# Patient Record
Sex: FEMALE | Race: BLACK OR AFRICAN AMERICAN
[De-identification: names, ages, dates, MRNs, and addresses within clinical notes are randomized per-mention and may not be internally consistent; named-entity substitution may affect disease eponyms.]

---

## 2018-02-15 ENCOUNTER — HOSPITAL ENCOUNTER (OUTPATIENT)
Dept: HOSPITAL 61 - LAB | Age: 83
Discharge: HOME | End: 2018-02-15
Attending: PSYCHIATRY & NEUROLOGY
Payer: MEDICARE

## 2018-02-15 DIAGNOSIS — R41.3: Primary | ICD-10-CM

## 2018-02-15 DIAGNOSIS — E55.9: ICD-10-CM

## 2018-02-15 LAB — VITAMIN-B12: 188 PG/ML (ref 247–911)

## 2018-02-15 PROCEDURE — 36415 COLL VENOUS BLD VENIPUNCTURE: CPT

## 2018-02-15 PROCEDURE — 82306 VITAMIN D 25 HYDROXY: CPT

## 2018-02-15 PROCEDURE — 82607 VITAMIN B-12: CPT

## 2018-03-01 ENCOUNTER — HOSPITAL ENCOUNTER (OUTPATIENT)
Dept: HOSPITAL 61 - RT | Age: 83
Discharge: HOME | End: 2018-03-01
Attending: PSYCHIATRY & NEUROLOGY
Payer: MEDICARE

## 2018-03-01 DIAGNOSIS — R41.0: Primary | ICD-10-CM

## 2018-03-01 PROCEDURE — 95816 EEG AWAKE AND DROWSY: CPT

## 2018-06-13 ENCOUNTER — HOSPITAL ENCOUNTER (OUTPATIENT)
Dept: HOSPITAL 61 - LAB | Age: 83
Discharge: HOME | End: 2018-06-13
Attending: PSYCHIATRY & NEUROLOGY
Payer: MEDICARE

## 2018-06-13 DIAGNOSIS — E55.9: ICD-10-CM

## 2018-06-13 DIAGNOSIS — E53.8: Primary | ICD-10-CM

## 2018-06-13 LAB
FOLATE: 6.03 NG/ML (ref 3.2–20)
VITAMIN-B12: 263 PG/ML (ref 247–911)

## 2018-06-13 PROCEDURE — 82746 ASSAY OF FOLIC ACID SERUM: CPT

## 2018-06-13 PROCEDURE — 82607 VITAMIN B-12: CPT

## 2018-06-13 PROCEDURE — 82306 VITAMIN D 25 HYDROXY: CPT

## 2018-06-13 PROCEDURE — 36415 COLL VENOUS BLD VENIPUNCTURE: CPT

## 2018-09-30 ENCOUNTER — HOSPITAL ENCOUNTER (EMERGENCY)
Dept: HOSPITAL 61 - ER | Age: 83
Discharge: HOME | End: 2018-09-30
Payer: MEDICARE

## 2018-09-30 VITALS — BODY MASS INDEX: 24.16 KG/M2 | HEIGHT: 65 IN | WEIGHT: 145 LBS

## 2018-09-30 VITALS — DIASTOLIC BLOOD PRESSURE: 64 MMHG | SYSTOLIC BLOOD PRESSURE: 155 MMHG

## 2018-09-30 DIAGNOSIS — K44.9: ICD-10-CM

## 2018-09-30 DIAGNOSIS — R55: Primary | ICD-10-CM

## 2018-09-30 DIAGNOSIS — F03.90: ICD-10-CM

## 2018-09-30 DIAGNOSIS — Z90.89: ICD-10-CM

## 2018-09-30 DIAGNOSIS — Z95.5: ICD-10-CM

## 2018-09-30 LAB
ALBUMIN SERPL-MCNC: 3.5 G/DL (ref 3.4–5)
ALBUMIN/GLOB SERPL: 1 {RATIO} (ref 1–1.7)
ALP SERPL-CCNC: 59 U/L (ref 46–116)
ALT SERPL-CCNC: 12 U/L (ref 14–59)
ANION GAP SERPL CALC-SCNC: 12 MMOL/L (ref 6–14)
APTT PPP: (no result) S
AST SERPL-CCNC: 13 U/L (ref 15–37)
BACTERIA #/AREA URNS HPF: 0 /HPF
BASOPHILS # BLD AUTO: 0 X10^3/UL (ref 0–0.2)
BASOPHILS NFR BLD: 0 % (ref 0–3)
BILIRUB SERPL-MCNC: 0.4 MG/DL (ref 0.2–1)
BILIRUB UR QL STRIP: (no result)
BUN SERPL-MCNC: 13 MG/DL (ref 7–20)
BUN/CREAT SERPL: 12 (ref 6–20)
CALCIUM SERPL-MCNC: 9.7 MG/DL (ref 8.5–10.1)
CHLORIDE SERPL-SCNC: 105 MMOL/L (ref 98–107)
CO2 SERPL-SCNC: 25 MMOL/L (ref 21–32)
CREAT SERPL-MCNC: 1.1 MG/DL (ref 0.6–1)
EOSINOPHIL NFR BLD: 0.1 X10^3/UL (ref 0–0.7)
EOSINOPHIL NFR BLD: 1 % (ref 0–3)
ERYTHROCYTE [DISTWIDTH] IN BLOOD BY AUTOMATED COUNT: 18.6 % (ref 11.5–14.5)
FIBRINOGEN PPP-MCNC: CLEAR MG/DL
GFR SERPLBLD BASED ON 1.73 SQ M-ARVRAT: 57.4 ML/MIN
GLOBULIN SER-MCNC: 3.6 G/DL (ref 2.2–3.8)
GLUCOSE SERPL-MCNC: 160 MG/DL (ref 70–99)
HCT VFR BLD CALC: 36.3 % (ref 36–47)
HGB BLD-MCNC: 12 G/DL (ref 12–15.5)
HYALINE CASTS #/AREA URNS LPF: (no result) /HPF
LYMPHOCYTES # BLD: 0.8 X10^3/UL (ref 1–4.8)
LYMPHOCYTES NFR BLD AUTO: 16 % (ref 24–48)
MCH RBC QN AUTO: 28 PG (ref 25–35)
MCHC RBC AUTO-ENTMCNC: 33 G/DL (ref 31–37)
MCV RBC AUTO: 83 FL (ref 79–100)
MONO #: 0.4 X10^3/UL (ref 0–1.1)
MONOCYTES NFR BLD: 7 % (ref 0–9)
NEUT #: 3.7 X10^3UL (ref 1.8–7.7)
NEUTROPHILS NFR BLD AUTO: 75 % (ref 31–73)
NITRITE UR QL STRIP: NEGATIVE
PH UR STRIP: 5 [PH]
PLATELET # BLD AUTO: 202 X10^3/UL (ref 140–400)
POTASSIUM SERPL-SCNC: 3.7 MMOL/L (ref 3.5–5.1)
PROT SERPL-MCNC: 7.1 G/DL (ref 6.4–8.2)
PROT UR STRIP-MCNC: 30 MG/DL
RBC # BLD AUTO: 4.36 X10^6/UL (ref 3.5–5.4)
RBC #/AREA URNS HPF: 0 /HPF (ref 0–2)
SODIUM SERPL-SCNC: 142 MMOL/L (ref 136–145)
SQUAMOUS #/AREA URNS LPF: (no result) /LPF
UROBILINOGEN UR-MCNC: 1 MG/DL
WBC # BLD AUTO: 4.9 X10^3/UL (ref 4–11)
WBC #/AREA URNS HPF: (no result) /HPF (ref 0–4)

## 2018-09-30 PROCEDURE — 84484 ASSAY OF TROPONIN QUANT: CPT

## 2018-09-30 PROCEDURE — 93005 ELECTROCARDIOGRAM TRACING: CPT

## 2018-09-30 PROCEDURE — 82962 GLUCOSE BLOOD TEST: CPT

## 2018-09-30 PROCEDURE — 70450 CT HEAD/BRAIN W/O DYE: CPT

## 2018-09-30 PROCEDURE — 85025 COMPLETE CBC W/AUTO DIFF WBC: CPT

## 2018-09-30 PROCEDURE — 81001 URINALYSIS AUTO W/SCOPE: CPT

## 2018-09-30 PROCEDURE — 51701 INSERT BLADDER CATHETER: CPT

## 2018-09-30 PROCEDURE — 71045 X-RAY EXAM CHEST 1 VIEW: CPT

## 2018-09-30 PROCEDURE — 84443 ASSAY THYROID STIM HORMONE: CPT

## 2018-09-30 PROCEDURE — 80053 COMPREHEN METABOLIC PANEL: CPT

## 2018-09-30 PROCEDURE — 72125 CT NECK SPINE W/O DYE: CPT

## 2018-09-30 PROCEDURE — 36415 COLL VENOUS BLD VENIPUNCTURE: CPT

## 2018-09-30 PROCEDURE — 99285 EMERGENCY DEPT VISIT HI MDM: CPT

## 2018-09-30 PROCEDURE — 96360 HYDRATION IV INFUSION INIT: CPT

## 2018-09-30 NOTE — PHYS DOC
Past Medical History


Past Medical History:  Dementia


Past Surgical History:  Angioplasty, Appendectomy, Other


Additional Past Surgical Histo:  cardiac stent x 3


Alcohol Use:  None


Drug Use:  None





Adult General


Chief Complaint


Chief Complaint:  SYNCOPE





[facility]


HPI





Patient is a 83  year old female who presents with complaint of syncopal 

episode on the toilet at home today. Family was in the bathroom with her and 

stated she just finished defecating on the commode when apparently she became 

difficult to rouse. Patient never fell off the commode and after several 

minutes she came to again. Patient does have a history of dementia and does not 

have any recollection of the event. Patient has never had any type of vasovagal 

syncope in the past when using the bathroom. Patient has no localizing 

neurological deficits at this time and has no acute complaints.





Review of Systems


Review of Systems





Constitutional: Denies fever or chills []


Eyes: Denies change in visual acuity, redness, or eye pain []


HENT: Denies nasal congestion or sore throat []


Respiratory: Denies cough or shortness of breath []


Cardiovascular: No additional information not addressed in HPI []


GI: Denies abdominal pain, nausea, vomiting, bloody stools or diarrhea []


: Denies dysuria or hematuria []


Musculoskeletal: Denies back pain or joint pain []


Integument: Denies rash or skin lesions []


Neurologic: Denies headache, focal weakness or sensory changes []


Endocrine: Denies polyuria or polydipsia []





All other systems were reviewed and found to be within normal limits, except as 

documented in this note.





Allergies


Allergies





Allergies








Coded Allergies Type Severity Reaction Last Updated Verified


 


  No Known Drug Allergies    10/29/16 No











Physical Exam


Physical Exam





Constitutional: Well developed, well nourished, no acute distress, non-toxic 

appearance. []


HENT: Normocephalic, atraumatic, bilateral external ears normal, oropharynx 

moist, no oral exudates, nose normal. []


Eyes: PERRLA, EOMI, conjunctiva normal, no discharge. [] 


Neck: Normal range of motion, no tenderness, supple, no stridor. [] 


Cardiovascular:Heart rate regular rhythm, no murmur []


Lungs & Thorax:  Bilateral breath sounds clear to auscultation []


Abdomen: Bowel sounds normal, soft, no tenderness, no masses, no pulsatile 

masses. [] 


Skin: Warm, dry, no erythema, no rash. [] 


Back: No tenderness, no CVA tenderness. [] 


Extremities: No tenderness, no cyanosis, no clubbing, ROM intact, no edema. [] 


Neurologic: Alert and oriented X 3, normal motor function, normal sensory 

function, no focal deficits noted. []


Psychologic: Affect normal, judgement normal, mood normal. []





Current Patient Data


Vital Signs





 Vital Signs








  Date Time  Temp Pulse Resp B/P (MAP) Pulse Ox O2 Delivery O2 Flow Rate FiO2


 


18 12:18 98.2 70 18 138/85 (102) 97 Room Air  





 98.2       








Lab Values





 Laboratory Tests








Test


 18


12:32 18


12:40 18


13:15


 


Glucose (Fingerstick)


 179 mg/dL


(70-99)  H 


 





 


White Blood Count


 


 4.9 x10^3/uL


(4.0-11.0) 





 


Red Blood Count


 


 4.36 x10^6/uL


(3.50-5.40) 





 


Hemoglobin


 


 12.0 g/dL


(12.0-15.5) 





 


Hematocrit


 


 36.3 %


(36.0-47.0) 





 


Mean Corpuscular Volume


 


 83 fL ()


 





 


Mean Corpuscular Hemoglobin  28 pg (25-35)   


 


Mean Corpuscular Hemoglobin


Concent 


 33 g/dL


(31-37) 





 


Red Cell Distribution Width


 


 18.6 %


(11.5-14.5)  H 





 


Platelet Count


 


 202 x10^3/uL


(140-400) 





 


Neutrophils (%) (Auto)  75 % (31-73)  H 


 


Lymphocytes (%) (Auto)  16 % (24-48)  L 


 


Monocytes (%) (Auto)  7 % (0-9)   


 


Eosinophils (%) (Auto)  1 % (0-3)   


 


Basophils (%) (Auto)  0 % (0-3)   


 


Neutrophils # (Auto)


 


 3.7 x10^3uL


(1.8-7.7) 





 


Lymphocytes # (Auto)


 


 0.8 x10^3/uL


(1.0-4.8)  L 





 


Monocytes # (Auto)


 


 0.4 x10^3/uL


(0.0-1.1) 





 


Eosinophils # (Auto)


 


 0.1 x10^3/uL


(0.0-0.7) 





 


Basophils # (Auto)


 


 0.0 x10^3/uL


(0.0-0.2) 





 


Sodium Level


 


 142 mmol/L


(136-145) 





 


Potassium Level


 


 3.7 mmol/L


(3.5-5.1) 





 


Chloride Level


 


 105 mmol/L


() 





 


Carbon Dioxide Level


 


 25 mmol/L


(21-32) 





 


Anion Gap  12 (6-14)   


 


Blood Urea Nitrogen


 


 13 mg/dL


(7-20) 





 


Creatinine


 


 1.1 mg/dL


(0.6-1.0)  H 





 


Estimated GFR


(Cockcroft-Gault) 


 57.4  


 





 


BUN/Creatinine Ratio  12 (6-20)   


 


Glucose Level


 


 160 mg/dL


(70-99)  H 





 


Calcium Level


 


 9.7 mg/dL


(8.5-10.1) 





 


Total Bilirubin


 


 0.4 mg/dL


(0.2-1.0) 





 


Aspartate Amino Transferase


(AST) 


 13 U/L (15-37)


L 





 


Alanine Aminotransferase (ALT)


 


 12 U/L (14-59)


L 





 


Alkaline Phosphatase


 


 59 U/L


() 





 


Troponin I Quantitative


 


 < 0.017 ng/mL


(0.000-0.055) 





 


Total Protein


 


 7.1 g/dL


(6.4-8.2) 





 


Albumin


 


 3.5 g/dL


(3.4-5.0) 





 


Albumin/Globulin Ratio  1.0 (1.0-1.7)   


 


Thyroid Stimulating Hormone


(TSH) 


 5.120 uIU/mL


(0.358-3.74)  H 





 


Urine Color   [place]  


 


Urine Clarity   Clear  


 


Urine pH   5.0  


 


Urine Specific Gravity   1.025  


 


Urine Protein


 


 


 30 mg/dL


(NEG-TRACE)


 


Urine Glucose (UA)


 


 


 Negative mg/dL


(NEG)


 


Urine Ketones (Stick)


 


 


 Trace mg/dL


(NEG)


 


Urine Blood


 


 


 Negative (NEG)





 


Urine Nitrite


 


 


 Negative (NEG)





 


Urine Bilirubin


 


 


 Moderate (NEG)





 


Urine Urobilinogen Dipstick


 


 


 1.0 mg/dL (0.2


mg/dL)


 


Urine Leukocyte Esterase


 


 


 Negative (NEG)





 


Urine RBC   0 /HPF (0-2)  


 


Urine WBC


 


 


 Occ /HPF (0-4)





 


Urine Squamous Epithelial


Cells 


 


 Occ /LPF  





 


Urine Bacteria


 


 


 0 /HPF (0-FEW)





 


Urine Hyaline Casts


 


 


 Occasional


/HPF


 


Urine Mucus   Mod /LPF  





 Laboratory Tests


18 12:40








 Laboratory Tests


18 12:40














EKG


EKG


Normal sinus rhythm at a rate of 75 with a rare PVC[]





Radiology/Procedures


Radiology/Procedures


[][facility]


 [address] [place] [address]


 [phone]


 


 IMAGING REPORT





 Signed





PATIENT: MARY OBREGON ACCOUNT: [id number] MRN#: [id number]


: 1935 LOCATION: ER AGE: 83


SEX: F EXAM DT: 18 ACCESSION#: [id number]


STATUS: REG ER ORD. PHYSICIAN: SONG ROJAS MD 


REASON: syncopy


PROCEDURE: PORTABLE CHEST 1V





 


EXAM: CHEST 1 VIEW 


 


History: Syncope 


 


COMPARISON: 2018


 


TECHNIQUE: Single portable radiograph of the chest


 


FINDINGS:  The cardiac silhouette is unremarkable. Moderate-sized hiatal 


hernia. Minimal left lung base atelectasis.


 


IMPRESSION:


 


1. No acute cardiopulmonary findings.


 


2. Moderate size hiatal hernia.


 


 


Electronically signed by: Matthew Hogue MD (2018 1:45 PM) [facility]














DICTATED and SIGNED BY:     MATTHEW HOGUE MD


DATE:     18 1344








 [id number] [place] [address]


 [phone]


 


 IMAGING REPORT





 Signed





PATIENT: MARY OBREGON ACCOUNT: [id number] MRN#: [id number]


: 1935 LOCATION: ER AGE: 83


SEX: F EXAM DT: 18 ACCESSION#: [id number]


STATUS: REG ER ORD. PHYSICIAN: SONG ROJAS MD 


REASON: syncopy


PROCEDURE: CT HEAD AND CERVICAL SPINE WO





Examination: CT head and cervical spine without contrast


 


 


 


CT HEAD


 


INDICATION: SYNCOPE


 


COMPARISON: 2018


 


Exposure: One or more of the following individualized dose reduction 


techniques were utilized for this examination:  1. Automated exposure 


control  2. Adjustment of the mA and/or kV according to patient size  3. 


Use of iterative reconstruction technique


 


TECHNIQUE: 5 mm contiguous axial images were obtained from the skull base 


to the vertex in both bone and soft tissue algorithm.  


 


FINDINGS: 


 


Mild bilateral periventricular white matter hypodensities likely chronic 


small vessel ischemic disease.  


 


No evidence of acute intracranial hemorrhage.   No extra-axial fluid 


collections.


 


No mass effect or midline shift. Ventricular size is appropriate.  Basal 


cisterns are patent.


 


No fractures identified.Gray-white differentiation is preserved.Globes and


orbits are within normal limits.   Paranasal sinuses and mastoid air cells


are clear.   


 


IMPRESSION:


 


No acute intracranial findings. 


 


 


 


CT CERVICAL SPINE


 


INDICATION: SYNCOPE. 


 


COMPARISON: None Available.


 


Technique: axial images were obtained from the skull base through the 


cervicothoracic junction Additional sagittal and coronal reconstructions 


were also performed. 


 


FINDINGS: Vertebral body height and alignment are maintained.  Cervical 


lordosis is preserved.  The lateral masses of C1 are aligned upon C2.  


 


No fractures identified.  The bony canal is patent throughout.


 


Moderate intervertebral disc height loss identified throughout the 


cervical spine particularly at C3-C4, C5-C6, C6-7 vertebral levels. The 


bilateral facets are well aligned.  


 


The paraspinous soft tissues are unremarkable.  Visualized intracranial 


contents are unremarkable.  Lung apices are clear. 


 


IMPRESSION:


 


 


1. No acute fracture pf the cervical spine particularly clinically.


 


 


2. Moderate degenerative changes cervical spine.  


 


Electronically signed by: Matthew Hogue MD (2018 1:11 PM) [facility]














DICTATED and SIGNED BY:     MATTHEW HOGUE MD


DATE:     18 1303





Course & Med Decision Making


Course & Med Decision Making


Pertinent Labs and Imaging studies reviewed. (See chart for details)





[]





Dragon Disclaimer


Dragon Disclaimer


This electronic medical record was generated, in whole or in part, using a 

voice recognition dictation system.





Departure


Departure


Impression:  


 Primary Impression:  


 Vasovagal syncope


Disposition:  01 HOME, SELF-CARE


Condition:  IMPROVED


Referrals:  


VIMAL BERNAL MD (PCP)


Patient Instructions:  Syncope, Easy-to-Read





Additional Instructions:  


Please follow up with her primary care in 1-2 days for further evaluation and 

management of your condition











SONG ROJAS MD Sep 30, 2018 13:01

## 2018-09-30 NOTE — EKG
[facility]

              [address] [place] [address]

Test Date:    2018               Test Time:    12:26:39

Pat Name:     MARY OBREGON            Department:   

Patient ID:   [id number]           Room:          

Gender:       F                        Technician:   

:          1935               Requested By: SONG ROJAS

Order Number: [id number]           Reading MD:   Ayush Rizvi MD

                                 Measurements

Intervals                              Axis          

Rate:         75                       P:            169

IN:           196                      QRS:          -168

QRSD:         96                       T:            161

QT:           390                                    

QTc:          438                                    

                           Interpretive Statements

SINUS RHYTHM

LIMB LEAD MISPLACEMENT

PVC

Electronically Signed On 10-1-2018 14:02:01 CDT by Ayush Rizvi MD

## 2018-09-30 NOTE — RAD
EXAM: CHEST 1 VIEW 

 

History: Syncope 

 

COMPARISON: 8/30/2018

 

TECHNIQUE: Single portable radiograph of the chest

 

FINDINGS:  The cardiac silhouette is unremarkable. Moderate-sized hiatal 

hernia. Minimal left lung base atelectasis.

 

IMPRESSION:

 

1. No acute cardiopulmonary findings.

 

2. Moderate size hiatal hernia.

 

 

Electronically signed by: Matthwe Hogue MD (9/30/2018 1:45 PM) [facility]

## 2018-09-30 NOTE — RAD
Examination: CT head and cervical spine without contrast

 

 

 

CT HEAD

 

INDICATION: SYNCOPE

 

COMPARISON: 8/30/2018

 

Exposure: One or more of the following individualized dose reduction 

techniques were utilized for this examination:  1. Automated exposure 

control  2. Adjustment of the mA and/or kV according to patient size  3. 

Use of iterative reconstruction technique

 

TECHNIQUE: 5 mm contiguous axial images were obtained from the skull base 

to the vertex in both bone and soft tissue algorithm.  

 

FINDINGS: 

 

Mild bilateral periventricular white matter hypodensities likely chronic 

small vessel ischemic disease.  

 

No evidence of acute intracranial hemorrhage.   No extra-axial fluid 

collections.

 

No mass effect or midline shift. Ventricular size is appropriate.  Basal 

cisterns are patent.

 

No fractures identified.Gray-white differentiation is preserved.Globes and

orbits are within normal limits.   Paranasal sinuses and mastoid air cells

are clear.   

 

IMPRESSION:

 

No acute intracranial findings. 

 

 

 

CT CERVICAL SPINE

 

INDICATION: SYNCOPE. 

 

COMPARISON: None Available.

 

Technique: axial images were obtained from the skull base through the 

cervicothoracic junction Additional sagittal and coronal reconstructions 

were also performed. 

 

FINDINGS: Vertebral body height and alignment are maintained.  Cervical 

lordosis is preserved.  The lateral masses of C1 are aligned upon C2.  

 

No fractures identified.  The bony canal is patent throughout.

 

Moderate intervertebral disc height loss identified throughout the 

cervical spine particularly at C3-C4, C5-C6, C6-7 vertebral levels. The 

bilateral facets are well aligned.  

 

The paraspinous soft tissues are unremarkable.  Visualized intracranial 

contents are unremarkable.  Lung apices are clear. 

 

IMPRESSION:

 

 

1. No acute fracture pf the cervical spine particularly clinically.

 

 

2. Moderate degenerative changes cervical spine.  

 

Electronically signed by: Matthew Hogue MD (9/30/2018 1:11 PM) [facility]

## 2018-10-14 ENCOUNTER — HOSPITAL ENCOUNTER (INPATIENT)
Dept: HOSPITAL 61 - ER | Age: 83
LOS: 10 days | Discharge: NURSING, REHAB OR LONG-TERM CARE | DRG: 97 | End: 2018-10-24
Attending: INTERNAL MEDICINE | Admitting: INTERNAL MEDICINE
Payer: MEDICARE

## 2018-10-14 VITALS — BODY MASS INDEX: 22.56 KG/M2 | WEIGHT: 127.31 LBS | HEIGHT: 63 IN

## 2018-10-14 DIAGNOSIS — F02.80: ICD-10-CM

## 2018-10-14 DIAGNOSIS — Z90.710: ICD-10-CM

## 2018-10-14 DIAGNOSIS — I25.10: ICD-10-CM

## 2018-10-14 DIAGNOSIS — M19.90: ICD-10-CM

## 2018-10-14 DIAGNOSIS — E78.5: ICD-10-CM

## 2018-10-14 DIAGNOSIS — E86.0: ICD-10-CM

## 2018-10-14 DIAGNOSIS — I49.3: ICD-10-CM

## 2018-10-14 DIAGNOSIS — Z82.3: ICD-10-CM

## 2018-10-14 DIAGNOSIS — Z82.49: ICD-10-CM

## 2018-10-14 DIAGNOSIS — R13.10: ICD-10-CM

## 2018-10-14 DIAGNOSIS — I25.2: ICD-10-CM

## 2018-10-14 DIAGNOSIS — Z80.41: ICD-10-CM

## 2018-10-14 DIAGNOSIS — E11.9: ICD-10-CM

## 2018-10-14 DIAGNOSIS — I10: ICD-10-CM

## 2018-10-14 DIAGNOSIS — G04.90: Primary | ICD-10-CM

## 2018-10-14 DIAGNOSIS — I67.83: ICD-10-CM

## 2018-10-14 DIAGNOSIS — G93.6: ICD-10-CM

## 2018-10-14 DIAGNOSIS — K59.00: ICD-10-CM

## 2018-10-14 DIAGNOSIS — Z95.5: ICD-10-CM

## 2018-10-14 DIAGNOSIS — E44.0: ICD-10-CM

## 2018-10-14 DIAGNOSIS — D72.819: ICD-10-CM

## 2018-10-14 DIAGNOSIS — D72.829: ICD-10-CM

## 2018-10-14 DIAGNOSIS — Z83.3: ICD-10-CM

## 2018-10-14 LAB
ALBUMIN SERPL-MCNC: 3 G/DL (ref 3.4–5)
ALBUMIN/GLOB SERPL: 0.9 {RATIO} (ref 1–1.7)
ALP SERPL-CCNC: 59 U/L (ref 46–116)
ALT SERPL-CCNC: 10 U/L (ref 14–59)
ANION GAP SERPL CALC-SCNC: 9 MMOL/L (ref 6–14)
APTT PPP: YELLOW S
AST SERPL-CCNC: 13 U/L (ref 15–37)
BACTERIA #/AREA URNS HPF: (no result) /HPF
BASOPHILS # BLD AUTO: 0 X10^3/UL (ref 0–0.2)
BASOPHILS NFR BLD: 1 % (ref 0–3)
BILIRUB SERPL-MCNC: 0.4 MG/DL (ref 0.2–1)
BILIRUB UR QL STRIP: (no result)
BUN SERPL-MCNC: 11 MG/DL (ref 7–20)
BUN/CREAT SERPL: 11 (ref 6–20)
CALCIUM SERPL-MCNC: 8.7 MG/DL (ref 8.5–10.1)
CHLORIDE SERPL-SCNC: 108 MMOL/L (ref 98–107)
CO2 SERPL-SCNC: 26 MMOL/L (ref 21–32)
CREAT SERPL-MCNC: 1 MG/DL (ref 0.6–1)
EOSINOPHIL NFR BLD: 0.1 X10^3/UL (ref 0–0.7)
EOSINOPHIL NFR BLD: 2 % (ref 0–3)
ERYTHROCYTE [DISTWIDTH] IN BLOOD BY AUTOMATED COUNT: 19 % (ref 11.5–14.5)
FIBRINOGEN PPP-MCNC: CLEAR MG/DL
GFR SERPLBLD BASED ON 1.73 SQ M-ARVRAT: 64.1 ML/MIN
GLOBULIN SER-MCNC: 3.3 G/DL (ref 2.2–3.8)
GLUCOSE SERPL-MCNC: 84 MG/DL (ref 70–99)
HCT VFR BLD CALC: 38.7 % (ref 36–47)
HGB BLD-MCNC: 12.8 G/DL (ref 12–15.5)
LYMPHOCYTES # BLD: 0.9 X10^3/UL (ref 1–4.8)
LYMPHOCYTES NFR BLD AUTO: 21 % (ref 24–48)
MCH RBC QN AUTO: 28 PG (ref 25–35)
MCHC RBC AUTO-ENTMCNC: 33 G/DL (ref 31–37)
MCV RBC AUTO: 84 FL (ref 79–100)
MONO #: 0.4 X10^3/UL (ref 0–1.1)
MONOCYTES NFR BLD: 10 % (ref 0–9)
NEUT #: 2.7 X10^3UL (ref 1.8–7.7)
NEUTROPHILS NFR BLD AUTO: 67 % (ref 31–73)
NITRITE UR QL STRIP: NEGATIVE
PH UR STRIP: 5.5 [PH]
PLATELET # BLD AUTO: 233 X10^3/UL (ref 140–400)
POTASSIUM SERPL-SCNC: 3.6 MMOL/L (ref 3.5–5.1)
PROT SERPL-MCNC: 6.3 G/DL (ref 6.4–8.2)
PROT UR STRIP-MCNC: NEGATIVE MG/DL
RBC # BLD AUTO: 4.59 X10^6/UL (ref 3.5–5.4)
RBC #/AREA URNS HPF: (no result) /HPF (ref 0–2)
SODIUM SERPL-SCNC: 143 MMOL/L (ref 136–145)
SQUAMOUS #/AREA URNS LPF: (no result) /LPF
UROBILINOGEN UR-MCNC: 1 MG/DL
WBC # BLD AUTO: 4.1 X10^3/UL (ref 4–11)
WBC #/AREA URNS HPF: (no result) /HPF (ref 0–4)

## 2018-10-14 PROCEDURE — 70450 CT HEAD/BRAIN W/O DYE: CPT

## 2018-10-14 PROCEDURE — 85025 COMPLETE CBC W/AUTO DIFF WBC: CPT

## 2018-10-14 PROCEDURE — 86592 SYPHILIS TEST NON-TREP QUAL: CPT

## 2018-10-14 PROCEDURE — 84443 ASSAY THYROID STIM HORMONE: CPT

## 2018-10-14 PROCEDURE — 86789 WEST NILE VIRUS ANTIBODY: CPT

## 2018-10-14 PROCEDURE — 93306 TTE W/DOPPLER COMPLETE: CPT

## 2018-10-14 PROCEDURE — 71045 X-RAY EXAM CHEST 1 VIEW: CPT

## 2018-10-14 PROCEDURE — 82945 GLUCOSE OTHER FLUID: CPT

## 2018-10-14 PROCEDURE — 87071 CULTURE AEROBIC QUANT OTHER: CPT

## 2018-10-14 PROCEDURE — 89051 BODY FLUID CELL COUNT: CPT

## 2018-10-14 PROCEDURE — 81001 URINALYSIS AUTO W/SCOPE: CPT

## 2018-10-14 PROCEDURE — 87529 HSV DNA AMP PROBE: CPT

## 2018-10-14 PROCEDURE — 86788 WEST NILE VIRUS AB IGM: CPT

## 2018-10-14 PROCEDURE — 93005 ELECTROCARDIOGRAM TRACING: CPT

## 2018-10-14 PROCEDURE — 80048 BASIC METABOLIC PNL TOTAL CA: CPT

## 2018-10-14 PROCEDURE — 74018 RADEX ABDOMEN 1 VIEW: CPT

## 2018-10-14 PROCEDURE — 80202 ASSAY OF VANCOMYCIN: CPT

## 2018-10-14 PROCEDURE — 80053 COMPREHEN METABOLIC PANEL: CPT

## 2018-10-14 PROCEDURE — 62270 DX LMBR SPI PNXR: CPT

## 2018-10-14 PROCEDURE — 36415 COLL VENOUS BLD VENIPUNCTURE: CPT

## 2018-10-14 PROCEDURE — 87075 CULTR BACTERIA EXCEPT BLOOD: CPT

## 2018-10-14 PROCEDURE — 83735 ASSAY OF MAGNESIUM: CPT

## 2018-10-14 PROCEDURE — 87102 FUNGUS ISOLATION CULTURE: CPT

## 2018-10-14 PROCEDURE — 70551 MRI BRAIN STEM W/O DYE: CPT

## 2018-10-14 PROCEDURE — 85730 THROMBOPLASTIN TIME PARTIAL: CPT

## 2018-10-14 PROCEDURE — 82962 GLUCOSE BLOOD TEST: CPT

## 2018-10-14 PROCEDURE — 96360 HYDRATION IV INFUSION INIT: CPT

## 2018-10-14 PROCEDURE — 86038 ANTINUCLEAR ANTIBODIES: CPT

## 2018-10-14 PROCEDURE — 85651 RBC SED RATE NONAUTOMATED: CPT

## 2018-10-14 PROCEDURE — 87252 VIRUS INOCULATION TISSUE: CPT

## 2018-10-14 PROCEDURE — 85610 PROTHROMBIN TIME: CPT

## 2018-10-14 PROCEDURE — 84484 ASSAY OF TROPONIN QUANT: CPT

## 2018-10-14 PROCEDURE — 84157 ASSAY OF PROTEIN OTHER: CPT

## 2018-10-14 PROCEDURE — 85007 BL SMEAR W/DIFF WBC COUNT: CPT

## 2018-10-14 PROCEDURE — 83605 ASSAY OF LACTIC ACID: CPT

## 2018-10-14 NOTE — RAD
PORTABLE CHEST 1V

 

History: Altered mental status

 

Comparison: September 30, 2018

 

Findings:

Single view of the chest is submitted. 

There is moderate to large hiatal hernia. Heart size is similar. There is 

atherosclerotic calcification thoracic aorta. There is no lobar 

infiltrate, pleural fluid, pneumothorax. There is degenerative change of 

the left shoulder.

 

Impression: 

 

1.  There is no evidence of acute cardiopulmonary disease.

2. There is hiatal hernia.

 

Electronically signed by: Russ Willoughby MD (10/14/2018 9:04 PM) [facility]

## 2018-10-14 NOTE — RAD
CT HEAD

 

INDICATION: Altered mental status

 

COMPARISON: 9/30/2018

 

Exposure: One or more of the following individualized dose reduction 

techniques were utilized for this examination:  1. Automated exposure 

control  2. Adjustment of the mA and/or kV according to patient size  3. 

Use of iterative reconstruction technique

 

TECHNIQUE: 5 mm contiguous axial images were obtained from the skull base 

to the vertex in both bone and soft tissue algorithm.  

 

FINDINGS: 

 

Moderate bilateral periventricular white matter hypodensities likely 

chronic small vessel ischemic disease.  Examination limited due to motion 

artifact.

 

No evidence of acute intracranial hemorrhage.   No extra-axial fluid 

collections.

 

No mass effect or midline shift. Ventricular size is appropriate.  Basal 

cisterns are patent.

 

No fractures identified.Gray-white differentiation is preserved.Globes and

orbits are within normal limits.   Paranasal sinuses and mastoid air cells

are clear.   

 

IMPRESSION:

 

 

No acute intracranial findings. 

 

Electronically signed by: Matthew Hogue MD (10/14/2018 10:31 PM) 

[facility]

## 2018-10-14 NOTE — PHYS DOC
Past Medical History


Past Medical History:  Dementia


Past Surgical History:  Angioplasty, Appendectomy, Other


Additional Past Surgical Histo:  cardiac stent x 3


Alcohol Use:  None


Drug Use:  None





Adult General


Chief Complaint


Chief Complaint:  ALTERED MENTAL STATUS





[facility]


HPI





Patient is a 83  year old female who presents with altered mental status. 

Patient lives at home with family members and family member states that she 

became quiet as of last night. Normally she is verbal and was able to ask for 

things and move around. They noted that she was in bed all day today wasn't 

eating. Patient also did not take her medications today. During the day when 

she was assisted to the bathroom they noted that her urine was a dark brown and 

very concentrated. They also noted that she complained of abdominal pain 

earlier today. This been no complaint of chest pain and no vomiting or 

diarrhea. There are no other acute complaints at this time.





Review of Systems


Review of Systems





Constitutional: Denies fever or chills []


Eyes: Denies change in visual acuity, redness, or eye pain []


HENT: Denies nasal congestion or sore throat []


Respiratory: Denies cough or shortness of breath []


Cardiovascular: No additional information not addressed in HPI []


GI: Positive for abdominal pain, denies nausea, vomiting, bloody stools or 

diarrhea []


: Denies dysuria or hematuria []


Musculoskeletal: Denies back pain or joint pain []


Integument: Denies rash or skin lesions []


Neurologic: Denies headache, focal weakness or sensory changes []


Endocrine: Denies polyuria or polydipsia []





All other systems were reviewed and found to be within normal limits, except as 

documented in this note.





Current Medications


Current Medications





Current Medications








 Medications


  (Trade)  Dose


 Ordered  Sig/Td  Start Time


 Stop Time Status Last Admin


Dose Admin


 


 Sodium Chloride  1,000 ml @ 


 100 mls/hr  1X  ONCE  10/14/18 20:30


 10/15/18 06:29  10/14/18 21:30


100 MLS/HR











Allergies


Allergies





Allergies








Coded Allergies Type Severity Reaction Last Updated Verified


 


  No Known Drug Allergies    10/29/16 No











Physical Exam


Physical Exam





Constitutional: Well developed, well nourished, no acute distress, non-toxic 

appearance. []


HENT: Normocephalic, atraumatic, bilateral external ears normal, oropharynx 

moist, no oral exudates, nose normal. []


Eyes: PERRLA, EOMI, conjunctiva normal, no discharge. [] 


Neck: Normal range of motion, no tenderness, supple, no stridor. [] 


Cardiovascular:Heart rate regular rhythm, no murmur []


Lungs & Thorax:  Bilateral breath sounds clear to auscultation []


Abdomen: Bowel sounds normal, soft, mild lower tenderness, no masses, no 

pulsatile masses. [] 


Skin: Warm, dry, no erythema, no rash. [] 


Back: No tenderness, no CVA tenderness. [] 


Extremities: No tenderness, no cyanosis, no clubbing, ROM intact, no edema. [] 


Neurologic: Alert and oriented X 3, normal motor function, normal sensory 

function, no focal deficits noted. []


Psychologic: Affect normal, judgement normal, mood normal. []





Current Patient Data


Vital Signs





 Vital Signs








  Date Time  Temp Pulse Resp B/P (MAP) Pulse Ox O2 Delivery O2 Flow Rate FiO2


 


10/14/18 20:30 97.3 70 24 161/78 (105) 96 Room Air  





 97.3       








Lab Values





 Laboratory Tests








Test


 10/14/18


20:24 10/14/18


20:30 10/14/18


21:12 10/14/18


21:45


 


Glucose (Fingerstick)


 87 mg/dL


(70-99) 


 


 





 


White Blood Count


 


 4.1 x10^3/uL


(4.0-11.0) 


 





 


Red Blood Count


 


 4.59 x10^6/uL


(3.50-5.40) 


 





 


Hemoglobin


 


 12.8 g/dL


(12.0-15.5) 


 





 


Hematocrit


 


 38.7 %


(36.0-47.0) 


 





 


Mean Corpuscular Volume


 


 84 fL ()


 


 





 


Mean Corpuscular Hemoglobin  28 pg (25-35)    


 


Mean Corpuscular Hemoglobin


Concent 


 33 g/dL


(31-37) 


 





 


Red Cell Distribution Width


 


 19.0 %


(11.5-14.5)  H 


 





 


Platelet Count


 


 233 x10^3/uL


(140-400) 


 





 


Neutrophils (%) (Auto)  67 % (31-73)    


 


Lymphocytes (%) (Auto)  21 % (24-48)  L  


 


Monocytes (%) (Auto)  10 % (0-9)  H  


 


Eosinophils (%) (Auto)  2 % (0-3)    


 


Basophils (%) (Auto)  1 % (0-3)    


 


Neutrophils # (Auto)


 


 2.7 x10^3uL


(1.8-7.7) 


 





 


Lymphocytes # (Auto)


 


 0.9 x10^3/uL


(1.0-4.8)  L 


 





 


Monocytes # (Auto)


 


 0.4 x10^3/uL


(0.0-1.1) 


 





 


Eosinophils # (Auto)


 


 0.1 x10^3/uL


(0.0-0.7) 


 





 


Basophils # (Auto)


 


 0.0 x10^3/uL


(0.0-0.2) 


 





 


Lactic Acid Level


 


 1.3 mmol/L


(0.4-2.0) 


 





 


Urine Collection Type   Unknown   


 


Urine Color   Yellow   


 


Urine Clarity   Clear   


 


Urine pH   5.5   


 


Urine Specific Gravity   1.020   


 


Urine Protein


 


 


 Negative mg/dL


(NEG-TRACE) 





 


Urine Glucose (UA)


 


 


 Negative mg/dL


(NEG) 





 


Urine Ketones (Stick)


 


 


 15 mg/dL (NEG)


 





 


Urine Blood


 


 


 Negative (NEG)


 





 


Urine Nitrite


 


 


 Negative (NEG)


 





 


Urine Bilirubin   Small (NEG)   


 


Urine Urobilinogen Dipstick


 


 


 1.0 mg/dL (0.2


mg/dL) 





 


Urine Leukocyte Esterase


 


 


 Negative (NEG)


 





 


Urine RBC


 


 


 Occ /HPF (0-2)


 





 


Urine WBC


 


 


 Occ /HPF (0-4)


 





 


Urine Squamous Epithelial


Cells 


 


 Occ /LPF  


 





 


Urine Bacteria


 


 


 Few /HPF


(0-FEW) 





 


Urine Mucus   Mod /LPF   


 


Sodium Level


 


 


 


 143 mmol/L


(136-145)


 


Potassium Level


 


 


 


 3.6 mmol/L


(3.5-5.1)


 


Chloride Level


 


 


 


 108 mmol/L


()  H


 


Carbon Dioxide Level


 


 


 


 26 mmol/L


(21-32)


 


Anion Gap    9 (6-14)  


 


Blood Urea Nitrogen


 


 


 


 11 mg/dL


(7-20)


 


Creatinine


 


 


 


 1.0 mg/dL


(0.6-1.0)


 


Estimated GFR


(Cockcroft-Gault) 


 


 


 64.1  





 


BUN/Creatinine Ratio    11 (6-20)  


 


Glucose Level


 


 


 


 84 mg/dL


(70-99)


 


Calcium Level


 


 


 


 8.7 mg/dL


(8.5-10.1)


 


Total Bilirubin


 


 


 


 0.4 mg/dL


(0.2-1.0)


 


Aspartate Amino Transferase


(AST) 


 


 


 13 U/L (15-37)


L


 


Alanine Aminotransferase (ALT)


 


 


 


 10 U/L (14-59)


L


 


Alkaline Phosphatase


 


 


 


 59 U/L


()


 


Troponin I Quantitative


 


 


 


 < 0.017 ng/mL


(0.000-0.055)


 


Total Protein


 


 


 


 6.3 g/dL


(6.4-8.2)  L


 


Albumin


 


 


 


 3.0 g/dL


(3.4-5.0)  L


 


Albumin/Globulin Ratio


 


 


 


 0.9 (1.0-1.7)


L


 


Thyroid Stimulating Hormone


(TSH) 


 


 


 2.289 uIU/mL


(0.358-3.74)





 Laboratory Tests


10/14/18 20:30








 Laboratory Tests


10/14/18 21:45











EKG


EKG


Bigeminy at a rate of 76[]





Radiology/Procedures


Radiology/Procedures


[id number] [place] [address]


 [phone]


 


 IMAGING REPORT





 Signed





PATIENT: MARY OBREGON ACCOUNT: [id number] MRN#: [id number]


: 1935 LOCATION: ER AGE: 83


SEX: F EXAM DT: 10/14/18 ACCESSION#: [id number]


STATUS: REG ER ORD. PHYSICIAN: SONG ROJAS MD 


REASON: AMS


PROCEDURE: CT HEAD WO CONTRAST





 


CT HEAD


 


INDICATION: Altered mental status


 


COMPARISON: 2018


 


Exposure: One or more of the following individualized dose reduction 


techniques were utilized for this examination:  1. Automated exposure 


control  2. Adjustment of the mA and/or kV according to patient size  3. 


Use of iterative reconstruction technique


 


TECHNIQUE: 5 mm contiguous axial images were obtained from the skull base 


to the vertex in both bone and soft tissue algorithm.  


 


FINDINGS: 


 


Moderate bilateral periventricular white matter hypodensities likely 


chronic small vessel ischemic disease.  Examination limited due to motion 


artifact.


 


No evidence of acute intracranial hemorrhage.   No extra-axial fluid 


collections.


 


No mass effect or midline shift. Ventricular size is appropriate.  Basal 


cisterns are patent.


 


No fractures identified.Gray-white differentiation is preserved.Globes and


orbits are within normal limits.   Paranasal sinuses and mastoid air cells


are clear.   


 


IMPRESSION:


 


 


No acute intracranial findings. 


 


Electronically signed by: Matthew Hogue MD (10/14/2018 10:31 PM) 


[facility]














DICTATED and SIGNED BY:     MATTHEW HOGUE MD


DATE:     10/14/18 2227


[]





 [facility]


 [address] [place] [address]


 [phone]


 


 IMAGING REPORT





 Signed





PATIENT: MARY OBREGON ACCOUNT: [id number] MRN#: [id number]


: 1935 LOCATION: ER AGE: 83


SEX: F EXAM DT: 10/14/18 ACCESSION#: [id number]


STATUS: REG ER ORD. PHYSICIAN: SONG ROJAS MD 


REASON: AMS


PROCEDURE: PORTABLE CHEST 1V





PORTABLE CHEST 1V


 


History: Altered mental status


 


Comparison: 2018


 


Findings:


Single view of the chest is submitted. 


There is moderate to large hiatal hernia. Heart size is similar. There is 


atherosclerotic calcification thoracic aorta. There is no lobar 


infiltrate, pleural fluid, pneumothorax. There is degenerative change of 


the left shoulder.


 


Impression: 


 


1.  There is no evidence of acute cardiopulmonary disease.


2. There is hiatal hernia.


 


Electronically signed by: Deisy Lopez MD (10/14/2018 9:04 PM) [facility]














DICTATED and SIGNED BY:     DEISY LOPEZ MD


DATE:     10/14/18 2103





Course & Med Decision Making


Course & Med Decision Making


Pertinent Labs and Imaging studies reviewed. (See chart for details)


Discussion with family they did mention that there might be some dementia but 

that even given the patient's declining mental state over the last 1-2 days 

with a thank is different is her minimally verbal state. While in the emergency 

department when I came in as part of my reevaluation process and asked her how 

she feeling the patient did say okay and was able to look at me and track 

across the room. The daughter/caregiver stated that was new that she was 

speaking now.





The daughter also said there at least 5 different family members the rotate 

within a 24-hour period taking care of her and that they use to keep a log book 

but they don't anymore in terms of being able to note and have first-person 

accurate record of what happened on each caregiver shift. The current caregiver 

is unable to give me much information and in trying to contact other family 

members is going to voicemail. Due to the possibility of this being dementia 

versus a TIA was decided to go ahead and admit for further evaluation and 

management.


[]





Dragon Disclaimer


Dragon Disclaimer


This electronic medical record was generated, in whole or in part, using a 

voice recognition dictation system.





Departure


Departure


Impression:  


 Primary Impression:  


 Altered mental status


Disposition:   ADMITTED AS INPATIENT


Admitting Physician:  Purvi Dao


Condition:  STABLE


Referrals:  


VIMAL BERNAL MD (PCP)











SOGN ROJAS MD Oct 14, 2018 20:24

## 2018-10-15 VITALS — SYSTOLIC BLOOD PRESSURE: 142 MMHG | DIASTOLIC BLOOD PRESSURE: 79 MMHG

## 2018-10-15 VITALS — SYSTOLIC BLOOD PRESSURE: 149 MMHG | DIASTOLIC BLOOD PRESSURE: 82 MMHG

## 2018-10-15 VITALS — SYSTOLIC BLOOD PRESSURE: 155 MMHG | DIASTOLIC BLOOD PRESSURE: 59 MMHG

## 2018-10-15 VITALS — DIASTOLIC BLOOD PRESSURE: 85 MMHG | SYSTOLIC BLOOD PRESSURE: 137 MMHG

## 2018-10-15 VITALS — SYSTOLIC BLOOD PRESSURE: 150 MMHG | DIASTOLIC BLOOD PRESSURE: 74 MMHG

## 2018-10-15 VITALS — SYSTOLIC BLOOD PRESSURE: 151 MMHG | DIASTOLIC BLOOD PRESSURE: 81 MMHG

## 2018-10-15 LAB
ANION GAP SERPL CALC-SCNC: 11 MMOL/L (ref 6–14)
BASOPHILS # BLD AUTO: 0 X10^3/UL (ref 0–0.2)
BASOPHILS NFR BLD: 0 % (ref 0–3)
BUN SERPL-MCNC: 9 MG/DL (ref 7–20)
CALCIUM SERPL-MCNC: 8.7 MG/DL (ref 8.5–10.1)
CHLORIDE SERPL-SCNC: 107 MMOL/L (ref 98–107)
CO2 SERPL-SCNC: 23 MMOL/L (ref 21–32)
CREAT SERPL-MCNC: 0.9 MG/DL (ref 0.6–1)
EOSINOPHIL NFR BLD: 0.1 X10^3/UL (ref 0–0.7)
EOSINOPHIL NFR BLD: 1 % (ref 0–3)
ERYTHROCYTE [DISTWIDTH] IN BLOOD BY AUTOMATED COUNT: 18.8 % (ref 11.5–14.5)
GFR SERPLBLD BASED ON 1.73 SQ M-ARVRAT: 72.4 ML/MIN
GLUCOSE SERPL-MCNC: 74 MG/DL (ref 70–99)
HCT VFR BLD CALC: 35.9 % (ref 36–47)
HGB BLD-MCNC: 11.8 G/DL (ref 12–15.5)
LYMPHOCYTES # BLD: 1 X10^3/UL (ref 1–4.8)
LYMPHOCYTES NFR BLD AUTO: 26 % (ref 24–48)
MCH RBC QN AUTO: 28 PG (ref 25–35)
MCHC RBC AUTO-ENTMCNC: 33 G/DL (ref 31–37)
MCV RBC AUTO: 84 FL (ref 79–100)
MONO #: 0.4 X10^3/UL (ref 0–1.1)
MONOCYTES NFR BLD: 10 % (ref 0–9)
NEUT #: 2.5 X10^3UL (ref 1.8–7.7)
NEUTROPHILS NFR BLD AUTO: 63 % (ref 31–73)
PLATELET # BLD AUTO: 206 X10^3/UL (ref 140–400)
POTASSIUM SERPL-SCNC: 3.3 MMOL/L (ref 3.5–5.1)
RBC # BLD AUTO: 4.28 X10^6/UL (ref 3.5–5.4)
SODIUM SERPL-SCNC: 141 MMOL/L (ref 136–145)
WBC # BLD AUTO: 3.9 X10^3/UL (ref 4–11)

## 2018-10-15 RX ADMIN — DEXTROSE, SODIUM CHLORIDE, AND POTASSIUM CHLORIDE SCH MLS/HR: 5; .45; .15 INJECTION INTRAVENOUS at 10:54

## 2018-10-15 RX ADMIN — VANCOMYCIN HYDROCHLORIDE PRN EACH: 1 INJECTION, POWDER, LYOPHILIZED, FOR SOLUTION INTRAVENOUS at 18:45

## 2018-10-15 RX ADMIN — DEXAMETHASONE SODIUM PHOSPHATE SCH MG: 4 INJECTION, SOLUTION INTRAMUSCULAR; INTRAVENOUS at 16:50

## 2018-10-15 RX ADMIN — DEXAMETHASONE SODIUM PHOSPHATE SCH MG: 4 INJECTION, SOLUTION INTRAMUSCULAR; INTRAVENOUS at 23:37

## 2018-10-15 NOTE — PDOC1
History and Physical


Date of Admission


Date of Admission


DATE: 10/15/18 


TIME: 09:24





Identification/Chief Complaint


Chief Complaint


obtunded





Source


Source:  Chart review, Patient





History of Present Illness


History of Present Illness


 Ms. Deal is a 83  year old female who presents with altered mental status.  

Similar admit 5 weeks ago


 Patient lives at home with family members and family members, and has required 

around the clock care.


Family reported change last night with her suddenly not talking.  She has not 

eaten much in over 2 days, and had mostly stayed in bed and had not moved or 

taken her meds





  Normally she is verbal and was able to ask for things and move around.  


  not much liquid intake, and little urine output.  She has appeared in no 

distress. s There are no other acute complaints at this time.





Past Medical History


Cardiovascular:  HTN


Pulmonary:  No pertinent hx


Musculoskeletal:  Osteoarthritis





Past Surgical History


Past Surgical History:  Hysterectomy





Family History


Family History


father had CVA, mother had ovarian cancer, sister Dm2


Family History:  Cancer, Diabetes, Hypertension, Stroke


Family History:  Parent





Social History


Smoke:  No


ALCOHOL:  none


Drugs:  None





Current Problem List


Problem List


Problems


Medical Problems:


(1) Altered mental status


Status: Acute  











Current Medications


Current Medications





Current Medications


Sodium Chloride 500 ml @  500 mls/hr 1X  ONCE IV  Last administered on 10/14/

18at 20:30;  Start 10/14/18 at 20:30;  Stop 10/14/18 at 21:29;  Status DC


Sodium Chloride 1,000 ml @  100 mls/hr 1X  ONCE IV  Last administered on 10/14/

18at 21:30;  Start 10/14/18 at 20:30;  Stop 10/15/18 at 06:29;  Status DC


Ondansetron HCl (Zofran) 4 mg PRN Q8HRS  PRN IV NAUSEA/VOMITING 1ST CHOICE;  

Start 10/14/18 at 23:45;  Stop 10/15/18 at 23:44


Sodium Chloride 1,000 ml @  80 mls/hr [id number] IV  Last administered on 10/15/

18at 08:29;  Start 10/15/18 at 08:00;  Stop 10/15/18 at 09:22;  Status DC


Potassium Chloride/Dextrose/ Sod Cl 1,000 ml @  80 mls/hr [id number] IV ;  Start 10

/15/18 at 09:30;  Status UNV





Active Scripts


Active


Meclizine Hcl 25 Mg Tablet 1 Tab PO PRN TID PRN


Reported


Aspirin 81 Mg Tab.chew 1 Tab PO DAILY


Simvastatin 40 Mg Tablet 1 Tab PO QHS


Clopidogrel (Clopidogrel Bisulfate) 75 Mg Tablet 1 Tab PO DAILY


Metformin Hcl 500 Mg Tablet 1 Tab PO BID


Atenolol 25 Mg Tablet 1 Tab PO DAILY





Allergies


Allergies:  


Coded Allergies:  


     No Known Drug Allergies (Unverified , 10/29/16)





ROS


Review of System


pt unable to articulate,  may have said abd discomfort and is rubbing her 

abdomen





Physical Exam


General:  No acute distress, Other (minimal cooperation,  does not follow 

commands, opens eyes,  shakes head,  does not talk, )


HEENT:  Atraumatic, PERRLA, EOMI, Mucous membr. moist/pink


Lungs:  Clear to auscultation


Heart:  no gallops, no murmurs


Abdomen:  Normal bowel sounds, Soft


Rectal Exam:  not examined


Extremities:  No clubbing, No cyanosis, No edema


Skin:  No breakdown, No significant lesion


Neuro:  Normal tone, Other


Psych/Mental Status:  Other





Vitals


Vitals





Vital Signs








  Date Time  Temp Pulse Resp B/P (MAP) Pulse Ox O2 Delivery O2 Flow Rate FiO2


 


10/15/18 03:00 97.9 75 18 149/82 (104) 98 Room Air  





 97.9       











Labs


Labs





Laboratory Tests








Test


 10/14/18


20:24 10/14/18


20:30 10/14/18


21:12 10/14/18


21:45


 


Glucose (Fingerstick)


 87 mg/dL


(70-99) 


 


 





 


White Blood Count


 


 4.1 x10^3/uL


(4.0-11.0) 


 





 


Red Blood Count


 


 4.59 x10^6/uL


(3.50-5.40) 


 





 


Hemoglobin


 


 12.8 g/dL


(12.0-15.5) 


 





 


Hematocrit


 


 38.7 %


(36.0-47.0) 


 





 


Mean Corpuscular Volume  84 fL ()   


 


Mean Corpuscular Hemoglobin  28 pg (25-35)   


 


Mean Corpuscular Hemoglobin


Concent 


 33 g/dL


(31-37) 


 





 


Red Cell Distribution Width


 


 19.0 %


(11.5-14.5) 


 





 


Platelet Count


 


 233 x10^3/uL


(140-400) 


 





 


Neutrophils (%) (Auto)  67 % (31-73)   


 


Lymphocytes (%) (Auto)  21 % (24-48)   


 


Monocytes (%) (Auto)  10 % (0-9)   


 


Eosinophils (%) (Auto)  2 % (0-3)   


 


Basophils (%) (Auto)  1 % (0-3)   


 


Neutrophils # (Auto)


 


 2.7 x10^3uL


(1.8-7.7) 


 





 


Lymphocytes # (Auto)


 


 0.9 x10^3/uL


(1.0-4.8) 


 





 


Monocytes # (Auto)


 


 0.4 x10^3/uL


(0.0-1.1) 


 





 


Eosinophils # (Auto)


 


 0.1 x10^3/uL


(0.0-0.7) 


 





 


Basophils # (Auto)


 


 0.0 x10^3/uL


(0.0-0.2) 


 





 


Lactic Acid Level


 


 1.3 mmol/L


(0.4-2.0) 


 





 


Urine Collection Type   Unknown  


 


Urine Color   Yellow  


 


Urine Clarity   Clear  


 


Urine pH   5.5  


 


Urine Specific Gravity   1.020  


 


Urine Protein


 


 


 Negative mg/dL


(NEG-TRACE) 





 


Urine Glucose (UA)


 


 


 Negative mg/dL


(NEG) 





 


Urine Ketones (Stick)   15 mg/dL (NEG)  


 


Urine Blood   Negative (NEG)  


 


Urine Nitrite   Negative (NEG)  


 


Urine Bilirubin   Small (NEG)  


 


Urine Urobilinogen Dipstick


 


 


 1.0 mg/dL (0.2


mg/dL) 





 


Urine Leukocyte Esterase   Negative (NEG)  


 


Urine RBC   Occ /HPF (0-2)  


 


Urine WBC   Occ /HPF (0-4)  


 


Urine Squamous Epithelial


Cells 


 


 Occ /LPF 


 





 


Urine Bacteria


 


 


 Few /HPF


(0-FEW) 





 


Urine Mucus   Mod /LPF  


 


Sodium Level


 


 


 


 143 mmol/L


(136-145)


 


Potassium Level


 


 


 


 3.6 mmol/L


(3.5-5.1)


 


Chloride Level


 


 


 


 108 mmol/L


()


 


Carbon Dioxide Level


 


 


 


 26 mmol/L


(21-32)


 


Anion Gap    9 (6-14) 


 


Blood Urea Nitrogen


 


 


 


 11 mg/dL


(7-20)


 


Creatinine


 


 


 


 1.0 mg/dL


(0.6-1.0)


 


Estimated GFR


(Cockcroft-Gault) 


 


 


 64.1 





 


BUN/Creatinine Ratio    11 (6-20) 


 


Glucose Level


 


 


 


 84 mg/dL


(70-99)


 


Calcium Level


 


 


 


 8.7 mg/dL


(8.5-10.1)


 


Total Bilirubin


 


 


 


 0.4 mg/dL


(0.2-1.0)


 


Aspartate Amino Transf


(AST/SGOT) 


 


 


 13 U/L (15-37) 





 


Alanine Aminotransferase


(ALT/SGPT) 


 


 


 10 U/L (14-59) 





 


Alkaline Phosphatase


 


 


 


 59 U/L


()


 


Troponin I Quantitative


 


 


 


 < 0.017 ng/mL


(0.000-0.055)


 


Total Protein


 


 


 


 6.3 g/dL


(6.4-8.2)


 


Albumin


 


 


 


 3.0 g/dL


(3.4-5.0)


 


Albumin/Globulin Ratio    0.9 (1.0-1.7) 


 


Thyroid Stimulating Hormone


(TSH) 


 


 


 2.289 uIU/mL


(0.358-3.74)


 


Test


 10/15/18


03:40 


 


 





 


White Blood Count


 3.9 x10^3/uL


(4.0-11.0) 


 


 





 


Red Blood Count


 4.28 x10^6/uL


(3.50-5.40) 


 


 





 


Hemoglobin


 11.8 g/dL


(12.0-15.5) 


 


 





 


Hematocrit


 35.9 %


(36.0-47.0) 


 


 





 


Mean Corpuscular Volume 84 fL ()    


 


Mean Corpuscular Hemoglobin 28 pg (25-35)    


 


Mean Corpuscular Hemoglobin


Concent 33 g/dL


(31-37) 


 


 





 


Red Cell Distribution Width


 18.8 %


(11.5-14.5) 


 


 





 


Platelet Count


 206 x10^3/uL


(140-400) 


 


 





 


Neutrophils (%) (Auto) 63 % (31-73)    


 


Lymphocytes (%) (Auto) 26 % (24-48)    


 


Monocytes (%) (Auto) 10 % (0-9)    


 


Eosinophils (%) (Auto) 1 % (0-3)    


 


Basophils (%) (Auto) 0 % (0-3)    


 


Neutrophils # (Auto)


 2.5 x10^3uL


(1.8-7.7) 


 


 





 


Lymphocytes # (Auto)


 1.0 x10^3/uL


(1.0-4.8) 


 


 





 


Monocytes # (Auto)


 0.4 x10^3/uL


(0.0-1.1) 


 


 





 


Eosinophils # (Auto)


 0.1 x10^3/uL


(0.0-0.7) 


 


 





 


Basophils # (Auto)


 0.0 x10^3/uL


(0.0-0.2) 


 


 





 


Sodium Level


 141 mmol/L


(136-145) 


 


 





 


Potassium Level


 3.3 mmol/L


(3.5-5.1) 


 


 





 


Chloride Level


 107 mmol/L


() 


 


 





 


Carbon Dioxide Level


 23 mmol/L


(21-32) 


 


 





 


Anion Gap 11 (6-14)    


 


Blood Urea Nitrogen 9 mg/dL (7-20)    


 


Creatinine


 0.9 mg/dL


(0.6-1.0) 


 


 





 


Estimated GFR


(Cockcroft-Gault) 72.4 


 


 


 





 


Glucose Level


 74 mg/dL


(70-99) 


 


 





 


Calcium Level


 8.7 mg/dL


(8.5-10.1) 


 


 











Laboratory Tests








Test


 10/14/18


20:24 10/14/18


20:30 10/14/18


21:12 10/14/18


21:45


 


Glucose (Fingerstick)


 87 mg/dL


(70-99) 


 


 





 


White Blood Count


 


 4.1 x10^3/uL


(4.0-11.0) 


 





 


Red Blood Count


 


 4.59 x10^6/uL


(3.50-5.40) 


 





 


Hemoglobin


 


 12.8 g/dL


(12.0-15.5) 


 





 


Hematocrit


 


 38.7 %


(36.0-47.0) 


 





 


Mean Corpuscular Volume  84 fL ()   


 


Mean Corpuscular Hemoglobin  28 pg (25-35)   


 


Mean Corpuscular Hemoglobin


Concent 


 33 g/dL


(31-37) 


 





 


Red Cell Distribution Width


 


 19.0 %


(11.5-14.5) 


 





 


Platelet Count


 


 233 x10^3/uL


(140-400) 


 





 


Neutrophils (%) (Auto)  67 % (31-73)   


 


Lymphocytes (%) (Auto)  21 % (24-48)   


 


Monocytes (%) (Auto)  10 % (0-9)   


 


Eosinophils (%) (Auto)  2 % (0-3)   


 


Basophils (%) (Auto)  1 % (0-3)   


 


Neutrophils # (Auto)


 


 2.7 x10^3uL


(1.8-7.7) 


 





 


Lymphocytes # (Auto)


 


 0.9 x10^3/uL


(1.0-4.8) 


 





 


Monocytes # (Auto)


 


 0.4 x10^3/uL


(0.0-1.1) 


 





 


Eosinophils # (Auto)


 


 0.1 x10^3/uL


(0.0-0.7) 


 





 


Basophils # (Auto)


 


 0.0 x10^3/uL


(0.0-0.2) 


 





 


Lactic Acid Level


 


 1.3 mmol/L


(0.4-2.0) 


 





 


Urine Collection Type   Unknown  


 


Urine Color   Yellow  


 


Urine Clarity   Clear  


 


Urine pH   5.5  


 


Urine Specific Gravity   1.020  


 


Urine Protein


 


 


 Negative mg/dL


(NEG-TRACE) 





 


Urine Glucose (UA)


 


 


 Negative mg/dL


(NEG) 





 


Urine Ketones (Stick)   15 mg/dL (NEG)  


 


Urine Blood   Negative (NEG)  


 


Urine Nitrite   Negative (NEG)  


 


Urine Bilirubin   Small (NEG)  


 


Urine Urobilinogen Dipstick


 


 


 1.0 mg/dL (0.2


mg/dL) 





 


Urine Leukocyte Esterase   Negative (NEG)  


 


Urine RBC   Occ /HPF (0-2)  


 


Urine WBC   Occ /HPF (0-4)  


 


Urine Squamous Epithelial


Cells 


 


 Occ /LPF 


 





 


Urine Bacteria


 


 


 Few /HPF


(0-FEW) 





 


Urine Mucus   Mod /LPF  


 


Sodium Level


 


 


 


 143 mmol/L


(136-145)


 


Potassium Level


 


 


 


 3.6 mmol/L


(3.5-5.1)


 


Chloride Level


 


 


 


 108 mmol/L


()


 


Carbon Dioxide Level


 


 


 


 26 mmol/L


(21-32)


 


Anion Gap    9 (6-14) 


 


Blood Urea Nitrogen


 


 


 


 11 mg/dL


(7-20)


 


Creatinine


 


 


 


 1.0 mg/dL


(0.6-1.0)


 


Estimated GFR


(Cockcroft-Gault) 


 


 


 64.1 





 


BUN/Creatinine Ratio    11 (6-20) 


 


Glucose Level


 


 


 


 84 mg/dL


(70-99)


 


Calcium Level


 


 


 


 8.7 mg/dL


(8.5-10.1)


 


Total Bilirubin


 


 


 


 0.4 mg/dL


(0.2-1.0)


 


Aspartate Amino Transf


(AST/SGOT) 


 


 


 13 U/L (15-37) 





 


Alanine Aminotransferase


(ALT/SGPT) 


 


 


 10 U/L (14-59) 





 


Alkaline Phosphatase


 


 


 


 59 U/L


()


 


Troponin I Quantitative


 


 


 


 < 0.017 ng/mL


(0.000-0.055)


 


Total Protein


 


 


 


 6.3 g/dL


(6.4-8.2)


 


Albumin


 


 


 


 3.0 g/dL


(3.4-5.0)


 


Albumin/Globulin Ratio    0.9 (1.0-1.7) 


 


Thyroid Stimulating Hormone


(TSH) 


 


 


 2.289 uIU/mL


(0.358-3.74)


 


Test


 10/15/18


03:40 


 


 





 


White Blood Count


 3.9 x10^3/uL


(4.0-11.0) 


 


 





 


Red Blood Count


 4.28 x10^6/uL


(3.50-5.40) 


 


 





 


Hemoglobin


 11.8 g/dL


(12.0-15.5) 


 


 





 


Hematocrit


 35.9 %


(36.0-47.0) 


 


 





 


Mean Corpuscular Volume 84 fL ()    


 


Mean Corpuscular Hemoglobin 28 pg (25-35)    


 


Mean Corpuscular Hemoglobin


Concent 33 g/dL


(31-37) 


 


 





 


Red Cell Distribution Width


 18.8 %


(11.5-14.5) 


 


 





 


Platelet Count


 206 x10^3/uL


(140-400) 


 


 





 


Neutrophils (%) (Auto) 63 % (31-73)    


 


Lymphocytes (%) (Auto) 26 % (24-48)    


 


Monocytes (%) (Auto) 10 % (0-9)    


 


Eosinophils (%) (Auto) 1 % (0-3)    


 


Basophils (%) (Auto) 0 % (0-3)    


 


Neutrophils # (Auto)


 2.5 x10^3uL


(1.8-7.7) 


 


 





 


Lymphocytes # (Auto)


 1.0 x10^3/uL


(1.0-4.8) 


 


 





 


Monocytes # (Auto)


 0.4 x10^3/uL


(0.0-1.1) 


 


 





 


Eosinophils # (Auto)


 0.1 x10^3/uL


(0.0-0.7) 


 


 





 


Basophils # (Auto)


 0.0 x10^3/uL


(0.0-0.2) 


 


 





 


Sodium Level


 141 mmol/L


(136-145) 


 


 





 


Potassium Level


 3.3 mmol/L


(3.5-5.1) 


 


 





 


Chloride Level


 107 mmol/L


() 


 


 





 


Carbon Dioxide Level


 23 mmol/L


(21-32) 


 


 





 


Anion Gap 11 (6-14)    


 


Blood Urea Nitrogen 9 mg/dL (7-20)    


 


Creatinine


 0.9 mg/dL


(0.6-1.0) 


 


 





 


Estimated GFR


(Cockcroft-Gault) 72.4 


 


 


 





 


Glucose Level


 74 mg/dL


(70-99) 


 


 





 


Calcium Level


 8.7 mg/dL


(8.5-10.1) 


 


 














VTE Prophylaxis Ordered


VTE Prophylaxis Devices:  No


VTE Pharmacological Prophylaxi:  Yes





Assessment/Plan


Assessment/Plan


Acute encephalopathy on chronic dementia


fluctuating dementia with prior delirium 





malnutrition,  moderate





dehydration





weakness and debility,  pT and OT


dysphagia,  poor po itnake, consult nutrition and speech





consult Neuro,   has seen before,  w/u had been negative, MRI, EEG neg,    ? 

Alzheimers or fronto-temporal dementia.





admit


full code,    consult palliative care to have a family meeting,  I discussed 

with one family member that this may be progression of dementia,   patient has 

6 kids, 


I recommended no code status,   consider hospice,   AUNG Domingo MD Oct 15, 2018 09:34

## 2018-10-15 NOTE — RAD
MRI Brain without contrast

 

History: Altered mental status, abnormal CT

 

Technique: Multiplanar, multisequential noncontrast MR imaging was 

performed of the brain.

 

Contrast:  None

 

Comparison: CT exam of October 14, 2018, MRI brain exam August 31, 2018

 

Findings: Not associated with significant restricted diffusion, there are 

new prominent areas of abnormal T2 and FLAIR hyperintense signal 

abnormality bilaterally, variable involvement of all lobes greater on the 

left. There is also involvement of the left aspect of the corpus callosum.

More confluent areas of edema on the left involving the left temporal lobe

but also left frontal parietal lobes, right greatest of the right temporal

lobe and parietal lobe. There is more mass effect on the left, left to 

right shift by about 0.4 to 0.5 cm. There is variable cortical involvement

again left greater than right. Ventricles are not significantly dilated. 

There is no significant extra-axial fluid collection. There are multiple 

scattered small foci of decreased signal on the gradient echo sequence 

greatest of the temporal lobes compatible with sites of microhemorrhage, 

new foci in the interval. Mastoid air cells are aerated. There has been 

lens surgery bilaterally. There is minimal patchy ethmoid air cell mucosal

thickening. There is preservation of the major arterial intracranial flow 

voids at the skull base.

 

Impression:

1. New since the previous MRI exam August 31, 2018, there are prominent 

areas of edema bilaterally, left greater than right, associated mild 

left-to-right midline shift. There is variable cortical involvement. 

Primary concern would be for encephalitis/cerebritis. There are scattered 

small foci of associated microhemorrhage.  If the patient is hypertensive,

sequela of posterior reversible encephalopathy syndrome would be included 

although distribution somewhat atypical.  No contrast was given for this 

exam. Underlying masses would be unlikely given absence of findings on 

recent exam.

 

 

Critical result:

 

Findings discussed with patient's nurse Macarena at 10/15/2018 4:10 PM.

 

 

Electronically signed by: Russ Willoughby MD (10/15/2018 4:11 PM) 

[id number]

## 2018-10-15 NOTE — EKG
[facility]

              [address] [place] [address]

Test Date:    2018-10-14               Test Time:    20:24:57

Pat Name:     MARY OBREGON            Department:   

Patient ID:   [id number]           Room:         506 1

Gender:       F                        Technician:   

:          [id number]               Requested By: SONG ROJAS

Order Number: [id number]           Reading MD:   Ayush Rizvi MD

                                 Measurements

Intervals                              Axis          

Rate:         75                       P:            55

MO:           180                      QRS:          -8

QRSD:         88                       T:            52

QT:           472                                    

QTc:          536                                    

                           Interpretive Statements

SINUS RHYTHM

VENTRICULAR PREMATURE COMPLEX(ES), BIGEMINY



Electronically Signed On 10- 9:39:21 CDT by Ayush Rizvi MD

## 2018-10-16 VITALS — SYSTOLIC BLOOD PRESSURE: 131 MMHG | DIASTOLIC BLOOD PRESSURE: 70 MMHG

## 2018-10-16 VITALS — SYSTOLIC BLOOD PRESSURE: 147 MMHG | DIASTOLIC BLOOD PRESSURE: 64 MMHG

## 2018-10-16 VITALS — SYSTOLIC BLOOD PRESSURE: 144 MMHG | DIASTOLIC BLOOD PRESSURE: 64 MMHG

## 2018-10-16 VITALS — SYSTOLIC BLOOD PRESSURE: 135 MMHG | DIASTOLIC BLOOD PRESSURE: 77 MMHG

## 2018-10-16 VITALS — DIASTOLIC BLOOD PRESSURE: 85 MMHG | SYSTOLIC BLOOD PRESSURE: 137 MMHG

## 2018-10-16 VITALS — SYSTOLIC BLOOD PRESSURE: 135 MMHG | DIASTOLIC BLOOD PRESSURE: 72 MMHG

## 2018-10-16 LAB
% BANDS: 7 % (ref 0–9)
% LYMPHS: 4 % (ref 24–48)
% SEGS: 89 % (ref 35–66)
ALBUMIN SERPL-MCNC: 2.8 G/DL (ref 3.4–5)
ALBUMIN/GLOB SERPL: 0.7 {RATIO} (ref 1–1.7)
ALP SERPL-CCNC: 65 U/L (ref 46–116)
ALT SERPL-CCNC: 10 U/L (ref 14–59)
ANION GAP SERPL CALC-SCNC: 14 MMOL/L (ref 6–14)
ANISOCYTOSIS BLD QL SMEAR: SLIGHT
AST SERPL-CCNC: 12 U/L (ref 15–37)
BASOPHILS # BLD AUTO: 0 X10^3/UL (ref 0–0.2)
BASOPHILS NFR BLD: 0 % (ref 0–3)
BILIRUB SERPL-MCNC: 0.3 MG/DL (ref 0.2–1)
BUN SERPL-MCNC: 10 MG/DL (ref 7–20)
BUN/CREAT SERPL: 13 (ref 6–20)
CALCIUM SERPL-MCNC: 8.6 MG/DL (ref 8.5–10.1)
CHLORIDE SERPL-SCNC: 107 MMOL/L (ref 98–107)
CO2 SERPL-SCNC: 20 MMOL/L (ref 21–32)
CREAT SERPL-MCNC: 0.8 MG/DL (ref 0.6–1)
EOSINOPHIL NFR BLD: 0 % (ref 0–3)
EOSINOPHIL NFR BLD: 0 X10^3/UL (ref 0–0.7)
ERYTHROCYTE [DISTWIDTH] IN BLOOD BY AUTOMATED COUNT: 18.3 % (ref 11.5–14.5)
GFR SERPLBLD BASED ON 1.73 SQ M-ARVRAT: 82.9 ML/MIN
GLOBULIN SER-MCNC: 3.8 G/DL (ref 2.2–3.8)
GLUCOSE SERPL-MCNC: 177 MG/DL (ref 70–99)
HCT VFR BLD CALC: 36.8 % (ref 36–47)
HGB BLD-MCNC: 12 G/DL (ref 12–15.5)
LYMPHOCYTES # BLD: 0.3 X10^3/UL (ref 1–4.8)
LYMPHOCYTES NFR BLD AUTO: 4 % (ref 24–48)
MCH RBC QN AUTO: 27 PG (ref 25–35)
MCHC RBC AUTO-ENTMCNC: 33 G/DL (ref 31–37)
MCV RBC AUTO: 83 FL (ref 79–100)
MONO #: 0.1 X10^3/UL (ref 0–1.1)
MONOCYTES NFR BLD: 2 % (ref 0–9)
NEUT #: 5.8 X10^3UL (ref 1.8–7.7)
NEUTROPHILS NFR BLD AUTO: 94 % (ref 31–73)
PLATELET # BLD AUTO: 221 X10^3/UL (ref 140–400)
PLATELET # BLD EST: ADEQUATE 10*3/UL
POIKILOCYTOSIS BLD QL SMEAR: SLIGHT
POTASSIUM SERPL-SCNC: 3.7 MMOL/L (ref 3.5–5.1)
PROT SERPL-MCNC: 6.6 G/DL (ref 6.4–8.2)
RBC # BLD AUTO: 4.41 X10^6/UL (ref 3.5–5.4)
SODIUM SERPL-SCNC: 141 MMOL/L (ref 136–145)
WBC # BLD AUTO: 6.2 X10^3/UL (ref 4–11)

## 2018-10-16 RX ADMIN — DEXAMETHASONE SODIUM PHOSPHATE SCH MG: 4 INJECTION, SOLUTION INTRAMUSCULAR; INTRAVENOUS at 12:05

## 2018-10-16 RX ADMIN — Medication SCH CAP: at 21:30

## 2018-10-16 RX ADMIN — GLYCERIN, ISOLEUCINE, LEUCINE, LYSINE, METHIONINE, PHENYLALANINE, THREONINE, TRYPTOPHAN, VALINE, ALANINE, GLYCINE, ARGININE, HISTIDINE, PROLINE, SERINE, CYSTEINE, SODIUM ACETATE, MAGNESIUM ACETATE, CALCIUM ACETATE, SODIUM CHLORIDE, POTASSIUM CHLORIDE, PHOSPHORIC ACID, AND POTASSIUM METABISULFITE SCH MLS/HR
3; .21; .27; .22; .16; .17; .12; .046; .2; .21; .42; .29; .085; .34; .18; .014; .2; .054; .026; .12; .15; .041 INJECTION INTRAVENOUS at 14:17

## 2018-10-16 RX ADMIN — DEXAMETHASONE SODIUM PHOSPHATE SCH MG: 4 INJECTION, SOLUTION INTRAMUSCULAR; INTRAVENOUS at 19:40

## 2018-10-16 RX ADMIN — DEXTROSE, SODIUM CHLORIDE, AND POTASSIUM CHLORIDE SCH MLS/HR: 5; .45; .15 INJECTION INTRAVENOUS at 01:33

## 2018-10-16 RX ADMIN — DEXAMETHASONE SODIUM PHOSPHATE SCH MG: 4 INJECTION, SOLUTION INTRAMUSCULAR; INTRAVENOUS at 05:15

## 2018-10-16 RX ADMIN — VANCOMYCIN HYDROCHLORIDE SCH MLS/HR: 1 INJECTION, POWDER, FOR SOLUTION INTRAVENOUS at 19:47

## 2018-10-16 RX ADMIN — ACYCLOVIR SODIUM SCH MLS/HR: 500 INJECTION, SOLUTION INTRAVENOUS at 21:08

## 2018-10-16 RX ADMIN — DEXTROSE, SODIUM CHLORIDE, AND POTASSIUM CHLORIDE SCH MLS/HR: 5; .45; .15 INJECTION INTRAVENOUS at 10:30

## 2018-10-16 NOTE — PDOC
PROGRESS NOTES


Chief Complaint


Chief Complaint


cerebral edema,   encephalitis or poss press


Acute encephalopathy on chronic dementia


fluctuating dementia with prior delirium 


malnutrition,  moderate


dehydration


weakness and debility, 


dysphagia,  poor po itnake, 





admit


full code,    consult palliative care to have a family meeting,  I discussed 

with one family member that this may be progression of dementia,   patient has 

6 kids, 


I recommended no code status,   consider hospice,   may eval





History of Present Illness


History of Present Illness


discussed with Dr. Trevino,  family had fired her from practice


I asked Dr. Martinez to eval, he is out of town for 5 days


LP ordered for tomorrow,  5 days off plavix


ID consult following


pt improved today, some PO intake





start PPN,  


c





Vitals


Vitals





Vital Signs








  Date Time  Temp Pulse Resp B/P (MAP) Pulse Ox O2 Delivery O2 Flow Rate FiO2


 


10/16/18 11:00 97.8 58 20 147/64 (91) 97 Room Air  





 97.8       











Physical Exam


General:  Alert, No acute distress, Other (minimal cooperation, opens eyes,  

shakes head,  had eaten)


Lungs:  Clear


Abdomen:  Normal bowel sounds, Soft


Extremities:  No clubbing, No cyanosis, No edema


Skin:  No breakdown, No significant lesion





Labs


LABS





Laboratory Tests








Test


 10/15/18


17:05 10/15/18


21:01 10/16/18


03:55 10/16/18


09:01


 


Glucose (Fingerstick)


 124 mg/dL


(70-99) 134 mg/dL


(70-99) 


 173 mg/dL


(70-99)


 


White Blood Count


 


 


 6.2 x10^3/uL


(4.0-11.0) 





 


Red Blood Count


 


 


 4.41 x10^6/uL


(3.50-5.40) 





 


Hemoglobin


 


 


 12.0 g/dL


(12.0-15.5) 





 


Hematocrit


 


 


 36.8 %


(36.0-47.0) 





 


Mean Corpuscular Volume   83 fL ()  


 


Mean Corpuscular Hemoglobin   27 pg (25-35)  


 


Mean Corpuscular Hemoglobin


Concent 


 


 33 g/dL


(31-37) 





 


Red Cell Distribution Width


 


 


 18.3 %


(11.5-14.5) 





 


Platelet Count


 


 


 221 x10^3/uL


(140-400) 





 


Neutrophils (%) (Auto)   94 % (31-73)  


 


Lymphocytes (%) (Auto)   4 % (24-48)  


 


Monocytes (%) (Auto)   2 % (0-9)  


 


Eosinophils (%) (Auto)   0 % (0-3)  


 


Basophils (%) (Auto)   0 % (0-3)  


 


Neutrophils # (Auto)


 


 


 5.8 x10^3uL


(1.8-7.7) 





 


Lymphocytes # (Auto)


 


 


 0.3 x10^3/uL


(1.0-4.8) 





 


Monocytes # (Auto)


 


 


 0.1 x10^3/uL


(0.0-1.1) 





 


Eosinophils # (Auto)


 


 


 0.0 x10^3/uL


(0.0-0.7) 





 


Basophils # (Auto)


 


 


 0.0 x10^3/uL


(0.0-0.2) 





 


Segmented Neutrophils %   89 % (35-66)  


 


Band Neutrophils %   7 % (0-9)  


 


Lymphocytes %   4 % (24-48)  


 


Platelet Estimate


 


 


 Adequate


(ADEQUATE) 





 


Poikilocytosis   Slight  


 


Anisocytosis   Slight  


 


Sodium Level


 


 


 141 mmol/L


(136-145) 





 


Potassium Level


 


 


 3.7 mmol/L


(3.5-5.1) 





 


Chloride Level


 


 


 107 mmol/L


() 





 


Carbon Dioxide Level


 


 


 20 mmol/L


(21-32) 





 


Anion Gap   14 (6-14)  


 


Blood Urea Nitrogen


 


 


 10 mg/dL


(7-20) 





 


Creatinine


 


 


 0.8 mg/dL


(0.6-1.0) 





 


Estimated GFR


(Cockcroft-Gault) 


 


 82.9 


 





 


BUN/Creatinine Ratio   13 (6-20)  


 


Glucose Level


 


 


 177 mg/dL


(70-99) 





 


Calcium Level


 


 


 8.6 mg/dL


(8.5-10.1) 





 


Total Bilirubin


 


 


 0.3 mg/dL


(0.2-1.0) 





 


Aspartate Amino Transf


(AST/SGOT) 


 


 12 U/L (15-37) 


 





 


Alanine Aminotransferase


(ALT/SGPT) 


 


 10 U/L (14-59) 


 





 


Alkaline Phosphatase


 


 


 65 U/L


() 





 


Total Protein


 


 


 6.6 g/dL


(6.4-8.2) 





 


Albumin


 


 


 2.8 g/dL


(3.4-5.0) 





 


Albumin/Globulin Ratio   0.7 (1.0-1.7)  


 


Treponema pallidum Antibody


 


 


 Nonreactive


(Nonreactive) 





 


Test


 10/16/18


11:45 


 


 





 


Erythrocyte Sedimentation Rate 14 (0-25)    











Assessment and Plan


Assessmemt and Plan


Problems


Medical Problems:


(1) Altered mental status


Status: Acute  











Comment


Review of Relevant


I have reviewed the following items marky (where applicable) has been applied.


Labs





Laboratory Tests








Test


 10/14/18


20:24 10/14/18


20:30 10/14/18


21:12 10/14/18


21:45


 


Glucose (Fingerstick)


 87 mg/dL


(70-99) 


 


 





 


White Blood Count


 


 4.1 x10^3/uL


(4.0-11.0) 


 





 


Red Blood Count


 


 4.59 x10^6/uL


(3.50-5.40) 


 





 


Hemoglobin


 


 12.8 g/dL


(12.0-15.5) 


 





 


Hematocrit


 


 38.7 %


(36.0-47.0) 


 





 


Mean Corpuscular Volume  84 fL ()   


 


Mean Corpuscular Hemoglobin  28 pg (25-35)   


 


Mean Corpuscular Hemoglobin


Concent 


 33 g/dL


(31-37) 


 





 


Red Cell Distribution Width


 


 19.0 %


(11.5-14.5) 


 





 


Platelet Count


 


 233 x10^3/uL


(140-400) 


 





 


Neutrophils (%) (Auto)  67 % (31-73)   


 


Lymphocytes (%) (Auto)  21 % (24-48)   


 


Monocytes (%) (Auto)  10 % (0-9)   


 


Eosinophils (%) (Auto)  2 % (0-3)   


 


Basophils (%) (Auto)  1 % (0-3)   


 


Neutrophils # (Auto)


 


 2.7 x10^3uL


(1.8-7.7) 


 





 


Lymphocytes # (Auto)


 


 0.9 x10^3/uL


(1.0-4.8) 


 





 


Monocytes # (Auto)


 


 0.4 x10^3/uL


(0.0-1.1) 


 





 


Eosinophils # (Auto)


 


 0.1 x10^3/uL


(0.0-0.7) 


 





 


Basophils # (Auto)


 


 0.0 x10^3/uL


(0.0-0.2) 


 





 


Lactic Acid Level


 


 1.3 mmol/L


(0.4-2.0) 


 





 


Urine Collection Type   Unknown  


 


Urine Color   Yellow  


 


Urine Clarity   Clear  


 


Urine pH   5.5  


 


Urine Specific Gravity   1.020  


 


Urine Protein


 


 


 Negative mg/dL


(NEG-TRACE) 





 


Urine Glucose (UA)


 


 


 Negative mg/dL


(NEG) 





 


Urine Ketones (Stick)   15 mg/dL (NEG)  


 


Urine Blood   Negative (NEG)  


 


Urine Nitrite   Negative (NEG)  


 


Urine Bilirubin   Small (NEG)  


 


Urine Urobilinogen Dipstick


 


 


 1.0 mg/dL (0.2


mg/dL) 





 


Urine Leukocyte Esterase   Negative (NEG)  


 


Urine RBC   Occ /HPF (0-2)  


 


Urine WBC   Occ /HPF (0-4)  


 


Urine Squamous Epithelial


Cells 


 


 Occ /LPF 


 





 


Urine Bacteria


 


 


 Few /HPF


(0-FEW) 





 


Urine Mucus   Mod /LPF  


 


Sodium Level


 


 


 


 143 mmol/L


(136-145)


 


Potassium Level


 


 


 


 3.6 mmol/L


(3.5-5.1)


 


Chloride Level


 


 


 


 108 mmol/L


()


 


Carbon Dioxide Level


 


 


 


 26 mmol/L


(21-32)


 


Anion Gap    9 (6-14) 


 


Blood Urea Nitrogen


 


 


 


 11 mg/dL


(7-20)


 


Creatinine


 


 


 


 1.0 mg/dL


(0.6-1.0)


 


Estimated GFR


(Cockcroft-Gault) 


 


 


 64.1 





 


BUN/Creatinine Ratio    11 (6-20) 


 


Glucose Level


 


 


 


 84 mg/dL


(70-99)


 


Calcium Level


 


 


 


 8.7 mg/dL


(8.5-10.1)


 


Total Bilirubin


 


 


 


 0.4 mg/dL


(0.2-1.0)


 


Aspartate Amino Transf


(AST/SGOT) 


 


 


 13 U/L (15-37) 





 


Alanine Aminotransferase


(ALT/SGPT) 


 


 


 10 U/L (14-59) 





 


Alkaline Phosphatase


 


 


 


 59 U/L


()


 


Troponin I Quantitative


 


 


 


 < 0.017 ng/mL


(0.000-0.055)


 


Total Protein


 


 


 


 6.3 g/dL


(6.4-8.2)


 


Albumin


 


 


 


 3.0 g/dL


(3.4-5.0)


 


Albumin/Globulin Ratio    0.9 (1.0-1.7) 


 


Thyroid Stimulating Hormone


(TSH) 


 


 


 2.289 uIU/mL


(0.358-3.74)


 


Test


 10/15/18


03:40 10/15/18


11:35 10/15/18


17:05 10/15/18


21:01


 


White Blood Count


 3.9 x10^3/uL


(4.0-11.0) 


 


 





 


Red Blood Count


 4.28 x10^6/uL


(3.50-5.40) 


 


 





 


Hemoglobin


 11.8 g/dL


(12.0-15.5) 


 


 





 


Hematocrit


 35.9 %


(36.0-47.0) 


 


 





 


Mean Corpuscular Volume 84 fL ()    


 


Mean Corpuscular Hemoglobin 28 pg (25-35)    


 


Mean Corpuscular Hemoglobin


Concent 33 g/dL


(31-37) 


 


 





 


Red Cell Distribution Width


 18.8 %


(11.5-14.5) 


 


 





 


Platelet Count


 206 x10^3/uL


(140-400) 


 


 





 


Neutrophils (%) (Auto) 63 % (31-73)    


 


Lymphocytes (%) (Auto) 26 % (24-48)    


 


Monocytes (%) (Auto) 10 % (0-9)    


 


Eosinophils (%) (Auto) 1 % (0-3)    


 


Basophils (%) (Auto) 0 % (0-3)    


 


Neutrophils # (Auto)


 2.5 x10^3uL


(1.8-7.7) 


 


 





 


Lymphocytes # (Auto)


 1.0 x10^3/uL


(1.0-4.8) 


 


 





 


Monocytes # (Auto)


 0.4 x10^3/uL


(0.0-1.1) 


 


 





 


Eosinophils # (Auto)


 0.1 x10^3/uL


(0.0-0.7) 


 


 





 


Basophils # (Auto)


 0.0 x10^3/uL


(0.0-0.2) 


 


 





 


Sodium Level


 141 mmol/L


(136-145) 


 


 





 


Potassium Level


 3.3 mmol/L


(3.5-5.1) 


 


 





 


Chloride Level


 107 mmol/L


() 


 


 





 


Carbon Dioxide Level


 23 mmol/L


(21-32) 


 


 





 


Anion Gap 11 (6-14)    


 


Blood Urea Nitrogen 9 mg/dL (7-20)    


 


Creatinine


 0.9 mg/dL


(0.6-1.0) 


 


 





 


Estimated GFR


(Cockcroft-Gault) 72.4 


 


 


 





 


Glucose Level


 74 mg/dL


(70-99) 


 


 





 


Calcium Level


 8.7 mg/dL


(8.5-10.1) 


 


 





 


Glucose (Fingerstick)


 


 84 mg/dL


(70-99) 124 mg/dL


(70-99) 134 mg/dL


(70-99)


 


Test


 10/16/18


03:55 10/16/18


09:01 10/16/18


11:45 





 


White Blood Count


 6.2 x10^3/uL


(4.0-11.0) 


 


 





 


Red Blood Count


 4.41 x10^6/uL


(3.50-5.40) 


 


 





 


Hemoglobin


 12.0 g/dL


(12.0-15.5) 


 


 





 


Hematocrit


 36.8 %


(36.0-47.0) 


 


 





 


Mean Corpuscular Volume 83 fL ()    


 


Mean Corpuscular Hemoglobin 27 pg (25-35)    


 


Mean Corpuscular Hemoglobin


Concent 33 g/dL


(31-37) 


 


 





 


Red Cell Distribution Width


 18.3 %


(11.5-14.5) 


 


 





 


Platelet Count


 221 x10^3/uL


(140-400) 


 


 





 


Neutrophils (%) (Auto) 94 % (31-73)    


 


Lymphocytes (%) (Auto) 4 % (24-48)    


 


Monocytes (%) (Auto) 2 % (0-9)    


 


Eosinophils (%) (Auto) 0 % (0-3)    


 


Basophils (%) (Auto) 0 % (0-3)    


 


Neutrophils # (Auto)


 5.8 x10^3uL


(1.8-7.7) 


 


 





 


Lymphocytes # (Auto)


 0.3 x10^3/uL


(1.0-4.8) 


 


 





 


Monocytes # (Auto)


 0.1 x10^3/uL


(0.0-1.1) 


 


 





 


Eosinophils # (Auto)


 0.0 x10^3/uL


(0.0-0.7) 


 


 





 


Basophils # (Auto)


 0.0 x10^3/uL


(0.0-0.2) 


 


 





 


Segmented Neutrophils % 89 % (35-66)    


 


Band Neutrophils % 7 % (0-9)    


 


Lymphocytes % 4 % (24-48)    


 


Platelet Estimate


 Adequate


(ADEQUATE) 


 


 





 


Poikilocytosis Slight    


 


Anisocytosis Slight    


 


Sodium Level


 141 mmol/L


(136-145) 


 


 





 


Potassium Level


 3.7 mmol/L


(3.5-5.1) 


 


 





 


Chloride Level


 107 mmol/L


() 


 


 





 


Carbon Dioxide Level


 20 mmol/L


(21-32) 


 


 





 


Anion Gap 14 (6-14)    


 


Blood Urea Nitrogen


 10 mg/dL


(7-20) 


 


 





 


Creatinine


 0.8 mg/dL


(0.6-1.0) 


 


 





 


Estimated GFR


(Cockcroft-Gault) 82.9 


 


 


 





 


BUN/Creatinine Ratio 13 (6-20)    


 


Glucose Level


 177 mg/dL


(70-99) 


 


 





 


Calcium Level


 8.6 mg/dL


(8.5-10.1) 


 


 





 


Total Bilirubin


 0.3 mg/dL


(0.2-1.0) 


 


 





 


Aspartate Amino Transf


(AST/SGOT) 12 U/L (15-37) 


 


 


 





 


Alanine Aminotransferase


(ALT/SGPT) 10 U/L (14-59) 


 


 


 





 


Alkaline Phosphatase


 65 U/L


() 


 


 





 


Total Protein


 6.6 g/dL


(6.4-8.2) 


 


 





 


Albumin


 2.8 g/dL


(3.4-5.0) 


 


 





 


Albumin/Globulin Ratio 0.7 (1.0-1.7)    


 


Treponema pallidum Antibody


 Nonreactive


(Nonreactive) 


 


 





 


Glucose (Fingerstick)


 


 173 mg/dL


(70-99) 


 





 


Erythrocyte Sedimentation Rate   14 (0-25)  








Laboratory Tests








Test


 10/15/18


17:05 10/15/18


21:01 10/16/18


03:55 10/16/18


09:01


 


Glucose (Fingerstick)


 124 mg/dL


(70-99) 134 mg/dL


(70-99) 


 173 mg/dL


(70-99)


 


White Blood Count


 


 


 6.2 x10^3/uL


(4.0-11.0) 





 


Red Blood Count


 


 


 4.41 x10^6/uL


(3.50-5.40) 





 


Hemoglobin


 


 


 12.0 g/dL


(12.0-15.5) 





 


Hematocrit


 


 


 36.8 %


(36.0-47.0) 





 


Mean Corpuscular Volume   83 fL ()  


 


Mean Corpuscular Hemoglobin   27 pg (25-35)  


 


Mean Corpuscular Hemoglobin


Concent 


 


 33 g/dL


(31-37) 





 


Red Cell Distribution Width


 


 


 18.3 %


(11.5-14.5) 





 


Platelet Count


 


 


 221 x10^3/uL


(140-400) 





 


Neutrophils (%) (Auto)   94 % (31-73)  


 


Lymphocytes (%) (Auto)   4 % (24-48)  


 


Monocytes (%) (Auto)   2 % (0-9)  


 


Eosinophils (%) (Auto)   0 % (0-3)  


 


Basophils (%) (Auto)   0 % (0-3)  


 


Neutrophils # (Auto)


 


 


 5.8 x10^3uL


(1.8-7.7) 





 


Lymphocytes # (Auto)


 


 


 0.3 x10^3/uL


(1.0-4.8) 





 


Monocytes # (Auto)


 


 


 0.1 x10^3/uL


(0.0-1.1) 





 


Eosinophils # (Auto)


 


 


 0.0 x10^3/uL


(0.0-0.7) 





 


Basophils # (Auto)


 


 


 0.0 x10^3/uL


(0.0-0.2) 





 


Segmented Neutrophils %   89 % (35-66)  


 


Band Neutrophils %   7 % (0-9)  


 


Lymphocytes %   4 % (24-48)  


 


Platelet Estimate


 


 


 Adequate


(ADEQUATE) 





 


Poikilocytosis   Slight  


 


Anisocytosis   Slight  


 


Sodium Level


 


 


 141 mmol/L


(136-145) 





 


Potassium Level


 


 


 3.7 mmol/L


(3.5-5.1) 





 


Chloride Level


 


 


 107 mmol/L


() 





 


Carbon Dioxide Level


 


 


 20 mmol/L


(21-32) 





 


Anion Gap   14 (6-14)  


 


Blood Urea Nitrogen


 


 


 10 mg/dL


(7-20) 





 


Creatinine


 


 


 0.8 mg/dL


(0.6-1.0) 





 


Estimated GFR


(Cockcroft-Gault) 


 


 82.9 


 





 


BUN/Creatinine Ratio   13 (6-20)  


 


Glucose Level


 


 


 177 mg/dL


(70-99) 





 


Calcium Level


 


 


 8.6 mg/dL


(8.5-10.1) 





 


Total Bilirubin


 


 


 0.3 mg/dL


(0.2-1.0) 





 


Aspartate Amino Transf


(AST/SGOT) 


 


 12 U/L (15-37) 


 





 


Alanine Aminotransferase


(ALT/SGPT) 


 


 10 U/L (14-59) 


 





 


Alkaline Phosphatase


 


 


 65 U/L


() 





 


Total Protein


 


 


 6.6 g/dL


(6.4-8.2) 





 


Albumin


 


 


 2.8 g/dL


(3.4-5.0) 





 


Albumin/Globulin Ratio   0.7 (1.0-1.7)  


 


Treponema pallidum Antibody


 


 


 Nonreactive


(Nonreactive) 





 


Test


 10/16/18


11:45 


 


 





 


Erythrocyte Sedimentation Rate 14 (0-25)    








Medications





Current Medications


Sodium Chloride 500 ml @  500 mls/hr 1X  ONCE IV  Last administered on 10/14/

18at 20:30;  Start 10/14/18 at 20:30;  Stop 10/14/18 at 21:29;  Status DC


Sodium Chloride 1,000 ml @  100 mls/hr 1X  ONCE IV  Last administered on 10/14/

18at 21:30;  Start 10/14/18 at 20:30;  Stop 10/15/18 at 06:29;  Status DC


Ondansetron HCl (Zofran) 4 mg PRN Q8HRS  PRN IV NAUSEA/VOMITING 1ST CHOICE;  

Start 10/14/18 at 23:45;  Stop 10/15/18 at 23:44;  Status DC


Sodium Chloride 1,000 ml @  80 mls/hr [id number] IV  Last administered on 10/15/

18at 08:29;  Start 10/15/18 at 08:00;  Stop 10/15/18 at 09:22;  Status DC


Potassium Chloride/Dextrose/ Sod Cl 1,000 ml @  80 mls/hr [id number] IV  Last 

administered on 10/16/18at 01:33;  Start 10/15/18 at 09:30;  Stop 10/16/18 at 13

:51;  Status DC


Dexamethasone Sodium Phosphate (Decadron) 4 mg Q6HRS IV  Last administered on 10

/16/18at 12:05;  Start 10/15/18 at 18:00


Vancomycin HCl (Vanco Per Pharmacy) 1 each PRN DAILY  PRN MC SEE COMMENTS Last 

administered on 10/15/18at 18:45;  Start 10/15/18 at 16:15


Ceftriaxone Sodium 2 gm/ Dextrose 100 ml @  200 mls/hr Q24H IV  Last 

administered on 10/15/18at 16:51;  Start 10/15/18 at 16:30;  Stop 10/16/18 at 10

:43;  Status DC


Vancomycin HCl 1.25 gm/Sodium Chloride 250 ml @  166.667 mls/hr 1X  ONCE IV  

Last administered on 10/15/18at 18:01;  Start 10/15/18 at 17:00;  Stop 10/15/18 

at 18:29;  Status DC


Vancomycin HCl 1 gm/Sodium Chloride 250 ml @  250 mls/hr Q24H IV ;  Start 10/16/

18 at 18:00


Vancomycin HCl (Vancomycin Trough Level) 1 each 1X  ONCE MC ;  Start 10/17/18 

at 17:30;  Stop 10/17/18 at 17:31


Ceftriaxone Sodium 2 gm/ Sodium Chloride 100 ml @  200 mls/hr Q12H IV ;  Start 

10/16/18 at 11:00;  Stop 10/16/18 at 11:00;  Status DC


Acyclovir Sodium 520 mg/Dextrose 110.4 ml @  110.4 mls/ hr Q12HR IV ;  Start 10/

16/18 at 11:30;  Stop 10/16/18 at 11:30;  Status DC


Ceftriaxone Sodium 2 gm/ Sodium Chloride 100 ml @  200 mls/hr Q12HR IV  Last 

administered on 10/16/18at 12:05;  Start 10/16/18 at 12:00


Acyclovir Sodium 500 mg/Dextrose 110 ml @  110 mls/hr Q12HR IV ;  Start 10/16/

18 at 11:30


Lactobacillus Rhamnosus (Culturelle) 1 cap BID PO ;  Start 10/16/18 at 21:00


Amino Acids/ Glycerin/ Electrolytes 1,000 ml @  80 mls/hr [id number] IV  Last 

administered on 10/16/18at 14:17;  Start 10/16/18 at 14:00





Active Scripts


Active


Meclizine Hcl 25 Mg Tablet 1 Tab PO PRN TID PRN


Reported


Aspirin 81 Mg Tab.chew 1 Tab PO DAILY


Simvastatin 40 Mg Tablet 1 Tab PO QHS


Clopidogrel (Clopidogrel Bisulfate) 75 Mg Tablet 1 Tab PO DAILY


Metformin Hcl 500 Mg Tablet 1 Tab PO BID


Atenolol 25 Mg Tablet 1 Tab PO DAILY


Vitals/I & O





Vital Sign - Last 24 Hours








 10/15/18 10/15/18 10/15/18 10/16/18





 19:00 20:00 23:00 03:00


 


Temp 98.0  97.4 96.9





 98.0  97.4 96.9


 


Pulse 75  77 56


 


Resp 16  16 16


 


B/P (MAP) 137/85 (102)  150/74 (99) 135/72 (93)


 


Pulse Ox 95  93 95


 


O2 Delivery Room Air Room Air Room Air Room Air


 


    





    





 10/16/18 10/16/18 10/16/18 





 07:00 08:30 11:00 


 


Temp 97.2  97.8 





 97.2  97.8 


 


Pulse 70  58 


 


Resp 22  20 


 


B/P (MAP) 135/77 (96)  147/64 (91) 


 


Pulse Ox 97  97 


 


O2 Delivery Room Air Room Air Room Air 














Intake and Output   


 


 10/15/18 10/15/18 10/16/18





 15:00 23:00 07:00


 


Intake Total 0 ml 350 ml 1010 ml


 


Output Total   740 ml


 


Balance 0 ml 350 ml 270 ml











Nutrition Consultation


Dietary Evaluation:


Recommendations by RD:  Increase Calorie Intake, Protein supplementation


Comments:  


sending ensure tid





diet per SLP


Expected Outcomes/Goals:  


to meet > 75% est nutr needs





Malnutrition Findings:


Food and Nutrition Intake (Mod:  <75% est energy req 7days


Weight Status:  Appropriate











AUNG GRIMES MD Oct 16, 2018 15:20

## 2018-10-16 NOTE — PDOC
Infectious Disease Note


Vital Sign


Vital Signs





Vital Signs








  Date Time  Temp Pulse Resp B/P (MAP) Pulse Ox O2 Delivery O2 Flow Rate FiO2


 


10/16/18 08:30      Room Air  


 


10/16/18 07:00 97.2 70 22 135/77 (96) 97   





 97.2       











Labs


Lab





Laboratory Tests








Test


 10/15/18


11:35 10/15/18


17:05 10/15/18


21:01 10/16/18


03:55


 


Glucose (Fingerstick)


 84 mg/dL


(70-99) 124 mg/dL


(70-99) 134 mg/dL


(70-99) 





 


White Blood Count


 


 


 


 6.2 x10^3/uL


(4.0-11.0)


 


Red Blood Count


 


 


 


 4.41 x10^6/uL


(3.50-5.40)


 


Hemoglobin


 


 


 


 12.0 g/dL


(12.0-15.5)


 


Hematocrit


 


 


 


 36.8 %


(36.0-47.0)


 


Mean Corpuscular Volume    83 fL () 


 


Mean Corpuscular Hemoglobin    27 pg (25-35) 


 


Mean Corpuscular Hemoglobin


Concent 


 


 


 33 g/dL


(31-37)


 


Red Cell Distribution Width


 


 


 


 18.3 %


(11.5-14.5)


 


Platelet Count


 


 


 


 221 x10^3/uL


(140-400)


 


Neutrophils (%) (Auto)    94 % (31-73) 


 


Lymphocytes (%) (Auto)    4 % (24-48) 


 


Monocytes (%) (Auto)    2 % (0-9) 


 


Eosinophils (%) (Auto)    0 % (0-3) 


 


Basophils (%) (Auto)    0 % (0-3) 


 


Neutrophils # (Auto)


 


 


 


 5.8 x10^3uL


(1.8-7.7)


 


Lymphocytes # (Auto)


 


 


 


 0.3 x10^3/uL


(1.0-4.8)


 


Monocytes # (Auto)


 


 


 


 0.1 x10^3/uL


(0.0-1.1)


 


Eosinophils # (Auto)


 


 


 


 0.0 x10^3/uL


(0.0-0.7)


 


Basophils # (Auto)


 


 


 


 0.0 x10^3/uL


(0.0-0.2)


 


Segmented Neutrophils %    89 % (35-66) 


 


Band Neutrophils %    7 % (0-9) 


 


Lymphocytes %    4 % (24-48) 


 


Platelet Estimate


 


 


 


 Adequate


(ADEQUATE)


 


Poikilocytosis    Slight 


 


Anisocytosis    Slight 


 


Sodium Level


 


 


 


 141 mmol/L


(136-145)


 


Potassium Level


 


 


 


 3.7 mmol/L


(3.5-5.1)


 


Chloride Level


 


 


 


 107 mmol/L


()


 


Carbon Dioxide Level


 


 


 


 20 mmol/L


(21-32)


 


Anion Gap    14 (6-14) 


 


Blood Urea Nitrogen


 


 


 


 10 mg/dL


(7-20)


 


Creatinine


 


 


 


 0.8 mg/dL


(0.6-1.0)


 


Estimated GFR


(Cockcroft-Gault) 


 


 


 82.9 





 


BUN/Creatinine Ratio    13 (6-20) 


 


Glucose Level


 


 


 


 177 mg/dL


(70-99)


 


Calcium Level


 


 


 


 8.6 mg/dL


(8.5-10.1)


 


Total Bilirubin


 


 


 


 0.3 mg/dL


(0.2-1.0)


 


Aspartate Amino Transf


(AST/SGOT) 


 


 


 12 U/L (15-37) 





 


Alanine Aminotransferase


(ALT/SGPT) 


 


 


 10 U/L (14-59) 





 


Alkaline Phosphatase


 


 


 


 65 U/L


()


 


Total Protein


 


 


 


 6.6 g/dL


(6.4-8.2)


 


Albumin


 


 


 


 2.8 g/dL


(3.4-5.0)


 


Albumin/Globulin Ratio    0.7 (1.0-1.7) 


 


Test


 10/16/18


09:01 


 


 





 


Glucose (Fingerstick)


 173 mg/dL


(70-99) 


 


 














Objective


Assessment


Encephalopathy - now on dexamethasone/Rocephin/Vanc already - daughter states 

she is improving


Abnormal MRI - ? PRESS


Leukopenia - corrected but on Steroids


HTN





Plan


Plan of Care


Check HSV/[place]/Syphilis/Sed rate/YENI


Rocephin to 2 gm IV q 12


Cont vanc


Add Acyclovir


F/u labs ancd response


Needs Neurological eval





D/w daughter





Thank you





# [id number]











SHIVAM VARELA MD Oct 16, 2018 11:05

## 2018-10-16 NOTE — CONS
DATE OF CONSULTATION:  10/16/2018



ROOM:  506.



REQUESTING PHYSICIAN:  Dr. Rodriguez.



REASON FOR CONSULTATION:  Encephalitis.



HISTORY OF PRESENT ILLNESS:  The patient is an 83-year-old female who lives with

her family and has a history of early dementia, previous episodes of

encephalopathy for which she has been admitted.  Daughter states that she has

been living at home and she had been doing fairly well, walking with a walker. 

She had been eating, but over the weekend this past Saturday, she suddenly kind

of became quiet.  Along with being quite, she became less active, stopped eating

and was not getting out of bed.  Daughter asked if she felt ill and she states

yes and told her that she had approved her taking her to the Emergency Room,

which she did on the evening of 10/14/2018.  On arrival, she had a white count

of 4.1 with 21% lymphocytes.  Her LFTs were normal.  Thyroid was normal. 

Urinalysis was collected.  Nitrite was negative; leukocyte esterase negative;

squamous cells, occasional; a few bacteria and occasional white blood cells. 

She underwent a chest x-ray, which did not show any acute pulmonary process. 

She had a hiatal hernia.  CT scan of the head was performed, showed no acute

intracranial findings.  A brain MRI was performed and there were prominent areas

of edema bilaterally, left greater than right, associated with mild left to

right midline shift.  There is variable cortical involvement.  Primary concern

would be for encephalitis or cerebritis, also had some small scattered foci of

associated microhemorrhage and possible press is concerned.  Her blood pressure

was 161/70 on arrival.  Because of the change, an MRI was consulted.  She has

already been started on dexamethasone, Rocephin q.24h. as well as IV vancomycin.



Currently, the patient is lying in bed.  Her daughter states she has been

responsive to her and is improved today; however, she is nonverbal to me.



PAST MEDICAL HISTORY:  Positive for hypertension, osteoarthritis, dementia, type

2 diabetes, hyperlipidemia, heart disease, history of myocardial infarction.



PAST SURGICAL HISTORY:  Cardiac stents as well as hysterectomy.



REVIEW OF SYSTEMS:  Unobtainable.



ALLERGIES:  No known drug allergies.



SOCIAL HISTORY:  Again, she lives at home with family.  No alcohol, no tobacco.



FAMILY HISTORY:  Positive for CVA, ovarian cancer, diabetes and hypertension.



PHYSICAL EXAMINATION:

VITAL SIGNS:  She has been afebrile since her presentation.  Current temperature

is 97.2, pulse 70, respirations 22, blood pressure 135/77, satting 97% on room

air.

CONSTITUTIONAL:  She is alert.  She does focus, but she is nonverbal.

HEENT:  Pupils are with possible early cataracts.  She had normal conjunctivae. 

Oral cavity, pharynx, she would not open her mouth, it did look moist in the

small area that I could see.

NECK:  Supple.  She denies any photophobia.  No JVD.

LUNGS:  Clear to auscultation.

HEART:  S1, S2.

ABDOMEN:  Soft, nontender, nondistended with positive bowel sounds.

EXTREMITIES:  Without clubbing or cyanosis.  No gross edema.

SKIN:  Warm to touch without signs of any rashes.

NEUROLOGIC:  She is alert and nonverbal.  Affect is flat.



LABORATORY VALUES:  White count 6.2, hemoglobin 12, platelets of 221 with 89

segs, 7 bands.  Creatinine 0.9.  She had low liver function tests on arrival. 

AST today is 12, ALT 10, glucose 177, creatinine 0.8.  TSH was normal. 

Urinalysis reviewed in history of present illness.  Radiology reviewed in

history of present illness.



IMPRESSION:

1.  Encephalopathy, now on dexamethasone, Rocephin, vancomycin already. 

Daughter states that she is improving.

2.  Abnormal MRI, questionable press.

3.  Leukopenia, corrected but now on steroids.

4.  Hypertension.



PLAN:  We will check herpes antibodies in the periphery as well as West Nile and

periphery of sicca syphilis as well as sed rate and YENI.  She has been on blood

thinners.  She cannot undergo an LP.  We will change Rocephin to 2 grams IV

q.12h., continue the vancomycin.  We will add acyclovir.  We will hold off on

ampicillin at this time as this appears more encephalitic and less likely

meningitis and no one overload her with antibiotics, increasing the risk of

complications and side effects.  Follow up labs and response.  Needs

neurological evaluation.  Discussed with the daughter.



Thank you for asking us to participate in this patient's care.  Should you have

further questions, please do not hesitate to contact me.

 



______________________________

SHIVAM VARELA MD



DR:  OTTONIEL/prashant  JOB#:  [id number] / [id number]

DD:  10/16/2018 11:04  DT:  10/16/2018 14:15

## 2018-10-17 VITALS — SYSTOLIC BLOOD PRESSURE: 179 MMHG | DIASTOLIC BLOOD PRESSURE: 82 MMHG

## 2018-10-17 VITALS — DIASTOLIC BLOOD PRESSURE: 68 MMHG | SYSTOLIC BLOOD PRESSURE: 143 MMHG

## 2018-10-17 VITALS — SYSTOLIC BLOOD PRESSURE: 140 MMHG | DIASTOLIC BLOOD PRESSURE: 73 MMHG

## 2018-10-17 VITALS — DIASTOLIC BLOOD PRESSURE: 70 MMHG | SYSTOLIC BLOOD PRESSURE: 125 MMHG

## 2018-10-17 VITALS — SYSTOLIC BLOOD PRESSURE: 148 MMHG | DIASTOLIC BLOOD PRESSURE: 76 MMHG

## 2018-10-17 LAB
ANION GAP SERPL CALC-SCNC: 12 MMOL/L (ref 6–14)
APTT BLD: 28 SEC (ref 24–38)
BUN SERPL-MCNC: 18 MG/DL (ref 7–20)
CALCIUM SERPL-MCNC: 9.3 MG/DL (ref 8.5–10.1)
CHLORIDE SERPL-SCNC: 107 MMOL/L (ref 98–107)
CLARITY CSF: CLEAR
CO2 SERPL-SCNC: 22 MMOL/L (ref 21–32)
COLOR CSF: COLORLESS
CREAT SERPL-MCNC: 1 MG/DL (ref 0.6–1)
CSF MON %: 100 %
CSF RBC COUNT: 223
CSF WBC COUNT: 24
GFR SERPLBLD BASED ON 1.73 SQ M-ARVRAT: 64.1 ML/MIN
GLUCOSE CSF-MCNC: 82 MG/DL (ref 37–70)
GLUCOSE SERPL-MCNC: 126 MG/DL (ref 70–99)
GRANULOCYTES NFR CSF MANUAL: 0 %
POTASSIUM SERPL-SCNC: 3.9 MMOL/L (ref 3.5–5.1)
PROT CSF-MCNC: 270.8 MG/DL (ref 15–45)
PROTHROMBIN TIME: 13.8 SEC (ref 11.7–14)
SODIUM SERPL-SCNC: 141 MMOL/L (ref 136–145)
VANC TR: 12.1 MCG/ML (ref 10–20)

## 2018-10-17 PROCEDURE — B01B1ZZ FLUOROSCOPY OF SPINAL CORD USING LOW OSMOLAR CONTRAST: ICD-10-PCS | Performed by: RADIOLOGY

## 2018-10-17 PROCEDURE — 009U3ZX DRAINAGE OF SPINAL CANAL, PERCUTANEOUS APPROACH, DIAGNOSTIC: ICD-10-PCS | Performed by: RADIOLOGY

## 2018-10-17 RX ADMIN — GLYCERIN, ISOLEUCINE, LEUCINE, LYSINE, METHIONINE, PHENYLALANINE, THREONINE, TRYPTOPHAN, VALINE, ALANINE, GLYCINE, ARGININE, HISTIDINE, PROLINE, SERINE, CYSTEINE, SODIUM ACETATE, MAGNESIUM ACETATE, CALCIUM ACETATE, SODIUM CHLORIDE, POTASSIUM CHLORIDE, PHOSPHORIC ACID, AND POTASSIUM METABISULFITE SCH MLS/HR
3; .21; .27; .22; .16; .17; .12; .046; .2; .21; .42; .29; .085; .34; .18; .014; .2; .054; .026; .12; .15; .041 INJECTION INTRAVENOUS at 05:19

## 2018-10-17 RX ADMIN — GLYCERIN, ISOLEUCINE, LEUCINE, LYSINE, METHIONINE, PHENYLALANINE, THREONINE, TRYPTOPHAN, VALINE, ALANINE, GLYCINE, ARGININE, HISTIDINE, PROLINE, SERINE, CYSTEINE, SODIUM ACETATE, MAGNESIUM ACETATE, CALCIUM ACETATE, SODIUM CHLORIDE, POTASSIUM CHLORIDE, PHOSPHORIC ACID, AND POTASSIUM METABISULFITE SCH MLS/HR
3; .21; .27; .22; .16; .17; .12; .046; .2; .21; .42; .29; .085; .34; .18; .014; .2; .054; .026; .12; .15; .041 INJECTION INTRAVENOUS at 15:00

## 2018-10-17 RX ADMIN — Medication SCH CAP: at 08:01

## 2018-10-17 RX ADMIN — DEXAMETHASONE SODIUM PHOSPHATE SCH MG: 4 INJECTION, SOLUTION INTRAMUSCULAR; INTRAVENOUS at 00:24

## 2018-10-17 RX ADMIN — DEXAMETHASONE SODIUM PHOSPHATE SCH MG: 4 INJECTION, SOLUTION INTRAMUSCULAR; INTRAVENOUS at 05:20

## 2018-10-17 RX ADMIN — ACYCLOVIR SODIUM SCH MLS/HR: 500 INJECTION, SOLUTION INTRAVENOUS at 09:22

## 2018-10-17 RX ADMIN — ACYCLOVIR SODIUM SCH MLS/HR: 500 INJECTION, SOLUTION INTRAVENOUS at 21:57

## 2018-10-17 RX ADMIN — DEXAMETHASONE SODIUM PHOSPHATE SCH MG: 4 INJECTION, SOLUTION INTRAMUSCULAR; INTRAVENOUS at 11:36

## 2018-10-17 RX ADMIN — Medication SCH CAP: at 21:18

## 2018-10-17 RX ADMIN — VANCOMYCIN HYDROCHLORIDE PRN EACH: 1 INJECTION, POWDER, LYOPHILIZED, FOR SOLUTION INTRAVENOUS at 19:07

## 2018-10-17 RX ADMIN — VANCOMYCIN HYDROCHLORIDE SCH MLS/HR: 1 INJECTION, POWDER, FOR SOLUTION INTRAVENOUS at 21:57

## 2018-10-17 RX ADMIN — DEXAMETHASONE SODIUM PHOSPHATE SCH MG: 4 INJECTION, SOLUTION INTRAMUSCULAR; INTRAVENOUS at 18:00

## 2018-10-17 NOTE — RAD
Fluoroscopic guided diagnostic lumbar puncture

 

History: Cerebral edema. Possible encephalitis. Altered mental status.

 

Procedure: The patient was unable to provide written consent. Written 

consent was obtained from daughter and nurse on floor. A timeout was 

performed which confirmed the name of the patient and date of birth based 

on the forearm bracelet and the type of procedure based on checking of 

orders in the EMR. A skin marky was placed overlying the lower lumbar spine

at the L5-S1 level. The lower back was prepped widely with Betadine and 

draped in the usual sterile fashion. A total of 2.5 cc of 1% lidocaine was

utilized for local anesthesia. Using sterile technique and fluoroscopic 

guidance, a 20-gauge spinal needle was directed into the spinal canal at 

the at the L5-S1 level. Due to bone and no opening into the spinal canal, 

the spinal canal could not be accessed. Therefore, the next attempt was 

performed at the L3-4 level. Using sterile technique and fluoroscopic 

guidance, a 20-gauge spinal needle was directed into the spinal canal at 

the L3-4 level. A total of 2.5 cc of 1 percent lidocaine was utilized for 

local anesthesia. The patient was moving during the procedure due to the 

altered mental status and had to be held down with 3 people. Therefore, no

opening or closing pressure was obtained. A total of 6 cc of clear CSF was

aspirated and placed into 3 separate specimen bottles. These were sent to 

the laboratory and laboratory orders as per the patient's physician. The 

stylette was replaced and the needle was removed and hemostasis was deemed

adequate. 5 minutes of manual compression was applied. Sterile bandage was

applied to the puncture site. The patient tolerated the procedure well 

without complication and was sent back to the the floor for recovery. 

Follow-up will be with the patient's physician.

 

Total fluoroscopic time: 1.8 minutes Total fluoroscopic spot images: 1 and

one overhead image.

 

Impression: Fluoroscopic guided diagnostic lumbar puncture was performed 

without complication.

 

Electronically signed by: Garrison Reeder MD (10/17/2018 4:18 PM) [facility]

## 2018-10-17 NOTE — PDOC
PROGRESS NOTES


Chief Complaint


Chief Complaint


cerebral edema,   encephalitis or poss press


Acute encephalopathy on chronic dementia


fluctuating dementia with prior delirium 


malnutrition,  moderate


dehydration


weakness and debility, 


dysphagia,  poor po [place]nake,





History of Present Illness


History of Present Illness


Pt seen and examined


DW RN


Pt without any speach but makes eye contact





Vitals


Vitals





Vital Signs








  Date Time  Temp Pulse Resp B/P (MAP) Pulse Ox O2 Delivery O2 Flow Rate FiO2


 


10/17/18 11:00 97.6 88 16 179/82 (114) 99 Room Air  





 97.6       











Physical Exam


Physical Exam


CONSTITUTIONAL:  SConfused, no talk


HEENT:  Pupils are with possible early cataracts.  She had normal conjunctivae. 


Oral cavity, pharynx, she would not open her mouth, it did look moist in the


small area that I could see.


NECK:  Supple.  She denies any photophobia.  No JVD.


LUNGS:  Clear to auscultation.


HEART:  S1, S2.


ABDOMEN:  Soft, nontender, nondistended with positive bowel sounds.


EXTREMITIES:  Without clubbing or cyanosis.  No gross edema.


SKIN:  Warm to touch without signs of any rashes.


NEUROLOGIC:  She is alert and nonverbal.  Affect is less flat. smiled


General:  Other (wont talk)


Heart:  Normal S1, Normal S2


Lungs:  Clear


Abdomen:  Normal bowel sounds, Soft


Extremities:  No clubbing, No cyanosis, No edema


Skin:  No breakdown, No significant lesion





Labs


LABS





Laboratory Tests








Test


 10/17/18


04:25


 


Prothrombin Time


 13.8 SEC


(11.7-14.0)


 


Prothromb Time International


Ratio 1.1 (0.8-1.1) 





 


Activated Partial


Thromboplast Time 28 SEC (24-38) 





 


Sodium Level


 141 mmol/L


(136-145)


 


Potassium Level


 3.9 mmol/L


(3.5-5.1)


 


Chloride Level


 107 mmol/L


()


 


Carbon Dioxide Level


 22 mmol/L


(21-32)


 


Anion Gap 12 (6-14) 


 


Blood Urea Nitrogen


 18 mg/dL


(7-20)


 


Creatinine


 1.0 mg/dL


(0.6-1.0)


 


Estimated GFR


(Cockcroft-Gault) 64.1 





 


Glucose Level


 126 mg/dL


(70-99)


 


Calcium Level


 9.3 mg/dL


(8.5-10.1)











Review of Systems


Review of Systems


Unable to obtain





Assessment and Plan


Assessmemt and Plan


Problems


Medical Problems:


(1) Altered mental status


Status: Acute  





Encephalopathy


Abnormal MRI


Leukopenia


HTN





Plan


ID following


IV Procalamine


Labs


Home meds


Await LP


F/u HSV/West nile/YENI


Rocephin to 2 gm IV q 12


Cont vanc


Emperic Acyclovir 


F/u labs and response


Needs Neurological eval( Dr Martinez out of town for now)





Comment


Review of Relevant


I have reviewed the following items marky (where applicable) has been applied.


Labs





Laboratory Tests








Test


 10/15/18


17:05 10/15/18


21:01 10/16/18


03:55 10/16/18


09:01


 


Glucose (Fingerstick)


 124 mg/dL


(70-99) 134 mg/dL


(70-99) 


 173 mg/dL


(70-99)


 


White Blood Count


 


 


 6.2 x10^3/uL


(4.0-11.0) 





 


Red Blood Count


 


 


 4.41 x10^6/uL


(3.50-5.40) 





 


Hemoglobin


 


 


 12.0 g/dL


(12.0-15.5) 





 


Hematocrit


 


 


 36.8 %


(36.0-47.0) 





 


Mean Corpuscular Volume   83 fL ()  


 


Mean Corpuscular Hemoglobin   27 pg (25-35)  


 


Mean Corpuscular Hemoglobin


Concent 


 


 33 g/dL


(31-37) 





 


Red Cell Distribution Width


 


 


 18.3 %


(11.5-14.5) 





 


Platelet Count


 


 


 221 x10^3/uL


(140-400) 





 


Neutrophils (%) (Auto)   94 % (31-73)  


 


Lymphocytes (%) (Auto)   4 % (24-48)  


 


Monocytes (%) (Auto)   2 % (0-9)  


 


Eosinophils (%) (Auto)   0 % (0-3)  


 


Basophils (%) (Auto)   0 % (0-3)  


 


Neutrophils # (Auto)


 


 


 5.8 x10^3uL


(1.8-7.7) 





 


Lymphocytes # (Auto)


 


 


 0.3 x10^3/uL


(1.0-4.8) 





 


Monocytes # (Auto)


 


 


 0.1 x10^3/uL


(0.0-1.1) 





 


Eosinophils # (Auto)


 


 


 0.0 x10^3/uL


(0.0-0.7) 





 


Basophils # (Auto)


 


 


 0.0 x10^3/uL


(0.0-0.2) 





 


Segmented Neutrophils %   89 % (35-66)  


 


Band Neutrophils %   7 % (0-9)  


 


Lymphocytes %   4 % (24-48)  


 


Platelet Estimate


 


 


 Adequate


(ADEQUATE) 





 


Poikilocytosis   Slight  


 


Anisocytosis   Slight  


 


Sodium Level


 


 


 141 mmol/L


(136-145) 





 


Potassium Level


 


 


 3.7 mmol/L


(3.5-5.1) 





 


Chloride Level


 


 


 107 mmol/L


() 





 


Carbon Dioxide Level


 


 


 20 mmol/L


(21-32) 





 


Anion Gap   14 (6-14)  


 


Blood Urea Nitrogen


 


 


 10 mg/dL


(7-20) 





 


Creatinine


 


 


 0.8 mg/dL


(0.6-1.0) 





 


Estimated GFR


(Cockcroft-Gault) 


 


 82.9 


 





 


BUN/Creatinine Ratio   13 (6-20)  


 


Glucose Level


 


 


 177 mg/dL


(70-99) 





 


Calcium Level


 


 


 8.6 mg/dL


(8.5-10.1) 





 


Total Bilirubin


 


 


 0.3 mg/dL


(0.2-1.0) 





 


Aspartate Amino Transf


(AST/SGOT) 


 


 12 U/L (15-37) 


 





 


Alanine Aminotransferase


(ALT/SGPT) 


 


 10 U/L (14-59) 


 





 


Alkaline Phosphatase


 


 


 65 U/L


() 





 


Total Protein


 


 


 6.6 g/dL


(6.4-8.2) 





 


Albumin


 


 


 2.8 g/dL


(3.4-5.0) 





 


Albumin/Globulin Ratio   0.7 (1.0-1.7)  


 


Treponema pallidum Antibody


 


 


 Nonreactive


(Nonreactive) 





 


Test


 10/16/18


11:45 10/17/18


04:25 


 





 


Erythrocyte Sedimentation Rate 14 (0-25)    


 


Prothrombin Time


 


 13.8 SEC


(11.7-14.0) 


 





 


Prothromb Time International


Ratio 


 1.1 (0.8-1.1) 


 


 





 


Activated Partial


Thromboplast Time 


 28 SEC (24-38) 


 


 





 


Sodium Level


 


 141 mmol/L


(136-145) 


 





 


Potassium Level


 


 3.9 mmol/L


(3.5-5.1) 


 





 


Chloride Level


 


 107 mmol/L


() 


 





 


Carbon Dioxide Level


 


 22 mmol/L


(21-32) 


 





 


Anion Gap  12 (6-14)   


 


Blood Urea Nitrogen


 


 18 mg/dL


(7-20) 


 





 


Creatinine


 


 1.0 mg/dL


(0.6-1.0) 


 





 


Estimated GFR


(Cockcroft-Gault) 


 64.1 


 


 





 


Glucose Level


 


 126 mg/dL


(70-99) 


 





 


Calcium Level


 


 9.3 mg/dL


(8.5-10.1) 


 











Laboratory Tests








Test


 10/17/18


04:25


 


Prothrombin Time


 13.8 SEC


(11.7-14.0)


 


Prothromb Time International


Ratio 1.1 (0.8-1.1) 





 


Activated Partial


Thromboplast Time 28 SEC (24-38) 





 


Sodium Level


 141 mmol/L


(136-145)


 


Potassium Level


 3.9 mmol/L


(3.5-5.1)


 


Chloride Level


 107 mmol/L


()


 


Carbon Dioxide Level


 22 mmol/L


(21-32)


 


Anion Gap 12 (6-14) 


 


Blood Urea Nitrogen


 18 mg/dL


(7-20)


 


Creatinine


 1.0 mg/dL


(0.6-1.0)


 


Estimated GFR


(Cockcroft-Gault) 64.1 





 


Glucose Level


 126 mg/dL


(70-99)


 


Calcium Level


 9.3 mg/dL


(8.5-10.1)








Medications





Current Medications


Sodium Chloride 500 ml @  500 mls/hr 1X  ONCE IV  Last administered on 10/14/

18at 20:30;  Start 10/14/18 at 20:30;  Stop 10/14/18 at 21:29;  Status DC


Sodium Chloride 1,000 ml @  100 mls/hr 1X  ONCE IV  Last administered on 10/14/

18at 21:30;  Start 10/14/18 at 20:30;  Stop 10/15/18 at 06:29;  Status DC


Ondansetron HCl (Zofran) 4 mg PRN Q8HRS  PRN IV NAUSEA/VOMITING 1ST CHOICE;  

Start 10/14/18 at 23:45;  Stop 10/15/18 at 23:44;  Status DC


Sodium Chloride 1,000 ml @  80 mls/hr [id number] IV  Last administered on 10/15/

18at 08:29;  Start 10/15/18 at 08:00;  Stop 10/15/18 at 09:22;  Status DC


Potassium Chloride/Dextrose/ Sod Cl 1,000 ml @  80 mls/hr [id number] IV  Last 

administered on 10/16/18at 01:33;  Start 10/15/18 at 09:30;  Stop 10/16/18 at 13

:51;  Status DC


Dexamethasone Sodium Phosphate (Decadron) 4 mg Q6HRS IV  Last administered on 10

/17/18at 11:36;  Start 10/15/18 at 18:00


Vancomycin HCl (Vanco Per Pharmacy) 1 each PRN DAILY  PRN MC SEE COMMENTS Last 

administered on 10/15/18at 18:45;  Start 10/15/18 at 16:15


Ceftriaxone Sodium 2 gm/ Dextrose 100 ml @  200 mls/hr Q24H IV  Last 

administered on 10/15/18at 16:51;  Start 10/15/18 at 16:30;  Stop 10/16/18 at 10

:43;  Status DC


Vancomycin HCl 1.25 gm/Sodium Chloride 250 ml @  166.667 mls/hr 1X  ONCE IV  

Last administered on 10/15/18at 18:01;  Start 10/15/18 at 17:00;  Stop 10/15/18 

at 18:29;  Status DC


Vancomycin HCl 1 gm/Sodium Chloride 250 ml @  250 mls/hr Q24H IV  Last 

administered on 10/16/18at 19:47;  Start 10/16/18 at 18:00


Vancomycin HCl (Vancomycin Trough Level) 1 each 1X  ONCE MC ;  Start 10/17/18 

at 17:30;  Stop 10/17/18 at 17:31


Ceftriaxone Sodium 2 gm/ Sodium Chloride 100 ml @  200 mls/hr Q12H IV ;  Start 

10/16/18 at 11:00;  Stop 10/16/18 at 11:00;  Status DC


Acyclovir Sodium 520 mg/Dextrose 110.4 ml @  110.4 mls/ hr Q12HR IV ;  Start 10/

16/18 at 11:30;  Stop 10/16/18 at 11:30;  Status DC


Ceftriaxone Sodium 2 gm/ Sodium Chloride 100 ml @  200 mls/hr Q12HR IV  Last 

administered on 10/17/18at 11:35;  Start 10/16/18 at 12:00


Acyclovir Sodium 500 mg/Dextrose 110 ml @  110 mls/hr Q12HR IV  Last 

administered on 10/17/18at 09:22;  Start 10/16/18 at 11:30


Lactobacillus Rhamnosus (Culturelle) 1 cap BID PO  Last administered on 10/16/

18at 21:30;  Start 10/16/18 at 21:00


Amino Acids/ Glycerin/ Electrolytes 1,000 ml @  80 mls/hr [id number] IV  Last 

administered on 10/17/18at 05:19;  Start 10/16/18 at 14:00


Lidocaine/Sodium Bicarbonate (Buffered Lidocaine 1%) 6 ml 1X  ONCE INJ ;  Start 

10/17/18 at 11:45;  Stop 10/17/18 at 11:47;  Status DC





Active Scripts


Active


Meclizine Hcl 25 Mg Tablet 1 Tab PO PRN TID PRN


Reported


Aspirin 81 Mg Tab.chew 1 Tab PO DAILY


Simvastatin 40 Mg Tablet 1 Tab PO QHS


Clopidogrel (Clopidogrel Bisulfate) 75 Mg Tablet 1 Tab PO DAILY


Metformin Hcl 500 Mg Tablet 1 Tab PO BID


Atenolol 25 Mg Tablet 1 Tab PO DAILY


Vitals/I & O





Vital Sign - Last 24 Hours








 10/16/18 10/16/18 10/16/18 10/16/18





 15:00 19:00 20:00 23:00


 


Temp 98.0 97.4  97.5





 98.0 97.4  97.5


 


Pulse 72 71  70


 


Resp 16 17 16


 


B/P (MAP) 137/85 (102) 131/70 (90)  144/64 (90)


 


Pulse Ox 95 98  100


 


O2 Delivery Room Air Room Air Room Air Room Air


 


    





    





 10/17/18 10/17/18 10/17/18 10/17/18





 03:00 07:00 07:30 11:00


 


Temp 96.9 97.8  97.6





 96.9 97.8  97.6


 


Pulse 69 57  88


 


Resp 16 16 16


 


B/P (MAP) 125/70 (88) 143/68 (93)  179/82 (114)


 


Pulse Ox 97 99  99


 


O2 Delivery Room Air Room Air Room Air Room Air














Intake and Output   


 


 10/16/18 10/16/18 10/17/18





 15:01 23:01 07:01


 


Intake Total 1050 ml  200 ml


 


Output Total  625 ml 300 ml


 


Balance 1050 ml -625 ml -100 ml











Nutrition Consultation


Dietary Evaluation:


Recommendations by RD:  Increase Calorie Intake, Protein supplementation


Comments:  


sending ensure tid





diet per SLP


Expected Outcomes/Goals:  


to meet > 75% est nutr needs





Malnutrition Findings:


Food and Nutrition Intake (Mod:  <75% est energy req 7days


Weight Status:  Appropriate











JOSE M PATINO III DO Oct 17, 2018 11:59

## 2018-10-17 NOTE — PDOC2
PALLIATIVE CARE


Palliative Care Note


Palliative Care


Consult requested by Dr. Rodriguez to address goals of carel


Medical Assessment per medical record; (patient admitted with Headache and 

decreased functional status)


Encephalopathy; MRI abnormal; Leukocytosis--on steroids; dementia; dehydration, 

dysphagia, 


Patient more awake today. 


Met with family yesterday; Yonny Palma Cheryl, Lee, Kevin, (Joel oldest son 

not in attendance--but aware of her illness)


Reviewed above medical condition. 





Discussed options for care:  Family see some improvement and would like to 

continue current medical treatment.


Family states 6months ago she was dressing herself, walking with a cane, 

playing dominoes, talking on the phone. 


She would get confused some at night.  Calling family on the phone for various 

concerns. 





Worked as a 


[employer]; very important to patient. 





Discussed Code Status;  Family requests full code. 





Plan:  Full Code


          Full aggressive care











GIOVANNA MIRANDA Oct 17, 2018 12:48

## 2018-10-18 VITALS — DIASTOLIC BLOOD PRESSURE: 89 MMHG | SYSTOLIC BLOOD PRESSURE: 139 MMHG

## 2018-10-18 VITALS — DIASTOLIC BLOOD PRESSURE: 93 MMHG | SYSTOLIC BLOOD PRESSURE: 123 MMHG

## 2018-10-18 VITALS — SYSTOLIC BLOOD PRESSURE: 138 MMHG | DIASTOLIC BLOOD PRESSURE: 81 MMHG

## 2018-10-18 VITALS — DIASTOLIC BLOOD PRESSURE: 82 MMHG | SYSTOLIC BLOOD PRESSURE: 142 MMHG

## 2018-10-18 VITALS — SYSTOLIC BLOOD PRESSURE: 112 MMHG | DIASTOLIC BLOOD PRESSURE: 60 MMHG

## 2018-10-18 VITALS — DIASTOLIC BLOOD PRESSURE: 87 MMHG | SYSTOLIC BLOOD PRESSURE: 141 MMHG

## 2018-10-18 LAB
ALBUMIN SERPL-MCNC: 2.7 G/DL (ref 3.4–5)
ALBUMIN/GLOB SERPL: 0.7 {RATIO} (ref 1–1.7)
ALP SERPL-CCNC: 62 U/L (ref 46–116)
ALT SERPL-CCNC: 11 U/L (ref 14–59)
ANION GAP SERPL CALC-SCNC: 14 MMOL/L (ref 6–14)
AST SERPL-CCNC: 14 U/L (ref 15–37)
BASOPHILS # BLD AUTO: 0 X10^3/UL (ref 0–0.2)
BASOPHILS NFR BLD: 0 % (ref 0–3)
BILIRUB SERPL-MCNC: 0.2 MG/DL (ref 0.2–1)
BUN SERPL-MCNC: 19 MG/DL (ref 7–20)
BUN/CREAT SERPL: 19 (ref 6–20)
CALCIUM SERPL-MCNC: 9 MG/DL (ref 8.5–10.1)
CHLORIDE SERPL-SCNC: 107 MMOL/L (ref 98–107)
CO2 SERPL-SCNC: 21 MMOL/L (ref 21–32)
CREAT SERPL-MCNC: 1 MG/DL (ref 0.6–1)
EOSINOPHIL NFR BLD: 0 % (ref 0–3)
EOSINOPHIL NFR BLD: 0 X10^3/UL (ref 0–0.7)
ERYTHROCYTE [DISTWIDTH] IN BLOOD BY AUTOMATED COUNT: 18.7 % (ref 11.5–14.5)
GFR SERPLBLD BASED ON 1.73 SQ M-ARVRAT: 64.1 ML/MIN
GLOBULIN SER-MCNC: 3.8 G/DL (ref 2.2–3.8)
GLUCOSE SERPL-MCNC: 169 MG/DL (ref 70–99)
HCT VFR BLD CALC: 38.1 % (ref 36–47)
HGB BLD-MCNC: 12.4 G/DL (ref 12–15.5)
LYMPHOCYTES # BLD: 0.5 X10^3/UL (ref 1–4.8)
LYMPHOCYTES NFR BLD AUTO: 4 % (ref 24–48)
MCH RBC QN AUTO: 27 PG (ref 25–35)
MCHC RBC AUTO-ENTMCNC: 33 G/DL (ref 31–37)
MCV RBC AUTO: 83 FL (ref 79–100)
MONO #: 0.4 X10^3/UL (ref 0–1.1)
MONOCYTES NFR BLD: 4 % (ref 0–9)
NEUT #: 11.2 X10^3UL (ref 1.8–7.7)
NEUTROPHILS NFR BLD AUTO: 92 % (ref 31–73)
PLATELET # BLD AUTO: 257 X10^3/UL (ref 140–400)
POTASSIUM SERPL-SCNC: 4.1 MMOL/L (ref 3.5–5.1)
PROT SERPL-MCNC: 6.5 G/DL (ref 6.4–8.2)
RBC # BLD AUTO: 4.6 X10^6/UL (ref 3.5–5.4)
SODIUM SERPL-SCNC: 142 MMOL/L (ref 136–145)
WBC # BLD AUTO: 12.2 X10^3/UL (ref 4–11)

## 2018-10-18 RX ADMIN — DEXAMETHASONE SODIUM PHOSPHATE SCH MG: 4 INJECTION, SOLUTION INTRAMUSCULAR; INTRAVENOUS at 05:42

## 2018-10-18 RX ADMIN — GLYCERIN, ISOLEUCINE, LEUCINE, LYSINE, METHIONINE, PHENYLALANINE, THREONINE, TRYPTOPHAN, VALINE, ALANINE, GLYCINE, ARGININE, HISTIDINE, PROLINE, SERINE, CYSTEINE, SODIUM ACETATE, MAGNESIUM ACETATE, CALCIUM ACETATE, SODIUM CHLORIDE, POTASSIUM CHLORIDE, PHOSPHORIC ACID, AND POTASSIUM METABISULFITE SCH MLS/HR
3; .21; .27; .22; .16; .17; .12; .046; .2; .21; .42; .29; .085; .34; .18; .014; .2; .054; .026; .12; .15; .041 INJECTION INTRAVENOUS at 05:43

## 2018-10-18 RX ADMIN — ENALAPRILAT SCH MG: 1.25 INJECTION, SOLUTION INTRAVENOUS at 11:28

## 2018-10-18 RX ADMIN — ENALAPRILAT SCH MG: 1.25 INJECTION, SOLUTION INTRAVENOUS at 17:48

## 2018-10-18 RX ADMIN — DICLOFENAC SODIUM SCH APP: 10 GEL TOPICAL at 00:05

## 2018-10-18 RX ADMIN — DEXAMETHASONE SODIUM PHOSPHATE SCH MG: 4 INJECTION, SOLUTION INTRAMUSCULAR; INTRAVENOUS at 11:27

## 2018-10-18 RX ADMIN — DICLOFENAC SODIUM SCH APP: 10 GEL TOPICAL at 20:53

## 2018-10-18 RX ADMIN — Medication SCH CAP: at 09:00

## 2018-10-18 RX ADMIN — ACYCLOVIR SODIUM SCH MLS/HR: 500 INJECTION, SOLUTION INTRAVENOUS at 20:53

## 2018-10-18 RX ADMIN — Medication SCH CAP: at 20:53

## 2018-10-18 RX ADMIN — VANCOMYCIN HYDROCHLORIDE PRN EACH: 1 INJECTION, POWDER, LYOPHILIZED, FOR SOLUTION INTRAVENOUS at 14:40

## 2018-10-18 RX ADMIN — DICLOFENAC SODIUM SCH APP: 10 GEL TOPICAL at 08:49

## 2018-10-18 RX ADMIN — ACYCLOVIR SODIUM SCH MLS/HR: 500 INJECTION, SOLUTION INTRAVENOUS at 08:57

## 2018-10-18 RX ADMIN — DEXAMETHASONE SODIUM PHOSPHATE SCH MG: 4 INJECTION, SOLUTION INTRAMUSCULAR; INTRAVENOUS at 00:05

## 2018-10-18 RX ADMIN — DEXAMETHASONE SODIUM PHOSPHATE SCH MG: 4 INJECTION, SOLUTION INTRAMUSCULAR; INTRAVENOUS at 17:48

## 2018-10-18 RX ADMIN — DEXAMETHASONE SODIUM PHOSPHATE SCH MG: 4 INJECTION, SOLUTION INTRAMUSCULAR; INTRAVENOUS at 23:29

## 2018-10-18 RX ADMIN — ENALAPRILAT SCH MG: 1.25 INJECTION, SOLUTION INTRAVENOUS at 23:29

## 2018-10-18 RX ADMIN — VANCOMYCIN HYDROCHLORIDE SCH MLS/HR: 1 INJECTION, POWDER, FOR SOLUTION INTRAVENOUS at 17:51

## 2018-10-18 RX ADMIN — GLYCERIN, ISOLEUCINE, LEUCINE, LYSINE, METHIONINE, PHENYLALANINE, THREONINE, TRYPTOPHAN, VALINE, ALANINE, GLYCINE, ARGININE, HISTIDINE, PROLINE, SERINE, CYSTEINE, SODIUM ACETATE, MAGNESIUM ACETATE, CALCIUM ACETATE, SODIUM CHLORIDE, POTASSIUM CHLORIDE, PHOSPHORIC ACID, AND POTASSIUM METABISULFITE SCH MLS/HR
3; .21; .27; .22; .16; .17; .12; .046; .2; .21; .42; .29; .085; .34; .18; .014; .2; .054; .026; .12; .15; .041 INJECTION INTRAVENOUS at 17:47

## 2018-10-18 NOTE — PDOC
PROGRESS NOTES


Chief Complaint


Chief Complaint


cerebral edema,   encephalitis or poss press


Acute encephalopathy on chronic dementia


fluctuating dementia with prior delirium 


malnutrition,  moderate


dehydration


weakness and debility, 


dysphagia,  poor po intake,





History of Present Illness


History of Present Illness


more alert today, not talking, but follows commands,  family has gotten her to 

eat a little





DW GINA KENNEY and family both discussed at [facility]





Vitals


Vitals





Vital Signs








  Date Time  Temp Pulse Resp B/P (MAP) Pulse Ox O2 Delivery O2 Flow Rate FiO2


 


10/18/18 11:28  112  139/89    


 


10/18/18 08:30      Room Air  


 


10/18/18 07:00 94.6  16  94   





 94.6       











Physical Exam


Physical Exam


CONSTITUTIONAL:  Confused, min verbalization


HEENT:  Pupils are with possible early cataracts.  She had normal conjunctivae. 


Oral cavity, pharynx, she would not open her mouth


NECK:  Supple.  She denies any photophobia.  No JVD.


LUNGS:  Clear to auscultation.


HEART:  S1, S2.


ABDOMEN:  Soft, nontender, nondistended with positive bowel sounds.


EXTREMITIES:  Without clubbing or cyanosis.  No gross edema.


SKIN:  Warm to touch without signs of any rashes.


NEUROLOGIC:  She is alert and nonverbal.  Affect is less flat. Left resisted + 

extension.


General:  Other (wont talk)


Heart:  Normal S1, Normal S2


Lungs:  Clear


Abdomen:  Normal bowel sounds, Soft


Extremities:  No clubbing, No cyanosis, No edema


Skin:  No breakdown, No significant lesion





Labs


LABS





Laboratory Tests








Test


 10/17/18


15:20 10/17/18


18:00 10/18/18


04:40 10/18/18


07:31


 


CSF Color Colorless    


 


CSF Clarity Clear    


 


CSF WBC 24    


 


CSF     


 


CSF Mononuclear WBCs % 100 %    


 


CSF Polynuclear WBCs (%) 0 %    


 


CSF Glucose


 82 mg/dL


(37-70) 


 


 





 


CSF Total Protein


 270.8 mg/dL


(15.0-45.0) 


 


 





 


Vancomycin Level Trough


 


 12.1 mcg/mL


(10.0-20.0) 


 





 


Vancomycin Last Dose Date  10/16/18   


 


Vancomycin Last Dose Time  1800   


 


Glucose (Fingerstick)


 


 


 162 mg/dL


(70-99) 164 mg/dL


(70-99)


 


Test


 10/18/18


07:45 


 


 





 


White Blood Count


 12.2 x10^3/uL


(4.0-11.0) 


 


 





 


Red Blood Count


 4.60 x10^6/uL


(3.50-5.40) 


 


 





 


Hemoglobin


 12.4 g/dL


(12.0-15.5) 


 


 





 


Hematocrit


 38.1 %


(36.0-47.0) 


 


 





 


Mean Corpuscular Volume 83 fL ()    


 


Mean Corpuscular Hemoglobin 27 pg (25-35)    


 


Mean Corpuscular Hemoglobin


Concent 33 g/dL


(31-37) 


 


 





 


Red Cell Distribution Width


 18.7 %


(11.5-14.5) 


 


 





 


Platelet Count


 257 x10^3/uL


(140-400) 


 


 





 


Neutrophils (%) (Auto) 92 % (31-73)    


 


Lymphocytes (%) (Auto) 4 % (24-48)    


 


Monocytes (%) (Auto) 4 % (0-9)    


 


Eosinophils (%) (Auto) 0 % (0-3)    


 


Basophils (%) (Auto) 0 % (0-3)    


 


Neutrophils # (Auto)


 11.2 x10^3uL


(1.8-7.7) 


 


 





 


Lymphocytes # (Auto)


 0.5 x10^3/uL


(1.0-4.8) 


 


 





 


Monocytes # (Auto)


 0.4 x10^3/uL


(0.0-1.1) 


 


 





 


Eosinophils # (Auto)


 0.0 x10^3/uL


(0.0-0.7) 


 


 





 


Basophils # (Auto)


 0.0 x10^3/uL


(0.0-0.2) 


 


 





 


Sodium Level


 142 mmol/L


(136-145) 


 


 





 


Potassium Level


 4.1 mmol/L


(3.5-5.1) 


 


 





 


Chloride Level


 107 mmol/L


() 


 


 





 


Carbon Dioxide Level


 21 mmol/L


(21-32) 


 


 





 


Anion Gap 14 (6-14)    


 


Blood Urea Nitrogen


 19 mg/dL


(7-20) 


 


 





 


Creatinine


 1.0 mg/dL


(0.6-1.0) 


 


 





 


Estimated GFR


(Cockcroft-Gault) 64.1 


 


 


 





 


BUN/Creatinine Ratio 19 (6-20)    


 


Glucose Level


 169 mg/dL


(70-99) 


 


 





 


Calcium Level


 9.0 mg/dL


(8.5-10.1) 


 


 





 


Total Bilirubin


 0.2 mg/dL


(0.2-1.0) 


 


 





 


Aspartate Amino Transf


(AST/SGOT) 14 U/L (15-37) 


 


 


 





 


Alanine Aminotransferase


(ALT/SGPT) 11 U/L (14-59) 


 


 


 





 


Alkaline Phosphatase


 62 U/L


() 


 


 





 


Total Protein


 6.5 g/dL


(6.4-8.2) 


 


 





 


Albumin


 2.7 g/dL


(3.4-5.0) 


 


 





 


Albumin/Globulin Ratio 0.7 (1.0-1.7)    











Assessment and Plan


Assessmemt and Plan


Problems


Medical Problems:


(1) Altered mental status


Status: Acute  











Comment


Review of Relevant


I have reviewed the following items marky (where applicable) has been applied.


Labs





Laboratory Tests








Test


 10/16/18


11:45 10/17/18


04:25 10/17/18


15:20 10/17/18


18:00


 


Erythrocyte Sedimentation Rate 14 (0-25)    


 


Prothrombin Time


 


 13.8 SEC


(11.7-14.0) 


 





 


Prothromb Time International


Ratio 


 1.1 (0.8-1.1) 


 


 





 


Activated Partial


Thromboplast Time 


 28 SEC (24-38) 


 


 





 


Sodium Level


 


 141 mmol/L


(136-145) 


 





 


Potassium Level


 


 3.9 mmol/L


(3.5-5.1) 


 





 


Chloride Level


 


 107 mmol/L


() 


 





 


Carbon Dioxide Level


 


 22 mmol/L


(21-32) 


 





 


Anion Gap  12 (6-14)   


 


Blood Urea Nitrogen


 


 18 mg/dL


(7-20) 


 





 


Creatinine


 


 1.0 mg/dL


(0.6-1.0) 


 





 


Estimated GFR


(Cockcroft-Gault) 


 64.1 


 


 





 


Glucose Level


 


 126 mg/dL


(70-99) 


 





 


Calcium Level


 


 9.3 mg/dL


(8.5-10.1) 


 





 


CSF Color   Colorless  


 


CSF Clarity   Clear  


 


CSF WBC   24  


 


CSF RBC   223  


 


CSF Mononuclear WBCs %   100 %  


 


CSF Polynuclear WBCs (%)   0 %  


 


CSF Glucose


 


 


 82 mg/dL


(37-70) 





 


CSF Total Protein


 


 


 270.8 mg/dL


(15.0-45.0) 





 


Vancomycin Level Trough


 


 


 


 12.1 mcg/mL


(10.0-20.0)


 


Vancomycin Last Dose Date    10/16/18 


 


Vancomycin Last Dose Time    1800 


 


Test


 10/18/18


04:40 10/18/18


07:31 10/18/18


07:45 





 


Glucose (Fingerstick)


 162 mg/dL


(70-99) 164 mg/dL


(70-99) 


 





 


White Blood Count


 


 


 12.2 x10^3/uL


(4.0-11.0) 





 


Red Blood Count


 


 


 4.60 x10^6/uL


(3.50-5.40) 





 


Hemoglobin


 


 


 12.4 g/dL


(12.0-15.5) 





 


Hematocrit


 


 


 38.1 %


(36.0-47.0) 





 


Mean Corpuscular Volume   83 fL ()  


 


Mean Corpuscular Hemoglobin   27 pg (25-35)  


 


Mean Corpuscular Hemoglobin


Concent 


 


 33 g/dL


(31-37) 





 


Red Cell Distribution Width


 


 


 18.7 %


(11.5-14.5) 





 


Platelet Count


 


 


 257 x10^3/uL


(140-400) 





 


Neutrophils (%) (Auto)   92 % (31-73)  


 


Lymphocytes (%) (Auto)   4 % (24-48)  


 


Monocytes (%) (Auto)   4 % (0-9)  


 


Eosinophils (%) (Auto)   0 % (0-3)  


 


Basophils (%) (Auto)   0 % (0-3)  


 


Neutrophils # (Auto)


 


 


 11.2 x10^3uL


(1.8-7.7) 





 


Lymphocytes # (Auto)


 


 


 0.5 x10^3/uL


(1.0-4.8) 





 


Monocytes # (Auto)


 


 


 0.4 x10^3/uL


(0.0-1.1) 





 


Eosinophils # (Auto)


 


 


 0.0 x10^3/uL


(0.0-0.7) 





 


Basophils # (Auto)


 


 


 0.0 x10^3/uL


(0.0-0.2) 





 


Sodium Level


 


 


 142 mmol/L


(136-145) 





 


Potassium Level


 


 


 4.1 mmol/L


(3.5-5.1) 





 


Chloride Level


 


 


 107 mmol/L


() 





 


Carbon Dioxide Level


 


 


 21 mmol/L


(21-32) 





 


Anion Gap   14 (6-14)  


 


Blood Urea Nitrogen


 


 


 19 mg/dL


(7-20) 





 


Creatinine


 


 


 1.0 mg/dL


(0.6-1.0) 





 


Estimated GFR


(Cockcroft-Gault) 


 


 64.1 


 





 


BUN/Creatinine Ratio   19 (6-20)  


 


Glucose Level


 


 


 169 mg/dL


(70-99) 





 


Calcium Level


 


 


 9.0 mg/dL


(8.5-10.1) 





 


Total Bilirubin


 


 


 0.2 mg/dL


(0.2-1.0) 





 


Aspartate Amino Transf


(AST/SGOT) 


 


 14 U/L (15-37) 


 





 


Alanine Aminotransferase


(ALT/SGPT) 


 


 11 U/L (14-59) 


 





 


Alkaline Phosphatase


 


 


 62 U/L


() 





 


Total Protein


 


 


 6.5 g/dL


(6.4-8.2) 





 


Albumin


 


 


 2.7 g/dL


(3.4-5.0) 





 


Albumin/Globulin Ratio   0.7 (1.0-1.7)  








Laboratory Tests








Test


 10/17/18


15:20 10/17/18


18:00 10/18/18


04:40 10/18/18


07:31


 


CSF Color Colorless    


 


CSF Clarity Clear    


 


CSF WBC 24    


 


CSF     


 


CSF Mononuclear WBCs % 100 %    


 


CSF Polynuclear WBCs (%) 0 %    


 


CSF Glucose


 82 mg/dL


(37-70) 


 


 





 


CSF Total Protein


 270.8 mg/dL


(15.0-45.0) 


 


 





 


Vancomycin Level Trough


 


 12.1 mcg/mL


(10.0-20.0) 


 





 


Vancomycin Last Dose Date  10/16/18   


 


Vancomycin Last Dose Time  1800   


 


Glucose (Fingerstick)


 


 


 162 mg/dL


(70-99) 164 mg/dL


(70-99)


 


Test


 10/18/18


07:45 


 


 





 


White Blood Count


 12.2 x10^3/uL


(4.0-11.0) 


 


 





 


Red Blood Count


 4.60 x10^6/uL


(3.50-5.40) 


 


 





 


Hemoglobin


 12.4 g/dL


(12.0-15.5) 


 


 





 


Hematocrit


 38.1 %


(36.0-47.0) 


 


 





 


Mean Corpuscular Volume 83 fL ()    


 


Mean Corpuscular Hemoglobin 27 pg (25-35)    


 


Mean Corpuscular Hemoglobin


Concent 33 g/dL


(31-37) 


 


 





 


Red Cell Distribution Width


 18.7 %


(11.5-14.5) 


 


 





 


Platelet Count


 257 x10^3/uL


(140-400) 


 


 





 


Neutrophils (%) (Auto) 92 % (31-73)    


 


Lymphocytes (%) (Auto) 4 % (24-48)    


 


Monocytes (%) (Auto) 4 % (0-9)    


 


Eosinophils (%) (Auto) 0 % (0-3)    


 


Basophils (%) (Auto) 0 % (0-3)    


 


Neutrophils # (Auto)


 11.2 x10^3uL


(1.8-7.7) 


 


 





 


Lymphocytes # (Auto)


 0.5 x10^3/uL


(1.0-4.8) 


 


 





 


Monocytes # (Auto)


 0.4 x10^3/uL


(0.0-1.1) 


 


 





 


Eosinophils # (Auto)


 0.0 x10^3/uL


(0.0-0.7) 


 


 





 


Basophils # (Auto)


 0.0 x10^3/uL


(0.0-0.2) 


 


 





 


Sodium Level


 142 mmol/L


(136-145) 


 


 





 


Potassium Level


 4.1 mmol/L


(3.5-5.1) 


 


 





 


Chloride Level


 107 mmol/L


() 


 


 





 


Carbon Dioxide Level


 21 mmol/L


(21-32) 


 


 





 


Anion Gap 14 (6-14)    


 


Blood Urea Nitrogen


 19 mg/dL


(7-20) 


 


 





 


Creatinine


 1.0 mg/dL


(0.6-1.0) 


 


 





 


Estimated GFR


(Cockcroft-Gault) 64.1 


 


 


 





 


BUN/Creatinine Ratio 19 (6-20)    


 


Glucose Level


 169 mg/dL


(70-99) 


 


 





 


Calcium Level


 9.0 mg/dL


(8.5-10.1) 


 


 





 


Total Bilirubin


 0.2 mg/dL


(0.2-1.0) 


 


 





 


Aspartate Amino Transf


(AST/SGOT) 14 U/L (15-37) 


 


 


 





 


Alanine Aminotransferase


(ALT/SGPT) 11 U/L (14-59) 


 


 


 





 


Alkaline Phosphatase


 62 U/L


() 


 


 





 


Total Protein


 6.5 g/dL


(6.4-8.2) 


 


 





 


Albumin


 2.7 g/dL


(3.4-5.0) 


 


 





 


Albumin/Globulin Ratio 0.7 (1.0-1.7)    








Microbiology


10/17/18 CSF Gram Stain - Final, Complete


Medications





Current Medications


Sodium Chloride 500 ml @  500 mls/hr 1X  ONCE IV  Last administered on 10/14/

18at 20:30;  Start 10/14/18 at 20:30;  Stop 10/14/18 at 21:29;  Status DC


Sodium Chloride 1,000 ml @  100 mls/hr 1X  ONCE IV  Last administered on 10/14/

18at 21:30;  Start 10/14/18 at 20:30;  Stop 10/15/18 at 06:29;  Status DC


Ondansetron HCl (Zofran) 4 mg PRN Q8HRS  PRN IV NAUSEA/VOMITING 1ST CHOICE;  

Start 10/14/18 at 23:45;  Stop 10/15/18 at 23:44;  Status DC


Sodium Chloride 1,000 ml @  80 mls/hr [id number] IV  Last administered on 10/15/

18at 08:29;  Start 10/15/18 at 08:00;  Stop 10/15/18 at 09:22;  Status DC


Potassium Chloride/Dextrose/ Sod Cl 1,000 ml @  80 mls/hr [id number] IV  Last 

administered on 10/16/18at 01:33;  Start 10/15/18 at 09:30;  Stop 10/16/18 at 13

:51;  Status DC


Dexamethasone Sodium Phosphate (Decadron) 4 mg Q6HRS IV  Last administered on 10

/18/18at 11:27;  Start 10/15/18 at 18:00


Vancomycin HCl (Vanco Per Pharmacy) 1 each PRN DAILY  PRN MC SEE COMMENTS Last 

administered on 10/17/18at 19:07;  Start 10/15/18 at 16:15


Ceftriaxone Sodium 2 gm/ Dextrose 100 ml @  200 mls/hr Q24H IV  Last 

administered on 10/15/18at 16:51;  Start 10/15/18 at 16:30;  Stop 10/16/18 at 10

:43;  Status DC


Vancomycin HCl 1.25 gm/Sodium Chloride 250 ml @  166.667 mls/hr 1X  ONCE IV  

Last administered on 10/15/18at 18:01;  Start 10/15/18 at 17:00;  Stop 10/15/18 

at 18:29;  Status DC


Vancomycin HCl 1 gm/Sodium Chloride 250 ml @  250 mls/hr Q24H IV  Last 

administered on 10/17/18at 21:57;  Start 10/16/18 at 18:00


Vancomycin HCl (Vancomycin Trough Level) 1 each 1X  ONCE MC  Last administered 

on 10/17/18at 17:30;  Start 10/17/18 at 17:30;  Stop 10/17/18 at 17:31;  Status 

DC


Ceftriaxone Sodium 2 gm/ Sodium Chloride 100 ml @  200 mls/hr Q12H IV ;  Start 

10/16/18 at 11:00;  Stop 10/16/18 at 11:00;  Status DC


Acyclovir Sodium 520 mg/Dextrose 110.4 ml @  110.4 mls/ hr Q12HR IV ;  Start 10/

16/18 at 11:30;  Stop 10/16/18 at 11:30;  Status DC


Ceftriaxone Sodium 2 gm/ Sodium Chloride 100 ml @  200 mls/hr Q12HR IV  Last 

administered on 10/18/18at 11:25;  Start 10/16/18 at 12:00


Acyclovir Sodium 500 mg/Dextrose 110 ml @  110 mls/hr Q12HR IV  Last 

administered on 10/18/18at 08:57;  Start 10/16/18 at 11:30


Lactobacillus Rhamnosus (Culturelle) 1 cap BID PO  Last administered on 10/17/

18at 21:18;  Start 10/16/18 at 21:00


Amino Acids/ Glycerin/ Electrolytes 1,000 ml @  80 mls/hr [id number] IV  Last 

administered on 10/18/18at 05:43;  Start 10/16/18 at 14:00


Lidocaine/Sodium Bicarbonate (Buffered Lidocaine 1%) 6 ml 1X  ONCE INJ  Last 

administered on 10/17/18at 15:08;  Start 10/17/18 at 11:45;  Stop 10/17/18 at 11

:47;  Status DC


Diclofenac Sodium (Voltaren) 1 danyell BID TP  Last administered on 10/18/18at 08:49

;  Start 10/17/18 at 23:45


Enalaprilat (Vasotec Inj) 1.25 mg Q6HRS IVP  Last administered on 10/18/18at 11:

28;  Start 10/18/18 at 11:00





Active Scripts


Active


Meclizine Hcl 25 Mg Tablet 1 Tab PO PRN TID PRN


Reported


Aspirin 81 Mg Tab.chew 1 Tab PO DAILY


Simvastatin 40 Mg Tablet 1 Tab PO QHS


Clopidogrel (Clopidogrel Bisulfate) 75 Mg Tablet 1 Tab PO DAILY


Metformin Hcl 500 Mg Tablet 1 Tab PO BID


Atenolol 25 Mg Tablet 1 Tab PO DAILY


Vitals/I & O





Vital Sign - Last 24 Hours








 10/17/18 10/17/18 10/17/18 10/18/18





 19:00 20:00 23:00 03:00


 


Temp 98.9  98.1 98.1





 98.9  98.1 98.1


 


Pulse 88  81 94


 


Resp 16  16 16


 


B/P (MAP) 148/76 (100)  140/73 (95) 142/82 (102)


 


Pulse Ox 99  99 99


 


O2 Delivery Room Air Room Air Room Air Room Air


 


    





    





 10/18/18 10/18/18 10/18/18 





 07:00 08:30 11:28 


 


Temp 94.6   





 94.6   


 


Pulse 112  112 


 


Resp 16   


 


B/P (MAP) 139/89 (106)  139/89 


 


Pulse Ox 94   


 


O2 Delivery Room Air Room Air  














Intake and Output   


 


 10/17/18 10/17/18 10/18/18





 15:00 23:00 07:00


 


Intake Total 210 ml 50 ml 1460 ml


 


Output Total 800 ml 450 ml 1050 ml


 


Balance -590 ml -400 ml 410 ml











Nutrition Consultation


Dietary Evaluation:


Recommendations by RD:  Increase Calorie Intake, Protein supplementation


Comments:  


sending ensure tid





diet per SLP





PPN < 10 days


Expected Outcomes/Goals:  


to meet > 75% est nutr needs





Malnutrition Findings:


Food and Nutrition Intake (Mod:  <75% est energy req 7days


Weight Status:  Appropriate











AUNG GRIMES MD Oct 18, 2018 11:35

## 2018-10-18 NOTE — PDOC
Infectious Disease Note


Subjective


Subjective


States ok but no further verbalization


Looks better





ROS


ROS


unable to obtain





Vital Sign


Vital Signs





Vital Signs








  Date Time  Temp Pulse Resp B/P (MAP) Pulse Ox O2 Delivery O2 Flow Rate FiO2


 


10/18/18 03:00 98.1 94 16 142/82 (102) 99 Room Air  





 98.1       











Physical Exam


PHYSICAL EXAM


CONSTITUTIONAL:  Confused, min verbalization


HEENT:  Pupils are with possible early cataracts.  She had normal conjunctivae. 


Oral cavity, pharynx, she would not open her mouth


NECK:  Supple.  She denies any photophobia.  No JVD.


LUNGS:  Clear to auscultation.


HEART:  S1, S2.


ABDOMEN:  Soft, nontender, nondistended with positive bowel sounds.


EXTREMITIES:  Without clubbing or cyanosis.  No gross edema.


SKIN:  Warm to touch without signs of any rashes.


NEUROLOGIC:  She is alert and nonverbal.  Affect is less flat. Left resisted + 

extension.  R foot with some ? clonus





Labs


Lab





Laboratory Tests








Test


 10/17/18


15:20 10/17/18


18:00 10/18/18


04:40


 


CSF Color Colorless   


 


CSF Clarity Clear   


 


CSF WBC 24   


 


CSF    


 


CSF Mononuclear WBCs % 100 %   


 


CSF Polynuclear WBCs (%) 0 %   


 


CSF Glucose


 82 mg/dL


(37-70) 


 





 


CSF Total Protein


 270.8 mg/dL


(15.0-45.0) 


 





 


Vancomycin Level Trough


 


 12.1 mcg/mL


(10.0-20.0) 





 


Vancomycin Last Dose Date  10/16/18  


 


Vancomycin Last Dose Time  1800  


 


Glucose (Fingerstick)


 


 


 162 mg/dL


(70-99)








Micro





Microbiology





Objective


Assessment


Encephalopathy - now on dexamethasone/Rocephin/Vanc already - family states she 

is improving this am.  CSF abnormal WBC but on steroids however, TP elevated


Syphilis - neg. Sed rate 14 - nml


Abnormal MRI - ? PRESS


Leukopenia - corrected but on Steroids


HTN





Plan


Plan of Care


MRI thoracic/Lumbar given elevated TP in CSF


F/u HSV/[place] in peripheral and CSF


F/u YENI


Rocephin to 2 gm IV q 12


Cont vanc


Added Acyclovir 10/16


F/u labs and response


Needs Neurological eval





D/w family











SHIVAM VARELA MD Oct 18, 2018 07:48

## 2018-10-19 VITALS — DIASTOLIC BLOOD PRESSURE: 85 MMHG | SYSTOLIC BLOOD PRESSURE: 135 MMHG

## 2018-10-19 VITALS — DIASTOLIC BLOOD PRESSURE: 76 MMHG | SYSTOLIC BLOOD PRESSURE: 143 MMHG

## 2018-10-19 VITALS — DIASTOLIC BLOOD PRESSURE: 83 MMHG | SYSTOLIC BLOOD PRESSURE: 130 MMHG

## 2018-10-19 VITALS — SYSTOLIC BLOOD PRESSURE: 114 MMHG | DIASTOLIC BLOOD PRESSURE: 82 MMHG

## 2018-10-19 VITALS — SYSTOLIC BLOOD PRESSURE: 123 MMHG | DIASTOLIC BLOOD PRESSURE: 79 MMHG

## 2018-10-19 VITALS — DIASTOLIC BLOOD PRESSURE: 87 MMHG | SYSTOLIC BLOOD PRESSURE: 128 MMHG

## 2018-10-19 RX ADMIN — DICLOFENAC SODIUM SCH APP: 10 GEL TOPICAL at 20:31

## 2018-10-19 RX ADMIN — DICLOFENAC SODIUM SCH APP: 10 GEL TOPICAL at 09:00

## 2018-10-19 RX ADMIN — DEXAMETHASONE SODIUM PHOSPHATE SCH MG: 4 INJECTION, SOLUTION INTRAMUSCULAR; INTRAVENOUS at 12:00

## 2018-10-19 RX ADMIN — GLYCERIN, ISOLEUCINE, LEUCINE, LYSINE, METHIONINE, PHENYLALANINE, THREONINE, TRYPTOPHAN, VALINE, ALANINE, GLYCINE, ARGININE, HISTIDINE, PROLINE, SERINE, CYSTEINE, SODIUM ACETATE, MAGNESIUM ACETATE, CALCIUM ACETATE, SODIUM CHLORIDE, POTASSIUM CHLORIDE, PHOSPHORIC ACID, AND POTASSIUM METABISULFITE SCH MLS/HR
3; .21; .27; .22; .16; .17; .12; .046; .2; .21; .42; .29; .085; .34; .18; .014; .2; .054; .026; .12; .15; .041 INJECTION INTRAVENOUS at 03:37

## 2018-10-19 RX ADMIN — ACYCLOVIR SODIUM SCH MLS/HR: 500 INJECTION, SOLUTION INTRAVENOUS at 09:00

## 2018-10-19 RX ADMIN — ENALAPRILAT SCH MG: 1.25 INJECTION, SOLUTION INTRAVENOUS at 17:33

## 2018-10-19 RX ADMIN — GLYCERIN, ISOLEUCINE, LEUCINE, LYSINE, METHIONINE, PHENYLALANINE, THREONINE, TRYPTOPHAN, VALINE, ALANINE, GLYCINE, ARGININE, HISTIDINE, PROLINE, SERINE, CYSTEINE, SODIUM ACETATE, MAGNESIUM ACETATE, CALCIUM ACETATE, SODIUM CHLORIDE, POTASSIUM CHLORIDE, PHOSPHORIC ACID, AND POTASSIUM METABISULFITE SCH MLS/HR
3; .21; .27; .22; .16; .17; .12; .046; .2; .21; .42; .29; .085; .34; .18; .014; .2; .054; .026; .12; .15; .041 INJECTION INTRAVENOUS at 17:00

## 2018-10-19 RX ADMIN — ENALAPRILAT SCH MG: 1.25 INJECTION, SOLUTION INTRAVENOUS at 12:00

## 2018-10-19 RX ADMIN — ENALAPRILAT SCH MG: 1.25 INJECTION, SOLUTION INTRAVENOUS at 05:39

## 2018-10-19 RX ADMIN — DEXAMETHASONE SODIUM PHOSPHATE SCH MG: 4 INJECTION, SOLUTION INTRAMUSCULAR; INTRAVENOUS at 05:39

## 2018-10-19 RX ADMIN — Medication SCH CAP: at 09:00

## 2018-10-19 RX ADMIN — ACYCLOVIR SODIUM SCH MLS/HR: 500 INJECTION, SOLUTION INTRAVENOUS at 20:31

## 2018-10-19 RX ADMIN — DEXAMETHASONE SODIUM PHOSPHATE SCH MG: 4 INJECTION, SOLUTION INTRAMUSCULAR; INTRAVENOUS at 17:32

## 2018-10-19 NOTE — RAD
History:  Dobbhoff placement.

 

Comparison:  4 hours earlier.

 

Findings:

 

AP view the abdomen.  Dobbhoff tube is without significant change, still 

appearing to be coiled in proximal stomach.  Bowel gas pattern is 

nonspecific.

 

Impression:

Dobbhoff tube continues to be coiled in the proximal stomach.

 

Electronically signed by: Aguila Smith MD (10/19/2018 11:22 PM) [facility]

## 2018-10-19 NOTE — PDOC
Infectious Disease Note


Subjective


Subjective


Noncommunicative


No fevers





ROS


ROS


Unobtainable as patient in noncommunicative





Vital Sign


Vital Signs





Vital Signs








  Date Time  Temp Pulse Resp B/P (MAP) Pulse Ox O2 Delivery O2 Flow Rate FiO2


 


10/19/18 07:00 97.6 57 20 128/87 (101) 96 Room Air  





 97.6       











Physical Exam


PHYSICAL EXAM


GENERAL: weak appearing


HEENT:  Pupils are with possible early cataracts.  Resists oral exam. 


NECK:  Supple.  


LUNGS:  Clear to auscultation.


HEART:  S1, S2.


ABDOMEN:  Soft, nontender, nondistended with positive bowel sounds.


: [place]


EXTREMITIES:  Without clubbing or cyanosis.  No gross edema.


SKIN:  Warm to touch without signs of any rashes.


NEUROLOGIC:  Sleepy, nonverbal, no follow commands


PIV





Labs


Micro


CSF


 ANAEROBIC-AEROBIC CULTURE  


                                PENDING





  ANAEROBIC RES 1  


                                PENDING





  AEROBIC CULT  


                                PENDING





  AEROBIC RES 1  


                                PENDING





  GRAM STAIN  Final  


        Final report





  GRAM STAIN RES 1  Final  


        Comment





        No white blood cells seen.





  GRAM STAIN RES 2  Final  


        No organisms seen





Objective


Assessment


Encephalopathy - now on dexamethasone/Rocephin/Vanc/Acyclovir  


   -LP: CSF WBC 24, glucose 82, .8 (on steroids) 


Syphilis - neg. Sed rate 14 - nml


Abnormal brain MRI - ? PRESSURE


HTN





Plan


Plan of Care


MRI thoracic/Lumbar given elevated TP in CSF


F/u HSV/[place] in peripheral and CSF


YENI neg


Rocephin to 2 gm IV q 12


Vancomycin 


  - Trough 12.1


Acyclovir 10/16


F/u labs and response


Awaiting neurological eval


Attending Co-Sign


The patient was seen and interviewed as well as examined at the bedside. The 

chart was reviewed. The case was discussed. Agree with the plan of care.


Encephalitis, likely viral, csf wbc 24 only, 


cont acyclovir, rocephine , d/c vanc


d/w family











MAGUIRONNY ROSIO MOORE Oct 19, 2018 11:59


CATRINA GALLAGHER MD Oct 19, 2018 12:19

## 2018-10-19 NOTE — RAD
History:  Dobbhoff tube placement.

 

Comparison:  Earlier October 19, 2018.

 

Findings:

 

AP view of abdomen.  Bowel gas pattern is nonspecific.  Dobbhoff tube 

continues to be coiled in the proximal stomach.

 

Impression:

Dobbhoff tube tip is coiled in the proximal stomach.

 

Electronically signed by: Aguila Smith MD (10/19/2018 7:11 PM) [facility]

## 2018-10-19 NOTE — RAD
History:  Dobbhoff placement.

 

Comparison:  None.

 

Findings:

 

AP supine abdomen radiograph.  Dobbhoff tube is present with the tip 

projecting at the fundus of the stomach.  Advancement is advised.  Bowel 

gas pattern is nonspecific.  

 

Impression:

Dobbhoff tube tip projects at the fundus of the stomach.  Advancement is 

advised.

 

Electronically signed by: Aguila Smith MD (10/19/2018 6:20 PM) [facility]

## 2018-10-20 VITALS — SYSTOLIC BLOOD PRESSURE: 157 MMHG | DIASTOLIC BLOOD PRESSURE: 102 MMHG

## 2018-10-20 VITALS — DIASTOLIC BLOOD PRESSURE: 82 MMHG | SYSTOLIC BLOOD PRESSURE: 129 MMHG

## 2018-10-20 VITALS — SYSTOLIC BLOOD PRESSURE: 94 MMHG | DIASTOLIC BLOOD PRESSURE: 55 MMHG

## 2018-10-20 VITALS — SYSTOLIC BLOOD PRESSURE: 162 MMHG | DIASTOLIC BLOOD PRESSURE: 79 MMHG

## 2018-10-20 VITALS — DIASTOLIC BLOOD PRESSURE: 68 MMHG | SYSTOLIC BLOOD PRESSURE: 125 MMHG

## 2018-10-20 VITALS — SYSTOLIC BLOOD PRESSURE: 105 MMHG | DIASTOLIC BLOOD PRESSURE: 65 MMHG

## 2018-10-20 RX ADMIN — GLYCERIN, ISOLEUCINE, LEUCINE, LYSINE, METHIONINE, PHENYLALANINE, THREONINE, TRYPTOPHAN, VALINE, ALANINE, GLYCINE, ARGININE, HISTIDINE, PROLINE, SERINE, CYSTEINE, SODIUM ACETATE, MAGNESIUM ACETATE, CALCIUM ACETATE, SODIUM CHLORIDE, POTASSIUM CHLORIDE, PHOSPHORIC ACID, AND POTASSIUM METABISULFITE SCH MLS/HR
3; .21; .27; .22; .16; .17; .12; .046; .2; .21; .42; .29; .085; .34; .18; .014; .2; .054; .026; .12; .15; .041 INJECTION INTRAVENOUS at 03:18

## 2018-10-20 RX ADMIN — DEXAMETHASONE SODIUM PHOSPHATE SCH MG: 4 INJECTION, SOLUTION INTRAMUSCULAR; INTRAVENOUS at 01:11

## 2018-10-20 RX ADMIN — ENALAPRILAT SCH MG: 1.25 INJECTION, SOLUTION INTRAVENOUS at 01:11

## 2018-10-20 RX ADMIN — GLYCERIN, ISOLEUCINE, LEUCINE, LYSINE, METHIONINE, PHENYLALANINE, THREONINE, TRYPTOPHAN, VALINE, ALANINE, GLYCINE, ARGININE, HISTIDINE, PROLINE, SERINE, CYSTEINE, SODIUM ACETATE, MAGNESIUM ACETATE, CALCIUM ACETATE, SODIUM CHLORIDE, POTASSIUM CHLORIDE, PHOSPHORIC ACID, AND POTASSIUM METABISULFITE SCH MLS/HR
3; .21; .27; .22; .16; .17; .12; .046; .2; .21; .42; .29; .085; .34; .18; .014; .2; .054; .026; .12; .15; .041 INJECTION INTRAVENOUS at 19:30

## 2018-10-20 RX ADMIN — METOPROLOL TARTRATE SCH MG: 5 INJECTION INTRAVENOUS at 12:56

## 2018-10-20 RX ADMIN — DEXAMETHASONE SODIUM PHOSPHATE SCH MG: 4 INJECTION, SOLUTION INTRAMUSCULAR; INTRAVENOUS at 06:38

## 2018-10-20 RX ADMIN — ACYCLOVIR SODIUM SCH MLS/HR: 500 INJECTION, SOLUTION INTRAVENOUS at 20:31

## 2018-10-20 RX ADMIN — DICLOFENAC SODIUM SCH APP: 10 GEL TOPICAL at 09:00

## 2018-10-20 RX ADMIN — DEXAMETHASONE SODIUM PHOSPHATE SCH MG: 4 INJECTION, SOLUTION INTRAMUSCULAR; INTRAVENOUS at 12:55

## 2018-10-20 RX ADMIN — ENALAPRILAT SCH MG: 1.25 INJECTION, SOLUTION INTRAVENOUS at 12:55

## 2018-10-20 RX ADMIN — ENALAPRILAT SCH MG: 1.25 INJECTION, SOLUTION INTRAVENOUS at 19:31

## 2018-10-20 RX ADMIN — DEXAMETHASONE SODIUM PHOSPHATE SCH MG: 4 INJECTION, SOLUTION INTRAMUSCULAR; INTRAVENOUS at 18:00

## 2018-10-20 RX ADMIN — ENALAPRILAT SCH MG: 1.25 INJECTION, SOLUTION INTRAVENOUS at 06:39

## 2018-10-20 RX ADMIN — ACYCLOVIR SODIUM SCH MLS/HR: 500 INJECTION, SOLUTION INTRAVENOUS at 09:00

## 2018-10-20 RX ADMIN — METOPROLOL TARTRATE SCH MG: 5 INJECTION INTRAVENOUS at 19:30

## 2018-10-20 RX ADMIN — DICLOFENAC SODIUM SCH APP: 10 GEL TOPICAL at 20:31

## 2018-10-20 NOTE — RAD
EXAM: Supine AP view of the abdomen 

 

DATE: 10/20/2018 2:59 AM

 

INDICATION: NG PLACEMENT

 

COMPARISON: 10/19/2018

 

FINDINGS:

Dobbhoff tube is seen within the lower chest, possibly coiled within a 

large hiatal hernia.

Small and large bowel loops are grossly stable. Moderate colonic stool 

content.  No abnormal soft tissue mass effect.  No suspicious 

calcifications are seen.  Evaluation for free intraperitoneal gas is 

limited on this supine exam. Lung bases are not well assessed per 

technique.

 

IMPRESSION:

Dobbhoff tube is seen within the lower chest, likely coiled within a 

hiatal hernia.

 

Electronically signed by: Gonzalo Prescott MD (10/20/2018 3:12 AM) [facility]

## 2018-10-20 NOTE — PDOC2
GI CONSULT


Reason For Consult:


Difficult NG Tube Placement





HPI:


HPI:


Melinda Deal is an 83 years old female patient with history of multiple 

comorbidities including hypertension, hyperlipidemia, diabetes mellitus,

coronary artery disease, osteoarthritis and dementia. patient currently 

admitted with declined functional capacity and poor oral intake presumed to be 

related to  viral encephalitis. GI consulted for difficult feeding NG tube 

placement with hiatus hernia. Patient was examined at bed side this morning. 

She was non verbal. Family members were not also available at time of exam. 

Information was gathered from chart review and discussion with primary team and 

nursing staffs





PMH:


PMH:


Hypertension


Hyperlipidemia


Coronary artery disease


Osteoarthritis


Dementia.





Social History:


Smoke:  No


ALCOHOL:  none


Drugs:  None





ROS:





RO was not obtained because of patient's condition.





Vitals:


Vitals:





 Vital Signs








  Date Time  Temp Pulse Resp B/P (MAP) Pulse Ox O2 Delivery O2 Flow Rate FiO2


 


10/20/18 12:56  55  105/65    


 


10/20/18 10:48 97.5  17  97 Room Air  





 97.5       











Allergies:


Coded Allergies:  


     No Known Drug Allergies (Unverified , 10/29/16)





Medications:





Current Medications








 Medications


  (Trade)  Dose


 Ordered  Sig/Td


 Route


 PRN Reason  Start Time


 Stop Time Status Last Admin


Dose Admin


 


 Metoprolol


 Tartrate


  (Lopressor Vial)  2.5 mg  Q6HRS


 IVP


   10/20/18 12:45


    10/20/18 12:56














PE:





GEN: Non verbal elderly female patient. No acute cardiopulmonary distress.


HEENT: Atraumatic, PERRLA


LUNGS: CTAB


HEART: Has irregularly irregular heart beats to auscultation. 


ABD: NABS, S/ND/NT, no masses


EXTREMITY: No edema


SKIN: No rashes, no jaundice


NEURO/PSYCH: A & O 3





A/P:


A/P:


An 83 years old female patient with history of multiple comorbidities including 

hypertension, hyperlipidemia, diabetes mellitus,coronary artery disease,

osteoarthritis and dementia. Patient currently managed for possible viral 

encephalitis after she was brought for declined functional capacity and poor 

oral intake.GI consulted for difficult feeding NG tube placement with hiatus 

hernia. 





Recommendations:


- Please consult IR for fluoroscopic guided NG tube placement. This was 

discussed with primary team.


- Please call with any Q's.











DINA FINCH MD Oct 20, 2018 14:09

## 2018-10-20 NOTE — PDOC
Infectious Disease Note


Subjective


Subjective


Daughter says her mother has been more responsive today.


She recognized and spoke to her as well as ate few bites of her meal.


No fever/vomiting or diarrhea reported. .





Vital Sign


Vital Signs





Vital Signs








  Date Time  Temp Pulse Resp B/P (MAP) Pulse Ox O2 Delivery O2 Flow Rate FiO2


 


10/20/18 12:56  55  105/65    


 


10/20/18 10:48 97.5  17  97 Room Air  





 97.5       











Physical Exam


PHYSICAL EXAM


GENERAL: Resting quietly 


LUNGS:  Clear to auscultation.


HEART:  S1, S2.


ABDOMEN:  Soft, nondistended with positive bowel sounds.


: [place]


EXTREMITIES:  Without clubbing or cyanosis.  No gross edema.


SKIN:  Warm to touch without signs of any rashes.


PIV





Labs


Micro


CSF


 ANAEROBIC-AEROBIC CULTURE  


                                PENDING





  ANAEROBIC RES 1  


                                PENDING





  AEROBIC CULT  


                                PENDING





  AEROBIC RES 1  


                                PENDING





  GRAM STAIN  Final  


        Final report





  GRAM STAIN RES 1  Final  


        Comment





        No white blood cells seen.





  GRAM STAIN RES 2  Final  


        No organisms seen





Objective


Assessment


Encephalopathy - now on dexamethasone/Rocephin/Vanc/Acyclovir  


   -LP: CSF WBC 24, glucose 82, .8 (on steroids) 


Syphilis - neg. Sed rate 14 - nml


Abnormal brain MRI - ? PRESSURE


HTN





Plan


Plan of Care


MRI thoracic/Lumbar given elevated TP in CSF


HSV/West Nile peripheral neg 


YENI neg


Rocephin to 2 gm IV q 12


Acyclovir 10/16


F/u labs and response


Supportive care 





D/w son and daughter 





Patient seen, examined, I agree with above.  Assessment and plan was formulated 

with ARNP.











RONNY KILGORE Oct 20, 2018 13:33


AARON GALLAGHER MD Oct 20, 2018 15:39

## 2018-10-20 NOTE — PDOC
PROGRESS NOTES


Chief Complaint


Chief Complaint


cerebral edema,   encephalitis or poss press syndrome


Acute encephalopathy on chronic dementia


fluctuating dementia with prior delirium 


malnutrition,  moderate


dehydration


weakness and debility, 


dysphagia,  poor po intake,





History of Present Illness


History of Present Illness


About the same alert today, not talking, but follows (some) commands, 


no PO intake today, seen by GI, not a good candidate for sedation at this time


Accelerated HR noted on examination, placed on telemetry with multiple PACs, 

consulted cardiology





AIME RN - no BM, very hard stool in rectum, she attempted digital disimpaction





A/P:


cerebral edema,   encephalitis or poss press syndrome - may try MRI with 

sedation


Constipation - will try soap suds enema


Atrial arrhythmia - PACs, will use IV metoprolol


Acute encephalopathy on chronic dementia


fluctuating dementia - with prior delirium. A little awake today


malnutrition,  moderate - needs more than PPN. recommended doboff, family will 

consider IR to place under fluoro


dehydration - improving with IVF, PPN


weakness and debility - severe, will d/w neuro


dysphagia - poor po intake as above, will consider NGT with IR


HTN - BP control has been better





FEN - ppn for now, needs NGT


PPX - heparin


FULL CODE


Cont inpatient for encephalopathy, needs nutrition





Vitals


Vitals





Vital Signs








  Date Time  Temp Pulse Resp B/P (MAP) Pulse Ox O2 Delivery O2 Flow Rate FiO2


 


10/20/18 06:39    157/102    


 


10/20/18 03:00 96.9 60 18  96 Room Air  





 96.9       











Physical Exam


Physical Exam


GENERAL: weak appearing


HEENT:  Pupils are with possible early cataracts.  Resists oral exam. 


NECK:  Supple.  


LUNGS:  Clear to auscultation.


HEART:  S1, S2.


ABDOMEN:  Soft, nontender, nondistended with positive bowel sounds.


: [place]


EXTREMITIES:  Without clubbing or cyanosis.  No gross edema.


SKIN:  Warm to touch without signs of any rashes.


NEUROLOGIC:  Sleepy, nonverbal, no follow commands


PIV


General:  Cooperative, No acute distress, Other (wont talk)


Heart:  Normal S1, Normal S2


Lungs:  Clear


Abdomen:  Normal bowel sounds, Soft


Extremities:  No clubbing, No cyanosis, No edema


Skin:  No breakdown, No significant lesion





Assessment and Plan


Assessmemt and Plan


Problems


Medical Problems:


(1) Altered mental status


Status: Acute  











Comment


Review of Relevant


I have reviewed the following items marky (where applicable) has been applied.


Labs





Microbiology


10/17/18 CSF Gram Stain - Final, Complete


Medications





Current Medications


Sodium Chloride 500 ml @  500 mls/hr 1X  ONCE IV  Last administered on 10/14/

18at 20:30;  Start 10/14/18 at 20:30;  Stop 10/14/18 at 21:29;  Status DC


Sodium Chloride 1,000 ml @  100 mls/hr 1X  ONCE IV  Last administered on 10/14/

18at 21:30;  Start 10/14/18 at 20:30;  Stop 10/15/18 at 06:29;  Status DC


Ondansetron HCl (Zofran) 4 mg PRN Q8HRS  PRN IV NAUSEA/VOMITING 1ST CHOICE;  

Start 10/14/18 at 23:45;  Stop 10/15/18 at 23:44;  Status DC


Sodium Chloride 1,000 ml @  80 mls/hr [id number] IV  Last administered on 10/15/

18at 08:29;  Start 10/15/18 at 08:00;  Stop 10/15/18 at 09:22;  Status DC


Potassium Chloride/Dextrose/ Sod Cl 1,000 ml @  80 mls/hr [id number] IV  Last 

administered on 10/16/18at 01:33;  Start 10/15/18 at 09:30;  Stop 10/16/18 at 13

:51;  Status DC


Dexamethasone Sodium Phosphate (Decadron) 4 mg Q6HRS IV  Last administered on 10

/20/18at 06:38;  Start 10/15/18 at 18:00


Vancomycin HCl (Vanco Per Pharmacy) 1 each PRN DAILY  PRN MC SEE COMMENTS Last 

administered on 10/18/18at 14:40;  Start 10/15/18 at 16:15;  Stop 10/19/18 at 12

:17;  Status DC


Ceftriaxone Sodium 2 gm/ Dextrose 100 ml @  200 mls/hr Q24H IV  Last 

administered on 10/15/18at 16:51;  Start 10/15/18 at 16:30;  Stop 10/16/18 at 10

:43;  Status DC


Vancomycin HCl 1.25 gm/Sodium Chloride 250 ml @  166.667 mls/hr 1X  ONCE IV  

Last administered on 10/15/18at 18:01;  Start 10/15/18 at 17:00;  Stop 10/15/18 

at 18:29;  Status DC


Vancomycin HCl 1 gm/Sodium Chloride 250 ml @  250 mls/hr Q24H IV  Last 

administered on 10/18/18at 17:51;  Start 10/16/18 at 18:00;  Stop 10/19/18 at 12

:17;  Status DC


Vancomycin HCl (Vancomycin Trough Level) 1 each 1X  ONCE MC  Last administered 

on 10/17/18at 17:30;  Start 10/17/18 at 17:30;  Stop 10/17/18 at 17:31;  Status 

DC


Ceftriaxone Sodium 2 gm/ Sodium Chloride 100 ml @  200 mls/hr Q12H IV ;  Start 

10/16/18 at 11:00;  Stop 10/16/18 at 11:00;  Status DC


Acyclovir Sodium 520 mg/Dextrose 110.4 ml @  110.4 mls/ hr Q12HR IV ;  Start 10/

16/18 at 11:30;  Stop 10/16/18 at 11:30;  Status DC


Ceftriaxone Sodium 2 gm/ Sodium Chloride 100 ml @  200 mls/hr Q12HR IV  Last 

administered on 10/19/18at 20:30;  Start 10/16/18 at 12:00


Acyclovir Sodium 500 mg/Dextrose 110 ml @  110 mls/hr Q12HR IV  Last 

administered on 10/19/18at 20:31;  Start 10/16/18 at 11:30


Lactobacillus Rhamnosus (Culturelle) 1 cap BID PO  Last administered on 10/17/

18at 21:18;  Start 10/16/18 at 21:00;  Stop 10/19/18 at 16:08;  Status DC


Amino Acids/ Glycerin/ Electrolytes 1,000 ml @  80 mls/hr [id number] IV  Last 

administered on 10/20/18at 03:18;  Start 10/16/18 at 14:00


Lidocaine/Sodium Bicarbonate (Buffered Lidocaine 1%) 6 ml 1X  ONCE INJ  Last 

administered on 10/17/18at 15:08;  Start 10/17/18 at 11:45;  Stop 10/17/18 at 11

:47;  Status DC


Diclofenac Sodium (Voltaren) 1 danyell BID TP  Last administered on 10/19/18at 20:31

;  Start 10/17/18 at 23:45


Enalaprilat (Vasotec Inj) 1.25 mg Q6HRS IVP  Last administered on 10/20/18at 06:

39;  Start 10/18/18 at 11:00


Lorazepam (Ativan) 0.5 mg PRN DAILY  PRN IV ANXIETY / AGITATION Last 

administered on 10/19/18at 17:32;  Start 10/18/18 at 11:45





Active Scripts


Active


Meclizine Hcl 25 Mg Tablet 1 Tab PO PRN TID PRN


Reported


Aspirin 81 Mg Tab.chew 1 Tab PO DAILY


Simvastatin 40 Mg Tablet 1 Tab PO QHS


Clopidogrel (Clopidogrel Bisulfate) 75 Mg Tablet 1 Tab PO DAILY


Metformin Hcl 500 Mg Tablet 1 Tab PO BID


Atenolol 25 Mg Tablet 1 Tab PO DAILY


Vitals/I & O





Vital Sign - Last 24 Hours








 10/19/18 10/19/18 10/19/18 10/19/18





 08:00 11:00 12:00 15:00


 


Temp  97.7  97.9





  97.7  97.9


 


Pulse  60 60 60


 


Resp  20  20


 


B/P (MAP)  130/83 (99) 130/83 123/79 (94)


 


Pulse Ox  98  96


 


O2 Delivery Room Air Room Air  Room Air


 


    





    





 10/19/18 10/19/18 10/19/18 10/19/18





 17:33 19:00 20:00 23:00


 


Temp  96.5  96.6





  96.5  96.6


 


Pulse 60 72  82


 


Resp  18  18


 


B/P (MAP) 123/79 135/85 (102)  143/76 (98)


 


Pulse Ox  94  94


 


O2 Delivery  Room Air Room Air Room Air


 


    





    





 10/20/18 10/20/18 10/20/18 





 01:11 03:00 06:39 


 


Temp  96.9  





  96.9  


 


Pulse  60  


 


Resp  18  


 


B/P (MAP) 143/76 157/102 (120) 157/102 


 


Pulse Ox  96  


 


O2 Delivery  Room Air  














Intake and Output   


 


 10/19/18 10/19/18 10/20/18





 15:00 23:00 07:00


 


Output Total  600 ml 350 ml


 


Balance  -600 ml -350 ml











Nutrition Consultation


Dietary Evaluation:


Recommendations by RD:  Increase Calorie Intake, Protein supplementation


Comments:  


sending ensure tid





diet per SLP





REC TF via dobhoff per following: Jevity 1.5@goal rate 40 ml/hr w/125 ml  


water flushes q4 hrs or flushes per MD


Expected Outcomes/Goals:  


to meet > 75% est nutr needs - not met, new goal established 





New goal 10/19: Dobhoff placement and TF initiation





Malnutrition Findings:


Food and Nutrition Intake (Mod:  <75% est energy req 7days


Weight Status:  Appropriate











BENJAMÍN ADAM MD Oct 20, 2018 07:52

## 2018-10-20 NOTE — CONS
DATE OF CONSULTATION:  10/20/2018



REASON FOR CONSULTATION:  Irregular heart rhythm.



HISTORY OF PRESENT ILLNESS:  The patient is a pleasant 83-year-old woman who

presented initially with altered mental status.  She is being evaluated for

possible PRES syndrome and is also being given multiple antibiotics for possible

encephalitis.  In this setting, she was noted to have an irregular heartbeat on

routine examination, which then prompted the Cardiology consultation.  Telemetry

revealed sinus rhythm with frequent PVCs.  Her initial admission EKG also

reveals sinus rhythm with ventricular bigeminy.



PAST MEDICAL HISTORY:

1.  Coronary artery disease.

2.  Hypertension.

3.  Dyslipidemia.

4.  Diabetes.



SOCIAL HISTORY:  The patient lives alone at home, but is cared for by multiple

family members.  No alcohol, tobacco or illicit drug use.



ALLERGIES:  No known drug allergies.



CURRENT CARDIAC MEDICATIONS:  Enalapril 1.25 mg IV q.6 hours.



REVIEW OF SYSTEMS:  Not able to be obtained, but in speaking with the patient,

she reports no chest pain.  Family reports that she is doing much better today.



PHYSICAL EXAMINATION:

VITAL SIGNS:  Afebrile, 55, 17, 105/65, 97% on room air.

GENERAL:  She is somnolent, but arousable.  She is able to recognize herself and

her family.  Does not know the date or time.

HEAD AND NECK:  Unremarkable.

CARDIAC:  Irregularly irregular rhythm with frequent PVCs.

LUNGS:  Fairly clear to auscultation bilaterally anteriorly.

ABDOMEN:  Soft, nontender, nondistended.

EXTREMITIES:  No clubbing, cyanosis or edema.  1+ pedal and radial pulses.

NEUROLOGIC:  No focal deficits, but overall is weak.

MUSCULOSKELETAL:  No trauma.



DIAGNOSTIC STUDIES:  Hemoglobin 12.2, platelets are 257.

Creatinine, potassium, ALT, AST and TSH are within normal limits.



IMPRESSION:

1.  Sinus rhythm with frequent premature ventricular contractions in the setting

of multiple underlying comorbidities.

2.  Known chronic coronary artery disease without any acute chest pain.

3.  Hypertension, well controlled.

4.  Dyslipidemia.

5.  Diabetes.



RECOMMENDATIONS:  From a cardiac perspective, at this present time, we would

continue conservative management with metoprolol 2.5 mg IV q.6 hours and if her

blood pressure is too low could then consider holding that.  If she has

significant bradycardia, could at that time consider decreasing the metoprolol

to 1.25 mg IV q.6 hours.  If necessary, we can ultimately had amiodarone as

needed, but would defer this at this time.



Obtain an echocardiogram routinely.



Thank you for this consultation.

 



______________________________

PATRICIA DAWSON MD



DR:  LEONEL/prashant  JOB#:  [id number] / [id number]

DD:  10/20/2018 12:42  DT:  10/20/2018 12:55

## 2018-10-21 VITALS — SYSTOLIC BLOOD PRESSURE: 128 MMHG | DIASTOLIC BLOOD PRESSURE: 68 MMHG

## 2018-10-21 VITALS — SYSTOLIC BLOOD PRESSURE: 111 MMHG | DIASTOLIC BLOOD PRESSURE: 61 MMHG

## 2018-10-21 VITALS — SYSTOLIC BLOOD PRESSURE: 133 MMHG | DIASTOLIC BLOOD PRESSURE: 60 MMHG

## 2018-10-21 VITALS — DIASTOLIC BLOOD PRESSURE: 78 MMHG | SYSTOLIC BLOOD PRESSURE: 118 MMHG

## 2018-10-21 VITALS — SYSTOLIC BLOOD PRESSURE: 114 MMHG | DIASTOLIC BLOOD PRESSURE: 67 MMHG

## 2018-10-21 VITALS — SYSTOLIC BLOOD PRESSURE: 119 MMHG | DIASTOLIC BLOOD PRESSURE: 68 MMHG

## 2018-10-21 RX ADMIN — DEXAMETHASONE SODIUM PHOSPHATE SCH MG: 4 INJECTION, SOLUTION INTRAMUSCULAR; INTRAVENOUS at 00:34

## 2018-10-21 RX ADMIN — HEPARIN SODIUM SCH UNIT: 5000 INJECTION, SOLUTION INTRAVENOUS; SUBCUTANEOUS at 22:33

## 2018-10-21 RX ADMIN — DEXAMETHASONE SODIUM PHOSPHATE SCH MG: 4 INJECTION, SOLUTION INTRAMUSCULAR; INTRAVENOUS at 14:27

## 2018-10-21 RX ADMIN — METOPROLOL TARTRATE SCH MG: 5 INJECTION INTRAVENOUS at 00:35

## 2018-10-21 RX ADMIN — GLYCERIN, ISOLEUCINE, LEUCINE, LYSINE, METHIONINE, PHENYLALANINE, THREONINE, TRYPTOPHAN, VALINE, ALANINE, GLYCINE, ARGININE, HISTIDINE, PROLINE, SERINE, CYSTEINE, SODIUM ACETATE, MAGNESIUM ACETATE, CALCIUM ACETATE, SODIUM CHLORIDE, POTASSIUM CHLORIDE, PHOSPHORIC ACID, AND POTASSIUM METABISULFITE SCH MLS/HR
3; .21; .27; .22; .16; .17; .12; .046; .2; .21; .42; .29; .085; .34; .18; .014; .2; .054; .026; .12; .15; .041 INJECTION INTRAVENOUS at 06:30

## 2018-10-21 RX ADMIN — ENALAPRILAT SCH MG: 1.25 INJECTION, SOLUTION INTRAVENOUS at 14:27

## 2018-10-21 RX ADMIN — ACYCLOVIR SODIUM SCH MLS/HR: 500 INJECTION, SOLUTION INTRAVENOUS at 21:18

## 2018-10-21 RX ADMIN — METOPROLOL TARTRATE SCH MG: 5 INJECTION INTRAVENOUS at 18:00

## 2018-10-21 RX ADMIN — ENALAPRILAT SCH MG: 1.25 INJECTION, SOLUTION INTRAVENOUS at 00:34

## 2018-10-21 RX ADMIN — HEPARIN SODIUM SCH UNIT: 5000 INJECTION, SOLUTION INTRAVENOUS; SUBCUTANEOUS at 14:36

## 2018-10-21 RX ADMIN — DEXAMETHASONE SODIUM PHOSPHATE SCH MG: 4 INJECTION, SOLUTION INTRAMUSCULAR; INTRAVENOUS at 06:12

## 2018-10-21 RX ADMIN — METOPROLOL TARTRATE SCH MG: 5 INJECTION INTRAVENOUS at 06:00

## 2018-10-21 RX ADMIN — FAMOTIDINE SCH MG: 20 TABLET ORAL at 21:17

## 2018-10-21 RX ADMIN — DEXAMETHASONE SODIUM PHOSPHATE SCH MG: 4 INJECTION, SOLUTION INTRAMUSCULAR; INTRAVENOUS at 18:32

## 2018-10-21 RX ADMIN — GLYCERIN, ISOLEUCINE, LEUCINE, LYSINE, METHIONINE, PHENYLALANINE, THREONINE, TRYPTOPHAN, VALINE, ALANINE, GLYCINE, ARGININE, HISTIDINE, PROLINE, SERINE, CYSTEINE, SODIUM ACETATE, MAGNESIUM ACETATE, CALCIUM ACETATE, SODIUM CHLORIDE, POTASSIUM CHLORIDE, PHOSPHORIC ACID, AND POTASSIUM METABISULFITE SCH MLS/HR
3; .21; .27; .22; .16; .17; .12; .046; .2; .21; .42; .29; .085; .34; .18; .014; .2; .054; .026; .12; .15; .041 INJECTION INTRAVENOUS at 11:23

## 2018-10-21 RX ADMIN — ENALAPRILAT SCH MG: 1.25 INJECTION, SOLUTION INTRAVENOUS at 18:31

## 2018-10-21 RX ADMIN — METOPROLOL TARTRATE SCH MG: 5 INJECTION INTRAVENOUS at 14:26

## 2018-10-21 RX ADMIN — DICLOFENAC SODIUM SCH APP: 10 GEL TOPICAL at 09:26

## 2018-10-21 RX ADMIN — DICLOFENAC SODIUM SCH APP: 10 GEL TOPICAL at 21:18

## 2018-10-21 RX ADMIN — ENALAPRILAT SCH MG: 1.25 INJECTION, SOLUTION INTRAVENOUS at 06:13

## 2018-10-21 RX ADMIN — ACYCLOVIR SODIUM SCH MLS/HR: 500 INJECTION, SOLUTION INTRAVENOUS at 09:26

## 2018-10-21 NOTE — PDOC
PROGRESS NOTES


Chief Complaint


Chief Complaint


cerebral edema,   encephalitis or poss press syndrome


Acute encephalopathy on chronic mild dementia


fluctuating dementia with prior delirium 


malnutrition,  moderate


dehydration


weakness and debility, 


dysphagia,  poor po intake, 


arrythmia


leukocytosis











plan:


fu with ID, on steroid iv, ceftriaxone, acyclovir. fu LP cx.


pt eats ok with daughter at bedside, GI fu tmr


on PPN, if cont eats ok, will dc


add dvt , gi ppx


PTOT





History of Present Illness


History of Present Illness


 





ROS: no fever, chills, sob or chest pain





Altered mental status for admission, s/p LP with high Protein >200, otherwise 

not significant





slightly better today, not talking to me or follow commands, but can squeeze my 

fingers when asked by her daughter at bedside


has constipation, had BM WITH ENema last night


MRI brain showed brain edema indicating encephalitis





family refused neuro before, not sure why allow to see dr. Villa yesterday. not 

sure if neuro will cont see tmr





Vitals


Vitals





Vital Signs








  Date Time  Temp Pulse Resp B/P (MAP) Pulse Ox O2 Delivery O2 Flow Rate FiO2


 


10/21/18 08:00      Room Air  


 


10/21/18 07:00 98.0 74 18 118/78 (91) 100   





 98.0       











Physical Exam


Physical Exam


GENERAL: Resting quietly, NAD  . not answer questions, squeeze my hands when 

asked by daughter


LUNGS:  Clear to auscultation.


HEART:  S1, S2.


ABDOMEN:  Soft, nondistended with positive bowel sounds.


: [place]


EXTREMITIES:  Without clubbing or cyanosis.  No gross edema.


SKIN:  Warm to touch without signs of any rashes.


CNS: Arouses to name, mumbles, 


PIV


General:  Alert, No acute distress, Other (wont talk)


Heart:  Normal S1, Normal S2


Lungs:  Clear


Abdomen:  Normal bowel sounds, Soft


Extremities:  No clubbing, No cyanosis, No edema


Skin:  No breakdown, No significant lesion





Assessment and Plan


Assessmemt and Plan


Problems


Medical Problems:


(1) Altered mental status


Status: Acute  











Comment


Review of Relevant


I have reviewed the following items marky (where applicable) has been applied.


Labs





Microbiology


10/17/18 CSF Gram Stain - Final, Complete


Medications





Current Medications


Sodium Chloride 500 ml @  500 mls/hr 1X  ONCE IV  Last administered on 10/14/

18at 20:30;  Start 10/14/18 at 20:30;  Stop 10/14/18 at 21:29;  Status DC


Sodium Chloride 1,000 ml @  100 mls/hr 1X  ONCE IV  Last administered on 10/14/

18at 21:30;  Start 10/14/18 at 20:30;  Stop 10/15/18 at 06:29;  Status DC


Ondansetron HCl (Zofran) 4 mg PRN Q8HRS  PRN IV NAUSEA/VOMITING 1ST CHOICE;  

Start 10/14/18 at 23:45;  Stop 10/15/18 at 23:44;  Status DC


Sodium Chloride 1,000 ml @  80 mls/hr [id number] IV  Last administered on 10/15/

18at 08:29;  Start 10/15/18 at 08:00;  Stop 10/15/18 at 09:22;  Status DC


Potassium Chloride/Dextrose/ Sod Cl 1,000 ml @  80 mls/hr [id number] IV  Last 

administered on 10/16/18at 01:33;  Start 10/15/18 at 09:30;  Stop 10/16/18 at 13

:51;  Status DC


Dexamethasone Sodium Phosphate (Decadron) 4 mg Q6HRS IV  Last administered on 10

/21/18at 06:12;  Start 10/15/18 at 18:00


Vancomycin HCl (Vanco Per Pharmacy) 1 each PRN DAILY  PRN MC SEE COMMENTS Last 

administered on 10/18/18at 14:40;  Start 10/15/18 at 16:15;  Stop 10/19/18 at 12

:17;  Status DC


Ceftriaxone Sodium 2 gm/ Dextrose 100 ml @  200 mls/hr Q24H IV  Last 

administered on 10/15/18at 16:51;  Start 10/15/18 at 16:30;  Stop 10/16/18 at 10

:43;  Status DC


Vancomycin HCl 1.25 gm/Sodium Chloride 250 ml @  166.667 mls/hr 1X  ONCE IV  

Last administered on 10/15/18at 18:01;  Start 10/15/18 at 17:00;  Stop 10/15/18 

at 18:29;  Status DC


Vancomycin HCl 1 gm/Sodium Chloride 250 ml @  250 mls/hr Q24H IV  Last 

administered on 10/18/18at 17:51;  Start 10/16/18 at 18:00;  Stop 10/19/18 at 12

:17;  Status DC


Vancomycin HCl (Vancomycin Trough Level) 1 each 1X  ONCE MC  Last administered 

on 10/17/18at 17:30;  Start 10/17/18 at 17:30;  Stop 10/17/18 at 17:31;  Status 

DC


Ceftriaxone Sodium 2 gm/ Sodium Chloride 100 ml @  200 mls/hr Q12H IV ;  Start 

10/16/18 at 11:00;  Stop 10/16/18 at 11:00;  Status DC


Acyclovir Sodium 520 mg/Dextrose 110.4 ml @  110.4 mls/ hr Q12HR IV ;  Start 10/

16/18 at 11:30;  Stop 10/16/18 at 11:30;  Status DC


Ceftriaxone Sodium 2 gm/ Sodium Chloride 100 ml @  200 mls/hr Q12HR IV  Last 

administered on 10/21/18at 08:29;  Start 10/16/18 at 12:00


Acyclovir Sodium 500 mg/Dextrose 110 ml @  110 mls/hr Q12HR IV  Last 

administered on 10/21/18at 09:26;  Start 10/16/18 at 11:30


Lactobacillus Rhamnosus (Culturelle) 1 cap BID PO  Last administered on 10/17/

18at 21:18;  Start 10/16/18 at 21:00;  Stop 10/19/18 at 16:08;  Status DC


Amino Acids/ Glycerin/ Electrolytes 1,000 ml @  80 mls/hr [id number] IV  Last 

administered on 10/21/18at 11:23;  Start 10/16/18 at 14:00


Lidocaine/Sodium Bicarbonate (Buffered Lidocaine 1%) 6 ml 1X  ONCE INJ  Last 

administered on 10/17/18at 15:08;  Start 10/17/18 at 11:45;  Stop 10/17/18 at 11

:47;  Status DC


Diclofenac Sodium (Voltaren) 1 danyell BID TP  Last administered on 10/21/18at 09:26

;  Start 10/17/18 at 23:45


Enalaprilat (Vasotec Inj) 1.25 mg Q6HRS IVP  Last administered on 10/21/18at 06:

13;  Start 10/18/18 at 11:00


Lorazepam (Ativan) 0.5 mg PRN DAILY  PRN IV ANXIETY / AGITATION Last 

administered on 10/19/18at 17:32;  Start 10/18/18 at 11:45


Metoprolol Tartrate (Lopressor Vial) 2.5 mg Q6HRS IVP  Last administered on 10/

21/18at 00:35;  Start 10/20/18 at 12:45





Active Scripts


Active


Meclizine Hcl 25 Mg Tablet 1 Tab PO PRN TID PRN


Reported


Aspirin 81 Mg Tab.chew 1 Tab PO DAILY


Simvastatin 40 Mg Tablet 1 Tab PO QHS


Clopidogrel (Clopidogrel Bisulfate) 75 Mg Tablet 1 Tab PO DAILY


Metformin Hcl 500 Mg Tablet 1 Tab PO BID


Atenolol 25 Mg Tablet 1 Tab PO DAILY


Vitals/I & O





Vital Sign - Last 24 Hours








 10/20/18 10/20/18 10/20/18 10/20/18





 12:55 12:56 15:00 19:00


 


Temp   97.5 97.7





   97.5 97.7


 


Pulse 55 55 55 75


 


Resp   16 16


 


B/P (MAP) 105/65 105/65 125/68 (87) 129/82 (98)


 


Pulse Ox   97 97


 


O2 Delivery   Room Air Room Air


 


    





    





 10/20/18 10/20/18 10/20/18 10/20/18





 19:30 19:31 20:00 23:00


 


Temp    97.5





    97.5


 


Pulse 75 75  61


 


Resp    17


 


B/P (MAP) 129/82 129/82  94/55 (68)


 


Pulse Ox    100


 


O2 Delivery   Room Air Room Air


 


    





    





 10/21/18 10/21/18 10/21/18 10/21/18





 00:34 00:35 03:00 06:13


 


Temp   96.7 





   96.7 


 


Pulse 79 79 72 59


 


Resp   17 


 


B/P (MAP) 119/72 119/72 128/68 (88) 115/56


 


Pulse Ox   100 


 


O2 Delivery   Room Air 


 


    





    





 10/21/18 10/21/18  





 07:00 08:00  


 


Temp 98.0   





 98.0   


 


Pulse 74   


 


Resp 18   


 


B/P (MAP) 118/78 (91)   


 


Pulse Ox 100   


 


O2 Delivery Room Air Room Air  














Intake and Output   


 


 10/20/18 10/20/18 10/21/18





 15:00 23:00 07:00


 


Intake Total  30 ml 20 ml


 


Output Total  700 ml 800 ml


 


Balance  -670 ml -780 ml











Nutrition Consultation


Dietary Evaluation:


Recommendations by RD:  Increase Calorie Intake, Protein supplementation


Comments:  


sending ensure tid





diet per SLP





REC TF via dobhoff per following: Jevity 1.5@goal rate 40 ml/hr w/125 ml  


water flushes q4 hrs or flushes per MD


Expected Outcomes/Goals:  


to meet > 75% est nutr needs - not met, new goal established 





New goal 10/19: Dobhoff placement and TF initiation





Malnutrition Findings:


Food and Nutrition Intake (Mod:  <75% est energy req 7days


Weight Status:  Appropriate











MELVI METZGER MD Oct 21, 2018 11:44

## 2018-10-21 NOTE — PDOC2
CONSULT


Date of Consult


Date of Consult


DATE: 10/21/18 


TIME: 10:55





Reason for Consult


Reason for Consult:


AMS





History of Present Illness


Reason for Visit:


This patient is 83-year-old woman with past medical history of multiple medical 

problems history of coronary artery disease, memory problems, hypertension, 

hyperlipidemia, diabetes.  Patient presented with complaint of confusion.  

Patient was noted to have a regular heart rhythm.  Patient was also evaluated 

by cardiology. Patient had MRI of brain done with a question of encephalitis, 

hypertensive sequelae of posterior reversible encephalopathy breathy with the 

prominent areas of edema bilaterally more on the left than on the right side





Past Medical History


Cardiovascular:  HTN


Pulmonary:  No pertinent hx


Musculoskeletal:  Osteoarthritis





Past Surgical History


Past Surgical History:  Hysterectomy





Family History


Family History:  Cancer, Diabetes, Hypertension, Stroke





Social History


Social History:  Parent


No


ALCOHOL:  none


Drugs:  None





Current Problem List


Problem List


Problems


Medical Problems:


(1) Altered mental status


Status: Acute  











Current Medications


Current Medications





Current Medications


Sodium Chloride 500 ml @  500 mls/hr 1X  ONCE IV  Last administered on 10/14/

18at 20:30;  Start 10/14/18 at 20:30;  Stop 10/14/18 at 21:29;  Status DC


Sodium Chloride 1,000 ml @  100 mls/hr 1X  ONCE IV  Last administered on 10/14/

18at 21:30;  Start 10/14/18 at 20:30;  Stop 10/15/18 at 06:29;  Status DC


Ondansetron HCl (Zofran) 4 mg PRN Q8HRS  PRN IV NAUSEA/VOMITING 1ST CHOICE;  

Start 10/14/18 at 23:45;  Stop 10/15/18 at 23:44;  Status DC


Sodium Chloride 1,000 ml @  80 mls/hr [id number] IV  Last administered on 10/15/

18at 08:29;  Start 10/15/18 at 08:00;  Stop 10/15/18 at 09:22;  Status DC


Potassium Chloride/Dextrose/ Sod Cl 1,000 ml @  80 mls/hr [id number] IV  Last 

administered on 10/16/18at 01:33;  Start 10/15/18 at 09:30;  Stop 10/16/18 at 13

:51;  Status DC


Dexamethasone Sodium Phosphate (Decadron) 4 mg Q6HRS IV  Last administered on 10

/21/18at 06:12;  Start 10/15/18 at 18:00


Vancomycin HCl (Vanco Per Pharmacy) 1 each PRN DAILY  PRN MC SEE COMMENTS Last 

administered on 10/18/18at 14:40;  Start 10/15/18 at 16:15;  Stop 10/19/18 at 12

:17;  Status DC


Ceftriaxone Sodium 2 gm/ Dextrose 100 ml @  200 mls/hr Q24H IV  Last 

administered on 10/15/18at 16:51;  Start 10/15/18 at 16:30;  Stop 10/16/18 at 10

:43;  Status DC


Vancomycin HCl 1.25 gm/Sodium Chloride 250 ml @  166.667 mls/hr 1X  ONCE IV  

Last administered on 10/15/18at 18:01;  Start 10/15/18 at 17:00;  Stop 10/15/18 

at 18:29;  Status DC


Vancomycin HCl 1 gm/Sodium Chloride 250 ml @  250 mls/hr Q24H IV  Last 

administered on 10/18/18at 17:51;  Start 10/16/18 at 18:00;  Stop 10/19/18 at 12

:17;  Status DC


Vancomycin HCl (Vancomycin Trough Level) 1 each 1X  ONCE MC  Last administered 

on 10/17/18at 17:30;  Start 10/17/18 at 17:30;  Stop 10/17/18 at 17:31;  Status 

DC


Ceftriaxone Sodium 2 gm/ Sodium Chloride 100 ml @  200 mls/hr Q12H IV ;  Start 

10/16/18 at 11:00;  Stop 10/16/18 at 11:00;  Status DC


Acyclovir Sodium 520 mg/Dextrose 110.4 ml @  110.4 mls/ hr Q12HR IV ;  Start 10/

16/18 at 11:30;  Stop 10/16/18 at 11:30;  Status DC


Ceftriaxone Sodium 2 gm/ Sodium Chloride 100 ml @  200 mls/hr Q12HR IV  Last 

administered on 10/21/18at 08:29;  Start 10/16/18 at 12:00


Acyclovir Sodium 500 mg/Dextrose 110 ml @  110 mls/hr Q12HR IV  Last 

administered on 10/21/18at 09:26;  Start 10/16/18 at 11:30


Lactobacillus Rhamnosus (Culturelle) 1 cap BID PO  Last administered on 10/17/

18at 21:18;  Start 10/16/18 at 21:00;  Stop 10/19/18 at 16:08;  Status DC


Amino Acids/ Glycerin/ Electrolytes 1,000 ml @  80 mls/hr [id number] IV  Last 

administered on 10/20/18at 19:30;  Start 10/16/18 at 14:00


Lidocaine/Sodium Bicarbonate (Buffered Lidocaine 1%) 6 ml 1X  ONCE INJ  Last 

administered on 10/17/18at 15:08;  Start 10/17/18 at 11:45;  Stop 10/17/18 at 11

:47;  Status DC


Diclofenac Sodium (Voltaren) 1 danyell BID TP  Last administered on 10/21/18at 09:26

;  Start 10/17/18 at 23:45


Enalaprilat (Vasotec Inj) 1.25 mg Q6HRS IVP  Last administered on 10/21/18at 06:

13;  Start 10/18/18 at 11:00


Lorazepam (Ativan) 0.5 mg PRN DAILY  PRN IV ANXIETY / AGITATION Last 

administered on 10/19/18at 17:32;  Start 10/18/18 at 11:45


Metoprolol Tartrate (Lopressor Vial) 2.5 mg Q6HRS IVP  Last administered on 10/

21/18at 00:35;  Start 10/20/18 at 12:45





Active Scripts


Active


Meclizine Hcl 25 Mg Tablet 1 Tab PO PRN TID PRN


Reported


Aspirin 81 Mg Tab.chew 1 Tab PO DAILY


Simvastatin 40 Mg Tablet 1 Tab PO QHS


Clopidogrel (Clopidogrel Bisulfate) 75 Mg Tablet 1 Tab PO DAILY


Metformin Hcl 500 Mg Tablet 1 Tab PO BID


Atenolol 25 Mg Tablet 1 Tab PO DAILY





Allergies


Allergies:  


Coded Allergies:  


     No Known Drug Allergies (Unverified , 10/29/16)





Physical Exam


Physical Exam


General no acute distress.


Respiratory: clear


Heart: Regular rate and rhythm, S1S2 normal


HEENT: Normocephalic and atraumatic. 


NECK: Supple without bruit


Respiratory: Clear to auscultation bilaterally


Heart: Regular rate and rhythm, S1S2 normal


NEUROLOGIC: Mental status able alert does not appear to be in pain


Cranial nerve equally reactive pupils, There is no facial asymmetry. Palate 

elevates and tongue protrudes in midline.


 Reflexes are 1+ with flexor plantar responses. Strength able to move exts more 

upper than lower to stimuli


 Sensory exam is intact for pain stimuli. Gait in bed.





Vitals


VITALS





Vital Signs








  Date Time  Temp Pulse Resp B/P (MAP) Pulse Ox O2 Delivery O2 Flow Rate FiO2


 


10/21/18 08:00      Room Air  


 


10/21/18 07:00 98.0 74 18 118/78 (91) 100   





 98.0       











Assessment/Plan


Assessment/Plan


This patient is 83-year-old woman with past medical history of multiple medical 

problems history of coronary artery disease, memory problems, hypertension, 

hyperlipidemia, diabetes.  Patient presented with complaint of confusion.  

Patient was noted to have a regular heart rhythm.  Patient was also evaluated 

by cardiology. Patient had MRI of brain done with a question of encephalitis, 

hypertensive sequelae of posterior reversible encephalopathy breathy with the 

prominent areas of edema bilaterally more on the left than on the right side





83-year-old woman with with past medical history of multiple medical problems 

history of coronary artery disease, memory problems, hypertension, 

hyperlipidemia, diabetes.  Patient presented with complaint of confusion.the 

encephalopathy with the concern of encephalitis patient is on  antibiotics, 

infectious disease recommendations appreciated.  Patient had elevated WBCs on 

CSF with elevated protein.  HSV, West Nile negative follow pending labs, CSF 

results.  Encephalopathy is improving. Patient had MRI of brain done with a 

question of encephalitis, hypertensive sequelae of posterior reversible 

encephalopathy breathy with the prominent areas of edema bilaterally more on 

the left than on the right side Continue medical management. Plan discussed 

with patient's family in detail











WOJCIECH MARQUIS MD Oct 21, 2018 10:56

## 2018-10-21 NOTE — PDOC
Infectious Disease Note


Subjective


Subjective


Resting quietly


No fevers





Vital Sign


Vital Signs





Vital Signs








  Date Time  Temp Pulse Resp B/P (MAP) Pulse Ox O2 Delivery O2 Flow Rate FiO2


 


10/21/18 08:00      Room Air  


 


10/21/18 07:00 98.0 74 18 118/78 (91) 100   





 98.0       











Physical Exam


PHYSICAL EXAM


GENERAL: Resting quietly, NAD  


LUNGS:  Clear to auscultation.


HEART:  S1, S2.


ABDOMEN:  Soft, nondistended with positive bowel sounds.


: [place]


EXTREMITIES:  Without clubbing or cyanosis.  No gross edema.


SKIN:  Warm to touch without signs of any rashes.


CNS: Arouses to name, mumbles, 


PIV





Labs


Micro


CSF


  ANAEROBIC-AEROBIC CULTURE  


                                PENDING





  ANAEROBIC RES 1  


                                PENDING





  AEROBIC CULT  Preliminary  


        Preliminary report





  AEROBIC RES 1  Preliminary  


        Comment





        No growth in 36 - 48 hours.





  GRAM STAIN  Final  


        Final report





  GRAM STAIN RES 1  Final  


        Comment





        No white blood cells seen.





  GRAM STAIN RES 2  Final  


        No organisms seen





Objective


Assessment


Encephalopathy - now on dexamethasone/Rocephin/Vanc/Acyclovir  


   -LP: CSF WBC 24, glucose 82, .8 (on steroids) 


Syphilis - neg. Sed rate 14 - nml


Abnormal brain MRI - ? PRESSURE


HTN





Plan


Plan of Care


MRI thoracic/Lumbar given elevated TP in CSF


HSV/West Nile peripheral neg 


YENI neg


Rocephin to 2 gm IV q 12


Acyclovir 10/16


F/u labs and response


Supportive care 





Patient seen, examined,labs and data reviewed. I agree with above a and p.


d/w with ARNP.











RONNY KILGORE Oct 21, 2018 10:56


AARON GALLAGHER MD Oct 21, 2018 13:40

## 2018-10-22 VITALS — DIASTOLIC BLOOD PRESSURE: 64 MMHG | SYSTOLIC BLOOD PRESSURE: 111 MMHG

## 2018-10-22 VITALS — SYSTOLIC BLOOD PRESSURE: 120 MMHG | DIASTOLIC BLOOD PRESSURE: 70 MMHG

## 2018-10-22 VITALS — SYSTOLIC BLOOD PRESSURE: 131 MMHG | DIASTOLIC BLOOD PRESSURE: 74 MMHG

## 2018-10-22 VITALS — SYSTOLIC BLOOD PRESSURE: 114 MMHG | DIASTOLIC BLOOD PRESSURE: 64 MMHG

## 2018-10-22 VITALS — SYSTOLIC BLOOD PRESSURE: 134 MMHG | DIASTOLIC BLOOD PRESSURE: 73 MMHG

## 2018-10-22 VITALS — SYSTOLIC BLOOD PRESSURE: 123 MMHG | DIASTOLIC BLOOD PRESSURE: 67 MMHG

## 2018-10-22 LAB
ANION GAP SERPL CALC-SCNC: 9 MMOL/L (ref 6–14)
BASOPHILS # BLD AUTO: 0 X10^3/UL (ref 0–0.2)
BASOPHILS NFR BLD: 0 % (ref 0–3)
BUN SERPL-MCNC: 41 MG/DL (ref 7–20)
CALCIUM SERPL-MCNC: 8.5 MG/DL (ref 8.5–10.1)
CHLORIDE SERPL-SCNC: 105 MMOL/L (ref 98–107)
CO2 SERPL-SCNC: 19 MMOL/L (ref 21–32)
CREAT SERPL-MCNC: 0.9 MG/DL (ref 0.6–1)
EOSINOPHIL NFR BLD: 0 % (ref 0–3)
EOSINOPHIL NFR BLD: 0 X10^3/UL (ref 0–0.7)
ERYTHROCYTE [DISTWIDTH] IN BLOOD BY AUTOMATED COUNT: 18.4 % (ref 11.5–14.5)
GFR SERPLBLD BASED ON 1.73 SQ M-ARVRAT: 72.4 ML/MIN
GLUCOSE SERPL-MCNC: 145 MG/DL (ref 70–99)
HCT VFR BLD CALC: 39.9 % (ref 36–47)
HGB BLD-MCNC: 13.5 G/DL (ref 12–15.5)
LYMPHOCYTES # BLD: 0.9 X10^3/UL (ref 1–4.8)
LYMPHOCYTES NFR BLD AUTO: 8 % (ref 24–48)
MCH RBC QN AUTO: 28 PG (ref 25–35)
MCHC RBC AUTO-ENTMCNC: 34 G/DL (ref 31–37)
MCV RBC AUTO: 83 FL (ref 79–100)
MONO #: 0.4 X10^3/UL (ref 0–1.1)
MONOCYTES NFR BLD: 4 % (ref 0–9)
NEUT #: 9.2 X10^3UL (ref 1.8–7.7)
NEUTROPHILS NFR BLD AUTO: 88 % (ref 31–73)
PLATELET # BLD AUTO: 201 X10^3/UL (ref 140–400)
POTASSIUM SERPL-SCNC: 5 MMOL/L (ref 3.5–5.1)
RBC # BLD AUTO: 4.83 X10^6/UL (ref 3.5–5.4)
SODIUM SERPL-SCNC: 133 MMOL/L (ref 136–145)
WBC # BLD AUTO: 10.5 X10^3/UL (ref 4–11)

## 2018-10-22 RX ADMIN — DEXAMETHASONE SODIUM PHOSPHATE SCH MG: 4 INJECTION, SOLUTION INTRAMUSCULAR; INTRAVENOUS at 00:06

## 2018-10-22 RX ADMIN — DICLOFENAC SODIUM SCH APP: 10 GEL TOPICAL at 09:18

## 2018-10-22 RX ADMIN — ENALAPRILAT SCH MG: 1.25 INJECTION, SOLUTION INTRAVENOUS at 00:00

## 2018-10-22 RX ADMIN — DICLOFENAC SODIUM SCH APP: 10 GEL TOPICAL at 21:45

## 2018-10-22 RX ADMIN — METOPROLOL TARTRATE SCH MG: 25 TABLET ORAL at 21:45

## 2018-10-22 RX ADMIN — HEPARIN SODIUM SCH UNIT: 5000 INJECTION, SOLUTION INTRAVENOUS; SUBCUTANEOUS at 07:40

## 2018-10-22 RX ADMIN — DEXAMETHASONE SCH MG: 4 TABLET ORAL at 17:48

## 2018-10-22 RX ADMIN — FAMOTIDINE SCH MG: 20 TABLET ORAL at 21:45

## 2018-10-22 RX ADMIN — HEPARIN SODIUM SCH UNIT: 5000 INJECTION, SOLUTION INTRAVENOUS; SUBCUTANEOUS at 21:57

## 2018-10-22 RX ADMIN — ACYCLOVIR SODIUM SCH MLS/HR: 500 INJECTION, SOLUTION INTRAVENOUS at 09:17

## 2018-10-22 RX ADMIN — DEXAMETHASONE SODIUM PHOSPHATE SCH MG: 4 INJECTION, SOLUTION INTRAMUSCULAR; INTRAVENOUS at 07:29

## 2018-10-22 RX ADMIN — GLYCERIN, ISOLEUCINE, LEUCINE, LYSINE, METHIONINE, PHENYLALANINE, THREONINE, TRYPTOPHAN, VALINE, ALANINE, GLYCINE, ARGININE, HISTIDINE, PROLINE, SERINE, CYSTEINE, SODIUM ACETATE, MAGNESIUM ACETATE, CALCIUM ACETATE, SODIUM CHLORIDE, POTASSIUM CHLORIDE, PHOSPHORIC ACID, AND POTASSIUM METABISULFITE SCH MLS/HR
3; .21; .27; .22; .16; .17; .12; .046; .2; .21; .42; .29; .085; .34; .18; .014; .2; .054; .026; .12; .15; .041 INJECTION INTRAVENOUS at 09:16

## 2018-10-22 RX ADMIN — ENALAPRILAT SCH MG: 1.25 INJECTION, SOLUTION INTRAVENOUS at 06:00

## 2018-10-22 RX ADMIN — METOPROLOL TARTRATE SCH MG: 5 INJECTION INTRAVENOUS at 07:30

## 2018-10-22 RX ADMIN — HEPARIN SODIUM SCH UNIT: 5000 INJECTION, SOLUTION INTRAVENOUS; SUBCUTANEOUS at 16:18

## 2018-10-22 RX ADMIN — METOPROLOL TARTRATE SCH MG: 5 INJECTION INTRAVENOUS at 00:00

## 2018-10-22 RX ADMIN — GLYCERIN, ISOLEUCINE, LEUCINE, LYSINE, METHIONINE, PHENYLALANINE, THREONINE, TRYPTOPHAN, VALINE, ALANINE, GLYCINE, ARGININE, HISTIDINE, PROLINE, SERINE, CYSTEINE, SODIUM ACETATE, MAGNESIUM ACETATE, CALCIUM ACETATE, SODIUM CHLORIDE, POTASSIUM CHLORIDE, PHOSPHORIC ACID, AND POTASSIUM METABISULFITE SCH MLS/HR
3; .21; .27; .22; .16; .17; .12; .046; .2; .21; .42; .29; .085; .34; .18; .014; .2; .054; .026; .12; .15; .041 INJECTION INTRAVENOUS at 07:26

## 2018-10-22 RX ADMIN — LISINOPRIL SCH MG: 5 TABLET ORAL at 16:15

## 2018-10-22 RX ADMIN — METOPROLOL TARTRATE SCH MG: 25 TABLET ORAL at 16:14

## 2018-10-22 NOTE — PDOC
Subjective:


Subjective:


Pt is no longer requiring an NG tube. She has had her diet advanced to a 

mechanical soft diet, which she seems to be tolerating well. She is more alert 

and responsive today.





Objective:


Vital Signs:





 Vital Signs








  Date Time  Temp Pulse Resp B/P (MAP) Pulse Ox O2 Delivery O2 Flow Rate FiO2


 


10/22/18 11:00 97.4 67 18 111/64 (80) 100 Room Air  





 97.4       








Labs:





Laboratory Tests








Test


 10/22/18


03:40


 


White Blood Count 10.5 x10^3/uL 


 


Red Blood Count 4.83 x10^6/uL 


 


Hemoglobin 13.5 g/dL 


 


Hematocrit 39.9 % 


 


Mean Corpuscular Volume 83 fL 


 


Mean Corpuscular Hemoglobin 28 pg 


 


Mean Corpuscular Hemoglobin


Concent 34 g/dL 





 


Red Cell Distribution Width 18.4 % 


 


Platelet Count 201 x10^3/uL 


 


Neutrophils (%) (Auto) 88 % 


 


Lymphocytes (%) (Auto) 8 % 


 


Monocytes (%) (Auto) 4 % 


 


Eosinophils (%) (Auto) 0 % 


 


Basophils (%) (Auto) 0 % 


 


Neutrophils # (Auto) 9.2 x10^3uL 


 


Lymphocytes # (Auto) 0.9 x10^3/uL 


 


Monocytes # (Auto) 0.4 x10^3/uL 


 


Eosinophils # (Auto) 0.0 x10^3/uL 


 


Basophils # (Auto) 0.0 x10^3/uL 


 


Sodium Level 133 mmol/L 


 


Potassium Level 5.0 mmol/L 


 


Chloride Level 105 mmol/L 


 


Carbon Dioxide Level 19 mmol/L 


 


Anion Gap 9 


 


Blood Urea Nitrogen 41 mg/dL 


 


Creatinine 0.9 mg/dL 


 


Estimated GFR


(Cockcroft-Gault) 72.4 





 


Glucose Level 145 mg/dL 


 


Calcium Level 8.5 mg/dL 


 


Magnesium Level 2.8 mg/dL 








Imaging:


KUB 10/20/18





IMPRESSION:


Dobbhoff tube is seen within the lower chest, likely coiled within a 


hiatal hernia.





PE:





GEN: NAD


HEENT: Atraumatic, PERRLA


LUNGS: CTAB


HEART: RRR, no murmurs


ABD: NABS, S/ND/NT, no masses


EXTREMITY: No edema


SKIN: No rashes, no jaundice


NEURO/PSYCH: A & O 3





A/P:


An 83 years old female patient with history of multiple comorbidities including 

hypertension, hyperlipidemia, diabetes mellitus,coronary artery disease,

osteoarthritis and dementia. Patient currently managed for possible viral 

encephalitis after she was brought for declined functional capacity and poor 

oral intake.GI consulted for difficult feeding NG tube placement with hiatus 

hernia.





-Pt is no longer requiring NG tube, ok to continue mechanical soft diet for now











ELY ARGUELLO Oct 22, 2018 14:27

## 2018-10-22 NOTE — PDOC
Infectious Disease Note


Subjective


Subjective


awake, speaking, eating, much better per son and RN





ROS


ROS


no n/v/d/fever/sob





Vital Sign


Vital Signs





Vital Signs








  Date Time  Temp Pulse Resp B/P (MAP) Pulse Ox O2 Delivery O2 Flow Rate FiO2


 


10/22/18 08:00      Room Air  


 


10/22/18 07:30  66  149/84    


 


10/22/18 06:00 96.9  17  97   





 96.9       











Physical Exam


PHYSICAL EXAM


GENERAL: Resting quietly, NAD  .


LUNGS:  Clear to auscultation.


HEART:  S1, S2.


ABDOMEN:  Soft, nondistended with positive bowel sounds.


: [place]


EXTREMITIES:  Without clubbing or cyanosis.  No gross edema.


SKIN:  Warm to touch without signs of any rashes.


CNS: much more awake, and nods feeling better, eat today per RN


PIV





Labs


Lab





Laboratory Tests








Test


 10/22/18


03:40


 


White Blood Count


 10.5 x10^3/uL


(4.0-11.0)


 


Red Blood Count


 4.83 x10^6/uL


(3.50-5.40)


 


Hemoglobin


 13.5 g/dL


(12.0-15.5)


 


Hematocrit


 39.9 %


(36.0-47.0)


 


Mean Corpuscular Volume 83 fL () 


 


Mean Corpuscular Hemoglobin 28 pg (25-35) 


 


Mean Corpuscular Hemoglobin


Concent 34 g/dL


(31-37)


 


Red Cell Distribution Width


 18.4 %


(11.5-14.5)


 


Platelet Count


 201 x10^3/uL


(140-400)


 


Neutrophils (%) (Auto) 88 % (31-73) 


 


Lymphocytes (%) (Auto) 8 % (24-48) 


 


Monocytes (%) (Auto) 4 % (0-9) 


 


Eosinophils (%) (Auto) 0 % (0-3) 


 


Basophils (%) (Auto) 0 % (0-3) 


 


Neutrophils # (Auto)


 9.2 x10^3uL


(1.8-7.7)


 


Lymphocytes # (Auto)


 0.9 x10^3/uL


(1.0-4.8)


 


Monocytes # (Auto)


 0.4 x10^3/uL


(0.0-1.1)


 


Eosinophils # (Auto)


 0.0 x10^3/uL


(0.0-0.7)


 


Basophils # (Auto)


 0.0 x10^3/uL


(0.0-0.2)


 


Sodium Level


 133 mmol/L


(136-145)


 


Potassium Level


 5.0 mmol/L


(3.5-5.1)


 


Chloride Level


 105 mmol/L


()


 


Carbon Dioxide Level


 19 mmol/L


(21-32)


 


Anion Gap 9 (6-14) 


 


Blood Urea Nitrogen


 41 mg/dL


(7-20)


 


Creatinine


 0.9 mg/dL


(0.6-1.0)


 


Estimated GFR


(Cockcroft-Gault) 72.4 





 


Glucose Level


 145 mg/dL


(70-99)


 


Calcium Level


 8.5 mg/dL


(8.5-10.1)


 


Magnesium Level


 2.8 mg/dL


(1.8-2.4)








Micro





Microbiology


10/17/18 CSF Gram Stain - Final, Complete





Objective


Assessment


Encephalopathy - now on dexamethasone/Rocephin/Vanc/Acyclovir  


   -LP: CSF WBC 24, glucose 82, .8 (on steroids) 


Syphilis - neg. Sed rate 14 - nml,,, HSV neg, west nile neg


Abnormal brain MRI - ? PRESSURE


HTN





Plan


Plan of Care


d/c acyclovir, rocephine


pt/ot


d/w CATRINA Sousa MD Oct 22, 2018 12:01

## 2018-10-22 NOTE — PDOC
PROGRESS NOTES


Chief Complaint


Chief Complaint


Cerebral edema, encephalitis or poss press syndrome


Acute encephalopathy on chronic mild dementia


Fluctuating dementia w/prior delirium 


Moderate malnutrition 


Dehydration


Weakness and debility


Dysphagia


Poor PO intake


Arrhythmia


Leukocytosis





History of Present Illness


History of Present Illness


Pt seen and examined


Discussed plan with pt and daughter at bedside


Discussed with RN





Vitals


Vitals





Vital Signs








  Date Time  Temp Pulse Resp B/P (MAP) Pulse Ox O2 Delivery O2 Flow Rate FiO2


 


10/22/18 11:00 97.4 67 18 111/64 (80) 100 Room Air  





 97.4       











Physical Exam


Physical Exam


: Evans


CNS: Awake and nods she is feeling better, earing well today per RN


General:  Alert, No acute distress, Other (wont talk)


Heart:  Regular rate, Normal S1, Normal S2


Lungs:  Clear


Abdomen:  Normal bowel sounds, Soft


Extremities:  No clubbing, No cyanosis, No edema


Skin:  No breakdown, No significant lesion





Labs


LABS





Laboratory Tests








Test


 10/22/18


03:40


 


White Blood Count


 10.5 x10^3/uL


(4.0-11.0)


 


Red Blood Count


 4.83 x10^6/uL


(3.50-5.40)


 


Hemoglobin


 13.5 g/dL


(12.0-15.5)


 


Hematocrit


 39.9 %


(36.0-47.0)


 


Mean Corpuscular Volume 83 fL () 


 


Mean Corpuscular Hemoglobin 28 pg (25-35) 


 


Mean Corpuscular Hemoglobin


Concent 34 g/dL


(31-37)


 


Red Cell Distribution Width


 18.4 %


(11.5-14.5)


 


Platelet Count


 201 x10^3/uL


(140-400)


 


Neutrophils (%) (Auto) 88 % (31-73) 


 


Lymphocytes (%) (Auto) 8 % (24-48) 


 


Monocytes (%) (Auto) 4 % (0-9) 


 


Eosinophils (%) (Auto) 0 % (0-3) 


 


Basophils (%) (Auto) 0 % (0-3) 


 


Neutrophils # (Auto)


 9.2 x10^3uL


(1.8-7.7)


 


Lymphocytes # (Auto)


 0.9 x10^3/uL


(1.0-4.8)


 


Monocytes # (Auto)


 0.4 x10^3/uL


(0.0-1.1)


 


Eosinophils # (Auto)


 0.0 x10^3/uL


(0.0-0.7)


 


Basophils # (Auto)


 0.0 x10^3/uL


(0.0-0.2)


 


Sodium Level


 133 mmol/L


(136-145)


 


Potassium Level


 5.0 mmol/L


(3.5-5.1)


 


Chloride Level


 105 mmol/L


()


 


Carbon Dioxide Level


 19 mmol/L


(21-32)


 


Anion Gap 9 (6-14) 


 


Blood Urea Nitrogen


 41 mg/dL


(7-20)


 


Creatinine


 0.9 mg/dL


(0.6-1.0)


 


Estimated GFR


(Cockcroft-Gault) 72.4 





 


Glucose Level


 145 mg/dL


(70-99)


 


Calcium Level


 8.5 mg/dL


(8.5-10.1)


 


Magnesium Level


 2.8 mg/dL


(1.8-2.4)











Review of Systems


Review of Systems


No fever, chills or SOB


Daughter and RN report pt is eating well today


RN states she does not believe dobhoff will be necessary





Assessment and Plan


Assessmemt and Plan


Problems


Medical Problems:


(1) Altered mental status


Status: Acute  





Assessment:


Cerebral edema, encephalitis or poss press syndrome


Acute encephalopathy on chronic mild dementia


Fluctuating dementia w/prior delirium 


Moderate malnutrition 


Dehydration


Weakness and debility


Dysphagia


Poor PO intake 


Arrhythmia


Leukocytosis





Plan:


Cont abx


Cont PPN


Advance diet as tolerated 


PT/OT


Labs


Home meds 








Comment


Review of Relevant


I have reviewed the following items marky (where applicable) has been applied.


Labs





Laboratory Tests








Test


 10/22/18


03:40


 


White Blood Count


 10.5 x10^3/uL


(4.0-11.0)


 


Red Blood Count


 4.83 x10^6/uL


(3.50-5.40)


 


Hemoglobin


 13.5 g/dL


(12.0-15.5)


 


Hematocrit


 39.9 %


(36.0-47.0)


 


Mean Corpuscular Volume 83 fL () 


 


Mean Corpuscular Hemoglobin 28 pg (25-35) 


 


Mean Corpuscular Hemoglobin


Concent 34 g/dL


(31-37)


 


Red Cell Distribution Width


 18.4 %


(11.5-14.5)


 


Platelet Count


 201 x10^3/uL


(140-400)


 


Neutrophils (%) (Auto) 88 % (31-73) 


 


Lymphocytes (%) (Auto) 8 % (24-48) 


 


Monocytes (%) (Auto) 4 % (0-9) 


 


Eosinophils (%) (Auto) 0 % (0-3) 


 


Basophils (%) (Auto) 0 % (0-3) 


 


Neutrophils # (Auto)


 9.2 x10^3uL


(1.8-7.7)


 


Lymphocytes # (Auto)


 0.9 x10^3/uL


(1.0-4.8)


 


Monocytes # (Auto)


 0.4 x10^3/uL


(0.0-1.1)


 


Eosinophils # (Auto)


 0.0 x10^3/uL


(0.0-0.7)


 


Basophils # (Auto)


 0.0 x10^3/uL


(0.0-0.2)


 


Sodium Level


 133 mmol/L


(136-145)


 


Potassium Level


 5.0 mmol/L


(3.5-5.1)


 


Chloride Level


 105 mmol/L


()


 


Carbon Dioxide Level


 19 mmol/L


(21-32)


 


Anion Gap 9 (6-14) 


 


Blood Urea Nitrogen


 41 mg/dL


(7-20)


 


Creatinine


 0.9 mg/dL


(0.6-1.0)


 


Estimated GFR


(Cockcroft-Gault) 72.4 





 


Glucose Level


 145 mg/dL


(70-99)


 


Calcium Level


 8.5 mg/dL


(8.5-10.1)


 


Magnesium Level


 2.8 mg/dL


(1.8-2.4)








Laboratory Tests








Test


 10/22/18


03:40


 


White Blood Count


 10.5 x10^3/uL


(4.0-11.0)


 


Red Blood Count


 4.83 x10^6/uL


(3.50-5.40)


 


Hemoglobin


 13.5 g/dL


(12.0-15.5)


 


Hematocrit


 39.9 %


(36.0-47.0)


 


Mean Corpuscular Volume 83 fL () 


 


Mean Corpuscular Hemoglobin 28 pg (25-35) 


 


Mean Corpuscular Hemoglobin


Concent 34 g/dL


(31-37)


 


Red Cell Distribution Width


 18.4 %


(11.5-14.5)


 


Platelet Count


 201 x10^3/uL


(140-400)


 


Neutrophils (%) (Auto) 88 % (31-73) 


 


Lymphocytes (%) (Auto) 8 % (24-48) 


 


Monocytes (%) (Auto) 4 % (0-9) 


 


Eosinophils (%) (Auto) 0 % (0-3) 


 


Basophils (%) (Auto) 0 % (0-3) 


 


Neutrophils # (Auto)


 9.2 x10^3uL


(1.8-7.7)


 


Lymphocytes # (Auto)


 0.9 x10^3/uL


(1.0-4.8)


 


Monocytes # (Auto)


 0.4 x10^3/uL


(0.0-1.1)


 


Eosinophils # (Auto)


 0.0 x10^3/uL


(0.0-0.7)


 


Basophils # (Auto)


 0.0 x10^3/uL


(0.0-0.2)


 


Sodium Level


 133 mmol/L


(136-145)


 


Potassium Level


 5.0 mmol/L


(3.5-5.1)


 


Chloride Level


 105 mmol/L


()


 


Carbon Dioxide Level


 19 mmol/L


(21-32)


 


Anion Gap 9 (6-14) 


 


Blood Urea Nitrogen


 41 mg/dL


(7-20)


 


Creatinine


 0.9 mg/dL


(0.6-1.0)


 


Estimated GFR


(Cockcroft-Gault) 72.4 





 


Glucose Level


 145 mg/dL


(70-99)


 


Calcium Level


 8.5 mg/dL


(8.5-10.1)


 


Magnesium Level


 2.8 mg/dL


(1.8-2.4)








Microbiology


10/17/18 CSF Gram Stain - Final, Complete


Medications





Current Medications


Sodium Chloride 500 ml @  500 mls/hr 1X  ONCE IV  Last administered on 10/14/

18at 20:30;  Start 10/14/18 at 20:30;  Stop 10/14/18 at 21:29;  Status DC


Sodium Chloride 1,000 ml @  100 mls/hr 1X  ONCE IV  Last administered on 10/14/

18at 21:30;  Start 10/14/18 at 20:30;  Stop 10/15/18 at 06:29;  Status DC


Ondansetron HCl (Zofran) 4 mg PRN Q8HRS  PRN IV NAUSEA/VOMITING 1ST CHOICE;  

Start 10/14/18 at 23:45;  Stop 10/15/18 at 23:44;  Status DC


Sodium Chloride 1,000 ml @  80 mls/hr [id number] IV  Last administered on 10/15/

18at 08:29;  Start 10/15/18 at 08:00;  Stop 10/15/18 at 09:22;  Status DC


Potassium Chloride/Dextrose/ Sod Cl 1,000 ml @  80 mls/hr [id number] IV  Last 

administered on 10/16/18at 01:33;  Start 10/15/18 at 09:30;  Stop 10/16/18 at 13

:51;  Status DC


Dexamethasone Sodium Phosphate (Decadron) 4 mg Q6HRS IV  Last administered on 10

/22/18at 07:29;  Start 10/15/18 at 18:00;  Stop 10/22/18 at 13:02;  Status DC


Vancomycin HCl (Vanco Per Pharmacy) 1 each PRN DAILY  PRN MC SEE COMMENTS Last 

administered on 10/18/18at 14:40;  Start 10/15/18 at 16:15;  Stop 10/19/18 at 12

:17;  Status DC


Ceftriaxone Sodium 2 gm/ Dextrose 100 ml @  200 mls/hr Q24H IV  Last 

administered on 10/15/18at 16:51;  Start 10/15/18 at 16:30;  Stop 10/16/18 at 10

:43;  Status DC


Vancomycin HCl 1.25 gm/Sodium Chloride 250 ml @  166.667 mls/hr 1X  ONCE IV  

Last administered on 10/15/18at 18:01;  Start 10/15/18 at 17:00;  Stop 10/15/18 

at 18:29;  Status DC


Vancomycin HCl 1 gm/Sodium Chloride 250 ml @  250 mls/hr Q24H IV  Last 

administered on 10/18/18at 17:51;  Start 10/16/18 at 18:00;  Stop 10/19/18 at 12

:17;  Status DC


Vancomycin HCl (Vancomycin Trough Level) 1 each 1X  ONCE MC  Last administered 

on 10/17/18at 17:30;  Start 10/17/18 at 17:30;  Stop 10/17/18 at 17:31;  Status 

DC


Ceftriaxone Sodium 2 gm/ Sodium Chloride 100 ml @  200 mls/hr Q12H IV ;  Start 

10/16/18 at 11:00;  Stop 10/16/18 at 11:00;  Status DC


Acyclovir Sodium 520 mg/Dextrose 110.4 ml @  110.4 mls/ hr Q12HR IV ;  Start 10/

16/18 at 11:30;  Stop 10/16/18 at 11:30;  Status DC


Ceftriaxone Sodium 2 gm/ Sodium Chloride 100 ml @  200 mls/hr Q12HR IV  Last 

administered on 10/22/18at 09:10;  Start 10/16/18 at 12:00;  Stop 10/22/18 at 11

:58;  Status DC


Acyclovir Sodium 500 mg/Dextrose 110 ml @  110 mls/hr Q12HR IV  Last 

administered on 10/22/18at 09:17;  Start 10/16/18 at 11:30;  Stop 10/22/18 at 11

:58;  Status DC


Lactobacillus Rhamnosus (Culturelle) 1 cap BID PO  Last administered on 10/17/

18at 21:18;  Start 10/16/18 at 21:00;  Stop 10/19/18 at 16:08;  Status DC


Amino Acids/ Glycerin/ Electrolytes 1,000 ml @  80 mls/hr [id number] IV  Last 

administered on 10/22/18at 09:16;  Start 10/16/18 at 14:00


Lidocaine/Sodium Bicarbonate (Buffered Lidocaine 1%) 6 ml 1X  ONCE INJ  Last 

administered on 10/17/18at 15:08;  Start 10/17/18 at 11:45;  Stop 10/17/18 at 11

:47;  Status DC


Diclofenac Sodium (Voltaren) 1 danyell BID TP  Last administered on 10/22/18at 09:18

;  Start 10/17/18 at 23:45


Enalaprilat (Vasotec Inj) 1.25 mg Q6HRS IVP  Last administered on 10/21/18at 18:

31;  Start 10/18/18 at 11:00;  Stop 10/22/18 at 13:12;  Status DC


Lorazepam (Ativan) 0.5 mg PRN DAILY  PRN IV ANXIETY / AGITATION Last 

administered on 10/19/18at 17:32;  Start 10/18/18 at 11:45


Metoprolol Tartrate (Lopressor Vial) 2.5 mg Q6HRS IVP  Last administered on 10/

22/18at 07:30;  Start 10/20/18 at 12:45;  Stop 10/22/18 at 13:12;  Status DC


Acetaminophen (Tylenol) 650 mg PRN Q6HRS  PRN PO FEVER;  Start 10/21/18 at 11:45


Ondansetron HCl (Zofran) 4 mg PRN Q6HRS  PRN IV NAUSEA/VOMITING;  Start 10/21/

18 at 11:45


Morphine Sulfate (Morphine Sulfate) 2 mg PRN Q2HR  PRN IV MODERATE TO SEVERE 

PAIN;  Start 10/21/18 at 11:45


Tramadol HCl (Ultram) 50 mg PRN Q6HRS  PRN PO MILD TO MODERATE PAIN;  Start 10/

21/18 at 11:45


Docusate Sodium (Colace) 100 mg PRN DAILY  PRN PO CONSTIPATION;  Start 10/21/18 

at 11:45


Famotidine (Pepcid) 20 mg QHS PO  Last administered on 10/21/18at 21:17;  Start 

10/21/18 at 21:00


Heparin Sodium (Porcine) (Heparin Sodium) 5,000 unit Q8HRS SQ  Last 

administered on 10/22/18at 07:40;  Start 10/21/18 at 14:00


Dexamethasone (Decadron) 4 mg Q6HRS PO ;  Start 10/22/18 at 18:00


Metoprolol Tartrate (Lopressor) 12.5 mg BID PO ;  Start 10/22/18 at 14:00


Lisinopril (Prinivil) 5 mg DAILY PO ;  Start 10/22/18 at 14:00





Active Scripts


Active


Meclizine Hcl 25 Mg Tablet 1 Tab PO PRN TID PRN


Reported


Aspirin 81 Mg Tab.chew 1 Tab PO DAILY


Simvastatin 40 Mg Tablet 1 Tab PO QHS


Clopidogrel (Clopidogrel Bisulfate) 75 Mg Tablet 1 Tab PO DAILY


Metformin Hcl 500 Mg Tablet 1 Tab PO BID


Atenolol 25 Mg Tablet 1 Tab PO DAILY


Vitals/I & O





Vital Sign - Last 24 Hours








 10/21/18 10/21/18 10/21/18 10/21/18





 15:00 18:31 19:00 20:00


 


Temp   96.9 





   96.9 


 


Pulse 65 65 71 


 


Resp 18  17 


 


B/P (MAP) 114/67 (83) 114/67 133/60 (84) 


 


Pulse Ox 99  98 


 


O2 Delivery   Room Air Room Air


 


    





    





 10/21/18 10/22/18 10/22/18 10/22/18





 23:00 00:00 00:14 06:00


 


Temp 96.7   96.9





 96.7   96.9


 


Pulse 57 58 58 61


 


Resp 16   17


 


B/P (MAP) 119/68 (85)  114/64 (81) 120/70 (87)


 


Pulse Ox 96   97


 


O2 Delivery Room Air   Room Air


 


    





    





 10/22/18 10/22/18 10/22/18 





 07:30 08:00 11:00 


 


Temp   97.4 





   97.4 


 


Pulse 66  67 


 


Resp   18 


 


B/P (MAP) 149/84  111/64 (80) 


 


Pulse Ox   100 


 


O2 Delivery  Room Air Room Air 














Intake and Output   


 


 10/21/18 10/21/18 10/22/18





 15:00 23:00 07:00


 


Intake Total  120 ml 220 ml


 


Output Total  350 ml 1100 ml


 


Balance  -230 ml -880 ml











Nutrition Consultation


Dietary Evaluation:


Recommendations by RD:  Increase Calorie Intake, Protein supplementation, PPN/

TPN


Comments:  


continue ensure tid, diet per SLP, PPN until d/c


Expected Outcomes/Goals:  


to meet > 75% est nutr needs - goal ongoing





Malnutrition Findings:


Food and Nutrition Intake (Mod:  <75% est energy req 7days


Weight Status:  Appropriate











JOSE M PATINO III DO Oct 22, 2018 14:59

## 2018-10-23 VITALS — DIASTOLIC BLOOD PRESSURE: 61 MMHG | SYSTOLIC BLOOD PRESSURE: 157 MMHG

## 2018-10-23 VITALS — DIASTOLIC BLOOD PRESSURE: 61 MMHG | SYSTOLIC BLOOD PRESSURE: 122 MMHG

## 2018-10-23 VITALS — SYSTOLIC BLOOD PRESSURE: 143 MMHG | DIASTOLIC BLOOD PRESSURE: 65 MMHG

## 2018-10-23 VITALS — SYSTOLIC BLOOD PRESSURE: 118 MMHG | DIASTOLIC BLOOD PRESSURE: 89 MMHG

## 2018-10-23 VITALS — SYSTOLIC BLOOD PRESSURE: 138 MMHG | DIASTOLIC BLOOD PRESSURE: 84 MMHG

## 2018-10-23 VITALS — DIASTOLIC BLOOD PRESSURE: 56 MMHG | SYSTOLIC BLOOD PRESSURE: 128 MMHG

## 2018-10-23 RX ADMIN — METOPROLOL TARTRATE SCH MG: 25 TABLET ORAL at 21:34

## 2018-10-23 RX ADMIN — DEXAMETHASONE SCH MG: 4 TABLET ORAL at 05:33

## 2018-10-23 RX ADMIN — LISINOPRIL SCH MG: 5 TABLET ORAL at 09:04

## 2018-10-23 RX ADMIN — DEXAMETHASONE SCH MG: 4 TABLET ORAL at 00:57

## 2018-10-23 RX ADMIN — HEPARIN SODIUM SCH UNIT: 5000 INJECTION, SOLUTION INTRAVENOUS; SUBCUTANEOUS at 14:02

## 2018-10-23 RX ADMIN — METOPROLOL TARTRATE SCH MG: 25 TABLET ORAL at 09:00

## 2018-10-23 RX ADMIN — DEXAMETHASONE SCH MG: 4 TABLET ORAL at 12:19

## 2018-10-23 RX ADMIN — FAMOTIDINE SCH MG: 20 TABLET ORAL at 21:34

## 2018-10-23 RX ADMIN — HEPARIN SODIUM SCH UNIT: 5000 INJECTION, SOLUTION INTRAVENOUS; SUBCUTANEOUS at 05:40

## 2018-10-23 RX ADMIN — DICLOFENAC SODIUM SCH APP: 10 GEL TOPICAL at 09:08

## 2018-10-23 RX ADMIN — DICLOFENAC SODIUM SCH APP: 10 GEL TOPICAL at 21:34

## 2018-10-23 RX ADMIN — HEPARIN SODIUM SCH UNIT: 5000 INJECTION, SOLUTION INTRAVENOUS; SUBCUTANEOUS at 21:42

## 2018-10-23 RX ADMIN — DEXAMETHASONE SCH MG: 4 TABLET ORAL at 18:37

## 2018-10-23 NOTE — PDOC
PROGRESS NOTES


Chief Complaint


Chief Complaint


Cerebral edema, encephalitis or poss press syndrome


Acute encephalopathy on chronic mild dementia


Fluctuating dementia w/prior delirium 


Moderate malnutrition 


Dehydration


Weakness and debility


Dysphagia


Poor PO intake


Arrhythmia


Leukocytosis





History of Present Illness


History of Present Illness


Pt seen and examined


Discussed plan with pt and daughter at bedside


Discussed with RN


continue iv antibiotics





Vitals


Vitals





Vital Signs








  Date Time  Temp Pulse Resp B/P (MAP) Pulse Ox O2 Delivery O2 Flow Rate FiO2


 


10/23/18 11:08 97.7 51 18 128/56 (80) 100 Room Air  





 97.7       











Physical Exam


Physical Exam


GENERAL: Resting quietly, NAD  .


LUNGS:  Clear to auscultation.


HEART:  S1, S2.


ABDOMEN:  Soft, nondistended with positive bowel sounds.


: [place]


EXTREMITIES:  Without clubbing or cyanosis.  No gross edema.


SKIN:  Warm to touch without signs of any rashes.


CNS: much more awake, and nods feeling better, eat today per RN


PIV


General:  Alert, Cooperative, No acute distress, mild distress, Other (wont talk

)


Heart:  Regular rate, Normal S1, Normal S2


Lungs:  Clear


Abdomen:  Normal bowel sounds, Soft


Extremities:  No clubbing, No cyanosis, No edema


Skin:  No breakdown, No significant lesion





Assessment and Plan


Assessmemt and Plan


Problems


Medical Problems:


(1) Altered mental status


Status: Acute  











Comment


Review of Relevant


I have reviewed the following items marky (where applicable) has been applied.


Labs





Laboratory Tests








Test


 10/22/18


03:40


 


White Blood Count


 10.5 x10^3/uL


(4.0-11.0)


 


Red Blood Count


 4.83 x10^6/uL


(3.50-5.40)


 


Hemoglobin


 13.5 g/dL


(12.0-15.5)


 


Hematocrit


 39.9 %


(36.0-47.0)


 


Mean Corpuscular Volume 83 fL () 


 


Mean Corpuscular Hemoglobin 28 pg (25-35) 


 


Mean Corpuscular Hemoglobin


Concent 34 g/dL


(31-37)


 


Red Cell Distribution Width


 18.4 %


(11.5-14.5)


 


Platelet Count


 201 x10^3/uL


(140-400)


 


Neutrophils (%) (Auto) 88 % (31-73) 


 


Lymphocytes (%) (Auto) 8 % (24-48) 


 


Monocytes (%) (Auto) 4 % (0-9) 


 


Eosinophils (%) (Auto) 0 % (0-3) 


 


Basophils (%) (Auto) 0 % (0-3) 


 


Neutrophils # (Auto)


 9.2 x10^3uL


(1.8-7.7)


 


Lymphocytes # (Auto)


 0.9 x10^3/uL


(1.0-4.8)


 


Monocytes # (Auto)


 0.4 x10^3/uL


(0.0-1.1)


 


Eosinophils # (Auto)


 0.0 x10^3/uL


(0.0-0.7)


 


Basophils # (Auto)


 0.0 x10^3/uL


(0.0-0.2)


 


Sodium Level


 133 mmol/L


(136-145)


 


Potassium Level


 5.0 mmol/L


(3.5-5.1)


 


Chloride Level


 105 mmol/L


()


 


Carbon Dioxide Level


 19 mmol/L


(21-32)


 


Anion Gap 9 (6-14) 


 


Blood Urea Nitrogen


 41 mg/dL


(7-20)


 


Creatinine


 0.9 mg/dL


(0.6-1.0)


 


Estimated GFR


(Cockcroft-Gault) 72.4 





 


Glucose Level


 145 mg/dL


(70-99)


 


Calcium Level


 8.5 mg/dL


(8.5-10.1)


 


Magnesium Level


 2.8 mg/dL


(1.8-2.4)








Microbiology


10/17/18 CSF Gram Stain - Final, Complete


Medications





Current Medications


Sodium Chloride 500 ml @  500 mls/hr 1X  ONCE IV  Last administered on 10/14/

18at 20:30;  Start 10/14/18 at 20:30;  Stop 10/14/18 at 21:29;  Status DC


Sodium Chloride 1,000 ml @  100 mls/hr 1X  ONCE IV  Last administered on 10/14/

18at 21:30;  Start 10/14/18 at 20:30;  Stop 10/15/18 at 06:29;  Status DC


Ondansetron HCl (Zofran) 4 mg PRN Q8HRS  PRN IV NAUSEA/VOMITING 1ST CHOICE;  

Start 10/14/18 at 23:45;  Stop 10/15/18 at 23:44;  Status DC


Sodium Chloride 1,000 ml @  80 mls/hr [id number] IV  Last administered on 10/15/

18at 08:29;  Start 10/15/18 at 08:00;  Stop 10/15/18 at 09:22;  Status DC


Potassium Chloride/Dextrose/ Sod Cl 1,000 ml @  80 mls/hr [id number] IV  Last 

administered on 10/16/18at 01:33;  Start 10/15/18 at 09:30;  Stop 10/16/18 at 13

:51;  Status DC


Dexamethasone Sodium Phosphate (Decadron) 4 mg Q6HRS IV  Last administered on 10

/22/18at 07:29;  Start 10/15/18 at 18:00;  Stop 10/22/18 at 13:02;  Status DC


Vancomycin HCl (Vanco Per Pharmacy) 1 each PRN DAILY  PRN MC SEE COMMENTS Last 

administered on 10/18/18at 14:40;  Start 10/15/18 at 16:15;  Stop 10/19/18 at 12

:17;  Status DC


Ceftriaxone Sodium 2 gm/ Dextrose 100 ml @  200 mls/hr Q24H IV  Last 

administered on 10/15/18at 16:51;  Start 10/15/18 at 16:30;  Stop 10/16/18 at 10

:43;  Status DC


Vancomycin HCl 1.25 gm/Sodium Chloride 250 ml @  166.667 mls/hr 1X  ONCE IV  

Last administered on 10/15/18at 18:01;  Start 10/15/18 at 17:00;  Stop 10/15/18 

at 18:29;  Status DC


Vancomycin HCl 1 gm/Sodium Chloride 250 ml @  250 mls/hr Q24H IV  Last 

administered on 10/18/18at 17:51;  Start 10/16/18 at 18:00;  Stop 10/19/18 at 12

:17;  Status DC


Vancomycin HCl (Vancomycin Trough Level) 1 each 1X  ONCE MC  Last administered 

on 10/17/18at 17:30;  Start 10/17/18 at 17:30;  Stop 10/17/18 at 17:31;  Status 

DC


Ceftriaxone Sodium 2 gm/ Sodium Chloride 100 ml @  200 mls/hr Q12H IV ;  Start 

10/16/18 at 11:00;  Stop 10/16/18 at 11:00;  Status DC


Acyclovir Sodium 520 mg/Dextrose 110.4 ml @  110.4 mls/ hr Q12HR IV ;  Start 10/

16/18 at 11:30;  Stop 10/16/18 at 11:30;  Status DC


Ceftriaxone Sodium 2 gm/ Sodium Chloride 100 ml @  200 mls/hr Q12HR IV  Last 

administered on 10/22/18at 09:10;  Start 10/16/18 at 12:00;  Stop 10/22/18 at 11

:58;  Status DC


Acyclovir Sodium 500 mg/Dextrose 110 ml @  110 mls/hr Q12HR IV  Last 

administered on 10/22/18at 09:17;  Start 10/16/18 at 11:30;  Stop 10/22/18 at 11

:58;  Status DC


Lactobacillus Rhamnosus (Culturelle) 1 cap BID PO  Last administered on 10/17/

18at 21:18;  Start 10/16/18 at 21:00;  Stop 10/19/18 at 16:08;  Status DC


Amino Acids/ Glycerin/ Electrolytes 1,000 ml @  80 mls/hr [id number] IV  Last 

administered on 10/22/18at 09:16;  Start 10/16/18 at 14:00;  Stop 10/22/18 at 16

:36;  Status DC


Lidocaine/Sodium Bicarbonate (Buffered Lidocaine 1%) 6 ml 1X  ONCE INJ  Last 

administered on 10/17/18at 15:08;  Start 10/17/18 at 11:45;  Stop 10/17/18 at 11

:47;  Status DC


Diclofenac Sodium (Voltaren) 1 danyell BID TP  Last administered on 10/23/18at 09:08

;  Start 10/17/18 at 23:45


Enalaprilat (Vasotec Inj) 1.25 mg Q6HRS IVP  Last administered on 10/21/18at 18:

31;  Start 10/18/18 at 11:00;  Stop 10/22/18 at 13:12;  Status DC


Lorazepam (Ativan) 0.5 mg PRN DAILY  PRN IV ANXIETY / AGITATION Last 

administered on 10/19/18at 17:32;  Start 10/18/18 at 11:45


Metoprolol Tartrate (Lopressor Vial) 2.5 mg Q6HRS IVP  Last administered on 10/

22/18at 07:30;  Start 10/20/18 at 12:45;  Stop 10/22/18 at 13:12;  Status DC


Acetaminophen (Tylenol) 650 mg PRN Q6HRS  PRN PO FEVER;  Start 10/21/18 at 11:45


Ondansetron HCl (Zofran) 4 mg PRN Q6HRS  PRN IV NAUSEA/VOMITING;  Start 10/21/

18 at 11:45


Morphine Sulfate (Morphine Sulfate) 2 mg PRN Q2HR  PRN IV MODERATE TO SEVERE 

PAIN;  Start 10/21/18 at 11:45


Tramadol HCl (Ultram) 50 mg PRN Q6HRS  PRN PO MILD TO MODERATE PAIN;  Start 10/

21/18 at 11:45


Docusate Sodium (Colace) 100 mg PRN DAILY  PRN PO CONSTIPATION;  Start 10/21/18 

at 11:45


Famotidine (Pepcid) 20 mg QHS PO  Last administered on 10/22/18at 21:45;  Start 

10/21/18 at 21:00


Heparin Sodium (Porcine) (Heparin Sodium) 5,000 unit Q8HRS SQ  Last 

administered on 10/23/18at 05:40;  Start 10/21/18 at 14:00


Dexamethasone (Decadron) 4 mg Q6HRS PO  Last administered on 10/23/18at 12:19;  

Start 10/22/18 at 18:00


Metoprolol Tartrate (Lopressor) 12.5 mg BID PO  Last administered on 10/22/18at 

21:45;  Start 10/22/18 at 14:00


Lisinopril (Prinivil) 5 mg DAILY PO  Last administered on 10/23/18at 09:04;  

Start 10/22/18 at 14:00





Active Scripts


Active


Meclizine Hcl 25 Mg Tablet 1 Tab PO PRN TID PRN


Reported


Aspirin 81 Mg Tab.chew 1 Tab PO DAILY


Simvastatin 40 Mg Tablet 1 Tab PO QHS


Clopidogrel (Clopidogrel Bisulfate) 75 Mg Tablet 1 Tab PO DAILY


Metformin Hcl 500 Mg Tablet 1 Tab PO BID


Atenolol 25 Mg Tablet 1 Tab PO DAILY


Vitals/I & O





Vital Sign - Last 24 Hours








 10/22/18 10/22/18 10/22/18 10/22/18





 15:00 16:14 16:15 19:00


 


Temp 96.6   96.9





 96.6   96.9


 


Pulse 69 69 69 62


 


Resp 17   18


 


B/P (MAP) 123/67 (85) 123/67 123/67 134/73 (93)


 


Pulse Ox 99   98


 


O2 Delivery Room Air   Room Air


 


    





    





 10/22/18 10/22/18 10/22/18 10/23/18





 20:05 21:45 23:00 03:00


 


Temp   96.7 96.9





   96.7 96.9


 


Pulse  62 59 69


 


Resp   18 18


 


B/P (MAP)  134/73 131/74 (93) 138/84 (102)


 


Pulse Ox   98 92


 


O2 Delivery Room Air  Room Air Room Air


 


    





    





 10/23/18 10/23/18 10/23/18 10/23/18





 07:47 08:00 09:00 09:04


 


Temp 97.7   





 97.7   


 


Pulse 56  45 56


 


Resp 18   


 


B/P (MAP) 143/65 (91)  143/65 143/65


 


Pulse Ox 100   


 


O2 Delivery Room Air Room Air  


 


    





    





 10/23/18   





 11:08   


 


Temp 97.7   





 97.7   


 


Pulse 51   


 


Resp 18   


 


B/P (MAP) 128/56 (80)   


 


Pulse Ox 100   


 


O2 Delivery Room Air   














Intake and Output   


 


 10/22/18 10/22/18 10/23/18





 15:00 23:00 07:00


 


Intake Total  200 ml 


 


Output Total  800 ml 675 ml


 


Balance  -600 ml -675 ml











Nutrition Consultation


Dietary Evaluation:


Recommendations by RD:  Increase Calorie Intake, Protein supplementation, PPN/

TPN


Comments:  


continue ensure tid, diet per SLP, PPN until d/c


Expected Outcomes/Goals:  


to meet > 75% est nutr needs - goal ongoing





Malnutrition Findings:


Food and Nutrition Intake (Mod:  <75% est energy req 7days


Weight Status:  Appropriate











KATELYNN ABDULLAHI MD Oct 23, 2018 13:55

## 2018-10-23 NOTE — DISCH
DISCHARGE


DISCHARGE INFORMATION:


FINAL DIAGNOSIS


Problems


Medical Problems:


(1) Altered mental status


Status: Acute  








CONDITION ON DISCHARGE:  Stable





CODE STATUS:


Code Status:  Full





SKILLED NURSING:


SNF STAY <30 DAYS:  Yes





POST DISCHARGE ORDERS:


ACTIVITY ORDERS:  Activity as tolerated


WEIGHT BEARING STATUS:  As tolerated


DIET AFTER DISCHARGE:  Cardiac





CHECKS AFTER DISCHARGE:


CHECKS AFTER DISCHARGE:  Check blood press - daily, Check blood sugar, ac/hs, 

Check your Temp as needed





TREATMENT/EQUIPMENT ORDERS:


ADAPTIVE EQUIPMENT NEEDED:  None


Physical Therapy For:  Evalulation/Treatment


Occupational Therapy For:  Evaluation/Treatment





DISCHARGE MEDICATIONS:


Home Meds


Active Scripts


Meclizine Hcl (MECLIZINE HCL) 25 Mg Tablet, 1 TAB PO PRN TID PRN for DIZZINESS, 

#30 TAB


   Prov:KINGS ROSAS MD         10/29/16


Reported Medications


Aspirin (ASPIRIN) 81 Mg Tab.chew, 1 TAB PO DAILY for blood thinner, #30 TAB 3 

Refills


   10/29/16


Simvastatin (SIMVASTATIN) 40 Mg Tablet, 1 TAB PO QHS for elevated cholesterol, #

30 TAB 5 Refills


   10/29/16


Clopidogrel Bisulfate (CLOPIDOGREL) 75 Mg Tablet, 1 TAB PO DAILY for stents, #

90 TAB 1 Refill


   10/29/16


Metformin Hcl (METFORMIN HCL) 500 Mg Tablet, 1 TAB PO BID for dm, #60 TAB 3 

Refills


   10/29/16


Atenolol (ATENOLOL) 25 Mg Tablet, 1 TAB PO DAILY for htn, #30 TAB 5 Refills


   10/29/16











KATELYNN ABDULLAHI MD Oct 23, 2018 15:19

## 2018-10-23 NOTE — PDOC
Infectious Disease Note


Subjective


Subjective


awake, speaking, eating, much better per son and RN





ROS


ROS


no n/v/d/sob





Vital Sign


Vital Signs





Vital Signs








  Date Time  Temp Pulse Resp B/P (MAP) Pulse Ox O2 Delivery O2 Flow Rate FiO2


 


10/23/18 11:08 97.7 51 18 128/56 (80) 100 Room Air  





 97.7       











Physical Exam


PHYSICAL EXAM


GENERAL: Resting quietly, NAD  .


LUNGS:  Clear to auscultation.


HEART:  S1, S2.


ABDOMEN:  Soft, nondistended with positive bowel sounds.


: [place]


EXTREMITIES:  Without clubbing or cyanosis.  No gross edema.


SKIN:  Warm to touch without signs of any rashes.


CNS: much more awake, and nods feeling better, eat today per RN


PIV





Labs


Micro





Microbiology


10/17/18 CSF Gram Stain - Final, Complete





Objective


Assessment


Encephalopathy - now on dexamethasone/Rocephin/Vanc/Acyclovir  


   -LP: CSF WBC 24, glucose 82, .8 (on steroids) 


Syphilis - neg. Sed rate 14 - nml,,, HSV neg, [place] neg


Abnormal brain MRI - ? PRESSURE


HTN





Plan


Plan of Care


supportive care


pt/ot


d/w son


ok to d/c to SNF











CATRINA GALLAGHER MD Oct 23, 2018 12:00

## 2018-10-23 NOTE — PDOC3
Discharge Summary


Date of Admission:  Oct 14, 2018


Date of Discharge:  Oct 23, 2018


Follow-Up:  1-2 days


Admitting Diagnosis comment:


DISCHARGE DIAGNOSIS=======





Chief Complaint


Cerebral edema, encephalitis or poss press syndrome


Acute encephalopathy on chronic mild dementia


Fluctuating dementia w/prior delirium 


Moderate malnutrition 


Dehydration


Weakness and debility


Dysphagia


Poor PO intake


Arrhythmia


Leukocytosis





History of Present Illness


History of Present Illness


Pt seen and examined


Discussed plan with pt 


Discussed with RN


OK WITH DR GALLAGHER D/C TO SNF





Vitals


Vitals





Vital Signs








  Date Time  Temp Pulse Resp B/P (MAP) Pulse Ox O2 Delivery O2 Flow Rate FiO2


 


10/23/18 11:08 97.7 51 18 128/56 (80) 100 Room Air  





 97.7       











Physical Exam


Physical Exam


GENERAL: Resting quietly, NAD  .


LUNGS:  Clear to auscultation.


HEART:  S1, S2.


ABDOMEN:  Soft, nondistended with positive bowel sounds.


: [place]


EXTREMITIES:  Without clubbing or cyanosis.  No gross edema.


SKIN:  Warm to touch without signs of any rashes.


CNS: much more awake, and nods feeling better, eat today per RN


PIV


General:  Alert, Cooperative, No acute distress, mild distress, Other (wont talk

)


Heart:  Regular rate, Normal S1, Normal S2


Lungs:  Clear


Abdomen:  Normal bowel sounds, Soft


Extremities:  No clubbing, No cyanosis, No edema


Skin:  No breakdown, No significant lesion





Assessment and Plan


Assessmemt and Plan


Problems


FINAL DIAGNOSIS


Problems


Medical Problems:


(1) Altered mental status


Status: Acute  








Brief Hospital Course


Ms. Deal  is a 83 old [sex] who presented with [ ]


Discharge Medications





Current Medications


Sodium Chloride 500 ml @  500 mls/hr 1X  ONCE IV  Last administered on 10/14/

18at 20:30;  Start 10/14/18 at 20:30;  Stop 10/14/18 at 21:29;  Status DC


Sodium Chloride 1,000 ml @  100 mls/hr 1X  ONCE IV  Last administered on 10/14/

18at 21:30;  Start 10/14/18 at 20:30;  Stop 10/15/18 at 06:29;  Status DC


Ondansetron HCl (Zofran) 4 mg PRN Q8HRS  PRN IV NAUSEA/VOMITING 1ST CHOICE;  

Start 10/14/18 at 23:45;  Stop 10/15/18 at 23:44;  Status DC


Sodium Chloride 1,000 ml @  80 mls/hr [id number] IV  Last administered on 10/15/

18at 08:29;  Start 10/15/18 at 08:00;  Stop 10/15/18 at 09:22;  Status DC


Potassium Chloride/Dextrose/ Sod Cl 1,000 ml @  80 mls/hr [id number] IV  Last 

administered on 10/16/18at 01:33;  Start 10/15/18 at 09:30;  Stop 10/16/18 at 13

:51;  Status DC


Dexamethasone Sodium Phosphate (Decadron) 4 mg Q6HRS IV  Last administered on 10

/22/18at 07:29;  Start 10/15/18 at 18:00;  Stop 10/22/18 at 13:02;  Status DC


Vancomycin HCl (Vanco Per Pharmacy) 1 each PRN DAILY  PRN MC SEE COMMENTS Last 

administered on 10/18/18at 14:40;  Start 10/15/18 at 16:15;  Stop 10/19/18 at 12

:17;  Status DC


Ceftriaxone Sodium 2 gm/ Dextrose 100 ml @  200 mls/hr Q24H IV  Last 

administered on 10/15/18at 16:51;  Start 10/15/18 at 16:30;  Stop 10/16/18 at 10

:43;  Status DC


Vancomycin HCl 1.25 gm/Sodium Chloride 250 ml @  166.667 mls/hr 1X  ONCE IV  

Last administered on 10/15/18at 18:01;  Start 10/15/18 at 17:00;  Stop 10/15/18 

at 18:29;  Status DC


Vancomycin HCl 1 gm/Sodium Chloride 250 ml @  250 mls/hr Q24H IV  Last 

administered on 10/18/18at 17:51;  Start 10/16/18 at 18:00;  Stop 10/19/18 at 12

:17;  Status DC


Vancomycin HCl (Vancomycin Trough Level) 1 each 1X  ONCE MC  Last administered 

on 10/17/18at 17:30;  Start 10/17/18 at 17:30;  Stop 10/17/18 at 17:31;  Status 

DC


Ceftriaxone Sodium 2 gm/ Sodium Chloride 100 ml @  200 mls/hr Q12H IV ;  Start 

10/16/18 at 11:00;  Stop 10/16/18 at 11:00;  Status DC


Acyclovir Sodium 520 mg/Dextrose 110.4 ml @  110.4 mls/ hr Q12HR IV ;  Start 10/

16/18 at 11:30;  Stop 10/16/18 at 11:30;  Status DC


Ceftriaxone Sodium 2 gm/ Sodium Chloride 100 ml @  200 mls/hr Q12HR IV  Last 

administered on 10/22/18at 09:10;  Start 10/16/18 at 12:00;  Stop 10/22/18 at 11

:58;  Status DC


Acyclovir Sodium 500 mg/Dextrose 110 ml @  110 mls/hr Q12HR IV  Last 

administered on 10/22/18at 09:17;  Start 10/16/18 at 11:30;  Stop 10/22/18 at 11

:58;  Status DC


Lactobacillus Rhamnosus (Culturelle) 1 cap BID PO  Last administered on 10/17/

18at 21:18;  Start 10/16/18 at 21:00;  Stop 10/19/18 at 16:08;  Status DC


Amino Acids/ Glycerin/ Electrolytes 1,000 ml @  80 mls/hr [id number] IV  Last 

administered on 10/22/18at 09:16;  Start 10/16/18 at 14:00;  Stop 10/22/18 at 16

:36;  Status DC


Lidocaine/Sodium Bicarbonate (Buffered Lidocaine 1%) 6 ml 1X  ONCE INJ  Last 

administered on 10/17/18at 15:08;  Start 10/17/18 at 11:45;  Stop 10/17/18 at 11

:47;  Status DC


Diclofenac Sodium (Voltaren) 1 danyell BID TP  Last administered on 10/23/18at 09:08

;  Start 10/17/18 at 23:45


Enalaprilat (Vasotec Inj) 1.25 mg Q6HRS IVP  Last administered on 10/21/18at 18:

31;  Start 10/18/18 at 11:00;  Stop 10/22/18 at 13:12;  Status DC


Lorazepam (Ativan) 0.5 mg PRN DAILY  PRN IV ANXIETY / AGITATION Last 

administered on 10/19/18at 17:32;  Start 10/18/18 at 11:45


Metoprolol Tartrate (Lopressor Vial) 2.5 mg Q6HRS IVP  Last administered on 10/

22/18at 07:30;  Start 10/20/18 at 12:45;  Stop 10/22/18 at 13:12;  Status DC


Acetaminophen (Tylenol) 650 mg PRN Q6HRS  PRN PO FEVER;  Start 10/21/18 at 11:45


Ondansetron HCl (Zofran) 4 mg PRN Q6HRS  PRN IV NAUSEA/VOMITING;  Start 10/21/

18 at 11:45


Morphine Sulfate (Morphine Sulfate) 2 mg PRN Q2HR  PRN IV MODERATE TO SEVERE 

PAIN;  Start 10/21/18 at 11:45


Tramadol HCl (Ultram) 50 mg PRN Q6HRS  PRN PO MILD TO MODERATE PAIN;  Start 10/

21/18 at 11:45


Docusate Sodium (Colace) 100 mg PRN DAILY  PRN PO CONSTIPATION;  Start 10/21/18 

at 11:45


Famotidine (Pepcid) 20 mg QHS PO  Last administered on 10/22/18at 21:45;  Start 

10/21/18 at 21:00


Heparin Sodium (Porcine) (Heparin Sodium) 5,000 unit Q8HRS SQ  Last 

administered on 10/23/18at 14:02;  Start 10/21/18 at 14:00


Dexamethasone (Decadron) 4 mg Q6HRS PO  Last administered on 10/23/18at 12:19;  

Start 10/22/18 at 18:00


Metoprolol Tartrate (Lopressor) 12.5 mg BID PO  Last administered on 10/22/18at 

21:45;  Start 10/22/18 at 14:00


Lisinopril (Prinivil) 5 mg DAILY PO  Last administered on 10/23/18at 09:04;  

Start 10/22/18 at 14:00





Active Scripts


Active


Meclizine Hcl 25 Mg Tablet 1 Tab PO PRN TID PRN


Reported


Aspirin 81 Mg Tab.chew 1 Tab PO DAILY


Simvastatin 40 Mg Tablet 1 Tab PO QHS


Clopidogrel (Clopidogrel Bisulfate) 75 Mg Tablet 1 Tab PO DAILY


Metformin Hcl 500 Mg Tablet 1 Tab PO BID


Atenolol 25 Mg Tablet 1 Tab PO DAILY


Vital Signs





Vital Signs








  Date Time  Temp Pulse Resp B/P (MAP) Pulse Ox O2 Delivery O2 Flow Rate FiO2


 


10/23/18 11:08 97.7 51 18 128/56 (80) 100 Room Air  





 97.7       








Labs





Laboratory Tests








Test


 10/22/18


03:40


 


White Blood Count


 10.5 x10^3/uL


(4.0-11.0)


 


Red Blood Count


 4.83 x10^6/uL


(3.50-5.40)


 


Hemoglobin


 13.5 g/dL


(12.0-15.5)


 


Hematocrit


 39.9 %


(36.0-47.0)


 


Mean Corpuscular Volume 83 fL () 


 


Mean Corpuscular Hemoglobin 28 pg (25-35) 


 


Mean Corpuscular Hemoglobin


Concent 34 g/dL


(31-37)


 


Red Cell Distribution Width


 18.4 %


(11.5-14.5)


 


Platelet Count


 201 x10^3/uL


(140-400)


 


Neutrophils (%) (Auto) 88 % (31-73) 


 


Lymphocytes (%) (Auto) 8 % (24-48) 


 


Monocytes (%) (Auto) 4 % (0-9) 


 


Eosinophils (%) (Auto) 0 % (0-3) 


 


Basophils (%) (Auto) 0 % (0-3) 


 


Neutrophils # (Auto)


 9.2 x10^3uL


(1.8-7.7)


 


Lymphocytes # (Auto)


 0.9 x10^3/uL


(1.0-4.8)


 


Monocytes # (Auto)


 0.4 x10^3/uL


(0.0-1.1)


 


Eosinophils # (Auto)


 0.0 x10^3/uL


(0.0-0.7)


 


Basophils # (Auto)


 0.0 x10^3/uL


(0.0-0.2)


 


Sodium Level


 133 mmol/L


(136-145)


 


Potassium Level


 5.0 mmol/L


(3.5-5.1)


 


Chloride Level


 105 mmol/L


()


 


Carbon Dioxide Level


 19 mmol/L


(21-32)


 


Anion Gap 9 (6-14) 


 


Blood Urea Nitrogen


 41 mg/dL


(7-20)


 


Creatinine


 0.9 mg/dL


(0.6-1.0)


 


Estimated GFR


(Cockcroft-Gault) 72.4 





 


Glucose Level


 145 mg/dL


(70-99)


 


Calcium Level


 8.5 mg/dL


(8.5-10.1)


 


Magnesium Level


 2.8 mg/dL


(1.8-2.4)








Allergies





 Allergies








Coded Allergies Type Severity Reaction Last Updated Verified


 


  No Known Drug Allergies    10/29/16 No








Disposition/Orders:  Other (SNF BED)


Patient Instructions


D/C PLANNING 35 MIN











KATELYNN ABDULLAHI MD Oct 23, 2018 15:18

## 2018-10-24 VITALS — DIASTOLIC BLOOD PRESSURE: 56 MMHG | SYSTOLIC BLOOD PRESSURE: 122 MMHG

## 2018-10-24 VITALS — SYSTOLIC BLOOD PRESSURE: 129 MMHG | DIASTOLIC BLOOD PRESSURE: 60 MMHG

## 2018-10-24 RX ADMIN — LISINOPRIL SCH MG: 5 TABLET ORAL at 09:37

## 2018-10-24 RX ADMIN — HEPARIN SODIUM SCH UNIT: 5000 INJECTION, SOLUTION INTRAVENOUS; SUBCUTANEOUS at 06:48

## 2018-10-24 RX ADMIN — METOPROLOL TARTRATE SCH MG: 25 TABLET ORAL at 09:00

## 2018-10-24 RX ADMIN — DEXAMETHASONE SCH MG: 4 TABLET ORAL at 12:00

## 2018-10-24 RX ADMIN — DICLOFENAC SODIUM SCH APP: 10 GEL TOPICAL at 09:36

## 2018-10-24 RX ADMIN — DEXAMETHASONE SCH MG: 4 TABLET ORAL at 00:00

## 2018-10-24 RX ADMIN — DEXAMETHASONE SCH MG: 4 TABLET ORAL at 06:47

## 2018-10-24 NOTE — PDOC
PROGRESS NOTES


Chief Complaint


Chief Complaint


Cerebral edema, encephalitis or poss press syndrome


Acute encephalopathy on chronic mild dementia


Fluctuating dementia w/prior delirium 


Moderate malnutrition 


Dehydration


Weakness and debility


Dysphagia


Poor PO intake


Arrhythmia


Leukocytosis





History of Present Illness


History of Present Illness


Pt seen and examined


Discussed plan with pt at bedside


Discussed with RN


continue iv antibiotics





Vitals


Vitals





Vital Signs








  Date Time  Temp Pulse Resp B/P (MAP) Pulse Ox O2 Delivery O2 Flow Rate FiO2


 


10/24/18 09:37  52  129/60    


 


10/24/18 08:00      Room Air  


 


10/24/18 03:00 97.8  18  98   





 97.8       











Physical Exam


Physical Exam


GENERAL: Resting quietly, NAD  .


LUNGS:  Clear to auscultation.


HEART:  S1, S2.


ABDOMEN:  Soft, nondistended with positive bowel sounds.


: [place]


EXTREMITIES:  Without clubbing or cyanosis.  No gross edema.


SKIN:  Warm to touch without signs of any rashes.


CNS: much more awake, and nods feeling better, eat today per RN


PIV


General:  Alert, Cooperative, No acute distress, mild distress, Other (wont talk

)


Heart:  Regular rate, Normal S1, Normal S2


Lungs:  Clear


Abdomen:  Normal bowel sounds, Soft, No hepatosplenomegaly


Extremities:  No clubbing, No cyanosis, No edema, Normal pulses


Skin:  No breakdown, No significant lesion





Assessment and Plan


Assessmemt and Plan


Problems


Medical Problems:


(1) Altered mental status


Status: Acute  











Comment


Review of Relevant


I have reviewed the following items marky (where applicable) has been applied.


Labs





Microbiology


10/17/18 CSF Gram Stain - Final, Complete


Medications





Current Medications


Sodium Chloride 500 ml @  500 mls/hr 1X  ONCE IV  Last administered on 10/14/

18at 20:30;  Start 10/14/18 at 20:30;  Stop 10/14/18 at 21:29;  Status DC


Sodium Chloride 1,000 ml @  100 mls/hr 1X  ONCE IV  Last administered on 10/14/

18at 21:30;  Start 10/14/18 at 20:30;  Stop 10/15/18 at 06:29;  Status DC


Ondansetron HCl (Zofran) 4 mg PRN Q8HRS  PRN IV NAUSEA/VOMITING 1ST CHOICE;  

Start 10/14/18 at 23:45;  Stop 10/15/18 at 23:44;  Status DC


Sodium Chloride 1,000 ml @  80 mls/hr [id number] IV  Last administered on 10/15/

18at 08:29;  Start 10/15/18 at 08:00;  Stop 10/15/18 at 09:22;  Status DC


Potassium Chloride/Dextrose/ Sod Cl 1,000 ml @  80 mls/hr [id number] IV  Last 

administered on 10/16/18at 01:33;  Start 10/15/18 at 09:30;  Stop 10/16/18 at 13

:51;  Status DC


Dexamethasone Sodium Phosphate (Decadron) 4 mg Q6HRS IV  Last administered on 10

/22/18at 07:29;  Start 10/15/18 at 18:00;  Stop 10/22/18 at 13:02;  Status DC


Vancomycin HCl (Vanco Per Pharmacy) 1 each PRN DAILY  PRN MC SEE COMMENTS Last 

administered on 10/18/18at 14:40;  Start 10/15/18 at 16:15;  Stop 10/19/18 at 12

:17;  Status DC


Ceftriaxone Sodium 2 gm/ Dextrose 100 ml @  200 mls/hr Q24H IV  Last 

administered on 10/15/18at 16:51;  Start 10/15/18 at 16:30;  Stop 10/16/18 at 10

:43;  Status DC


Vancomycin HCl 1.25 gm/Sodium Chloride 250 ml @  166.667 mls/hr 1X  ONCE IV  

Last administered on 10/15/18at 18:01;  Start 10/15/18 at 17:00;  Stop 10/15/18 

at 18:29;  Status DC


Vancomycin HCl 1 gm/Sodium Chloride 250 ml @  250 mls/hr Q24H IV  Last 

administered on 10/18/18at 17:51;  Start 10/16/18 at 18:00;  Stop 10/19/18 at 12

:17;  Status DC


Vancomycin HCl (Vancomycin Trough Level) 1 each 1X  ONCE MC  Last administered 

on 10/17/18at 17:30;  Start 10/17/18 at 17:30;  Stop 10/17/18 at 17:31;  Status 

DC


Ceftriaxone Sodium 2 gm/ Sodium Chloride 100 ml @  200 mls/hr Q12H IV ;  Start 

10/16/18 at 11:00;  Stop 10/16/18 at 11:00;  Status DC


Acyclovir Sodium 520 mg/Dextrose 110.4 ml @  110.4 mls/ hr Q12HR IV ;  Start 10/

16/18 at 11:30;  Stop 10/16/18 at 11:30;  Status DC


Ceftriaxone Sodium 2 gm/ Sodium Chloride 100 ml @  200 mls/hr Q12HR IV  Last 

administered on 10/22/18at 09:10;  Start 10/16/18 at 12:00;  Stop 10/22/18 at 11

:58;  Status DC


Acyclovir Sodium 500 mg/Dextrose 110 ml @  110 mls/hr Q12HR IV  Last 

administered on 10/22/18at 09:17;  Start 10/16/18 at 11:30;  Stop 10/22/18 at 11

:58;  Status DC


Lactobacillus Rhamnosus (Culturelle) 1 cap BID PO  Last administered on 10/17/

18at 21:18;  Start 10/16/18 at 21:00;  Stop 10/19/18 at 16:08;  Status DC


Amino Acids/ Glycerin/ Electrolytes 1,000 ml @  80 mls/hr [id number] IV  Last 

administered on 10/22/18at 09:16;  Start 10/16/18 at 14:00;  Stop 10/22/18 at 16

:36;  Status DC


Lidocaine/Sodium Bicarbonate (Buffered Lidocaine 1%) 6 ml 1X  ONCE INJ  Last 

administered on 10/17/18at 15:08;  Start 10/17/18 at 11:45;  Stop 10/17/18 at 11

:47;  Status DC


Diclofenac Sodium (Voltaren) 1 dnayell BID TP  Last administered on 10/24/18at 09:36

;  Start 10/17/18 at 23:45


Enalaprilat (Vasotec Inj) 1.25 mg Q6HRS IVP  Last administered on 10/21/18at 18:

31;  Start 10/18/18 at 11:00;  Stop 10/22/18 at 13:12;  Status DC


Lorazepam (Ativan) 0.5 mg PRN DAILY  PRN IV ANXIETY / AGITATION Last 

administered on 10/19/18at 17:32;  Start 10/18/18 at 11:45


Metoprolol Tartrate (Lopressor Vial) 2.5 mg Q6HRS IVP  Last administered on 10/

22/18at 07:30;  Start 10/20/18 at 12:45;  Stop 10/22/18 at 13:12;  Status DC


Acetaminophen (Tylenol) 650 mg PRN Q6HRS  PRN PO FEVER;  Start 10/21/18 at 11:45


Ondansetron HCl (Zofran) 4 mg PRN Q6HRS  PRN IV NAUSEA/VOMITING;  Start 10/21/

18 at 11:45


Morphine Sulfate (Morphine Sulfate) 2 mg PRN Q2HR  PRN IV MODERATE TO SEVERE 

PAIN;  Start 10/21/18 at 11:45


Tramadol HCl (Ultram) 50 mg PRN Q6HRS  PRN PO MILD TO MODERATE PAIN;  Start 10/

21/18 at 11:45


Docusate Sodium (Colace) 100 mg PRN DAILY  PRN PO CONSTIPATION;  Start 10/21/18 

at 11:45


Famotidine (Pepcid) 20 mg QHS PO  Last administered on 10/23/18at 21:34;  Start 

10/21/18 at 21:00


Heparin Sodium (Porcine) (Heparin Sodium) 5,000 unit Q8HRS SQ  Last 

administered on 10/23/18at 21:42;  Start 10/21/18 at 14:00


Dexamethasone (Decadron) 4 mg Q6HRS PO  Last administered on 10/23/18at 18:37;  

Start 10/22/18 at 18:00


Metoprolol Tartrate (Lopressor) 12.5 mg BID PO  Last administered on 10/23/18at 

21:34;  Start 10/22/18 at 14:00


Lisinopril (Prinivil) 5 mg DAILY PO  Last administered on 10/24/18at 09:37;  

Start 10/22/18 at 14:00





Active Scripts


Active


Meclizine Hcl 25 Mg Tablet 1 Tab PO PRN TID PRN


Reported


Aspirin 81 Mg Tab.chew 1 Tab PO DAILY


Simvastatin 40 Mg Tablet 1 Tab PO QHS


Clopidogrel (Clopidogrel Bisulfate) 75 Mg Tablet 1 Tab PO DAILY


Metformin Hcl 500 Mg Tablet 1 Tab PO BID


Atenolol 25 Mg Tablet 1 Tab PO DAILY


Vitals/I & O





Vital Sign - Last 24 Hours








 10/23/18 10/23/18 10/23/18 10/23/18





 11:08 15:50 19:00 19:45


 


Temp 97.7 97.3 98.0 





 97.7 97.3 98.0 


 


Pulse 51 56 67 


 


Resp 18 18 18 


 


B/P (MAP) 128/56 (80) 122/61 (81) 157/61 (93) 


 


Pulse Ox 100 100 98 


 


O2 Delivery Room Air Room Air Room Air Room Air


 


    





    





 10/23/18 10/23/18 10/24/18 10/24/18





 21:34 23:21 03:00 08:00


 


Temp  96.1 97.8 





  96.1 97.8 


 


Pulse 67 55 52 


 


Resp  18 18 


 


B/P (MAP) 157/61 118/89 (99) 129/60 (83) 


 


Pulse Ox  98 98 


 


O2 Delivery  Room Air Room Air Room Air


 


    





    





 10/24/18 10/24/18  





 09:00 09:37  


 


Pulse 52 52  


 


B/P (MAP) 129/60 129/60  














Intake and Output   


 


 10/23/18 10/23/18 10/24/18





 15:00 23:00 07:00


 


Intake Total  240 ml 170 ml


 


Output Total  400 ml 325 ml


 


Balance  -160 ml -155 ml











Nutrition Consultation


Dietary Evaluation:


Recommendations by RD:  Increase Calorie Intake, Protein supplementation, PPN/

TPN


Comments:  


continue ensure tid, diet per SLP, PPN until d/c


Expected Outcomes/Goals:  


to meet > 75% est nutr needs - goal ongoing





Malnutrition Findings:


Food and Nutrition Intake (Mod:  <75% est energy req 7days


Weight Status:  Appropriate











KATELYNN ABDULLAHI MD Oct 24, 2018 10:25

## 2018-10-25 ENCOUNTER — HOSPITAL ENCOUNTER (INPATIENT)
Dept: HOSPITAL 61 - ER | Age: 83
LOS: 9 days | Discharge: NURSING, REHAB OR LONG-TERM CARE | DRG: 64 | End: 2018-11-03
Attending: FAMILY MEDICINE | Admitting: FAMILY MEDICINE
Payer: MEDICARE

## 2018-10-25 VITALS — HEIGHT: 62 IN | BODY MASS INDEX: 23.23 KG/M2 | WEIGHT: 126.25 LBS

## 2018-10-25 VITALS — DIASTOLIC BLOOD PRESSURE: 53 MMHG | SYSTOLIC BLOOD PRESSURE: 92 MMHG

## 2018-10-25 VITALS — DIASTOLIC BLOOD PRESSURE: 55 MMHG | SYSTOLIC BLOOD PRESSURE: 89 MMHG

## 2018-10-25 VITALS — SYSTOLIC BLOOD PRESSURE: 111 MMHG | DIASTOLIC BLOOD PRESSURE: 54 MMHG

## 2018-10-25 DIAGNOSIS — Z83.3: ICD-10-CM

## 2018-10-25 DIAGNOSIS — I25.10: ICD-10-CM

## 2018-10-25 DIAGNOSIS — Z79.82: ICD-10-CM

## 2018-10-25 DIAGNOSIS — E11.9: ICD-10-CM

## 2018-10-25 DIAGNOSIS — I50.9: ICD-10-CM

## 2018-10-25 DIAGNOSIS — Z82.3: ICD-10-CM

## 2018-10-25 DIAGNOSIS — E78.5: ICD-10-CM

## 2018-10-25 DIAGNOSIS — R13.10: ICD-10-CM

## 2018-10-25 DIAGNOSIS — G93.40: ICD-10-CM

## 2018-10-25 DIAGNOSIS — Z79.01: ICD-10-CM

## 2018-10-25 DIAGNOSIS — F03.90: ICD-10-CM

## 2018-10-25 DIAGNOSIS — I26.99: ICD-10-CM

## 2018-10-25 DIAGNOSIS — Z86.61: ICD-10-CM

## 2018-10-25 DIAGNOSIS — R62.7: ICD-10-CM

## 2018-10-25 DIAGNOSIS — I49.3: ICD-10-CM

## 2018-10-25 DIAGNOSIS — E78.00: ICD-10-CM

## 2018-10-25 DIAGNOSIS — Z82.49: ICD-10-CM

## 2018-10-25 DIAGNOSIS — Z95.5: ICD-10-CM

## 2018-10-25 DIAGNOSIS — E44.0: ICD-10-CM

## 2018-10-25 DIAGNOSIS — Z79.84: ICD-10-CM

## 2018-10-25 DIAGNOSIS — I47.2: ICD-10-CM

## 2018-10-25 DIAGNOSIS — I63.9: Primary | ICD-10-CM

## 2018-10-25 DIAGNOSIS — M86.9: ICD-10-CM

## 2018-10-25 DIAGNOSIS — Z79.899: ICD-10-CM

## 2018-10-25 DIAGNOSIS — N39.0: ICD-10-CM

## 2018-10-25 DIAGNOSIS — M19.90: ICD-10-CM

## 2018-10-25 DIAGNOSIS — I10: ICD-10-CM

## 2018-10-25 DIAGNOSIS — G93.6: ICD-10-CM

## 2018-10-25 DIAGNOSIS — E86.0: ICD-10-CM

## 2018-10-25 DIAGNOSIS — Z90.710: ICD-10-CM

## 2018-10-25 DIAGNOSIS — I08.0: ICD-10-CM

## 2018-10-25 DIAGNOSIS — Z86.73: ICD-10-CM

## 2018-10-25 LAB
% BANDS: 4 % (ref 0–9)
% LYMPHS: 19 % (ref 24–48)
% METAS: 3 % (ref 0–0)
% MONOS: 8 % (ref 0–10)
% SEGS: 66 % (ref 35–66)
ACANTHOCYTES BLD QL SMEAR: (no result)
ALBUMIN SERPL-MCNC: 2.5 G/DL (ref 3.4–5)
ALBUMIN/GLOB SERPL: 0.8 {RATIO} (ref 1–1.7)
ALP SERPL-CCNC: 63 U/L (ref 46–116)
ALT SERPL-CCNC: 59 U/L (ref 14–59)
ANION GAP SERPL CALC-SCNC: 7 MMOL/L (ref 6–14)
ANISOCYTOSIS BLD QL SMEAR: SLIGHT
APTT PPP: YELLOW S
AST SERPL-CCNC: 24 U/L (ref 15–37)
BACTERIA #/AREA URNS HPF: 0 /HPF
BASOPHILS # BLD AUTO: 0 X10^3/UL (ref 0–0.2)
BASOPHILS NFR BLD: 0 % (ref 0–3)
BILIRUB SERPL-MCNC: 0.4 MG/DL (ref 0.2–1)
BILIRUB UR QL STRIP: NEGATIVE
BIZZARE CELLS: (no result)
BUN SERPL-MCNC: 33 MG/DL (ref 7–20)
BUN/CREAT SERPL: 28 (ref 6–20)
BURR CELLS BLD QL SMEAR: (no result)
CALCIUM SERPL-MCNC: 8.7 MG/DL (ref 8.5–10.1)
CHLORIDE SERPL-SCNC: 102 MMOL/L (ref 98–107)
CO2 SERPL-SCNC: 26 MMOL/L (ref 21–32)
CREAT SERPL-MCNC: 1.2 MG/DL (ref 0.6–1)
EOSINOPHIL NFR BLD: 0 % (ref 0–3)
EOSINOPHIL NFR BLD: 0 X10^3/UL (ref 0–0.7)
ERYTHROCYTE [DISTWIDTH] IN BLOOD BY AUTOMATED COUNT: 19.1 % (ref 11.5–14.5)
FIBRINOGEN PPP-MCNC: CLEAR MG/DL
GFR SERPLBLD BASED ON 1.73 SQ M-ARVRAT: 51.9 ML/MIN
GLOBULIN SER-MCNC: 3.1 G/DL (ref 2.2–3.8)
GLUCOSE SERPL-MCNC: 132 MG/DL (ref 70–99)
HCT VFR BLD CALC: 39.5 % (ref 36–47)
HGB BLD-MCNC: 13.1 G/DL (ref 12–15.5)
HYALINE CASTS #/AREA URNS LPF: (no result) /HPF
LYMPHOCYTES # BLD: 1.2 X10^3/UL (ref 1–4.8)
LYMPHOCYTES NFR BLD AUTO: 14 % (ref 24–48)
MCH RBC QN AUTO: 28 PG (ref 25–35)
MCHC RBC AUTO-ENTMCNC: 33 G/DL (ref 31–37)
MCV RBC AUTO: 85 FL (ref 79–100)
MONO #: 0.5 X10^3/UL (ref 0–1.1)
MONOCYTES NFR BLD: 6 % (ref 0–9)
NEUT #: 6.9 X10^3UL (ref 1.8–7.7)
NEUTROPHILS NFR BLD AUTO: 79 % (ref 31–73)
NITRITE UR QL STRIP: NEGATIVE
PH UR STRIP: 5.5 [PH]
PLATELET # BLD AUTO: 171 X10^3/UL (ref 140–400)
PLATELET # BLD EST: ADEQUATE 10*3/UL
POIKILOCYTOSIS BLD QL SMEAR: (no result)
POTASSIUM SERPL-SCNC: 4.2 MMOL/L (ref 3.5–5.1)
PROT SERPL-MCNC: 5.6 G/DL (ref 6.4–8.2)
PROT UR STRIP-MCNC: NEGATIVE MG/DL
RBC # BLD AUTO: 4.67 X10^6/UL (ref 3.5–5.4)
RBC #/AREA URNS HPF: (no result) /HPF (ref 0–2)
SCHISTOCYTES BLD QL SMEAR: (no result)
SODIUM SERPL-SCNC: 135 MMOL/L (ref 136–145)
UROBILINOGEN UR-MCNC: 0.2 MG/DL
WBC # BLD AUTO: 8.7 X10^3/UL (ref 4–11)
YEAST #/AREA URNS HPF: PRESENT /HPF

## 2018-10-25 PROCEDURE — 36415 COLL VENOUS BLD VENIPUNCTURE: CPT

## 2018-10-25 PROCEDURE — 84443 ASSAY THYROID STIM HORMONE: CPT

## 2018-10-25 PROCEDURE — 87086 URINE CULTURE/COLONY COUNT: CPT

## 2018-10-25 PROCEDURE — A9585 GADOBUTROL INJECTION: HCPCS

## 2018-10-25 PROCEDURE — 78582 LUNG VENTILAT&PERFUS IMAGING: CPT

## 2018-10-25 PROCEDURE — 83880 ASSAY OF NATRIURETIC PEPTIDE: CPT

## 2018-10-25 PROCEDURE — 85025 COMPLETE CBC W/AUTO DIFF WBC: CPT

## 2018-10-25 PROCEDURE — 96374 THER/PROPH/DIAG INJ IV PUSH: CPT

## 2018-10-25 PROCEDURE — 70496 CT ANGIOGRAPHY HEAD: CPT

## 2018-10-25 PROCEDURE — 71045 X-RAY EXAM CHEST 1 VIEW: CPT

## 2018-10-25 PROCEDURE — 85007 BL SMEAR W/DIFF WBC COUNT: CPT

## 2018-10-25 PROCEDURE — 93970 EXTREMITY STUDY: CPT

## 2018-10-25 PROCEDURE — 84146 ASSAY OF PROLACTIN: CPT

## 2018-10-25 PROCEDURE — 81001 URINALYSIS AUTO W/SCOPE: CPT

## 2018-10-25 PROCEDURE — 93005 ELECTROCARDIOGRAM TRACING: CPT

## 2018-10-25 PROCEDURE — 93325 DOPPLER ECHO COLOR FLOW MAPG: CPT

## 2018-10-25 PROCEDURE — 84484 ASSAY OF TROPONIN QUANT: CPT

## 2018-10-25 PROCEDURE — A9540 TC99M MAA: HCPCS

## 2018-10-25 PROCEDURE — 85027 COMPLETE CBC AUTOMATED: CPT

## 2018-10-25 PROCEDURE — 95816 EEG AWAKE AND DROWSY: CPT

## 2018-10-25 PROCEDURE — 80048 BASIC METABOLIC PNL TOTAL CA: CPT

## 2018-10-25 PROCEDURE — A9558 XE133 XENON 10MCI: HCPCS

## 2018-10-25 PROCEDURE — 70450 CT HEAD/BRAIN W/O DYE: CPT

## 2018-10-25 PROCEDURE — 82962 GLUCOSE BLOOD TEST: CPT

## 2018-10-25 PROCEDURE — 70498 CT ANGIOGRAPHY NECK: CPT

## 2018-10-25 PROCEDURE — 70553 MRI BRAIN STEM W/O & W/DYE: CPT

## 2018-10-25 PROCEDURE — 80053 COMPREHEN METABOLIC PANEL: CPT

## 2018-10-25 PROCEDURE — 83735 ASSAY OF MAGNESIUM: CPT

## 2018-10-25 PROCEDURE — 85520 HEPARIN ASSAY: CPT

## 2018-10-25 PROCEDURE — 93312 ECHO TRANSESOPHAGEAL: CPT

## 2018-10-25 RX ADMIN — ACETAMINOPHEN SCH MG: 500 TABLET ORAL at 21:39

## 2018-10-25 RX ADMIN — Medication SCH CAP: at 21:39

## 2018-10-25 RX ADMIN — SIMVASTATIN SCH MG: 40 TABLET, FILM COATED ORAL at 21:39

## 2018-10-25 RX ADMIN — BACITRACIN SCH MLS/HR: 5000 INJECTION, POWDER, FOR SOLUTION INTRAMUSCULAR at 16:39

## 2018-10-25 RX ADMIN — BACITRACIN SCH MLS/HR: 5000 INJECTION, POWDER, FOR SOLUTION INTRAMUSCULAR at 22:04

## 2018-10-25 NOTE — PDOC1
History and Physical


Date of Admission


Date of Admission


DATE: 10/25/18 


TIME: 16:57





Identification/Chief Complaint


Chief Complaint





 seen in ER Patient is a 83  year old  female presents from 

skilled nursing unit with witnessed syncopal episode.WAS  doing rehabilitation 

in her wheelchair when she became unresponsive with respiration with possible 

aspiration episode. Patient was unresponsive for approximately 4 minutes 

according to her daughter who is present. No seizure activity was witnessed. 

Patient then became gradually more alert and has since returned to baseline 

mental status. Patient's alert and oriented person only in the emergency 

department. 





ua concerning for uti





Past Medical History


Cardiovascular:  HTN


Pulmonary:  No pertinent hx


Musculoskeletal:  Osteoarthritis





Past Surgical History


Past Surgical History:  Hysterectomy





Family History


Family History:  Cancer, Diabetes, Hypertension, Stroke


Family History:  Parent





Social History


Smoke:  No


ALCOHOL:  none


Drugs:  None





Current Medications


Current Medications





Current Medications


Ondansetron HCl (Zofran) 4 mg 1X  ONCE IV  Last administered on 10/25/18at 13:04

;  Start 10/25/18 at 11:45;  Stop 10/25/18 at 11:46;  Status DC


Ondansetron HCl (Zofran) 4 mg PRN Q8HRS  PRN IV NAUSEA/VOMITING;  Start 10/25/

18 at 14:15;  Stop 10/26/18 at 14:14


Sodium Chloride 1,000 ml @  125 mls/hr Q8H IV  Last administered on 10/25/18at 

16:39;  Start 10/25/18 at 14:04;  Stop 10/26/18 at 14:03





Active Scripts


Active


Meclizine Hcl 25 Mg Tablet 1 Tab PO PRN TID PRN


Reported


Tylenol (Acetaminophen) 325 Mg Tablet 500 Mg PO BID


Aspirin 81 Mg Tab.chew 1 Tab PO DAILY


Simvastatin 40 Mg Tablet 1 Tab PO QHS


Clopidogrel (Clopidogrel Bisulfate) 75 Mg Tablet 1 Tab PO DAILY


Metformin Hcl 500 Mg Tablet 1 Tab PO BID


Atenolol 25 Mg Tablet 1 Tab PO DAILY





Allergies


Allergies:  


Coded Allergies:  


     No Known Drug Allergies (Unverified , 10/29/16)





ROS


Review of System


14 pt ros otherwise neg, poor historian sec dementia


General:  YES: Fatigue


PSYCHOLOGICAL ROS:  YES: Memory difficulties


Eyes:  Yes Blurry vision


Hematological and Lymphatic:  No: Bleeding Problems, Blood Clots, Blood 

Transfusions, Brusing, Night Sweats, Pallor, Swollen Lymph Nodes, Other


ENDOCRINE:  No: Breast Changes, Galactorrhea, Hair Pattern Changes, Hot Flashes

, Malaise/lethargy, Mood Swings, Palpitations, Polydipsia/polyuria, Skin Changes

, Temperature Intolerance, Unexpected Weight Changes, Other


Cardiovascular:  No Chest Pain, No Palpitations, No Orthopnea, No Paroxysmal 

Noc. Dyspnea, No Edema, No Lt Headedness, No Other


Genitourinary:  YES Incontinence


Musculoskeletal:  Yes Gait Disturbance


Neurological:  Yes Confusion





Physical Exam


Physical Exam


LUNGS:  Clear to auscultation.


HEART:  S1, S2.


ABDOMEN:  Soft, nondistended with positive bowel sounds.


: [place]


EXTREMITIES:  Without clubbing or cyanosis.  No gross edema.


SKIN:  Warm to touch without signs of any rashes.


CNS:  somnolent, responds to simple commands, confused to details


General:  somnolent , Cooperative, No acute distress, 


Heart:  Regular rate, Normal S1, Normal S2


Lungs:  Clear


Abdomen:  Normal bowel sounds, Soft, No hepatosplenomegaly


Extremities:  No clubbing, No cyanosis, No edema, Normal pulses


Skin:  No breakdown, No significant lesion


General:  Cooperative, mild distress


HEENT:  Atraumatic


Lungs:  Clear to auscultation


Cardiovascular:  S1, S2


Breasts:  Not examined


Abdomen:  Normal bowel sounds, Soft


Rectal Exam:  not examined


PELVIC:  Examination not indicated


Extremities:  No cyanosis


Neuro:  Cranial nerves 3-12 NL





Vitals


Vitals





Vital Signs








  Date Time  Temp Pulse Resp B/P (MAP) Pulse Ox O2 Delivery O2 Flow Rate FiO2


 


10/25/18 15:56      Room Air  


 


10/25/18 15:50 97.5 53 20 89/55 (66) 100   





 97.5       











Labs


Labs





Laboratory Tests








Test


 10/25/18


12:15 10/25/18


12:33


 


White Blood Count


 8.7 x10^3/uL


(4.0-11.0) 





 


Red Blood Count


 4.67 x10^6/uL


(3.50-5.40) 





 


Hemoglobin


 13.1 g/dL


(12.0-15.5) 





 


Hematocrit


 39.5 %


(36.0-47.0) 





 


Mean Corpuscular Volume 85 fL ()  


 


Mean Corpuscular Hemoglobin 28 pg (25-35)  


 


Mean Corpuscular Hemoglobin


Concent 33 g/dL


(31-37) 





 


Red Cell Distribution Width


 19.1 %


(11.5-14.5) 





 


Platelet Count


 171 x10^3/uL


(140-400) 





 


Neutrophils (%) (Auto) 79 % (31-73)  


 


Lymphocytes (%) (Auto) 14 % (24-48)  


 


Monocytes (%) (Auto) 6 % (0-9)  


 


Eosinophils (%) (Auto) 0 % (0-3)  


 


Basophils (%) (Auto) 0 % (0-3)  


 


Neutrophils # (Auto)


 6.9 x10^3uL


(1.8-7.7) 





 


Lymphocytes # (Auto)


 1.2 x10^3/uL


(1.0-4.8) 





 


Monocytes # (Auto)


 0.5 x10^3/uL


(0.0-1.1) 





 


Eosinophils # (Auto)


 0.0 x10^3/uL


(0.0-0.7) 





 


Basophils # (Auto)


 0.0 x10^3/uL


(0.0-0.2) 





 


Segmented Neutrophils % 66 % (35-66)  


 


Band Neutrophils % 4 % (0-9)  


 


Lymphocytes % 19 % (24-48)  


 


Monocytes % 8 % (0-10)  


 


Metamyelocytes % 3 % (0-0)  


 


Platelet Estimate


 Adequate


(ADEQUATE) 





 


Giant Platelets Occ  


 


Poikilocytosis Mod  


 


Anisocytosis Slight  


 


Tian Cells Mod  


 


Acanthocytes Few  


 


Schistocytes Few  


 


RBC Morphology Bizarre Forms Few  


 


Sodium Level


 135 mmol/L


(136-145) 





 


Potassium Level


 4.2 mmol/L


(3.5-5.1) 





 


Chloride Level


 102 mmol/L


() 





 


Carbon Dioxide Level


 26 mmol/L


(21-32) 





 


Anion Gap 7 (6-14)  


 


Blood Urea Nitrogen


 33 mg/dL


(7-20) 





 


Creatinine


 1.2 mg/dL


(0.6-1.0) 





 


Estimated GFR


(Cockcroft-Gault) 51.9 


 





 


BUN/Creatinine Ratio 28 (6-20)  


 


Glucose Level


 132 mg/dL


(70-99) 





 


Calcium Level


 8.7 mg/dL


(8.5-10.1) 





 


Total Bilirubin


 0.4 mg/dL


(0.2-1.0) 





 


Aspartate Amino Transf


(AST/SGOT) 24 U/L (15-37) 


 





 


Alanine Aminotransferase


(ALT/SGPT) 59 U/L (14-59) 


 





 


Alkaline Phosphatase


 63 U/L


() 





 


Troponin I Quantitative


 < 0.017 ng/mL


(0.000-0.055) 





 


Total Protein


 5.6 g/dL


(6.4-8.2) 





 


Albumin


 2.5 g/dL


(3.4-5.0) 





 


Albumin/Globulin Ratio 0.8 (1.0-1.7)  


 


Urine Collection Type  Unknown 


 


Urine Color  Yellow 


 


Urine Clarity  Clear 


 


Urine pH  5.5 


 


Urine Specific Gravity  1.020 


 


Urine Protein


 


 Negative mg/dL


(NEG-TRACE)


 


Urine Glucose (UA)


 


 Negative mg/dL


(NEG)


 


Urine Ketones (Stick)


 


 Negative mg/dL


(NEG)


 


Urine Blood  Trace (NEG) 


 


Urine Nitrite  Negative (NEG) 


 


Urine Bilirubin  Negative (NEG) 


 


Urine Urobilinogen Dipstick


 


 0.2 mg/dL (0.2


mg/dL)


 


Urine Leukocyte Esterase  Small (NEG) 


 


Urine RBC  3-5 /HPF (0-2) 


 


Urine WBC


 


 11-20 /HPF


(0-4)


 


Urine Bacteria  0 /HPF (0-FEW) 


 


Urine Hyaline Casts  Moderate /HPF 


 


Urine Mucus  Marked /LPF 


 


Urine Yeast  Present /HPF 








Laboratory Tests








Test


 10/25/18


12:15 10/25/18


12:33


 


White Blood Count


 8.7 x10^3/uL


(4.0-11.0) 





 


Red Blood Count


 4.67 x10^6/uL


(3.50-5.40) 





 


Hemoglobin


 13.1 g/dL


(12.0-15.5) 





 


Hematocrit


 39.5 %


(36.0-47.0) 





 


Mean Corpuscular Volume 85 fL ()  


 


Mean Corpuscular Hemoglobin 28 pg (25-35)  


 


Mean Corpuscular Hemoglobin


Concent 33 g/dL


(31-37) 





 


Red Cell Distribution Width


 19.1 %


(11.5-14.5) 





 


Platelet Count


 171 x10^3/uL


(140-400) 





 


Neutrophils (%) (Auto) 79 % (31-73)  


 


Lymphocytes (%) (Auto) 14 % (24-48)  


 


Monocytes (%) (Auto) 6 % (0-9)  


 


Eosinophils (%) (Auto) 0 % (0-3)  


 


Basophils (%) (Auto) 0 % (0-3)  


 


Neutrophils # (Auto)


 6.9 x10^3uL


(1.8-7.7) 





 


Lymphocytes # (Auto)


 1.2 x10^3/uL


(1.0-4.8) 





 


Monocytes # (Auto)


 0.5 x10^3/uL


(0.0-1.1) 





 


Eosinophils # (Auto)


 0.0 x10^3/uL


(0.0-0.7) 





 


Basophils # (Auto)


 0.0 x10^3/uL


(0.0-0.2) 





 


Segmented Neutrophils % 66 % (35-66)  


 


Band Neutrophils % 4 % (0-9)  


 


Lymphocytes % 19 % (24-48)  


 


Monocytes % 8 % (0-10)  


 


Metamyelocytes % 3 % (0-0)  


 


Platelet Estimate


 Adequate


(ADEQUATE) 





 


Giant Platelets Occ  


 


Poikilocytosis Mod  


 


Anisocytosis Slight  


 


[place] Cells Mod  


 


Acanthocytes Few  


 


Schistocytes Few  


 


RBC Morphology Bizarre Forms Few  


 


Sodium Level


 135 mmol/L


(136-145) 





 


Potassium Level


 4.2 mmol/L


(3.5-5.1) 





 


Chloride Level


 102 mmol/L


() 





 


Carbon Dioxide Level


 26 mmol/L


(21-32) 





 


Anion Gap 7 (6-14)  


 


Blood Urea Nitrogen


 33 mg/dL


(7-20) 





 


Creatinine


 1.2 mg/dL


(0.6-1.0) 





 


Estimated GFR


(Cockcroft-Gault) 51.9 


 





 


BUN/Creatinine Ratio 28 (6-20)  


 


Glucose Level


 132 mg/dL


(70-99) 





 


Calcium Level


 8.7 mg/dL


(8.5-10.1) 





 


Total Bilirubin


 0.4 mg/dL


(0.2-1.0) 





 


Aspartate Amino Transf


(AST/SGOT) 24 U/L (15-37) 


 





 


Alanine Aminotransferase


(ALT/SGPT) 59 U/L (14-59) 


 





 


Alkaline Phosphatase


 63 U/L


() 





 


Troponin I Quantitative


 < 0.017 ng/mL


(0.000-0.055) 





 


Total Protein


 5.6 g/dL


(6.4-8.2) 





 


Albumin


 2.5 g/dL


(3.4-5.0) 





 


Albumin/Globulin Ratio 0.8 (1.0-1.7)  


 


Urine Collection Type  Unknown 


 


Urine Color  Yellow 


 


Urine Clarity  Clear 


 


Urine pH  5.5 


 


Urine Specific Gravity  1.020 


 


Urine Protein


 


 Negative mg/dL


(NEG-TRACE)


 


Urine Glucose (UA)


 


 Negative mg/dL


(NEG)


 


Urine Ketones (Stick)


 


 Negative mg/dL


(NEG)


 


Urine Blood  Trace (NEG) 


 


Urine Nitrite  Negative (NEG) 


 


Urine Bilirubin  Negative (NEG) 


 


Urine Urobilinogen Dipstick


 


 0.2 mg/dL (0.2


mg/dL)


 


Urine Leukocyte Esterase  Small (NEG) 


 


Urine RBC  3-5 /HPF (0-2) 


 


Urine WBC


 


 11-20 /HPF


(0-4)


 


Urine Bacteria  0 /HPF (0-FEW) 


 


Urine Hyaline Casts  Moderate /HPF 


 


Urine Mucus  Marked /LPF 


 


Urine Yeast  Present /HPF 











Images


Images


 LEFT VENTRICLE 


The left ventricle is normal size. There is moderate concentric left 

ventricular hypertrophy. The left ventricular systolic function is low normal. 

Difficult to estimate due to PVC's. EF 45-50% Septal motion consistent with 

conduction abnormality. Transmitral Doppler flow pattern is Grade I-abnormal 

relaxation pattern.





 RIGHT VENTRICLE 


The right ventricle is normal size. There is normal right ventricular wall 

thickness. The right ventricular systolic function is normal.





 ATRIA 


The left atrium size is normal. The right atrium size is normal. The 

interatrial septum is intact with no evidence for an atrial septal defect or 

patent foramen ovale as noted on 2-D or Doppler imaging.





 AORTIC VALVE 


The aortic valve is normal in structure and function. Doppler and Color Flow 

revealed trace aortic regurgitation. Calculated aortic valve area is 2.1 cm2 

with maximum pressure gradient of 10 mmHg and mean pressure gradient of 6 mmHg.





 MITRAL VALVE 


The mitral valve is thickened but opens well. There is no mitral valve 

stenosis. Doppler and Color-flow revealed trace mitral regurgitation.





 TRICUSPID VALVE 


The tricuspid valve is normal in structure and function. Doppler and Color Flow 

revealed trace tricuspid regurgitation. There is no tricuspid valve stenosis. 





 PULMONIC VALVE 


The pulmonic valve is not well visualized. Doppler and Color Flow revealed 

trace pulmonic valvular regurgitation. There is no pulmonic valvular stenosis.





 GREAT VESSELS 


The aortic root is normal in size. The IVC is normal in size and collapses >50% 

with inspiration.





 PERICARDIAL EFFUSION 


There is no evidence of significant pericardial effusion.





Critical Notification


Critical Value: No





<Conclusion>


The left ventricular systolic function is low normal. Difficult to estimate due 

to PVC's. EF 45-50%


There is moderate concentric left ventricular hypertrophy.





VTE Prophylaxis Ordered


VTE Prophylaxis Devices:  Yes


VTE Pharmacological Prophylaxi:  Yes





Assessment/Plan


Assessment/Plan


IMPRESSION======= 


Altered mental status


syncope


Cerebral edema, encephalitis or poss press syndrome


Acute encephalopathy on chronic mild dementia


Fluctuating dementia w/prior delirium 


Moderate malnutrition 


Dehydration


Weakness and debility


Dysphagia


Poor PO intake


Arrhythmia


The left ventricular systolic function is low normal.  PVC's. EF 45-50%


There is moderate concentric left ventricular hypertrophy.


Leukocytosis


uti





 


Pt seen and examined


iv rocephin


Discussed plan with pt


consult cardiology


will consult neurology


tele


seizure precautions


neuro checks q 4 hrs


hold metrormin


sq lovenox dvt prophylaxis











KATELYNN ABDULLAHI MD Oct 25, 2018 16:57

## 2018-10-25 NOTE — RAD
CT HEAD WO CONTRAST

 

Indication: AMS, WEAKNESS, PRIOR SENT

 

Exposure: One or more of the following individualized dose reduction 

techniques were utilized for this examination:  1. Automated exposure 

control  2. Adjustment of the mA and/or kV according to patient size  3. 

Use of iterative reconstruction technique.

 

Comparison: October 14, 2018

Contrast: None

 

FINDINGS:

Cerebral hypodensity, left greater than right, is again identified and 

appears similar to the prior exam. This is again compatible with 

nonspecific cerebral edema. No new mass effect is identified. No evidence 

of acute intracranial or extra-axial hemorrhage.

 

Visualized orbits are unremarkable.

Visualized paranasal sinuses and mastoids are clear.

No acute calvarial abnormality.

 

Impression: Bilateral cerebral hypodensity or edema, left greater than 

right, appears similar to the previous exam. No new mass effect or acute 

hemorrhage.

 

Electronically signed by: Aguila Singh MD (10/25/2018 12:55 PM) 

[id number]

## 2018-10-25 NOTE — EKG
[facility]

              [address] [place] [address]

Test Date:    2018-10-25               Test Time:    12:25:16

Pat Name:     MARY OBREGON            Department:   

Patient ID:   [id number]           Room:          

Gender:       F                        Technician:   

:          [id number]               Requested By: NAHID CARPIO

Order Number: [id number]           Reading MD:   Ayush Rizvi MD

                                 Measurements

Intervals                              Axis          

Rate:         56                       P:            31

MN:           202                      QRS:          -2

QRSD:         92                       T:            -172

QT:           496                                    

QTc:          482                                    

                           Interpretive Statements

SINUS RHYTHM

LEFTWARD AXIS

T ABNORMALITY IN ANTERIOR LEADS

LATERAL LEADS

INFEROLATERAL LEADS

PROLONGED QT

ABNORMAL ECG



Electronically Signed On 10- 10:53:31 CDT by Ayush Rizvi MD

## 2018-10-25 NOTE — PHYS DOC
Past Medical History


Past Medical History:  Dementia


Past Surgical History:  Angioplasty, Appendectomy, Other


Additional Past Surgical Histo:  cardiac stent x 3


Alcohol Use:  None


Drug Use:  None





Adult General


Chief Complaint


Chief Complaint:  WEAKNESS/GENERALIZED





HPI


HPI





Patient is a 83  year old  female presents from skilled nursing 

unit with witnessed syncopal episode. Patient interested in doing 

rehabilitation in her wheelchair when she became unresponsive with respiration 

with possible aspiration episode. Patient was unresponsive for approximately 4 

minutes according to her daughter who is present. No seizure activity was 

witnessed. Patient then became gradually more alert and has since returned to 

baseline mental status. Patient's alert and oriented person only in the 

emergency department. Recent prolonged hospitalization with release this 

facility yesterday. Patient was being treated for an encephalitis/press 

syndrome with underlying mild chronic dementia and generalized weakness with 

malnutrition and chronic debilitation.  No other acute symptoms or complaints.[]





Review of Systems


Review of Systems


ROS as per HPI


All other systems were reviewed and found to be within normal limits, except as 

documented in this note.





Current Medications


Current Medications





Current Medications








 Medications


  (Trade)  Dose


 Ordered  Sig/Td  Start Time


 Stop Time Status Last Admin


Dose Admin


 


 Ondansetron HCl


  (Zofran)  4 mg  1X  ONCE  10/25/18 11:45


 10/25/18 11:46 DC 10/25/18 13:04


4 MG











Allergies


Allergies





Allergies








Coded Allergies Type Severity Reaction Last Updated Verified


 


  No Known Drug Allergies    10/29/16 No











Physical Exam


Physical Exam





Constitutional: Well developed, generally weak and fatigued appearing. []


HENT: Normocephalic, atraumatic, bilateral external ears normal, oropharynx 

moist, no oral exudates, nose normal. []


Eyes: PERRLA, EOMI, conjunctiva normal, no discharge. [] 


Neck: Normal range of motion, no tenderness. [] 


Cardiovascular:Heart rate regular rhythm, no murmur []


Lungs & Thorax:  Bilateral breath sounds clear to auscultation []


Abdomen: Bowel sounds normal, soft, no tenderness. [] 


Skin: Warm, dry, no erythema, no rash. [] 


Back: No tenderness. [] 


Extremities: No tenderness, no cyanosis, no clubbing, ROM intact, no edema. [] 


Neurologic: Alert and oriented X 1, normal motor function, normal sensory 

function, no focal deficits noted. []


Psychologic: Affect normal, judgement normal, mood normal. []





Current Patient Data


Vital Signs





 Vital Signs








  Date Time  Temp Pulse Resp B/P (MAP) Pulse Ox O2 Delivery O2 Flow Rate FiO2


 


10/25/18 13:15  52 19  99   


 


10/25/18 11:12 97.4   116/63 (80)  Room Air  





 97.4       








Lab Values





 Laboratory Tests








Test


 10/25/18


12:15 10/25/18


12:33


 


White Blood Count


 8.7 x10^3/uL


(4.0-11.0) 





 


Red Blood Count


 4.67 x10^6/uL


(3.50-5.40) 





 


Hemoglobin


 13.1 g/dL


(12.0-15.5) 





 


Hematocrit


 39.5 %


(36.0-47.0) 





 


Mean Corpuscular Volume


 85 fL ()


 





 


Mean Corpuscular Hemoglobin 28 pg (25-35)   


 


Mean Corpuscular Hemoglobin


Concent 33 g/dL


(31-37) 





 


Red Cell Distribution Width


 19.1 %


(11.5-14.5)  H 





 


Platelet Count


 171 x10^3/uL


(140-400) 





 


Neutrophils (%) (Auto) 79 % (31-73)  H 


 


Lymphocytes (%) (Auto) 14 % (24-48)  L 


 


Monocytes (%) (Auto) 6 % (0-9)   


 


Eosinophils (%) (Auto) 0 % (0-3)   


 


Basophils (%) (Auto) 0 % (0-3)   


 


Neutrophils # (Auto)


 6.9 x10^3uL


(1.8-7.7) 





 


Lymphocytes # (Auto)


 1.2 x10^3/uL


(1.0-4.8) 





 


Monocytes # (Auto)


 0.5 x10^3/uL


(0.0-1.1) 





 


Eosinophils # (Auto)


 0.0 x10^3/uL


(0.0-0.7) 





 


Basophils # (Auto)


 0.0 x10^3/uL


(0.0-0.2) 





 


Segmented Neutrophils % 66 % (35-66)   


 


Band Neutrophils % 4 % (0-9)   


 


Lymphocytes % 19 % (24-48)  L 


 


Monocytes % 8 % (0-10)   


 


Metamyelocytes % 3 % (0-0)  H 


 


Platelet Estimate


 Adequate


(ADEQUATE) 





 


Giant Platelets Occ   


 


Poikilocytosis Mod   


 


Anisocytosis Slight   


 


[place] Cells Mod   


 


Acanthocytes (Spur Cells) Few   


 


Schistocytes Few   


 


RBC Morphology Bizarre Forms Few   


 


Sodium Level


 135 mmol/L


(136-145)  L 





 


Potassium Level


 4.2 mmol/L


(3.5-5.1) 





 


Chloride Level


 102 mmol/L


() 





 


Carbon Dioxide Level


 26 mmol/L


(21-32) 





 


Anion Gap 7 (6-14)   


 


Blood Urea Nitrogen


 33 mg/dL


(7-20)  H 





 


Creatinine


 1.2 mg/dL


(0.6-1.0)  H 





 


Estimated GFR


(Cockcroft-Gault) 51.9  


 





 


BUN/Creatinine Ratio 28 (6-20)  H 


 


Glucose Level


 132 mg/dL


(70-99)  H 





 


Calcium Level


 8.7 mg/dL


(8.5-10.1) 





 


Total Bilirubin


 0.4 mg/dL


(0.2-1.0) 





 


Aspartate Amino Transferase


(AST) 24 U/L (15-37)


 





 


Alanine Aminotransferase (ALT)


 59 U/L (14-59)


 





 


Alkaline Phosphatase


 63 U/L


() 





 


Troponin I Quantitative


 < 0.017 ng/mL


(0.000-0.055) 





 


Total Protein


 5.6 g/dL


(6.4-8.2)  L 





 


Albumin


 2.5 g/dL


(3.4-5.0)  L 





 


Albumin/Globulin Ratio


 0.8 (1.0-1.7)


L 





 


Urine Collection Type  Unknown  


 


Urine Color  Yellow  


 


Urine Clarity  Clear  


 


Urine pH  5.5  


 


Urine Specific Gravity  1.020  


 


Urine Protein


 


 Negative mg/dL


(NEG-TRACE)


 


Urine Glucose (UA)


 


 Negative mg/dL


(NEG)


 


Urine Ketones (Stick)


 


 Negative mg/dL


(NEG)


 


Urine Blood  Trace (NEG)  


 


Urine Nitrite


 


 Negative (NEG)





 


Urine Bilirubin


 


 Negative (NEG)





 


Urine Urobilinogen Dipstick


 


 0.2 mg/dL (0.2


mg/dL)


 


Urine Leukocyte Esterase  Small (NEG)  


 


Urine RBC


 


 3-5 /HPF (0-2)





 


Urine WBC


 


 11-20 /HPF


(0-4)


 


Urine Bacteria


 


 0 /HPF (0-FEW)





 


Urine Hyaline Casts  Moderate /HPF  


 


Urine Mucus  Marked /LPF  


 


Urine Yeast  Present /HPF  





 Laboratory Tests


10/25/18 12:15








 Laboratory Tests


10/25/18 12:15











EKG


EKG


[EKG: Reviewed]





Radiology/Procedures


Radiology/Procedures


[CT head: Bilateral cerebral hypodensities or edema unchanged from previous exam

]





Course & Med Decision Making


Course & Med Decision Making


Pertinent Labs and Imaging studies reviewed. (See chart for details)





[Syncopal event with 4-5 minute LOC prior to ED arrival with return to baseline 

mental status, etiology unclear. Given patient's recent diagnosis of 

encephalopathyencephalitis with persistent cerebral edema concern for possible 

seizure episode. Dr. Borrego to admit with neurology to consult.]





Dragon Disclaimer


Dragon Disclaimer


This electronic medical record was generated, in whole or in part, using a 

voice recognition dictation system.





Departure


Departure


Impression:  


 Primary Impression:  


 Syncope


Disposition:  09 ADMITTED AS INPATIENT


Admitting Physician:  Florentin Low


Condition:  STABLE


Referrals:  


VIMAL BERNAL MD (PCP)











NAHID CARPIO DO Oct 25, 2018 11:59

## 2018-10-26 VITALS — DIASTOLIC BLOOD PRESSURE: 75 MMHG | SYSTOLIC BLOOD PRESSURE: 108 MMHG

## 2018-10-26 VITALS — DIASTOLIC BLOOD PRESSURE: 54 MMHG | SYSTOLIC BLOOD PRESSURE: 106 MMHG

## 2018-10-26 VITALS — DIASTOLIC BLOOD PRESSURE: 50 MMHG | SYSTOLIC BLOOD PRESSURE: 108 MMHG

## 2018-10-26 VITALS — DIASTOLIC BLOOD PRESSURE: 55 MMHG | SYSTOLIC BLOOD PRESSURE: 107 MMHG

## 2018-10-26 VITALS — DIASTOLIC BLOOD PRESSURE: 54 MMHG | SYSTOLIC BLOOD PRESSURE: 102 MMHG

## 2018-10-26 LAB
ALBUMIN SERPL-MCNC: 2.3 G/DL (ref 3.4–5)
ALBUMIN/GLOB SERPL: 0.7 {RATIO} (ref 1–1.7)
ALP SERPL-CCNC: 54 U/L (ref 46–116)
ALT SERPL-CCNC: 44 U/L (ref 14–59)
ANION GAP SERPL CALC-SCNC: 8 MMOL/L (ref 6–14)
AST SERPL-CCNC: 17 U/L (ref 15–37)
BASOPHILS # BLD AUTO: 0 X10^3/UL (ref 0–0.2)
BASOPHILS NFR BLD: 0 % (ref 0–3)
BILIRUB SERPL-MCNC: 0.3 MG/DL (ref 0.2–1)
BUN SERPL-MCNC: 21 MG/DL (ref 7–20)
BUN/CREAT SERPL: 21 (ref 6–20)
CALCIUM SERPL-MCNC: 8.4 MG/DL (ref 8.5–10.1)
CHLORIDE SERPL-SCNC: 103 MMOL/L (ref 98–107)
CO2 SERPL-SCNC: 24 MMOL/L (ref 21–32)
CREAT SERPL-MCNC: 1 MG/DL (ref 0.6–1)
EOSINOPHIL NFR BLD: 0 X10^3/UL (ref 0–0.7)
EOSINOPHIL NFR BLD: 1 % (ref 0–3)
ERYTHROCYTE [DISTWIDTH] IN BLOOD BY AUTOMATED COUNT: 19.6 % (ref 11.5–14.5)
GFR SERPLBLD BASED ON 1.73 SQ M-ARVRAT: 64.1 ML/MIN
GLOBULIN SER-MCNC: 3.2 G/DL (ref 2.2–3.8)
GLUCOSE SERPL-MCNC: 115 MG/DL (ref 70–99)
HCT VFR BLD CALC: 37.2 % (ref 36–47)
HGB BLD-MCNC: 12.3 G/DL (ref 12–15.5)
LYMPHOCYTES # BLD: 1.2 X10^3/UL (ref 1–4.8)
LYMPHOCYTES NFR BLD AUTO: 18 % (ref 24–48)
MCH RBC QN AUTO: 28 PG (ref 25–35)
MCHC RBC AUTO-ENTMCNC: 33 G/DL (ref 31–37)
MCV RBC AUTO: 86 FL (ref 79–100)
MONO #: 0.4 X10^3/UL (ref 0–1.1)
MONOCYTES NFR BLD: 6 % (ref 0–9)
NEUT #: 5 X10^3UL (ref 1.8–7.7)
NEUTROPHILS NFR BLD AUTO: 75 % (ref 31–73)
PLATELET # BLD AUTO: 131 X10^3/UL (ref 140–400)
POTASSIUM SERPL-SCNC: 4.7 MMOL/L (ref 3.5–5.1)
PROT SERPL-MCNC: 5.5 G/DL (ref 6.4–8.2)
RBC # BLD AUTO: 4.32 X10^6/UL (ref 3.5–5.4)
SODIUM SERPL-SCNC: 135 MMOL/L (ref 136–145)
WBC # BLD AUTO: 6.7 X10^3/UL (ref 4–11)

## 2018-10-26 RX ADMIN — Medication SCH CAP: at 10:13

## 2018-10-26 RX ADMIN — ACETAMINOPHEN SCH MG: 500 TABLET ORAL at 10:12

## 2018-10-26 RX ADMIN — CLOPIDOGREL BISULFATE SCH MG: 75 TABLET ORAL at 10:12

## 2018-10-26 RX ADMIN — ASPIRIN 81 MG SCH MG: 81 TABLET ORAL at 10:13

## 2018-10-26 RX ADMIN — Medication SCH CAP: at 20:47

## 2018-10-26 RX ADMIN — SIMVASTATIN SCH MG: 40 TABLET, FILM COATED ORAL at 20:47

## 2018-10-26 RX ADMIN — DICLOFENAC SODIUM SCH APP: 10 GEL TOPICAL at 09:00

## 2018-10-26 RX ADMIN — ACETAMINOPHEN SCH MG: 500 TABLET ORAL at 20:47

## 2018-10-26 RX ADMIN — ENOXAPARIN SODIUM SCH MG: 40 INJECTION SUBCUTANEOUS at 10:14

## 2018-10-26 RX ADMIN — BACITRACIN SCH MLS/HR: 5000 INJECTION, POWDER, FOR SOLUTION INTRAMUSCULAR at 06:04

## 2018-10-26 RX ADMIN — DICLOFENAC SODIUM SCH APP: 10 GEL TOPICAL at 20:47

## 2018-10-26 NOTE — PDOC
PROGRESS NOTES


Chief Complaint


Chief Complaint


Cerebral edema, encephalitis or poss press syndrome


Acute encephalopathy on chronic mild dementia


Fluctuating dementia w/prior delirium 


Moderate malnutrition 


Dehydration


Weakness and debility


Dysphagia


Poor PO intake


Arrhythmia


The left ventricular systolic function is low normal.  PVC's. EF 45-50%


There is moderate concentric left ventricular hypertrophy.


Leukocytosis, UTI?





History of Present Illness


History of Present Illness


dtr at bedside, feeding the patient


Patient has dementia able to recognize Dtr.just  But otherwise not oriented to 

place or time. Maybe just to self


May be some mild sacral redness but otherwise no obvious wounds 


Ambulates via wheelchair mostly


Some concerns of aspiration or choking at [facility]


Was just discharged 2 days ago and now back again


I did ask about CODE STATUS-daughter is thinking about DNR but she has to 

discuss with other family members-so far full code and aggressive care for now





Plan: Palliative care consult-daughter is agreeable-Pat is known to them from 

previous admission


SLP evaluation


PT OT


Might need palliative or hospice possibly


\HOme meds reconciled


HIgh fall and asp risk


FULL code for now


Supprotive meds - off abx - ID has recommended palliative consult


dw RN





Vitals


Vitals





Vital Signs








  Date Time  Temp Pulse Resp B/P (MAP) Pulse Ox O2 Delivery O2 Flow Rate FiO2


 


10/26/18 11:00 97.9 54 16 106/54 (71) 100 Room Air  





 97.9       











Physical Exam


Physical Exam


GENERAL: Resting quietly, NAD  .


LUNGS:  Clear to auscultation.


HEART:  S1, S2.


ABDOMEN:  Soft, nondistended with positive bowel sounds.


: [place]


EXTREMITIES:  Without clubbing or cyanosis.  No gross edema.


SKIN:  Warm to touch without signs of any rashes.


CNS: much more awake, and nods feeling better, eat today per RN


PIV


General:  Cooperative, mild distress


Lungs:  Clear


Abdomen:  Normal bowel sounds, Soft


Extremities:  No cyanosis





Labs


LABS





Laboratory Tests








Test


 10/26/18


07:20 10/26/18


09:45 10/26/18


11:33


 


Glucose (Fingerstick)


 87 mg/dL


(70-99) 


 99 mg/dL


(70-99)


 


White Blood Count


 


 6.7 x10^3/uL


(4.0-11.0) 





 


Red Blood Count


 


 4.32 x10^6/uL


(3.50-5.40) 





 


Hemoglobin


 


 12.3 g/dL


(12.0-15.5) 





 


Hematocrit


 


 37.2 %


(36.0-47.0) 





 


Mean Corpuscular Volume  86 fL ()  


 


Mean Corpuscular Hemoglobin  28 pg (25-35)  


 


Mean Corpuscular Hemoglobin


Concent 


 33 g/dL


(31-37) 





 


Red Cell Distribution Width


 


 19.6 %


(11.5-14.5) 





 


Platelet Count


 


 131 x10^3/uL


(140-400) 





 


Neutrophils (%) (Auto)  75 % (31-73)  


 


Lymphocytes (%) (Auto)  18 % (24-48)  


 


Monocytes (%) (Auto)  6 % (0-9)  


 


Eosinophils (%) (Auto)  1 % (0-3)  


 


Basophils (%) (Auto)  0 % (0-3)  


 


Neutrophils # (Auto)


 


 5.0 x10^3uL


(1.8-7.7) 





 


Lymphocytes # (Auto)


 


 1.2 x10^3/uL


(1.0-4.8) 





 


Monocytes # (Auto)


 


 0.4 x10^3/uL


(0.0-1.1) 





 


Eosinophils # (Auto)


 


 0.0 x10^3/uL


(0.0-0.7) 





 


Basophils # (Auto)


 


 0.0 x10^3/uL


(0.0-0.2) 





 


Sodium Level


 


 135 mmol/L


(136-145) 





 


Potassium Level


 


 4.7 mmol/L


(3.5-5.1) 





 


Chloride Level


 


 103 mmol/L


() 





 


Carbon Dioxide Level


 


 24 mmol/L


(21-32) 





 


Anion Gap  8 (6-14)  


 


Blood Urea Nitrogen


 


 21 mg/dL


(7-20) 





 


Creatinine


 


 1.0 mg/dL


(0.6-1.0) 





 


Estimated GFR


(Cockcroft-Gault) 


 64.1 


 





 


BUN/Creatinine Ratio  21 (6-20)  


 


Glucose Level


 


 115 mg/dL


(70-99) 





 


Calcium Level


 


 8.4 mg/dL


(8.5-10.1) 





 


Magnesium Level


 


 2.7 mg/dL


(1.8-2.4) 





 


Total Bilirubin


 


 0.3 mg/dL


(0.2-1.0) 





 


Aspartate Amino Transf


(AST/SGOT) 


 17 U/L (15-37) 


 





 


Alanine Aminotransferase


(ALT/SGPT) 


 44 U/L (14-59) 


 





 


Alkaline Phosphatase


 


 54 U/L


() 





 


Troponin I Quantitative


 


 < 0.017 ng/mL


(0.000-0.055) 





 


NT-Pro-B-Type Natriuretic


Peptide 


 407 pg/mL


(0-449) 





 


Total Protein


 


 5.5 g/dL


(6.4-8.2) 





 


Albumin


 


 2.3 g/dL


(3.4-5.0) 





 


Albumin/Globulin Ratio  0.7 (1.0-1.7)  


 


Thyroid Stimulating Hormone


(TSH) 


 3.458 uIU/mL


(0.358-3.74) 














Review of Systems


Review of Systems


dementia, limited ROS





Comment


Review of Relevant


I have reviewed the following items marky (where applicable) has been applied.


Labs





Laboratory Tests








Test


 10/25/18


12:15 10/25/18


12:33 10/26/18


07:20 10/26/18


09:45


 


White Blood Count


 8.7 x10^3/uL


(4.0-11.0) 


 


 6.7 x10^3/uL


(4.0-11.0)


 


Red Blood Count


 4.67 x10^6/uL


(3.50-5.40) 


 


 4.32 x10^6/uL


(3.50-5.40)


 


Hemoglobin


 13.1 g/dL


(12.0-15.5) 


 


 12.3 g/dL


(12.0-15.5)


 


Hematocrit


 39.5 %


(36.0-47.0) 


 


 37.2 %


(36.0-47.0)


 


Mean Corpuscular Volume 85 fL ()    86 fL () 


 


Mean Corpuscular Hemoglobin 28 pg (25-35)    28 pg (25-35) 


 


Mean Corpuscular Hemoglobin


Concent 33 g/dL


(31-37) 


 


 33 g/dL


(31-37)


 


Red Cell Distribution Width


 19.1 %


(11.5-14.5) 


 


 19.6 %


(11.5-14.5)


 


Platelet Count


 171 x10^3/uL


(140-400) 


 


 131 x10^3/uL


(140-400)


 


Neutrophils (%) (Auto) 79 % (31-73)    75 % (31-73) 


 


Lymphocytes (%) (Auto) 14 % (24-48)    18 % (24-48) 


 


Monocytes (%) (Auto) 6 % (0-9)    6 % (0-9) 


 


Eosinophils (%) (Auto) 0 % (0-3)    1 % (0-3) 


 


Basophils (%) (Auto) 0 % (0-3)    0 % (0-3) 


 


Neutrophils # (Auto)


 6.9 x10^3uL


(1.8-7.7) 


 


 5.0 x10^3uL


(1.8-7.7)


 


Lymphocytes # (Auto)


 1.2 x10^3/uL


(1.0-4.8) 


 


 1.2 x10^3/uL


(1.0-4.8)


 


Monocytes # (Auto)


 0.5 x10^3/uL


(0.0-1.1) 


 


 0.4 x10^3/uL


(0.0-1.1)


 


Eosinophils # (Auto)


 0.0 x10^3/uL


(0.0-0.7) 


 


 0.0 x10^3/uL


(0.0-0.7)


 


Basophils # (Auto)


 0.0 x10^3/uL


(0.0-0.2) 


 


 0.0 x10^3/uL


(0.0-0.2)


 


Segmented Neutrophils % 66 % (35-66)    


 


Band Neutrophils % 4 % (0-9)    


 


Lymphocytes % 19 % (24-48)    


 


Monocytes % 8 % (0-10)    


 


Metamyelocytes % 3 % (0-0)    


 


Platelet Estimate


 Adequate


(ADEQUATE) 


 


 





 


Giant Platelets Occ    


 


Poikilocytosis Mod    


 


Anisocytosis Slight    


 


[place] Cells Mod    


 


Acanthocytes Few    


 


Schistocytes Few    


 


RBC Morphology Bizarre Forms Few    


 


Sodium Level


 135 mmol/L


(136-145) 


 


 135 mmol/L


(136-145)


 


Potassium Level


 4.2 mmol/L


(3.5-5.1) 


 


 4.7 mmol/L


(3.5-5.1)


 


Chloride Level


 102 mmol/L


() 


 


 103 mmol/L


()


 


Carbon Dioxide Level


 26 mmol/L


(21-32) 


 


 24 mmol/L


(21-32)


 


Anion Gap 7 (6-14)    8 (6-14) 


 


Blood Urea Nitrogen


 33 mg/dL


(7-20) 


 


 21 mg/dL


(7-20)


 


Creatinine


 1.2 mg/dL


(0.6-1.0) 


 


 1.0 mg/dL


(0.6-1.0)


 


Estimated GFR


(Cockcroft-Gault) 51.9 


 


 


 64.1 





 


BUN/Creatinine Ratio 28 (6-20)    21 (6-20) 


 


Glucose Level


 132 mg/dL


(70-99) 


 


 115 mg/dL


(70-99)


 


Calcium Level


 8.7 mg/dL


(8.5-10.1) 


 


 8.4 mg/dL


(8.5-10.1)


 


Total Bilirubin


 0.4 mg/dL


(0.2-1.0) 


 


 0.3 mg/dL


(0.2-1.0)


 


Aspartate Amino Transf


(AST/SGOT) 24 U/L (15-37) 


 


 


 17 U/L (15-37) 





 


Alanine Aminotransferase


(ALT/SGPT) 59 U/L (14-59) 


 


 


 44 U/L (14-59) 





 


Alkaline Phosphatase


 63 U/L


() 


 


 54 U/L


()


 


Troponin I Quantitative


 < 0.017 ng/mL


(0.000-0.055) 


 


 < 0.017 ng/mL


(0.000-0.055)


 


Total Protein


 5.6 g/dL


(6.4-8.2) 


 


 5.5 g/dL


(6.4-8.2)


 


Albumin


 2.5 g/dL


(3.4-5.0) 


 


 2.3 g/dL


(3.4-5.0)


 


Albumin/Globulin Ratio 0.8 (1.0-1.7)    0.7 (1.0-1.7) 


 


Urine Collection Type  Unknown   


 


Urine Color  Yellow   


 


Urine Clarity  Clear   


 


Urine pH  5.5   


 


Urine Specific Gravity  1.020   


 


Urine Protein


 


 Negative mg/dL


(NEG-TRACE) 


 





 


Urine Glucose (UA)


 


 Negative mg/dL


(NEG) 


 





 


Urine Ketones (Stick)


 


 Negative mg/dL


(NEG) 


 





 


Urine Blood  Trace (NEG)   


 


Urine Nitrite  Negative (NEG)   


 


Urine Bilirubin  Negative (NEG)   


 


Urine Urobilinogen Dipstick


 


 0.2 mg/dL (0.2


mg/dL) 


 





 


Urine Leukocyte Esterase  Small (NEG)   


 


Urine RBC  3-5 /HPF (0-2)   


 


Urine WBC


 


 11-20 /HPF


(0-4) 


 





 


Urine Bacteria  0 /HPF (0-FEW)   


 


Urine Hyaline Casts  Moderate /HPF   


 


Urine Mucus  Marked /LPF   


 


Urine Yeast  Present /HPF   


 


Glucose (Fingerstick)


 


 


 87 mg/dL


(70-99) 





 


Magnesium Level


 


 


 


 2.7 mg/dL


(1.8-2.4)


 


NT-Pro-B-Type Natriuretic


Peptide 


 


 


 407 pg/mL


(0-449)


 


Thyroid Stimulating Hormone


(TSH) 


 


 


 3.458 uIU/mL


(0.358-3.74)


 


Test


 10/26/18


11:33 


 


 





 


Glucose (Fingerstick)


 99 mg/dL


(70-99) 


 


 











Laboratory Tests








Test


 10/26/18


07:20 10/26/18


09:45 10/26/18


11:33


 


Glucose (Fingerstick)


 87 mg/dL


(70-99) 


 99 mg/dL


(70-99)


 


White Blood Count


 


 6.7 x10^3/uL


(4.0-11.0) 





 


Red Blood Count


 


 4.32 x10^6/uL


(3.50-5.40) 





 


Hemoglobin


 


 12.3 g/dL


(12.0-15.5) 





 


Hematocrit


 


 37.2 %


(36.0-47.0) 





 


Mean Corpuscular Volume  86 fL ()  


 


Mean Corpuscular Hemoglobin  28 pg (25-35)  


 


Mean Corpuscular Hemoglobin


Concent 


 33 g/dL


(31-37) 





 


Red Cell Distribution Width


 


 19.6 %


(11.5-14.5) 





 


Platelet Count


 


 131 x10^3/uL


(140-400) 





 


Neutrophils (%) (Auto)  75 % (31-73)  


 


Lymphocytes (%) (Auto)  18 % (24-48)  


 


Monocytes (%) (Auto)  6 % (0-9)  


 


Eosinophils (%) (Auto)  1 % (0-3)  


 


Basophils (%) (Auto)  0 % (0-3)  


 


Neutrophils # (Auto)


 


 5.0 x10^3uL


(1.8-7.7) 





 


Lymphocytes # (Auto)


 


 1.2 x10^3/uL


(1.0-4.8) 





 


Monocytes # (Auto)


 


 0.4 x10^3/uL


(0.0-1.1) 





 


Eosinophils # (Auto)


 


 0.0 x10^3/uL


(0.0-0.7) 





 


Basophils # (Auto)


 


 0.0 x10^3/uL


(0.0-0.2) 





 


Sodium Level


 


 135 mmol/L


(136-145) 





 


Potassium Level


 


 4.7 mmol/L


(3.5-5.1) 





 


Chloride Level


 


 103 mmol/L


() 





 


Carbon Dioxide Level


 


 24 mmol/L


(21-32) 





 


Anion Gap  8 (6-14)  


 


Blood Urea Nitrogen


 


 21 mg/dL


(7-20) 





 


Creatinine


 


 1.0 mg/dL


(0.6-1.0) 





 


Estimated GFR


(Cockcroft-Gault) 


 64.1 


 





 


BUN/Creatinine Ratio  21 (6-20)  


 


Glucose Level


 


 115 mg/dL


(70-99) 





 


Calcium Level


 


 8.4 mg/dL


(8.5-10.1) 





 


Magnesium Level


 


 2.7 mg/dL


(1.8-2.4) 





 


Total Bilirubin


 


 0.3 mg/dL


(0.2-1.0) 





 


Aspartate Amino Transf


(AST/SGOT) 


 17 U/L (15-37) 


 





 


Alanine Aminotransferase


(ALT/SGPT) 


 44 U/L (14-59) 


 





 


Alkaline Phosphatase


 


 54 U/L


() 





 


Troponin I Quantitative


 


 < 0.017 ng/mL


(0.000-0.055) 





 


NT-Pro-B-Type Natriuretic


Peptide 


 407 pg/mL


(0-449) 





 


Total Protein


 


 5.5 g/dL


(6.4-8.2) 





 


Albumin


 


 2.3 g/dL


(3.4-5.0) 





 


Albumin/Globulin Ratio  0.7 (1.0-1.7)  


 


Thyroid Stimulating Hormone


(TSH) 


 3.458 uIU/mL


(0.358-3.74) 











Medications





Current Medications


Ondansetron HCl (Zofran) 4 mg 1X  ONCE IV  Last administered on 10/25/18at 13:04

;  Start 10/25/18 at 11:45;  Stop 10/25/18 at 11:46;  Status DC


Ondansetron HCl (Zofran) 4 mg PRN Q8HRS  PRN IV NAUSEA/VOMITING;  Start 10/25/

18 at 14:15;  Stop 10/26/18 at 14:14


Sodium Chloride 1,000 ml @  125 mls/hr Q8H IV  Last administered on 10/25/18at 

16:39;  Start 10/25/18 at 14:04;  Stop 10/26/18 at 14:03


Lorazepam (Ativan) 1 mg 1X  ONCE IM  Last administered on 10/25/18at 21:53;  

Start 10/25/18 at 18:15;  Stop 10/25/18 at 18:16;  Status DC


Acetaminophen (Tylenol) 500 mg BID PO  Last administered on 10/26/18at 10:12;  

Start 10/25/18 at 21:00


Aspirin (Children'S Aspirin) 81 mg DAILY08 PO  Last administered on 10/26/18at 

10:13;  Start 10/26/18 at 08:00


Clopidogrel Bisulfate (Plavix) 75 mg DAILY07 PO  Last administered on 10/26/

18at 10:12;  Start 10/26/18 at 07:00


Atenolol (Tenormin) 25 mg DAILY PO  Last administered on 10/26/18at 10:13;  

Start 10/26/18 at 09:00;  Stop 10/26/18 at 11:14;  Status DC


Meclizine HCl (Antivert) 25 mg PRN Q8HRS  PRN PO DIZZINESS;  Start 10/25/18 at 

18:45


Simvastatin (Zocor) 40 mg QHS PO  Last administered on 10/25/18at 21:39;  Start 

10/25/18 at 21:00


Ceftriaxone Sodium 1 gm/ Dextrose 50 ml @  100 mls/hr Q24H IV ;  Start 10/25/18 

at 18:45;  Status UNV


Lactobacillus Rhamnosus (Culturelle) 1 cap BID PO  Last administered on 10/26/

18at 10:13;  Start 10/25/18 at 21:00


Ceftriaxone Sodium (Rocephin) 1 gm Q24H IVP ;  Start 10/25/18 at 19:00;  Stop 10

/26/18 at 10:15;  Status DC


Enoxaparin Sodium (Lovenox 40mg Syringe) 40 mg DAILY SQ  Last administered on 10

/26/18at 10:14;  Start 10/26/18 at 09:00


Diclofenac Sodium (Voltaren) 1 danyell BID TP ;  Start 10/25/18 at 21:00;  Status 

Cancel


Diclofenac Sodium (Voltaren) 1 danyell BID TP ;  Start 10/26/18 at 09:00





Active Scripts


Active


Meclizine Hcl 25 Mg Tablet 1 Tab PO PRN TID PRN


Reported


Tylenol (Acetaminophen) 325 Mg Tablet 500 Mg PO BID


Aspirin 81 Mg Tab.chew 1 Tab PO DAILY


Simvastatin 40 Mg Tablet 1 Tab PO QHS


Clopidogrel (Clopidogrel Bisulfate) 75 Mg Tablet 1 Tab PO DAILY


Metformin Hcl 500 Mg Tablet 1 Tab PO BID


Atenolol 25 Mg Tablet 1 Tab PO DAILY


Vitals/I & O





Vital Sign - Last 24 Hours








 10/25/18 10/25/18 10/25/18 10/25/18





 14:15 14:45 15:50 15:56


 


Temp   97.5 





   97.5 


 


Pulse 56 58 53 


 


Resp 20 19 20 


 


B/P (MAP)   89/55 (66) 


 


Pulse Ox 97 96 100 


 


O2 Delivery   Room Air Room Air


 


    





    





 10/25/18 10/25/18 10/25/18 10/26/18





 19:53 20:00 23:41 03:00


 


Temp 97.6  98.1 





 97.6  98.1 


 


Pulse 58  57 


 


Resp 20  20 20


 


B/P (MAP) 92/53 (66)  111/54 (73) 


 


Pulse Ox 99  100 


 


O2 Delivery Room Air Room Air Room Air Room Air


 


    





    





 10/26/18 10/26/18 10/26/18 10/26/18





 07:00 08:00 10:13 11:00


 


Temp 97.9   97.9





 97.9   97.9


 


Pulse 51  51 54


 


Resp 16   16


 


B/P (MAP) 108/50 (69)  108/50 106/54 (71)


 


Pulse Ox 100   100


 


O2 Delivery Room Air Room Air  Room Air














Intake and Output   


 


 10/25/18 10/25/18 10/26/18





 15:00 23:00 07:00


 


Intake Total  50 ml 120 ml


 


Output Total   550 ml


 


Balance  50 ml -430 ml

















TORIE MAXWELL MD Oct 26, 2018 13:23

## 2018-10-26 NOTE — PDOC
Infectious Disease Note


Subjective


Subjective


Pt is known to us, back with ? syncopal episode. Pt is fine here . No n/v/d/

pain. Now sleepy and does not want to be disturbed .





ROS


ROS


no fever, no headache, no tingling or numbness per RN,





Vital Sign


Vital Signs





Vital Signs








  Date Time  Temp Pulse Resp B/P (MAP) Pulse Ox O2 Delivery O2 Flow Rate FiO2


 


10/26/18 07:00 97.9 51 16 108/50 (69) 100 Room Air  





 97.9       











Physical Exam


PHYSICAL EXAM


GENERAL: Resting quietly, NAD  .


LUNGS:  Clear to auscultation.


HEART:  S1, S2.


ABDOMEN:  Soft, nondistended with positive bowel sounds.


: [place]


EXTREMITIES:  Without clubbing or cyanosis.  No gross edema.


SKIN:  Warm to touch without signs of any rashes.


CNS: much more awake, and nods feeling better, eat today per RN


PIV





Labs


Lab





Laboratory Tests








Test


 10/25/18


12:15 10/25/18


12:33 10/26/18


07:20


 


White Blood Count


 8.7 x10^3/uL


(4.0-11.0) 


 





 


Red Blood Count


 4.67 x10^6/uL


(3.50-5.40) 


 





 


Hemoglobin


 13.1 g/dL


(12.0-15.5) 


 





 


Hematocrit


 39.5 %


(36.0-47.0) 


 





 


Mean Corpuscular Volume 85 fL ()   


 


Mean Corpuscular Hemoglobin 28 pg (25-35)   


 


Mean Corpuscular Hemoglobin


Concent 33 g/dL


(31-37) 


 





 


Red Cell Distribution Width


 19.1 %


(11.5-14.5) 


 





 


Platelet Count


 171 x10^3/uL


(140-400) 


 





 


Neutrophils (%) (Auto) 79 % (31-73)   


 


Lymphocytes (%) (Auto) 14 % (24-48)   


 


Monocytes (%) (Auto) 6 % (0-9)   


 


Eosinophils (%) (Auto) 0 % (0-3)   


 


Basophils (%) (Auto) 0 % (0-3)   


 


Neutrophils # (Auto)


 6.9 x10^3uL


(1.8-7.7) 


 





 


Lymphocytes # (Auto)


 1.2 x10^3/uL


(1.0-4.8) 


 





 


Monocytes # (Auto)


 0.5 x10^3/uL


(0.0-1.1) 


 





 


Eosinophils # (Auto)


 0.0 x10^3/uL


(0.0-0.7) 


 





 


Basophils # (Auto)


 0.0 x10^3/uL


(0.0-0.2) 


 





 


Segmented Neutrophils % 66 % (35-66)   


 


Band Neutrophils % 4 % (0-9)   


 


Lymphocytes % 19 % (24-48)   


 


Monocytes % 8 % (0-10)   


 


Metamyelocytes % 3 % (0-0)   


 


Platelet Estimate


 Adequate


(ADEQUATE) 


 





 


Giant Platelets Occ   


 


Poikilocytosis Mod   


 


Anisocytosis Slight   


 


[place] Cells Mod   


 


Acanthocytes Few   


 


Schistocytes Few   


 


RBC Morphology Bizarre Forms Few   


 


Sodium Level


 135 mmol/L


(136-145) 


 





 


Potassium Level


 4.2 mmol/L


(3.5-5.1) 


 





 


Chloride Level


 102 mmol/L


() 


 





 


Carbon Dioxide Level


 26 mmol/L


(21-32) 


 





 


Anion Gap 7 (6-14)   


 


Blood Urea Nitrogen


 33 mg/dL


(7-20) 


 





 


Creatinine


 1.2 mg/dL


(0.6-1.0) 


 





 


Estimated GFR


(Cockcroft-Gault) 51.9 


 


 





 


BUN/Creatinine Ratio 28 (6-20)   


 


Glucose Level


 132 mg/dL


(70-99) 


 





 


Calcium Level


 8.7 mg/dL


(8.5-10.1) 


 





 


Total Bilirubin


 0.4 mg/dL


(0.2-1.0) 


 





 


Aspartate Amino Transf


(AST/SGOT) 24 U/L (15-37) 


 


 





 


Alanine Aminotransferase


(ALT/SGPT) 59 U/L (14-59) 


 


 





 


Alkaline Phosphatase


 63 U/L


() 


 





 


Troponin I Quantitative


 < 0.017 ng/mL


(0.000-0.055) 


 





 


Total Protein


 5.6 g/dL


(6.4-8.2) 


 





 


Albumin


 2.5 g/dL


(3.4-5.0) 


 





 


Albumin/Globulin Ratio 0.8 (1.0-1.7)   


 


Urine Collection Type  Unknown  


 


Urine Color  Yellow  


 


Urine Clarity  Clear  


 


Urine pH  5.5  


 


Urine Specific Gravity  1.020  


 


Urine Protein


 


 Negative mg/dL


(NEG-TRACE) 





 


Urine Glucose (UA)


 


 Negative mg/dL


(NEG) 





 


Urine Ketones (Stick)


 


 Negative mg/dL


(NEG) 





 


Urine Blood  Trace (NEG)  


 


Urine Nitrite  Negative (NEG)  


 


Urine Bilirubin  Negative (NEG)  


 


Urine Urobilinogen Dipstick


 


 0.2 mg/dL (0.2


mg/dL) 





 


Urine Leukocyte Esterase  Small (NEG)  


 


Urine RBC  3-5 /HPF (0-2)  


 


Urine WBC


 


 11-20 /HPF


(0-4) 





 


Urine Bacteria  0 /HPF (0-FEW)  


 


Urine Hyaline Casts  Moderate /HPF  


 


Urine Mucus  Marked /LPF  


 


Urine Yeast  Present /HPF  


 


Glucose (Fingerstick)


 


 


 87 mg/dL


(70-99)











Objective


Assessment


Syncope


Encephalopathy - resolved


   -LP: CSF WBC 24, glucose 82, .8 (on steroids) 


Syphilis - neg. Sed rate 14 - nml,,, HSV neg, [place] neg


Abnormal brain MRI - ? PRESSURE,, as per neurosurgery


HTN





Plan


Plan of Care


no further ID workup indicated.











CATRINA GALLAGHER MD Oct 26, 2018 10:15

## 2018-10-26 NOTE — PDOC2
PALLIATIVE CARE


Palliative Care Note


Palliative Care


Consult requested by Dr. Dao


Patient known to palliative Care


Family meeting planned Monday 1130











GIOVANNA MIRANDA Oct 26, 2018 18:21

## 2018-10-26 NOTE — PDOC2
CARDIAC CONSULT


DATE OF CONSULT


Date of Consult


DATE: 10/26/18 


TIME: 10:46





REASON FOR CONSULT


Reason for Consult:


Syncope/CHF





REFERRING PHYSICIAN


Referring Physician:


Fullbright





SOURCE


Source:  Chart review





HISTORY OF PRESENT ILLNESS


HISTORY OF PRESENT ILLNESS


This is an 84 yo AA female admitted for possible syncopal episode.  Pt was at 

the rehab and was recently noted with viral encephalitis.  Pt is currently 

somnolent and barely oping her eyes with stimulation.  The details of possible 

syncope was questionable.  Per chart review she was on a wheelchair when she 

became unresponsive.  There was no seizure activity noted.  It appears that she 

has been having fluctuating alertness and decreasing mentation.  She is 

chronically debilitated/FTT with underlaying dementia. There was no notation if 

she is ambulatory and no noted description of any nausea, vomiting, chest pain 

or SOA.  Presently she is laying flat without distress and appears to be not in 

pain with O2 sat at 100% on RA.





PAST MEDICAL HISTORY


Cardiovascular:  CAD, HTN, Hyperlipidemia


CENTRAL NERVOUS SYSTEM:  Dementia, TIA, Other (encephalitis recent)


Musculoskeletal:  Osteoarthritis


Endocrine:  Diabetes (2)





PAST SURGICAL HISTORY


Past Surgical History:  Appendectomy, Hysterectomy, Other (PCI stents 2005 and 

2012)





FAMILY HISTORY


Family History:  Diabetes





SOCIAL HISTORY


Smoke:  No


ALCOHOL:  none


Drugs:  None


Lives:  with Family





CURRENT MEDICATIONS


CURRENT MEDICATIONS





Current Medications








 Medications


  (Trade)  Dose


 Ordered  Sig/Td


 Route


 PRN Reason  Start Time


 Stop Time Status Last Admin


Dose Admin


 


 Ondansetron HCl


  (Zofran)  4 mg  1X  ONCE


 IV


   10/25/18 11:45


 10/25/18 11:46 DC 10/25/18 13:04





 


 Sodium Chloride  1,000 ml @ 


 125 mls/hr  Q8H


 IV


   10/25/18 14:04


 10/26/18 14:03  10/25/18 16:39





 


 Lorazepam


  (Ativan)  1 mg  1X  ONCE


 IM


   10/25/18 18:15


 10/25/18 18:16 DC 10/25/18 21:53





 


 Acetaminophen


  (Tylenol)  500 mg  BID


 PO


   10/25/18 21:00


    10/26/18 10:12





 


 Aspirin


  (Children'S


 Aspirin)  81 mg  DAILY08


 PO


   10/26/18 08:00


    10/26/18 10:13





 


 Clopidogrel


 Bisulfate


  (Plavix)  75 mg  DAILY07


 PO


   10/26/18 07:00


    10/26/18 10:12





 


 Atenolol


  (Tenormin)  25 mg  DAILY


 PO


   10/26/18 09:00


    10/26/18 10:13





 


 Simvastatin


  (Zocor)  40 mg  QHS


 PO


   10/25/18 21:00


    10/25/18 21:39





 


 Lactobacillus


 Rhamnosus


  (Culturelle)  1 cap  BID


 PO


   10/25/18 21:00


    10/26/18 10:13





 


 Enoxaparin Sodium


  (Lovenox 40mg


 Syringe)  40 mg  DAILY


 SQ


   10/26/18 09:00


    10/26/18 10:14














ALLERGIES


ALLERGIES:  


Coded Allergies:  


     No Known Drug Allergies (Unverified , 10/29/16)





ROS


Review of System


unreliable, somnolent





PHYSICAL EXAM


General:  No acute distress, Other (somnolent)


HEENT:  Atraumatic, Mucous membr. moist/pink


Lungs:  Clear to auscultation, Normal air movement


Heart:  Regular rate (SB 50s), Normal S1, Normal S2, Other (2/6 systolic murmur 

to LLS border)


Abdomen:  Soft, No tenderness


Extremities:  No cyanosis


Skin:  No breakdown


Neuro:  Normal speech, Sensation intact


Psych/Mental Status:  Other (somnolent)


MUSCULOSKELETAL:  Osteoarthritic changes both hands





VITALS


VITALS





Vital Signs








  Date Time  Temp Pulse Resp B/P (MAP) Pulse Ox O2 Delivery O2 Flow Rate FiO2


 


10/26/18 10:13  51  108/50    


 


10/26/18 07:00 97.9  16  100 Room Air  





 97.9       











LABS


Lab:





Laboratory Tests








Test


 10/25/18


12:15 10/25/18


12:33 10/26/18


07:20 10/26/18


09:45


 


White Blood Count


 8.7 x10^3/uL


(4.0-11.0) 


 


 6.7 x10^3/uL


(4.0-11.0)


 


Red Blood Count


 4.67 x10^6/uL


(3.50-5.40) 


 


 4.32 x10^6/uL


(3.50-5.40)


 


Hemoglobin


 13.1 g/dL


(12.0-15.5) 


 


 12.3 g/dL


(12.0-15.5)


 


Hematocrit


 39.5 %


(36.0-47.0) 


 


 37.2 %


(36.0-47.0)


 


Mean Corpuscular Volume 85 fL ()    86 fL () 


 


Mean Corpuscular Hemoglobin 28 pg (25-35)    28 pg (25-35) 


 


Mean Corpuscular Hemoglobin


Concent 33 g/dL


(31-37) 


 


 33 g/dL


(31-37)


 


Red Cell Distribution Width


 19.1 %


(11.5-14.5) 


 


 19.6 %


(11.5-14.5)


 


Platelet Count


 171 x10^3/uL


(140-400) 


 


 131 x10^3/uL


(140-400)


 


Neutrophils (%) (Auto) 79 % (31-73)    75 % (31-73) 


 


Lymphocytes (%) (Auto) 14 % (24-48)    18 % (24-48) 


 


Monocytes (%) (Auto) 6 % (0-9)    6 % (0-9) 


 


Eosinophils (%) (Auto) 0 % (0-3)    1 % (0-3) 


 


Basophils (%) (Auto) 0 % (0-3)    0 % (0-3) 


 


Neutrophils # (Auto)


 6.9 x10^3uL


(1.8-7.7) 


 


 5.0 x10^3uL


(1.8-7.7)


 


Lymphocytes # (Auto)


 1.2 x10^3/uL


(1.0-4.8) 


 


 1.2 x10^3/uL


(1.0-4.8)


 


Monocytes # (Auto)


 0.5 x10^3/uL


(0.0-1.1) 


 


 0.4 x10^3/uL


(0.0-1.1)


 


Eosinophils # (Auto)


 0.0 x10^3/uL


(0.0-0.7) 


 


 0.0 x10^3/uL


(0.0-0.7)


 


Basophils # (Auto)


 0.0 x10^3/uL


(0.0-0.2) 


 


 0.0 x10^3/uL


(0.0-0.2)


 


Segmented Neutrophils % 66 % (35-66)    


 


Band Neutrophils % 4 % (0-9)    


 


Lymphocytes % 19 % (24-48)    


 


Monocytes % 8 % (0-10)    


 


Metamyelocytes % 3 % (0-0)    


 


Platelet Estimate


 Adequate


(ADEQUATE) 


 


 





 


Giant Platelets Occ    


 


Poikilocytosis Mod    


 


Anisocytosis Slight    


 


Tian Cells Mod    


 


Acanthocytes Few    


 


Schistocytes Few    


 


RBC Morphology Bizarre Forms Few    


 


Sodium Level


 135 mmol/L


(136-145) 


 


 135 mmol/L


(136-145)


 


Potassium Level


 4.2 mmol/L


(3.5-5.1) 


 


 4.7 mmol/L


(3.5-5.1)


 


Chloride Level


 102 mmol/L


() 


 


 103 mmol/L


()


 


Carbon Dioxide Level


 26 mmol/L


(21-32) 


 


 24 mmol/L


(21-32)


 


Anion Gap 7 (6-14)    8 (6-14) 


 


Blood Urea Nitrogen


 33 mg/dL


(7-20) 


 


 21 mg/dL


(7-20)


 


Creatinine


 1.2 mg/dL


(0.6-1.0) 


 


 1.0 mg/dL


(0.6-1.0)


 


Estimated GFR


(Cockcroft-Gault) 51.9 


 


 


 64.1 





 


BUN/Creatinine Ratio 28 (6-20)    21 (6-20) 


 


Glucose Level


 132 mg/dL


(70-99) 


 


 115 mg/dL


(70-99)


 


Calcium Level


 8.7 mg/dL


(8.5-10.1) 


 


 8.4 mg/dL


(8.5-10.1)


 


Total Bilirubin


 0.4 mg/dL


(0.2-1.0) 


 


 0.3 mg/dL


(0.2-1.0)


 


Aspartate Amino Transf


(AST/SGOT) 24 U/L (15-37) 


 


 


 17 U/L (15-37) 





 


Alanine Aminotransferase


(ALT/SGPT) 59 U/L (14-59) 


 


 


 44 U/L (14-59) 





 


Alkaline Phosphatase


 63 U/L


() 


 


 54 U/L


()


 


Troponin I Quantitative


 < 0.017 ng/mL


(0.000-0.055) 


 


 





 


Total Protein


 5.6 g/dL


(6.4-8.2) 


 


 5.5 g/dL


(6.4-8.2)


 


Albumin


 2.5 g/dL


(3.4-5.0) 


 


 2.3 g/dL


(3.4-5.0)


 


Albumin/Globulin Ratio 0.8 (1.0-1.7)    0.7 (1.0-1.7) 


 


Urine Collection Type  Unknown   


 


Urine Color  Yellow   


 


Urine Clarity  Clear   


 


Urine pH  5.5   


 


Urine Specific Gravity  1.020   


 


Urine Protein


 


 Negative mg/dL


(NEG-TRACE) 


 





 


Urine Glucose (UA)


 


 Negative mg/dL


(NEG) 


 





 


Urine Ketones (Stick)


 


 Negative mg/dL


(NEG) 


 





 


Urine Blood  Trace (NEG)   


 


Urine Nitrite  Negative (NEG)   


 


Urine Bilirubin  Negative (NEG)   


 


Urine Urobilinogen Dipstick


 


 0.2 mg/dL (0.2


mg/dL) 


 





 


Urine Leukocyte Esterase  Small (NEG)   


 


Urine RBC  3-5 /HPF (0-2)   


 


Urine WBC


 


 11-20 /HPF


(0-4) 


 





 


Urine Bacteria  0 /HPF (0-FEW)   


 


Urine Hyaline Casts  Moderate /HPF   


 


Urine Mucus  Marked /LPF   


 


Urine Yeast  Present /HPF   


 


Glucose (Fingerstick)


 


 


 87 mg/dL


(70-99) 














ECHOCARDIOGRAM


ECHOCARDIOGRAM





<Conclusion>


The left ventricular systolic function is low normal. Difficult to estimate due 

to PVC's. EF 45-50%


There is moderate concentric left ventricular hypertrophy.





10/21/2018 12:42:01











ASSESSMENT/PLAN


ASSESSMENT/PLAN


1. Possible syncope vs metabolic encephalopathy with recently suspected with 

viral encephalitis: abnormal head CT. Neurology and ID on board


2. SB: lowest 50s with known intermittent PVCs, x1 4 sec NSVT. Recent EF at 45-

50%


3. Abnormal EKG: Diffuse T wave inversions. Trop nml. suspect this is CNS 

related


4. CAD: past stents. No noted cardiac symptoms.  No CHF. 


5. FTT/debility/dementia


6. HTN: at low end


7. HLP





Recommendations


1. Potentially her increased PVC burden could contribute to syncope but I would 

suspect this is likely more from encephalopathy related to her encephalitis 

with potential cerebral edema accdg to CT. Will check Mg and will replace if 

low.  BB would be recommend but BP is currently at low end and mean HR in the 

50s so will hold atenolol and resume when V/S trend is better.  IVF warranted 

but family does not want any SL replaced after infiltration per RN. 


2. Given her CNS issue and debility and advanced age, conservative measures 

would be reasonable. Continue with secondary prevention measures including 

DAPT. once PO is deemed safe without risk of aspiration.  


3. Recommend palliative consult to address goals of care.











CATE WILLOUGHBY APRN Oct 26, 2018 11:20

## 2018-10-27 VITALS — DIASTOLIC BLOOD PRESSURE: 60 MMHG | SYSTOLIC BLOOD PRESSURE: 120 MMHG

## 2018-10-27 VITALS — SYSTOLIC BLOOD PRESSURE: 99 MMHG | DIASTOLIC BLOOD PRESSURE: 48 MMHG

## 2018-10-27 VITALS — SYSTOLIC BLOOD PRESSURE: 102 MMHG | DIASTOLIC BLOOD PRESSURE: 48 MMHG

## 2018-10-27 VITALS — SYSTOLIC BLOOD PRESSURE: 122 MMHG | DIASTOLIC BLOOD PRESSURE: 55 MMHG

## 2018-10-27 VITALS — SYSTOLIC BLOOD PRESSURE: 146 MMHG | DIASTOLIC BLOOD PRESSURE: 51 MMHG

## 2018-10-27 VITALS — DIASTOLIC BLOOD PRESSURE: 51 MMHG | SYSTOLIC BLOOD PRESSURE: 102 MMHG

## 2018-10-27 RX ADMIN — ASPIRIN 81 MG SCH MG: 81 TABLET ORAL at 09:33

## 2018-10-27 RX ADMIN — Medication SCH CAP: at 09:33

## 2018-10-27 RX ADMIN — DICLOFENAC SODIUM SCH APP: 10 GEL TOPICAL at 09:40

## 2018-10-27 RX ADMIN — ACETAMINOPHEN SCH MG: 500 TABLET ORAL at 09:33

## 2018-10-27 RX ADMIN — Medication SCH CAP: at 21:25

## 2018-10-27 RX ADMIN — ACETAMINOPHEN SCH MG: 500 TABLET ORAL at 21:25

## 2018-10-27 RX ADMIN — SIMVASTATIN SCH MG: 40 TABLET, FILM COATED ORAL at 21:25

## 2018-10-27 RX ADMIN — ENOXAPARIN SODIUM SCH MG: 40 INJECTION SUBCUTANEOUS at 09:40

## 2018-10-27 RX ADMIN — CLOPIDOGREL BISULFATE SCH MG: 75 TABLET ORAL at 06:29

## 2018-10-27 RX ADMIN — DICLOFENAC SODIUM SCH APP: 10 GEL TOPICAL at 21:25

## 2018-10-27 NOTE — PDOC
PROGRESS NOTES


Chief Complaint


Chief Complaint


Cerebral edema, encephalitis or poss press syndrome


Acute encephalopathy on chronic mild dementia


Fluctuating dementia w/prior delirium 


Moderate malnutrition 


Dehydration


Weakness and debility


Dysphagia


Poor PO intake


Arrhythmia


The left ventricular systolic function is low normal.  PVC's. EF 45-50%


There is moderate concentric left ventricular hypertrophy.


Leukocytosis, UTI?





History of Present Illness


History of Present Illness


Pt is wide awake today Saturday


Very alert and looks comfortable, attempting to get out of bed and seems to be 

stable in gait


Indwelling Evans catheter inserted last recent admission for strict I and O, 

but patient can void on her own as per patient relay





Plan: Appreciate palliative, family meeting plan on Monday 10:30 AM


May DC Evans


Supportive care


So far full code for now - dw dtr friday (they are reasonable)


Looks better today significantly than yesterday


dw RN at bedside





Vitals


Vitals





Vital Signs








  Date Time  Temp Pulse Resp B/P (MAP) Pulse Ox O2 Delivery O2 Flow Rate FiO2


 


10/27/18 11:37 97.9 89 19 146/51 (82) 97 Room Air  





 97.9       











Physical Exam


Physical Exam


GENERAL: Resting quietly, NAD  .


LUNGS:  Clear to auscultation.


HEART:  S1, S2.


ABDOMEN:  Soft, nondistended with positive bowel sounds.


: [place]


EXTREMITIES:  Without clubbing or cyanosis.  No gross edema.


SKIN:  Warm to touch without signs of any rashes.


CNS: much more awake, and nods feeling better, eat today per RN


PIV


General:  Alert, Cooperative, No acute distress, Other (somnolent)


Heart:  Regular rate (SB 50s), Normal S1, Normal S2, Other (2/6 systolic murmur 

to LLS border)


Lungs:  Clear


Abdomen:  Normal bowel sounds, Soft, No tenderness


Extremities:  No cyanosis


Skin:  No breakdown





Labs


LABS





Laboratory Tests








Test


 10/26/18


16:49 10/27/18


07:33 10/27/18


11:43


 


Glucose (Fingerstick)


 99 mg/dL


(70-99) 89 mg/dL


(70-99) 129 mg/dL


(70-99)











Review of Systems


Review of Systems


A 14 point ROS was completed with the following noted as positive:





Other systems reviewed and negative.


\CONSTITUTIONAL:        No fever or chills


EYES:                          No recent changes


SKIN:               No rash or itching


CARDIOVASCULAR:     No chest pain, syncope, palpitations, or edema


RESPIRATORY:            No SOB or cough


GASTROINTESTINAL:    No nausea, vomiting or abdominal pain


NEUROLOGICAL:          No headaches or weakness


ENDOCRINE:               No cold or heat intolerance


GENITOURINARY:         No urgency or frequency of urination


MUSCULOSKELETAL:   No back pain or joint pain


LYMPHATICS:               No enlarged lymph nodes


PSYCHIATRIC:              No anxiety or depression





Comment


Review of Relevant


I have reviewed the following items marky (where applicable) has been applied.


Labs





Laboratory Tests








Test


 10/26/18


07:20 10/26/18


09:45 10/26/18


11:33 10/26/18


16:49


 


Glucose (Fingerstick)


 87 mg/dL


(70-99) 


 99 mg/dL


(70-99) 99 mg/dL


(70-99)


 


White Blood Count


 


 6.7 x10^3/uL


(4.0-11.0) 


 





 


Red Blood Count


 


 4.32 x10^6/uL


(3.50-5.40) 


 





 


Hemoglobin


 


 12.3 g/dL


(12.0-15.5) 


 





 


Hematocrit


 


 37.2 %


(36.0-47.0) 


 





 


Mean Corpuscular Volume  86 fL ()   


 


Mean Corpuscular Hemoglobin  28 pg (25-35)   


 


Mean Corpuscular Hemoglobin


Concent 


 33 g/dL


(31-37) 


 





 


Red Cell Distribution Width


 


 19.6 %


(11.5-14.5) 


 





 


Platelet Count


 


 131 x10^3/uL


(140-400) 


 





 


Neutrophils (%) (Auto)  75 % (31-73)   


 


Lymphocytes (%) (Auto)  18 % (24-48)   


 


Monocytes (%) (Auto)  6 % (0-9)   


 


Eosinophils (%) (Auto)  1 % (0-3)   


 


Basophils (%) (Auto)  0 % (0-3)   


 


Neutrophils # (Auto)


 


 5.0 x10^3uL


(1.8-7.7) 


 





 


Lymphocytes # (Auto)


 


 1.2 x10^3/uL


(1.0-4.8) 


 





 


Monocytes # (Auto)


 


 0.4 x10^3/uL


(0.0-1.1) 


 





 


Eosinophils # (Auto)


 


 0.0 x10^3/uL


(0.0-0.7) 


 





 


Basophils # (Auto)


 


 0.0 x10^3/uL


(0.0-0.2) 


 





 


Sodium Level


 


 135 mmol/L


(136-145) 


 





 


Potassium Level


 


 4.7 mmol/L


(3.5-5.1) 


 





 


Chloride Level


 


 103 mmol/L


() 


 





 


Carbon Dioxide Level


 


 24 mmol/L


(21-32) 


 





 


Anion Gap  8 (6-14)   


 


Blood Urea Nitrogen


 


 21 mg/dL


(7-20) 


 





 


Creatinine


 


 1.0 mg/dL


(0.6-1.0) 


 





 


Estimated GFR


(Cockcroft-Gault) 


 64.1 


 


 





 


BUN/Creatinine Ratio  21 (6-20)   


 


Glucose Level


 


 115 mg/dL


(70-99) 


 





 


Calcium Level


 


 8.4 mg/dL


(8.5-10.1) 


 





 


Magnesium Level


 


 2.7 mg/dL


(1.8-2.4) 


 





 


Total Bilirubin


 


 0.3 mg/dL


(0.2-1.0) 


 





 


Aspartate Amino Transf


(AST/SGOT) 


 17 U/L (15-37) 


 


 





 


Alanine Aminotransferase


(ALT/SGPT) 


 44 U/L (14-59) 


 


 





 


Alkaline Phosphatase


 


 54 U/L


() 


 





 


Troponin I Quantitative


 


 < 0.017 ng/mL


(0.000-0.055) 


 





 


NT-Pro-B-Type Natriuretic


Peptide 


 407 pg/mL


(0-449) 


 





 


Total Protein


 


 5.5 g/dL


(6.4-8.2) 


 





 


Albumin


 


 2.3 g/dL


(3.4-5.0) 


 





 


Albumin/Globulin Ratio  0.7 (1.0-1.7)   


 


Thyroid Stimulating Hormone


(TSH) 


 3.458 uIU/mL


(0.358-3.74) 


 





 


Test


 10/27/18


07:33 10/27/18


11:43 


 





 


Glucose (Fingerstick)


 89 mg/dL


(70-99) 129 mg/dL


(70-99) 


 











Laboratory Tests








Test


 10/26/18


16:49 10/27/18


07:33 10/27/18


11:43


 


Glucose (Fingerstick)


 99 mg/dL


(70-99) 89 mg/dL


(70-99) 129 mg/dL


(70-99)








Medications





Current Medications


Ondansetron HCl (Zofran) 4 mg 1X  ONCE IV  Last administered on 10/25/18at 13:04

;  Start 10/25/18 at 11:45;  Stop 10/25/18 at 11:46;  Status DC


Ondansetron HCl (Zofran) 4 mg PRN Q8HRS  PRN IV NAUSEA/VOMITING;  Start 10/25/

18 at 14:15;  Stop 10/26/18 at 13:19;  Status DC


Sodium Chloride 1,000 ml @  85 mls/hr [id number] IV  Last administered on 10/25/

18at 16:39;  Start 10/25/18 at 14:04;  Stop 10/26/18 at 14:03;  Status DC


Lorazepam (Ativan) 1 mg 1X  ONCE IM  Last administered on 10/25/18at 21:53;  

Start 10/25/18 at 18:15;  Stop 10/25/18 at 18:16;  Status DC


Acetaminophen (Tylenol) 500 mg BID PO  Last administered on 10/27/18at 09:33;  

Start 10/25/18 at 21:00


Aspirin (Children'S Aspirin) 81 mg DAILY08 PO  Last administered on 10/27/18at 

09:33;  Start 10/26/18 at 08:00


Clopidogrel Bisulfate (Plavix) 75 mg DAILY07 PO  Last administered on 10/27/

18at 06:29;  Start 10/26/18 at 07:00


Atenolol (Tenormin) 25 mg DAILY PO  Last administered on 10/26/18at 10:13;  

Start 10/26/18 at 09:00;  Stop 10/26/18 at 11:14;  Status DC


Meclizine HCl (Antivert) 25 mg PRN Q8HRS  PRN PO DIZZINESS;  Start 10/25/18 at 

18:45


Simvastatin (Zocor) 40 mg QHS PO  Last administered on 10/26/18at 20:47;  Start 

10/25/18 at 21:00


Ceftriaxone Sodium 1 gm/ Dextrose 50 ml @  100 mls/hr Q24H IV ;  Start 10/25/18 

at 18:45;  Status UNV


Lactobacillus Rhamnosus (Culturelle) 1 cap BID PO  Last administered on 10/27/

18at 09:33;  Start 10/25/18 at 21:00


Ceftriaxone Sodium (Rocephin) 1 gm Q24H IVP ;  Start 10/25/18 at 19:00;  Stop 10

/26/18 at 10:15;  Status DC


Enoxaparin Sodium (Lovenox 40mg Syringe) 40 mg DAILY SQ  Last administered on 10

/27/18at 09:40;  Start 10/26/18 at 09:00


Diclofenac Sodium (Voltaren) 1 danyell BID TP ;  Start 10/25/18 at 21:00;  Status 

Cancel


Diclofenac Sodium (Voltaren) 1 danyell BID TP  Last administered on 10/27/18at 09:40

;  Start 10/26/18 at 09:00


Ondansetron HCl (Zofran) 4 mg PRN Q6HRS  PRN IV NAUSEA/VOMITING;  Start 10/26/

18 at 13:30


Alprazolam (Xanax) 1 mg 1X  ONCE PO ;  Start 10/26/18 at 21:00;  Stop 10/26/18 

at 21:01;  Status DC





Active Scripts


Active


Meclizine Hcl 25 Mg Tablet 1 Tab PO PRN TID PRN


Reported


Tylenol (Acetaminophen) 325 Mg Tablet 500 Mg PO BID


Aspirin 81 Mg Tab.chew 1 Tab PO DAILY


Simvastatin 40 Mg Tablet 1 Tab PO QHS


Clopidogrel (Clopidogrel Bisulfate) 75 Mg Tablet 1 Tab PO DAILY


Metformin Hcl 500 Mg Tablet 1 Tab PO BID


Atenolol 25 Mg Tablet 1 Tab PO DAILY


Vitals/I & O





Vital Sign - Last 24 Hours








 10/26/18 10/26/18 10/26/18 10/26/18





 15:00 19:45 20:00 23:58


 


Temp 97.7 97.6  98.4





 97.7 97.6  98.4


 


Pulse 55 54  49


 


Resp 18 18  18


 


B/P (MAP) 102/54 (70) 108/75 (86)  107/55 (72)


 


Pulse Ox 95 100  96


 


O2 Delivery Room Air Room Air Room Air Room Air


 


    





    





 10/27/18 10/27/18 10/27/18 10/27/18





 03:30 07:46 08:00 11:37


 


Temp 98.1 99.0  97.9





 98.1 99.0  97.9


 


Pulse 47 70  89


 


Resp 18 18  19


 


B/P (MAP) 99/48 (65) 102/48 (66)  146/51 (82)


 


Pulse Ox 97 98  97


 


O2 Delivery Room Air Room Air Room Air Room Air














Intake and Output   


 


 10/26/18 10/26/18 10/27/18





 15:00 23:00 07:00


 


Intake Total 120 ml  50 ml


 


Output Total  950 ml 600 ml


 


Balance 120 ml -950 ml -550 ml

















TORIE MAXWELL MD Oct 27, 2018 12:53

## 2018-10-28 VITALS — DIASTOLIC BLOOD PRESSURE: 51 MMHG | SYSTOLIC BLOOD PRESSURE: 116 MMHG

## 2018-10-28 VITALS — SYSTOLIC BLOOD PRESSURE: 103 MMHG | DIASTOLIC BLOOD PRESSURE: 56 MMHG

## 2018-10-28 VITALS — SYSTOLIC BLOOD PRESSURE: 119 MMHG | DIASTOLIC BLOOD PRESSURE: 75 MMHG

## 2018-10-28 VITALS — DIASTOLIC BLOOD PRESSURE: 74 MMHG | SYSTOLIC BLOOD PRESSURE: 121 MMHG

## 2018-10-28 VITALS — DIASTOLIC BLOOD PRESSURE: 41 MMHG | SYSTOLIC BLOOD PRESSURE: 101 MMHG

## 2018-10-28 VITALS — DIASTOLIC BLOOD PRESSURE: 57 MMHG | SYSTOLIC BLOOD PRESSURE: 147 MMHG

## 2018-10-28 VITALS — DIASTOLIC BLOOD PRESSURE: 57 MMHG | SYSTOLIC BLOOD PRESSURE: 122 MMHG

## 2018-10-28 RX ADMIN — Medication SCH CAP: at 08:41

## 2018-10-28 RX ADMIN — ACETAMINOPHEN SCH MG: 500 TABLET ORAL at 21:33

## 2018-10-28 RX ADMIN — SIMVASTATIN SCH MG: 40 TABLET, FILM COATED ORAL at 21:33

## 2018-10-28 RX ADMIN — Medication SCH CAP: at 21:33

## 2018-10-28 RX ADMIN — DICLOFENAC SODIUM SCH APP: 10 GEL TOPICAL at 21:34

## 2018-10-28 RX ADMIN — DICLOFENAC SODIUM SCH APP: 10 GEL TOPICAL at 08:49

## 2018-10-28 RX ADMIN — ENOXAPARIN SODIUM SCH MG: 40 INJECTION SUBCUTANEOUS at 08:42

## 2018-10-28 RX ADMIN — CLOPIDOGREL BISULFATE SCH MG: 75 TABLET ORAL at 08:42

## 2018-10-28 RX ADMIN — ACETAMINOPHEN SCH MG: 500 TABLET ORAL at 08:41

## 2018-10-28 RX ADMIN — ASPIRIN 81 MG SCH MG: 81 TABLET ORAL at 08:42

## 2018-10-28 NOTE — PDOC
PROGRESS NOTES


Chief Complaint


Chief Complaint


Cerebral edema, encephalitis or poss press syndrome


Acute encephalopathy on chronic mild dementia


Fluctuating dementia w/prior delirium 


Moderate malnutrition 


Dehydration


Weakness and debility


Dysphagia


Poor PO intake


Arrhythmia


The left ventricular systolic function is low normal.  PVC's. EF 45-50%


There is moderate concentric left ventricular hypertrophy.


Leukocytosis, UTI?





History of Present Illness


History of Present Illness


Medically stable as can be


Was readmitted after just being one day in SNU because of seizure-like activity 

in SNU


Patient is now sleeping, I did not disturb


Daughters at bedside


Daughters are realistic


Full code for now but family meeting is planned at Monday 10:30 with palliative


They know Pat from recent admission


tirado dcd 10/27 and voiding fine





PLAN:


FAm meet Monday 10:30 AM with palliative


Supportive care


High fall/aspiration risk


Full code for now-


Dysphagia diet





Vitals


Vitals





Vital Signs








  Date Time  Temp Pulse Resp B/P (MAP) Pulse Ox O2 Delivery O2 Flow Rate FiO2


 


10/28/18 07:45      Room Air  


 


10/28/18 07:27 98.4 57 18 116/51 (72) 91   





 98.4       











Physical Exam


Physical Exam


GENERAL: Resting quietly, NAD  .


LUNGS:  Clear to auscultation.


HEART:  S1, S2.


ABDOMEN:  Soft, nondistended with positive bowel sounds.


: [place]


EXTREMITIES:  Without clubbing or cyanosis.  No gross edema.


SKIN:  Warm to touch without signs of any rashes.


CNS: much more awake, and nods feeling better, eat today per RN


PIV


General:  Alert, Cooperative, No acute distress, Other (somnolent)


Heart:  Regular rate (SB 50s), Normal S1, Normal S2, Other (2/6 systolic murmur 

to LLS border)


Lungs:  Clear


Abdomen:  Normal bowel sounds, Soft, No tenderness


Extremities:  No cyanosis


Skin:  No breakdown





Labs


LABS





Laboratory Tests








Test


 10/27/18


11:43 10/27/18


17:38 10/27/18


21:32


 


Glucose (Fingerstick)


 129 mg/dL


(70-99) 86 mg/dL


(70-99) 116 mg/dL


(70-99)











Review of Systems


Review of Systems


Asleep I did not awaken





Comment


Review of Relevant


I have reviewed the following items marky (where applicable) has been applied.


Labs





Laboratory Tests








Test


 10/26/18


09:45 10/26/18


11:33 10/26/18


16:49 10/27/18


07:33


 


White Blood Count


 6.7 x10^3/uL


(4.0-11.0) 


 


 





 


Red Blood Count


 4.32 x10^6/uL


(3.50-5.40) 


 


 





 


Hemoglobin


 12.3 g/dL


(12.0-15.5) 


 


 





 


Hematocrit


 37.2 %


(36.0-47.0) 


 


 





 


Mean Corpuscular Volume 86 fL ()    


 


Mean Corpuscular Hemoglobin 28 pg (25-35)    


 


Mean Corpuscular Hemoglobin


Concent 33 g/dL


(31-37) 


 


 





 


Red Cell Distribution Width


 19.6 %


(11.5-14.5) 


 


 





 


Platelet Count


 131 x10^3/uL


(140-400) 


 


 





 


Neutrophils (%) (Auto) 75 % (31-73)    


 


Lymphocytes (%) (Auto) 18 % (24-48)    


 


Monocytes (%) (Auto) 6 % (0-9)    


 


Eosinophils (%) (Auto) 1 % (0-3)    


 


Basophils (%) (Auto) 0 % (0-3)    


 


Neutrophils # (Auto)


 5.0 x10^3uL


(1.8-7.7) 


 


 





 


Lymphocytes # (Auto)


 1.2 x10^3/uL


(1.0-4.8) 


 


 





 


Monocytes # (Auto)


 0.4 x10^3/uL


(0.0-1.1) 


 


 





 


Eosinophils # (Auto)


 0.0 x10^3/uL


(0.0-0.7) 


 


 





 


Basophils # (Auto)


 0.0 x10^3/uL


(0.0-0.2) 


 


 





 


Sodium Level


 135 mmol/L


(136-145) 


 


 





 


Potassium Level


 4.7 mmol/L


(3.5-5.1) 


 


 





 


Chloride Level


 103 mmol/L


() 


 


 





 


Carbon Dioxide Level


 24 mmol/L


(21-32) 


 


 





 


Anion Gap 8 (6-14)    


 


Blood Urea Nitrogen


 21 mg/dL


(7-20) 


 


 





 


Creatinine


 1.0 mg/dL


(0.6-1.0) 


 


 





 


Estimated GFR


(Cockcroft-Gault) 64.1 


 


 


 





 


BUN/Creatinine Ratio 21 (6-20)    


 


Glucose Level


 115 mg/dL


(70-99) 


 


 





 


Calcium Level


 8.4 mg/dL


(8.5-10.1) 


 


 





 


Magnesium Level


 2.7 mg/dL


(1.8-2.4) 


 


 





 


Total Bilirubin


 0.3 mg/dL


(0.2-1.0) 


 


 





 


Aspartate Amino Transf


(AST/SGOT) 17 U/L (15-37) 


 


 


 





 


Alanine Aminotransferase


(ALT/SGPT) 44 U/L (14-59) 


 


 


 





 


Alkaline Phosphatase


 54 U/L


() 


 


 





 


Troponin I Quantitative


 < 0.017 ng/mL


(0.000-0.055) 


 


 





 


NT-Pro-B-Type Natriuretic


Peptide 407 pg/mL


(0-449) 


 


 





 


Total Protein


 5.5 g/dL


(6.4-8.2) 


 


 





 


Albumin


 2.3 g/dL


(3.4-5.0) 


 


 





 


Albumin/Globulin Ratio 0.7 (1.0-1.7)    


 


Thyroid Stimulating Hormone


(TSH) 3.458 uIU/mL


(0.358-3.74) 


 


 





 


Glucose (Fingerstick)


 


 99 mg/dL


(70-99) 99 mg/dL


(70-99) 89 mg/dL


(70-99)


 


Test


 10/27/18


11:43 10/27/18


17:38 10/27/18


21:32 





 


Glucose (Fingerstick)


 129 mg/dL


(70-99) 86 mg/dL


(70-99) 116 mg/dL


(70-99) 











Laboratory Tests








Test


 10/27/18


11:43 10/27/18


17:38 10/27/18


21:32


 


Glucose (Fingerstick)


 129 mg/dL


(70-99) 86 mg/dL


(70-99) 116 mg/dL


(70-99)








Microbiology


10/25/18 Urine Culture - Final, Complete


           


10/25/18 Urine Culture Result 1 (EDGAR) - Final, Complete


Medications





Current Medications


Ondansetron HCl (Zofran) 4 mg 1X  ONCE IV  Last administered on 10/25/18at 13:04

;  Start 10/25/18 at 11:45;  Stop 10/25/18 at 11:46;  Status DC


Ondansetron HCl (Zofran) 4 mg PRN Q8HRS  PRN IV NAUSEA/VOMITING;  Start 10/25/

18 at 14:15;  Stop 10/26/18 at 13:19;  Status DC


Sodium Chloride 1,000 ml @  85 mls/hr [id number] IV  Last administered on 10/25/

18at 16:39;  Start 10/25/18 at 14:04;  Stop 10/26/18 at 14:03;  Status DC


Lorazepam (Ativan) 1 mg 1X  ONCE IM  Last administered on 10/25/18at 21:53;  

Start 10/25/18 at 18:15;  Stop 10/25/18 at 18:16;  Status DC


Acetaminophen (Tylenol) 500 mg BID PO  Last administered on 10/28/18at 08:41;  

Start 10/25/18 at 21:00


Aspirin (Children'S Aspirin) 81 mg DAILY08 PO  Last administered on 10/28/18at 

08:42;  Start 10/26/18 at 08:00


Clopidogrel Bisulfate (Plavix) 75 mg DAILY07 PO  Last administered on 10/28/

18at 08:42;  Start 10/26/18 at 07:00


Atenolol (Tenormin) 25 mg DAILY PO  Last administered on 10/26/18at 10:13;  

Start 10/26/18 at 09:00;  Stop 10/26/18 at 11:14;  Status DC


Meclizine HCl (Antivert) 25 mg PRN Q8HRS  PRN PO DIZZINESS;  Start 10/25/18 at 

18:45


Simvastatin (Zocor) 40 mg QHS PO  Last administered on 10/27/18at 21:25;  Start 

10/25/18 at 21:00


Ceftriaxone Sodium 1 gm/ Dextrose 50 ml @  100 mls/hr Q24H IV ;  Start 10/25/18 

at 18:45;  Status UNV


Lactobacillus Rhamnosus (Culturelle) 1 cap BID PO  Last administered on 10/28/

18at 08:41;  Start 10/25/18 at 21:00


Ceftriaxone Sodium (Rocephin) 1 gm Q24H IVP ;  Start 10/25/18 at 19:00;  Stop 10

/26/18 at 10:15;  Status DC


Enoxaparin Sodium (Lovenox 40mg Syringe) 40 mg DAILY SQ  Last administered on 10

/28/18at 08:42;  Start 10/26/18 at 09:00


Diclofenac Sodium (Voltaren) 1 danyell BID TP ;  Start 10/25/18 at 21:00;  Status 

Cancel


Diclofenac Sodium (Voltaren) 1 danyell BID TP  Last administered on 10/27/18at 21:25

;  Start 10/26/18 at 09:00


Ondansetron HCl (Zofran) 4 mg PRN Q6HRS  PRN IV NAUSEA/VOMITING;  Start 10/26/

18 at 13:30


Alprazolam (Xanax) 1 mg 1X  ONCE PO ;  Start 10/26/18 at 21:00;  Stop 10/26/18 

at 21:01;  Status DC


Lorazepam (Ativan) 0.5 mg PRN Q8HRS  PRN PO ANXIETY / AGITATION Last 

administered on 10/28/18at 04:13;  Start 10/28/18 at 03:45





Active Scripts


Active


Meclizine Hcl 25 Mg Tablet 1 Tab PO PRN TID PRN


Reported


Tylenol (Acetaminophen) 325 Mg Tablet 500 Mg PO BID


Aspirin 81 Mg Tab.chew 1 Tab PO DAILY


Simvastatin 40 Mg Tablet 1 Tab PO QHS


Clopidogrel (Clopidogrel Bisulfate) 75 Mg Tablet 1 Tab PO DAILY


Metformin Hcl 500 Mg Tablet 1 Tab PO BID


Atenolol 25 Mg Tablet 1 Tab PO DAILY


Vitals/I & O





Vital Sign - Last 24 Hours








 10/27/18 10/27/18 10/27/18 10/27/18





 11:37 15:10 19:48 20:00


 


Temp 97.9 97.7 98.3 





 97.9 97.7 98.3 


 


Pulse 89 79 72 


 


Resp 19 19 18 


 


B/P (MAP) 146/51 (82) 120/60 (80) 102/51 (68) 


 


Pulse Ox 97 100 98 


 


O2 Delivery Room Air Room Air Room Air Room Air


 


    





    





 10/27/18 10/28/18 10/28/18 10/28/18





 23:46 03:22 05:10 07:27


 


Temp 98.5 97.9 98.1 98.4





 98.5 97.9 98.1 98.4


 


Pulse 57 53 62 57


 


Resp 18 18 18 18


 


B/P (MAP) 122/55 (77) 122/57 (78) 103/56 (72) 116/51 (72)


 


Pulse Ox 97 98 96 91


 


O2 Delivery Room Air Room Air Room Air Room Air


 


    





    





 10/28/18   





 07:45   


 


O2 Delivery Room Air   














Intake and Output   


 


 10/27/18 10/27/18 10/28/18





 15:00 23:00 07:00


 


Intake Total  120 ml 0 ml


 


Output Total  400 ml 


 


Balance  -280 ml 0 ml

















TORIE MAXWELL MD Oct 28, 2018 09:43

## 2018-10-29 VITALS — DIASTOLIC BLOOD PRESSURE: 66 MMHG | SYSTOLIC BLOOD PRESSURE: 124 MMHG

## 2018-10-29 VITALS — DIASTOLIC BLOOD PRESSURE: 62 MMHG | SYSTOLIC BLOOD PRESSURE: 110 MMHG

## 2018-10-29 VITALS — DIASTOLIC BLOOD PRESSURE: 53 MMHG | SYSTOLIC BLOOD PRESSURE: 112 MMHG

## 2018-10-29 VITALS — SYSTOLIC BLOOD PRESSURE: 92 MMHG | DIASTOLIC BLOOD PRESSURE: 55 MMHG

## 2018-10-29 VITALS — DIASTOLIC BLOOD PRESSURE: 50 MMHG | SYSTOLIC BLOOD PRESSURE: 109 MMHG

## 2018-10-29 VITALS — SYSTOLIC BLOOD PRESSURE: 116 MMHG | DIASTOLIC BLOOD PRESSURE: 65 MMHG

## 2018-10-29 LAB — VIRAL CULT FINAL: (no result)

## 2018-10-29 RX ADMIN — DICLOFENAC SODIUM SCH APP: 10 GEL TOPICAL at 10:21

## 2018-10-29 RX ADMIN — ENOXAPARIN SODIUM SCH MG: 40 INJECTION SUBCUTANEOUS at 10:21

## 2018-10-29 RX ADMIN — DICLOFENAC SODIUM SCH APP: 10 GEL TOPICAL at 20:27

## 2018-10-29 RX ADMIN — ASPIRIN 81 MG SCH MG: 81 TABLET ORAL at 08:36

## 2018-10-29 RX ADMIN — ACETAMINOPHEN SCH MG: 500 TABLET ORAL at 20:26

## 2018-10-29 RX ADMIN — Medication SCH CAP: at 20:26

## 2018-10-29 RX ADMIN — SIMVASTATIN SCH MG: 40 TABLET, FILM COATED ORAL at 20:26

## 2018-10-29 RX ADMIN — CLOPIDOGREL BISULFATE SCH MG: 75 TABLET ORAL at 08:35

## 2018-10-29 RX ADMIN — Medication SCH CAP: at 08:35

## 2018-10-29 RX ADMIN — ACETAMINOPHEN SCH MG: 500 TABLET ORAL at 08:36

## 2018-10-29 NOTE — PDOC2
NEUROLOGY CONSULT


Date of Admission


Date of Admission


DATE: 10/29/18 


TIME: 11:42





Reason for Consult


Reason for Consult:


Altered mental status





Referring Physician


Referring Physician:


Dr. Greenfield





Source


Source:  Caregiver, Chart review, Patient





History of Present Illness


History of Present Illness


The patient is an 83-year-old right-handed female admitted from rehab with a 

syncopal episode. I last saw the patient in early September when she presented 

with similar complaints. EEG and MRI were negative.  The patient returned 2 

weeks ago with altered mental status. The patient did have an abnormal brain 

MRI as listed below. She had a lumbar puncture, showing elevated white blood 

count and protein, as listed below. The patient did see Dr. Villa, neurologist,  

who raised the possibility of posterior reversible encephalopathy. The family 

will not allow Dr. Trevino  to see the patient so neurology did not follow her.  I 

have felt that the patient has progressive dementia, most likely Alzheimer's, 

possibly frontotemporal dementia.





Past Medical History


Cardiovascular:  CAD, HTN, MI, Hyperlipidemia


CENTRAL NERVOUS SYSTEM:  Dementia


Musculoskeletal:  Osteoarthritis


Endocrine:  Diabetes





Past Surgical History


Past Surgical History:  Appendectomy, Hysterectomy, Other (Coronary stent)





Family History


Family History:  Cancer, DM





Social History


Social History


, no alcohol or tobacco





Current Medications


Current Medications





Current Medications


Ondansetron HCl (Zofran) 4 mg 1X  ONCE IV  Last administered on 10/25/18at 13:04

;  Start 10/25/18 at 11:45;  Stop 10/25/18 at 11:46;  Status DC


Ondansetron HCl (Zofran) 4 mg PRN Q8HRS  PRN IV NAUSEA/VOMITING;  Start 10/25/

18 at 14:15;  Stop 10/26/18 at 13:19;  Status DC


Sodium Chloride 1,000 ml @  85 mls/hr [id number] IV  Last administered on 10/25/

18at 16:39;  Start 10/25/18 at 14:04;  Stop 10/26/18 at 14:03;  Status DC


Lorazepam (Ativan) 1 mg 1X  ONCE IM  Last administered on 10/25/18at 21:53;  

Start 10/25/18 at 18:15;  Stop 10/25/18 at 18:16;  Status DC


Acetaminophen (Tylenol) 500 mg BID PO  Last administered on 10/29/18at 08:36;  

Start 10/25/18 at 21:00


Aspirin (Children'S Aspirin) 81 mg DAILY08 PO  Last administered on 10/29/18at 

08:36;  Start 10/26/18 at 08:00


Clopidogrel Bisulfate (Plavix) 75 mg DAILY07 PO  Last administered on 10/29/

18at 08:35;  Start 10/26/18 at 07:00


Atenolol (Tenormin) 25 mg DAILY PO  Last administered on 10/26/18at 10:13;  

Start 10/26/18 at 09:00;  Stop 10/26/18 at 11:14;  Status DC


Meclizine HCl (Antivert) 25 mg PRN Q8HRS  PRN PO DIZZINESS;  Start 10/25/18 at 

18:45


Simvastatin (Zocor) 40 mg QHS PO  Last administered on 10/28/18at 21:33;  Start 

10/25/18 at 21:00


Ceftriaxone Sodium 1 gm/ Dextrose 50 ml @  100 mls/hr Q24H IV ;  Start 10/25/18 

at 18:45;  Status UNV


Lactobacillus Rhamnosus (Culturelle) 1 cap BID PO  Last administered on 10/29/

18at 08:35;  Start 10/25/18 at 21:00


Ceftriaxone Sodium (Rocephin) 1 gm Q24H IVP ;  Start 10/25/18 at 19:00;  Stop 10

/26/18 at 10:15;  Status DC


Enoxaparin Sodium (Lovenox 40mg Syringe) 40 mg DAILY SQ  Last administered on 10

/29/18at 10:21;  Start 10/26/18 at 09:00


Diclofenac Sodium (Voltaren) 1 danyell BID TP ;  Start 10/25/18 at 21:00;  Status 

Cancel


Diclofenac Sodium (Voltaren) 1 danyell BID TP  Last administered on 10/29/18at 10:21

;  Start 10/26/18 at 09:00


Ondansetron HCl (Zofran) 4 mg PRN Q6HRS  PRN IV NAUSEA/VOMITING;  Start 10/26/

18 at 13:30


Alprazolam (Xanax) 1 mg 1X  ONCE PO ;  Start 10/26/18 at 21:00;  Stop 10/26/18 

at 21:01;  Status DC


Lorazepam (Ativan) 0.5 mg PRN Q8HRS  PRN PO ANXIETY / AGITATION Last 

administered on 10/28/18at 21:33;  Start 10/28/18 at 03:45





Active Scripts


Active


Meclizine Hcl 25 Mg Tablet 1 Tab PO PRN TID PRN


Reported


Tylenol (Acetaminophen) 325 Mg Tablet 500 Mg PO BID


Aspirin 81 Mg Tab.chew 1 Tab PO DAILY


Simvastatin 40 Mg Tablet 1 Tab PO QHS


Clopidogrel (Clopidogrel Bisulfate) 75 Mg Tablet 1 Tab PO DAILY


Metformin Hcl 500 Mg Tablet 1 Tab PO BID


Atenolol 25 Mg Tablet 1 Tab PO DAILY





Allergies


Allergies:  


Coded Allergies:  


     No Known Drug Allergies (Unverified , 10/29/16)





ROS


Review of System


Negative for fevers, chills, weight loss, shortness of breath, chest pain, 

indigestion, hematochezia, melena, dysuria. Full 14-point review systems is 

negative.





Physical Exam


Physical Examination


General:


Well-developed, well-nourished, black female, in no acute distress


HEENT:


Normocephalic andatraumatic. Tympanic membranes clear.Temporal arteries

pulsatile and nontender.Fundoscopic exam unremarkable


Neck:


Supple without bruit, no meningismus


Musculoskeletal:


Stability:see neurologic. Gait exam:see neurologic. Tone:see neurologic.

Strength:see neurologic.


Neurological:


Mental Status:orientation, memory, attention span/concentration, language, fund 

of knowledge: Knows location, month, year, names and repeats well. Cranial 

Nerves:Pupils equal and reactive to light, extraocular movements areintact, 

visual fields are full to confrontation. Facial sensation is normal. There is 

no facial asymmetry. Vestibulo-ocular reflex is intact. Palate elvates and 

tongue protrudes in midline. All other cranial related problems are negative 

except as mentioned before.Reflexes:2+ and symmetric with flexor plantar 

responses. Motor:5/5 strength with normal tone and bulk. Coordination:Finger-

nose finger and heel-to-shin testing are normal. Rapid alternating movements 

and fine finger movements are intact. Gait:Normal, including tandem. Sensory:

Normal pinprick, vibration, light touch, proprioception.





Vitals


VITALS





Vital Signs








  Date Time  Temp Pulse Resp B/P (MAP) Pulse Ox O2 Delivery O2 Flow Rate FiO2


 


10/29/18 11:00 97.7 65 18 109/50 (69) 95 Room Air  





 97.7       











Labs


Labs





Laboratory Tests








Test


 10/27/18


11:43 10/27/18


17:38 10/27/18


21:32


 


Glucose (Fingerstick)


 129 mg/dL


(70-99) 86 mg/dL


(70-99) 116 mg/dL


(70-99)











CSF, 10/17/18: 24 white blood cells, 100% mononuclear, 223 red blood cells, 

glucose 82, protein 270.8





Images


Images


CT head 10/25:


Cerebral hypodensity, left greater than right, is again identified and 


appears similar to the prior exam. This is again compatible with 


nonspecific cerebral edema. No new mass effect is identified. No evidence 


of acute intracranial or extra-axial hemorrhage.


 


Visualized orbits are unremarkable.


Visualized paranasal sinuses and mastoids are clear.


No acute calvarial abnormality.


 


Impression: Bilateral cerebral hypodensity or edema, left greater than 


right, appears similar to the previous exam. No new mass effect or acute 


hemorrhage.





MRI brain, 10/15/18:





Not associated with significant restricted diffusion, there are 


new prominent areas of abnormal T2 and FLAIR hyperintense signal 


abnormality bilaterally, variable involvement of all lobes greater on the 


left. There is also involvement of the left aspect of the corpus callosum.


More confluent areas of edema on the left involving the left temporal lobe


but also left frontal parietal lobes, right greatest of the right temporal


lobe and parietal lobe. There is more mass effect on the left, left to 


right shift by about 0.4 to 0.5 cm. There is variable cortical involvement


again left greater than right. Ventricles are not significantly dilated. 


There is no significant extra-axial fluid collection. There are multiple 


scattered small foci of decreased signal on the gradient echo sequence 


greatest of the temporal lobes compatible with sites of microhemorrhage, 


new foci in the interval. Mastoid air cells are aerated. There has been 


lens surgery bilaterally. There is minimal patchy ethmoid air cell mucosal


thickening. There is preservation of the major arterial intracranial flow 


voids at the skull base.


 


Impression:


1. New since the previous MRI exam August 31, 2018, there are prominent 


areas of edema bilaterally, left greater than right, associated mild 


left-to-right midline shift. There is variable cortical involvement. 


Primary concern would be for encephalitis/cerebritis. There are scattered 


small foci of associated microhemorrhage.  If the patient is hypertensive,


sequela of posterior reversible encephalopathy syndrome would be included 


although distribution somewhat atypical.  No contrast was given for this 


exam. Underlying masses would be unlikely given absence of findings on 


recent exam.





EEG 9/4/18, negative





MRI brain, 8/31/18:


here is a tiny focus of slight increased signal on diffusion 


weighted images within the left parietal subcortical white matter, 


consistent with artifact. No convincing restricted diffusion is seen to 


suggest acute or subacute infarction.


 


There are few foci of susceptibility effect throughout the left cerebral 


hemisphere due to chronic hemorrhage. The possibility of an underlying 


tiny cavernoma is not excluded.


 


There is cerebral volume loss with compensatory enlargement of the 


ventricles. There are scattered focal and confluent areas of signal change


throughout the cerebral white matter, likely due to chronic small vessel 


disease. There is a suspected chronic lacunar infarct within the anterior 


limb of the right internal capsule. 


 


There is incidental osteitis frontalis interna. There are normal flow 


voids within the cerebral vessels. There is evidence of lens surgery. The 


paranasal sinuses and mastoid air cells are clear.


 


IMPRESSION: 


1. No acute intracranial finding.


2. Nonspecific signal change within the cerebral white matter likely due 


to chronic small vessel disease.


3. Scattered foci of chronic microhemorrhage within the left cerebral 


hemisphere. The possibility of a tiny underlying cavernoma is not 


excluded.


4. Cerebral volume loss.





Assessment/Plan


Assessment/Plan


Impression:


Dementia, most likely Alzheimer's, consider frontotemporal


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 She 

continues to improve


Syncope, negative neurological workup for seizures in the past.





Recommendations:


Repeat brain MRI


Prolonged electroencephalogram


I had a preliminary discussion with the patient's daughters before I had 

reviewed the chart again.





Thank you for letting me help with the patient's care.











RUPA PIKE MD Oct 29, 2018 11:58

## 2018-10-29 NOTE — PDOC2
PALLIATIVE CARE


Palliative Care Note


Palliative Care


Patient alert.  Denies pain, SOB, nausea


Visiting with family. 


Met with Debbie and Louie children of patient.


Reviewed medical information;





Dementia, most likely Alzheimer's, consider frontotemporal


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 She 

continues to improve


Syncope, negative neurological workup for seizures in the past.





Family would like to continue full code and full aggressive care.


Likely will return to nursing home today or tomorrow.  Family prefers tomorrow 

so that they are available to accompany her to the nursing home.











GIOVANNA MIRANDA Oct 29, 2018 12:04

## 2018-10-29 NOTE — PDOC
PROGRESS NOTES


Chief Complaint


Chief Complaint


recent CT showed Cerebral edema, encephalitis or poss press syndrome


Acute encephalopathy on chronic mild dementia


Fluctuating dementia w/prior delirium 


Moderate malnutrition 


Dehydration


Weakness and debility


Dysphagia


Poor PO intake


Arrhythmia


The left ventricular systolic function is low normal.  PVC's. EF 45-50%


There is moderate concentric left ventricular hypertrophy.








plan:


fu with neuro, repeat MRI


PAT COnsulted, pt family still want FC and aggressive treatment


plan dc tmr





History of Present Illness


History of Present Illness


Medically stable as can be


Was readmitted after just being one day in SNU because of seizure-like activity 

in SNU


pt is talking ,follow commands, but obviously has dementia not know the days 

place


son at bedside





Full code for now but family meeting is planned at Monday 10:30 with palliative

, still want FC and everything 


They know Pat from recent admission


tirado dcd 10/27 and voiding fine








Vitals


Vitals





Vital Signs








  Date Time  Temp Pulse Resp B/P (MAP) Pulse Ox O2 Delivery O2 Flow Rate FiO2


 


10/29/18 11:00 97.7 65 18 109/50 (69) 95 Room Air  





 97.7       











Physical Exam


Physical Exam


GENERAL: Resting quietly, NAD  .


LUNGS:  Clear to auscultation.


HEART:  S1, S2.


ABDOMEN:  Soft, nondistended with positive bowel sounds.


: Tirado


EXTREMITIES:  Without clubbing or cyanosis.  No gross edema.


SKIN:  Warm to touch without signs of any rashes.


CNS: much more awake, and nods feeling better, eat today per RN


PIV


General:  Alert, Cooperative, No acute distress, Other (somnolent)


Heart:  Regular rate (SB 50s), Normal S1, Normal S2, Other (2/6 systolic murmur 

to LLS border)


Lungs:  Clear


Abdomen:  Normal bowel sounds, Soft, No tenderness


Extremities:  No cyanosis


Skin:  No breakdown





Comment


Review of Relevant


I have reviewed the following items marky (where applicable) has been applied.


Labs





Laboratory Tests








Test


 10/27/18


17:38 10/27/18


21:32


 


Glucose (Fingerstick)


 86 mg/dL


(70-99) 116 mg/dL


(70-99)








Microbiology


10/25/18 Urine Culture - Final, Complete


           


10/25/18 Urine Culture Result 1 (EDGAR) - Final, Complete


Medications





Current Medications


Ondansetron HCl (Zofran) 4 mg 1X  ONCE IV  Last administered on 10/25/18at 13:04

;  Start 10/25/18 at 11:45;  Stop 10/25/18 at 11:46;  Status DC


Ondansetron HCl (Zofran) 4 mg PRN Q8HRS  PRN IV NAUSEA/VOMITING;  Start 10/25/

18 at 14:15;  Stop 10/26/18 at 13:19;  Status DC


Sodium Chloride 1,000 ml @  85 mls/hr [id number] IV  Last administered on 10/25/

18at 16:39;  Start 10/25/18 at 14:04;  Stop 10/26/18 at 14:03;  Status DC


Lorazepam (Ativan) 1 mg 1X  ONCE IM  Last administered on 10/25/18at 21:53;  

Start 10/25/18 at 18:15;  Stop 10/25/18 at 18:16;  Status DC


Acetaminophen (Tylenol) 500 mg BID PO  Last administered on 10/29/18at 08:36;  

Start 10/25/18 at 21:00


Aspirin (Children'S Aspirin) 81 mg DAILY08 PO  Last administered on 10/29/18at 

08:36;  Start 10/26/18 at 08:00


Clopidogrel Bisulfate (Plavix) 75 mg DAILY07 PO  Last administered on 10/29/

18at 08:35;  Start 10/26/18 at 07:00


Atenolol (Tenormin) 25 mg DAILY PO  Last administered on 10/26/18at 10:13;  

Start 10/26/18 at 09:00;  Stop 10/26/18 at 11:14;  Status DC


Meclizine HCl (Antivert) 25 mg PRN Q8HRS  PRN PO DIZZINESS;  Start 10/25/18 at 

18:45


Simvastatin (Zocor) 40 mg QHS PO  Last administered on 10/28/18at 21:33;  Start 

10/25/18 at 21:00


Ceftriaxone Sodium 1 gm/ Dextrose 50 ml @  100 mls/hr Q24H IV ;  Start 10/25/18 

at 18:45;  Status UNV


Lactobacillus Rhamnosus (Culturelle) 1 cap BID PO  Last administered on 10/29/

18at 08:35;  Start 10/25/18 at 21:00


Ceftriaxone Sodium (Rocephin) 1 gm Q24H IVP ;  Start 10/25/18 at 19:00;  Stop 10

/26/18 at 10:15;  Status DC


Enoxaparin Sodium (Lovenox 40mg Syringe) 40 mg DAILY SQ  Last administered on 10

/29/18at 10:21;  Start 10/26/18 at 09:00


Diclofenac Sodium (Voltaren) 1 danyell BID TP ;  Start 10/25/18 at 21:00;  Status 

Cancel


Diclofenac Sodium (Voltaren) 1 danyell BID TP  Last administered on 10/29/18at 10:21

;  Start 10/26/18 at 09:00


Ondansetron HCl (Zofran) 4 mg PRN Q6HRS  PRN IV NAUSEA/VOMITING;  Start 10/26/

18 at 13:30


Alprazolam (Xanax) 1 mg 1X  ONCE PO ;  Start 10/26/18 at 21:00;  Stop 10/26/18 

at 21:01;  Status DC


Lorazepam (Ativan) 0.5 mg PRN Q8HRS  PRN PO ANXIETY / AGITATION Last 

administered on 10/28/18at 21:33;  Start 10/28/18 at 03:45





Active Scripts


Active


Meclizine Hcl 25 Mg Tablet 1 Tab PO PRN TID PRN


Reported


Tylenol (Acetaminophen) 325 Mg Tablet 500 Mg PO BID


Aspirin 81 Mg Tab.chew 1 Tab PO DAILY


Simvastatin 40 Mg Tablet 1 Tab PO QHS


Clopidogrel (Clopidogrel Bisulfate) 75 Mg Tablet 1 Tab PO DAILY


Metformin Hcl 500 Mg Tablet 1 Tab PO BID


Atenolol 25 Mg Tablet 1 Tab PO DAILY


Vitals/I & O





Vital Sign - Last 24 Hours








 10/28/18 10/28/18 10/28/18 10/28/18





 15:26 19:00 19:00 23:00


 


Temp 98.6 96.7  98.8





 98.6 96.7  98.8


 


Pulse 76 79  63


 


Resp 18 18  18


 


B/P (MAP) 121/74 (90) 119/75 (90)  101/41 (61)


 


Pulse Ox 95 95  98


 


O2 Delivery Room Air Room Air Room Air Room Air


 


    





    





 10/29/18 10/29/18 10/29/18 10/29/18





 03:00 07:00 08:15 11:00


 


Temp 98.0 98.5  97.7





 98.0 98.5  97.7


 


Pulse 68 64  65


 


Resp 18 18  18


 


B/P (MAP) 124/66 (85) 112/53 (72)  109/50 (69)


 


Pulse Ox 94 96  95


 


O2 Delivery Room Air Room Air Room Air Room Air














Intake and Output   


 


 10/28/18 10/28/18 10/29/18





 15:00 23:00 07:00


 


Intake Total  120 ml 100 ml


 


Balance  120 ml 100 ml

















MELVI METZGER MD Oct 29, 2018 14:55

## 2018-10-30 VITALS — SYSTOLIC BLOOD PRESSURE: 131 MMHG | DIASTOLIC BLOOD PRESSURE: 62 MMHG

## 2018-10-30 VITALS — SYSTOLIC BLOOD PRESSURE: 125 MMHG | DIASTOLIC BLOOD PRESSURE: 63 MMHG

## 2018-10-30 VITALS — SYSTOLIC BLOOD PRESSURE: 103 MMHG | DIASTOLIC BLOOD PRESSURE: 54 MMHG

## 2018-10-30 VITALS — SYSTOLIC BLOOD PRESSURE: 117 MMHG | DIASTOLIC BLOOD PRESSURE: 51 MMHG

## 2018-10-30 VITALS — DIASTOLIC BLOOD PRESSURE: 62 MMHG | SYSTOLIC BLOOD PRESSURE: 113 MMHG

## 2018-10-30 VITALS — SYSTOLIC BLOOD PRESSURE: 107 MMHG | DIASTOLIC BLOOD PRESSURE: 49 MMHG

## 2018-10-30 LAB
ANION GAP SERPL CALC-SCNC: 7 MMOL/L (ref 6–14)
BASOPHILS # BLD AUTO: 0 X10^3/UL (ref 0–0.2)
BASOPHILS NFR BLD: 0 % (ref 0–3)
BUN SERPL-MCNC: 15 MG/DL (ref 7–20)
CALCIUM SERPL-MCNC: 8.3 MG/DL (ref 8.5–10.1)
CHLORIDE SERPL-SCNC: 107 MMOL/L (ref 98–107)
CO2 SERPL-SCNC: 26 MMOL/L (ref 21–32)
CREAT SERPL-MCNC: 1 MG/DL (ref 0.6–1)
EOSINOPHIL NFR BLD: 0.1 X10^3/UL (ref 0–0.7)
EOSINOPHIL NFR BLD: 1 % (ref 0–3)
ERYTHROCYTE [DISTWIDTH] IN BLOOD BY AUTOMATED COUNT: 19.6 % (ref 11.5–14.5)
GFR SERPLBLD BASED ON 1.73 SQ M-ARVRAT: 64.1 ML/MIN
GLUCOSE SERPL-MCNC: 99 MG/DL (ref 70–99)
HCT VFR BLD CALC: 31.8 % (ref 36–47)
HGB BLD-MCNC: 10.6 G/DL (ref 12–15.5)
LYMPHOCYTES # BLD: 1.2 X10^3/UL (ref 1–4.8)
LYMPHOCYTES NFR BLD AUTO: 19 % (ref 24–48)
MCH RBC QN AUTO: 29 PG (ref 25–35)
MCHC RBC AUTO-ENTMCNC: 33 G/DL (ref 31–37)
MCV RBC AUTO: 86 FL (ref 79–100)
MONO #: 0.5 X10^3/UL (ref 0–1.1)
MONOCYTES NFR BLD: 8 % (ref 0–9)
NEUT #: 4.6 X10^3UL (ref 1.8–7.7)
NEUTROPHILS NFR BLD AUTO: 71 % (ref 31–73)
PLATELET # BLD AUTO: 145 X10^3/UL (ref 140–400)
POTASSIUM SERPL-SCNC: 4.2 MMOL/L (ref 3.5–5.1)
RBC # BLD AUTO: 3.72 X10^6/UL (ref 3.5–5.4)
SODIUM SERPL-SCNC: 140 MMOL/L (ref 136–145)
WBC # BLD AUTO: 6.4 X10^3/UL (ref 4–11)

## 2018-10-30 RX ADMIN — ACETAMINOPHEN SCH MG: 500 TABLET ORAL at 08:54

## 2018-10-30 RX ADMIN — DICLOFENAC SODIUM SCH APP: 10 GEL TOPICAL at 21:28

## 2018-10-30 RX ADMIN — SIMVASTATIN SCH MG: 40 TABLET, FILM COATED ORAL at 21:25

## 2018-10-30 RX ADMIN — ACETAMINOPHEN PRN MG: 325 TABLET, FILM COATED ORAL at 21:25

## 2018-10-30 RX ADMIN — DICLOFENAC SODIUM SCH APP: 10 GEL TOPICAL at 08:55

## 2018-10-30 RX ADMIN — ENOXAPARIN SODIUM SCH MG: 40 INJECTION SUBCUTANEOUS at 08:55

## 2018-10-30 RX ADMIN — ACETAMINOPHEN PRN MG: 325 TABLET, FILM COATED ORAL at 21:29

## 2018-10-30 RX ADMIN — ASPIRIN 81 MG SCH MG: 81 TABLET ORAL at 08:55

## 2018-10-30 RX ADMIN — CLOPIDOGREL BISULFATE SCH MG: 75 TABLET ORAL at 05:58

## 2018-10-30 RX ADMIN — Medication SCH CAP: at 08:55

## 2018-10-30 RX ADMIN — Medication SCH CAP: at 21:25

## 2018-10-30 RX ADMIN — ACETAMINOPHEN SCH MG: 500 TABLET ORAL at 21:00

## 2018-10-30 NOTE — PDOC
PROGRESS NOTES


Chief Complaint


Chief Complaint


recent CT showed Cerebral edema, encephalitis or poss press syndrome


Acute encephalopathy on chronic mild dementia


Fluctuating dementia w/prior delirium 


Moderate malnutrition 


Dehydration


Weakness and debility


Dysphagia


Poor PO intake


Arrhythmia


The left ventricular systolic function is low normal.  PVC's. EF 45-50%


There is moderate concentric left ventricular hypertrophy.








plan:


fu with neuro, repeat MRI pending


24h EEG with neuro pending


PAT COnsulted, pt family still want FC and aggressive treatment


plan dc tmr to SNF, talked to SW





History of Present Illness


History of Present Illness


Medically stable as can be


Was readmitted after just being one day in SNU because of seizure-like activity 

in SNU


pt is talking ,follow commands, but obviously has dementia not know the days 

place


son at bedside





Full code for now but family meeting is planned at Monday 10:30 with palliative

, still want FC and everything 


They know Pat from recent admission


tirado dcd 10/27 and voiding fine








Vitals


Vitals





Vital Signs








  Date Time  Temp Pulse Resp B/P (MAP) Pulse Ox O2 Delivery O2 Flow Rate FiO2


 


10/30/18 11:00 98.3 78 18 103/54 (70) 99 Room Air  





 98.3       











Physical Exam


Physical Exam


GENERAL: Resting quietly, NAD  .


LUNGS:  Clear to auscultation.


HEART:  S1, S2.


ABDOMEN:  Soft, nondistended with positive bowel sounds.


: [place]ado


EXTREMITIES:  Without clubbing or cyanosis.  No gross edema.


SKIN:  Warm to touch without signs of any rashes.


CNS: much more awake, and nods feeling better, eat today per RN


PIV


General:  Alert, Cooperative, No acute distress, Other (somnolent)


Heart:  Regular rate (SB 50s), Normal S1, Normal S2, Other (2/6 systolic murmur 

to LLS border)


Lungs:  Clear


Abdomen:  Normal bowel sounds, Soft, No tenderness


Extremities:  No cyanosis


Skin:  No breakdown





Labs


LABS





Laboratory Tests








Test


 10/30/18


06:05


 


White Blood Count


 6.4 x10^3/uL


(4.0-11.0)


 


Red Blood Count


 3.72 x10^6/uL


(3.50-5.40)


 


Hemoglobin


 10.6 g/dL


(12.0-15.5)


 


Hematocrit


 31.8 %


(36.0-47.0)


 


Mean Corpuscular Volume 86 fL () 


 


Mean Corpuscular Hemoglobin 29 pg (25-35) 


 


Mean Corpuscular Hemoglobin


Concent 33 g/dL


(31-37)


 


Red Cell Distribution Width


 19.6 %


(11.5-14.5)


 


Platelet Count


 145 x10^3/uL


(140-400)


 


Neutrophils (%) (Auto) 71 % (31-73) 


 


Lymphocytes (%) (Auto) 19 % (24-48) 


 


Monocytes (%) (Auto) 8 % (0-9) 


 


Eosinophils (%) (Auto) 1 % (0-3) 


 


Basophils (%) (Auto) 0 % (0-3) 


 


Neutrophils # (Auto)


 4.6 x10^3uL


(1.8-7.7)


 


Lymphocytes # (Auto)


 1.2 x10^3/uL


(1.0-4.8)


 


Monocytes # (Auto)


 0.5 x10^3/uL


(0.0-1.1)


 


Eosinophils # (Auto)


 0.1 x10^3/uL


(0.0-0.7)


 


Basophils # (Auto)


 0.0 x10^3/uL


(0.0-0.2)


 


Sodium Level


 140 mmol/L


(136-145)


 


Potassium Level


 4.2 mmol/L


(3.5-5.1)


 


Chloride Level


 107 mmol/L


()


 


Carbon Dioxide Level


 26 mmol/L


(21-32)


 


Anion Gap 7 (6-14) 


 


Blood Urea Nitrogen


 15 mg/dL


(7-20)


 


Creatinine


 1.0 mg/dL


(0.6-1.0)


 


Estimated GFR


(Cockcroft-Gault) 64.1 





 


Glucose Level


 99 mg/dL


(70-99)


 


Calcium Level


 8.3 mg/dL


(8.5-10.1)











Comment


Review of Relevant


I have reviewed the following items marky (where applicable) has been applied.


Labs





Laboratory Tests








Test


 10/30/18


06:05


 


White Blood Count


 6.4 x10^3/uL


(4.0-11.0)


 


Red Blood Count


 3.72 x10^6/uL


(3.50-5.40)


 


Hemoglobin


 10.6 g/dL


(12.0-15.5)


 


Hematocrit


 31.8 %


(36.0-47.0)


 


Mean Corpuscular Volume 86 fL () 


 


Mean Corpuscular Hemoglobin 29 pg (25-35) 


 


Mean Corpuscular Hemoglobin


Concent 33 g/dL


(31-37)


 


Red Cell Distribution Width


 19.6 %


(11.5-14.5)


 


Platelet Count


 145 x10^3/uL


(140-400)


 


Neutrophils (%) (Auto) 71 % (31-73) 


 


Lymphocytes (%) (Auto) 19 % (24-48) 


 


Monocytes (%) (Auto) 8 % (0-9) 


 


Eosinophils (%) (Auto) 1 % (0-3) 


 


Basophils (%) (Auto) 0 % (0-3) 


 


Neutrophils # (Auto)


 4.6 x10^3uL


(1.8-7.7)


 


Lymphocytes # (Auto)


 1.2 x10^3/uL


(1.0-4.8)


 


Monocytes # (Auto)


 0.5 x10^3/uL


(0.0-1.1)


 


Eosinophils # (Auto)


 0.1 x10^3/uL


(0.0-0.7)


 


Basophils # (Auto)


 0.0 x10^3/uL


(0.0-0.2)


 


Sodium Level


 140 mmol/L


(136-145)


 


Potassium Level


 4.2 mmol/L


(3.5-5.1)


 


Chloride Level


 107 mmol/L


()


 


Carbon Dioxide Level


 26 mmol/L


(21-32)


 


Anion Gap 7 (6-14) 


 


Blood Urea Nitrogen


 15 mg/dL


(7-20)


 


Creatinine


 1.0 mg/dL


(0.6-1.0)


 


Estimated GFR


(Cockcroft-Gault) 64.1 





 


Glucose Level


 99 mg/dL


(70-99)


 


Calcium Level


 8.3 mg/dL


(8.5-10.1)








Laboratory Tests








Test


 10/30/18


06:05


 


White Blood Count


 6.4 x10^3/uL


(4.0-11.0)


 


Red Blood Count


 3.72 x10^6/uL


(3.50-5.40)


 


Hemoglobin


 10.6 g/dL


(12.0-15.5)


 


Hematocrit


 31.8 %


(36.0-47.0)


 


Mean Corpuscular Volume 86 fL () 


 


Mean Corpuscular Hemoglobin 29 pg (25-35) 


 


Mean Corpuscular Hemoglobin


Concent 33 g/dL


(31-37)


 


Red Cell Distribution Width


 19.6 %


(11.5-14.5)


 


Platelet Count


 145 x10^3/uL


(140-400)


 


Neutrophils (%) (Auto) 71 % (31-73) 


 


Lymphocytes (%) (Auto) 19 % (24-48) 


 


Monocytes (%) (Auto) 8 % (0-9) 


 


Eosinophils (%) (Auto) 1 % (0-3) 


 


Basophils (%) (Auto) 0 % (0-3) 


 


Neutrophils # (Auto)


 4.6 x10^3uL


(1.8-7.7)


 


Lymphocytes # (Auto)


 1.2 x10^3/uL


(1.0-4.8)


 


Monocytes # (Auto)


 0.5 x10^3/uL


(0.0-1.1)


 


Eosinophils # (Auto)


 0.1 x10^3/uL


(0.0-0.7)


 


Basophils # (Auto)


 0.0 x10^3/uL


(0.0-0.2)


 


Sodium Level


 140 mmol/L


(136-145)


 


Potassium Level


 4.2 mmol/L


(3.5-5.1)


 


Chloride Level


 107 mmol/L


()


 


Carbon Dioxide Level


 26 mmol/L


(21-32)


 


Anion Gap 7 (6-14) 


 


Blood Urea Nitrogen


 15 mg/dL


(7-20)


 


Creatinine


 1.0 mg/dL


(0.6-1.0)


 


Estimated GFR


(Cockcroft-Gault) 64.1 





 


Glucose Level


 99 mg/dL


(70-99)


 


Calcium Level


 8.3 mg/dL


(8.5-10.1)








Microbiology


10/25/18 Urine Culture - Final, Complete


           


10/25/18 Urine Culture Result 1 (EDGAR) - Final, Complete


Medications





Current Medications


Ondansetron HCl (Zofran) 4 mg 1X  ONCE IV  Last administered on 10/25/18at 13:04

;  Start 10/25/18 at 11:45;  Stop 10/25/18 at 11:46;  Status DC


Ondansetron HCl (Zofran) 4 mg PRN Q8HRS  PRN IV NAUSEA/VOMITING;  Start 10/25/

18 at 14:15;  Stop 10/26/18 at 13:19;  Status DC


Sodium Chloride 1,000 ml @  85 mls/hr [id number] IV  Last administered on 10/25/

18at 16:39;  Start 10/25/18 at 14:04;  Stop 10/26/18 at 14:03;  Status DC


Lorazepam (Ativan) 1 mg 1X  ONCE IM  Last administered on 10/25/18at 21:53;  

Start 10/25/18 at 18:15;  Stop 10/25/18 at 18:16;  Status DC


Acetaminophen (Tylenol) 500 mg BID PO  Last administered on 10/30/18at 08:54;  

Start 10/25/18 at 21:00


Aspirin (Children'S Aspirin) 81 mg DAILY08 PO  Last administered on 10/30/18at 

08:55;  Start 10/26/18 at 08:00


Clopidogrel Bisulfate (Plavix) 75 mg DAILY07 PO  Last administered on 10/30/

18at 05:58;  Start 10/26/18 at 07:00


Atenolol (Tenormin) 25 mg DAILY PO  Last administered on 10/26/18at 10:13;  

Start 10/26/18 at 09:00;  Stop 10/26/18 at 11:14;  Status DC


Meclizine HCl (Antivert) 25 mg PRN Q8HRS  PRN PO DIZZINESS;  Start 10/25/18 at 

18:45


Simvastatin (Zocor) 40 mg QHS PO  Last administered on 10/29/18at 20:26;  Start 

10/25/18 at 21:00


Ceftriaxone Sodium 1 gm/ Dextrose 50 ml @  100 mls/hr Q24H IV ;  Start 10/25/18 

at 18:45;  Status UNV


Lactobacillus Rhamnosus (Culturelle) 1 cap BID PO  Last administered on 10/30/

18at 08:55;  Start 10/25/18 at 21:00


Ceftriaxone Sodium (Rocephin) 1 gm Q24H IVP ;  Start 10/25/18 at 19:00;  Stop 10

/26/18 at 10:15;  Status DC


Enoxaparin Sodium (Lovenox 40mg Syringe) 40 mg DAILY SQ  Last administered on 10

/30/18at 08:55;  Start 10/26/18 at 09:00


Diclofenac Sodium (Voltaren) 1 danyell BID TP ;  Start 10/25/18 at 21:00;  Status 

Cancel


Diclofenac Sodium (Voltaren) 1 danyell BID TP  Last administered on 10/30/18at 08:55

;  Start 10/26/18 at 09:00


Ondansetron HCl (Zofran) 4 mg PRN Q6HRS  PRN IV NAUSEA/VOMITING;  Start 10/26/

18 at 13:30;  Status Cancel


Alprazolam (Xanax) 1 mg 1X  ONCE PO ;  Start 10/26/18 at 21:00;  Stop 10/26/18 

at 21:01;  Status DC


Lorazepam (Ativan) 0.5 mg PRN Q8HRS  PRN PO ANXIETY / AGITATION Last 

administered on 10/29/18at 21:52;  Start 10/28/18 at 03:45


Acetaminophen (Tylenol) 650 mg PRN Q6HRS  PRN PO FEVER;  Start 10/29/18 at 15:00


Ondansetron HCl (Zofran) 4 mg PRN Q6HRS  PRN IV NAUSEA/VOMITING;  Start 10/29/

18 at 15:00


Morphine Sulfate (Morphine Sulfate) 2 mg PRN Q2HR  PRN IV MODERATE TO SEVERE 

PAIN;  Start 10/29/18 at 15:00


Tramadol HCl (Ultram) 50 mg PRN Q6HRS  PRN PO MILD TO MODERATE PAIN;  Start 10/

29/18 at 15:00


Docusate Sodium (Colace) 100 mg PRN DAILY  PRN PO CONSTIPATION;  Start 10/29/18 

at 15:00





Active Scripts


Active


Meclizine Hcl 25 Mg Tablet 1 Tab PO PRN TID PRN


Reported


Tylenol (Acetaminophen) 325 Mg Tablet 500 Mg PO BID


Aspirin 81 Mg Tab.chew 1 Tab PO DAILY


Simvastatin 40 Mg Tablet 1 Tab PO QHS


Clopidogrel (Clopidogrel Bisulfate) 75 Mg Tablet 1 Tab PO DAILY


Metformin Hcl 500 Mg Tablet 1 Tab PO BID


Atenolol 25 Mg Tablet 1 Tab PO DAILY


Vitals/I & O





Vital Sign - Last 24 Hours








 10/29/18 10/29/18 10/29/18 10/29/18





 15:00 19:00 20:05 23:00


 


Temp 98.7 97.6  97.1





 98.7 97.6  97.1


 


Pulse 68 81  80


 


Resp 18 17  17


 


B/P (MAP) 92/55 (67) 116/65 (82)  110/62 (78)


 


Pulse Ox 100 99  100


 


O2 Delivery Room Air Room Air Room Air Room Air


 


    





    





 10/30/18 10/30/18 10/30/18 10/30/18





 03:00 07:00 07:50 11:00


 


Temp 97.6 97.6  98.3





 97.6 97.6  98.3


 


Pulse 66 74  78


 


Resp 17 18  18


 


B/P (MAP) 131/62 (85) 107/49 (68)  103/54 (70)


 


Pulse Ox 100 97  99


 


O2 Delivery Room Air Room Air Room Air Room Air














Intake and Output   


 


 10/29/18 10/29/18 10/30/18





 15:00 23:00 07:00


 


Intake Total 240 ml  60 ml


 


Balance 240 ml  60 ml

















MELVI METZGER MD Oct 30, 2018 13:33

## 2018-10-30 NOTE — RAD
MRI Brain with and without contrast

 

History: Cerebral edema

 

Technique: Multiplanar, multi sequential pre and postcontrast MR imaging 

was performed of the brain.

 

Contrast:  6 cc  Gadavist

 

Comparison: 10/15/2018

 

Findings: There are persistent prominent areas of T2 and FLAIR 

hyperintense signal abnormality of the supratentorial parenchyma 

bilaterally although overall decreased associated mass effect. There is 

again variable cortical involvement. Overall extent of signal abnormality 

has decreased, previously seen small focus of the right frontal lobe 

almost completely resolved. There has been development of small foci of 

restricted diffusion left centrum semiovale about 0.4 cm in size, small 

focus of the left frontal lobe about 0.3 cm, focus along the posterior 

left lateral ventricle about 0.4 cm, focus of the left parietal lobe about

0.4 cm, and small focus left temporal lobe about 0.3 cm. There is also 

focus of restricted diffusion of the right cerebellum about 0.8 cm. There 

is no midline shift. There is small focus of parenchymal enhancement of 

the temporal lobe about 0.5 cm axial image 12. There is also small focus 

of enhancement along the left frontal cortical surface 0.3 cm axial image 

18, also probable small focus of the left frontal lobe parietal and 11 0.2

cm. 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 

frontal parietal regions and to a lesser degree of the temporal occipital 

regions. There is preservation of the major arterial flow voids at the 

skull base. There is patchy minimal ethmoid air cell mucosal thickening. 

There has been lens surgery bilaterally. There is preserved marrow signal 

of the clivus. Cerebellar tonsils are normal in location. 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 

cerebellar developmental venous anomaly. There are innumerable foci of 

hemosiderin deposition/microhemorrhage in part present previously although

more numerous in the interval.

 

 

Impression:

1. There is persistent although overall decreased cerebral edema, lesser 

degree of associated mass effect. There has been interval development of 

foci of likely subacute infarcts as stated, largest of the right 

cerebellum, smaller foci of the left frontal, parietal, and temporal 

lobes. There is a small focus of nonspecific parenchymal enhancement of 

the left temporal lobe, also small focus left frontal lobe. There is also 

left leptomeningeal enhancement suggestive of underlying infectious 

etiology. There is other T2 and FLAIR hyperintense abnormality as seen on 

August 2018 exam, likely component of underlying chronic microvascular 

ischemic disease. There are innumerable foci of microhemorrhage more 

numerous than previous exam.

 

 

 

Critical result:

 

Findings discussed with patient's nurse Nery at 10/30/2018 3:27 PM.

 

 

 

Electronically signed by: Russ Willoughby MD (10/30/2018 3:28 PM) 

[id number]

## 2018-10-30 NOTE — PDOC
Provider Note


Provider Note


10/30/2018


1630


Pt unreliable to obtain consent with periods of confusion.  Discussed with 

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 

MRI.  Discussed risks and benefits and agreeable to proceed.  Pt  mentation has 

improved and VSS.  By hx no esophageal issues.  No further episodes of possible 

syncopal or presyncopal episodes per staff. Per tele she has been in SR and 

there has been no episodes of AFIB or atrial flutter and maintaing SR. Will 

proceed with JOLENE tomorrow at 1330.











CATE WILLOUGHBY Oct 30, 2018 18:08

## 2018-10-30 NOTE — EEG
DATE OF SERVICE:  10/29/2018



ELECTROENCEPHALOGRAM NUMBER:  [id number].



DATE OF SERVICE:  10/29/2018-10/30/2018.



OBJECTIVE:  The patient is an 83-year-old female with episodes of

unresponsiveness.



DESCRIPTION:  This is a 24-hour video-monitored study.  Electrodes are

placed according to the international 10-20 system.  Bipolar and referential

montages are available.  Activation procedures typically include

hyperventilation and intermittent photic stimulation.



INTERPRETATION:  The waking background consists of 6-7 Hz,  microvolt

activity, symmetrically distributed over parietooccipital regions and reactive

to eye opening.  Hyperventilation and intermittent photic stimulation are

noncontributory.  All stages of sleep are observed with normal patterns.  All

computer-identified spikes and events are reviewed and findings are consistent

with movement artifact.



IMPRESSION:  This electroencephalogram with the patient awake and asleep, a

24-hour video-monitored study, is abnormal because of mild slowing of the

background activity consistent with encephalopathy or later stages of dementia. 

No epileptic activity is observed during this 24-hour study.



Thank you for letting us help with the patient's care.

 



______________________________

RUPA PIKE MD



DR:  NIKOLAY/prashant  JOB#:  [id number] / [id number]

DD:  10/30/2018 16:11  DT:  10/30/2018 16:39



KATELYNN Evans MD

MTDD

## 2018-10-30 NOTE — PDOC
PROGRESS NOTES


Assessment





Dementia, most likely Alzheimer's, consider frontotemporal


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 She 

continues to improve


Syncope, negative neurological workup for seizures in the past.


Plan


Await repeat brain MRI


Prolonged electroencephalogram, 24 hours, will disconnect this afternoon


Note palliative care indications, family still wants aggressive treatment


Aiming for return to skilled nursing as soon as tomorrow


I discussed with patients son


Subjective


No complaints


Objective





Vital Signs








  Date Time  Temp Pulse Resp B/P (MAP) Pulse Ox O2 Delivery O2 Flow Rate FiO2


 


10/30/18 07:50      Room Air  


 


10/30/18 07:00 97.6 74 18 107/49 (68) 97   





 97.6       














Intake and Output 


 


 10/30/18





 07:00


 


Intake Total 300 ml


 


Balance 300 ml


 


 


 


Intake Oral 300 ml








PHYSICAL EXAM


Physical Exam:


Alert. Oriented to place and person, knows month and year.


PERRL.


EOMI.


CN: no focal findings.


Muscle tone: normal.


Muscle strength:  5/5


DTR: 2+


Plantar reflex:  flexor


Gait: not examined in bed.


Sensory exam: no abnormal findings.


No cerebellar signs elicited.


Review of Relevant


I have reviewed the following items marky (where applicable) has been applied.


Labs





Laboratory Tests








Test


 10/30/18


06:05


 


White Blood Count


 6.4 x10^3/uL


(4.0-11.0)


 


Red Blood Count


 3.72 x10^6/uL


(3.50-5.40)


 


Hemoglobin


 10.6 g/dL


(12.0-15.5)


 


Hematocrit


 31.8 %


(36.0-47.0)


 


Mean Corpuscular Volume 86 fL () 


 


Mean Corpuscular Hemoglobin 29 pg (25-35) 


 


Mean Corpuscular Hemoglobin


Concent 33 g/dL


(31-37)


 


Red Cell Distribution Width


 19.6 %


(11.5-14.5)


 


Platelet Count


 145 x10^3/uL


(140-400)


 


Neutrophils (%) (Auto) 71 % (31-73) 


 


Lymphocytes (%) (Auto) 19 % (24-48) 


 


Monocytes (%) (Auto) 8 % (0-9) 


 


Eosinophils (%) (Auto) 1 % (0-3) 


 


Basophils (%) (Auto) 0 % (0-3) 


 


Neutrophils # (Auto)


 4.6 x10^3uL


(1.8-7.7)


 


Lymphocytes # (Auto)


 1.2 x10^3/uL


(1.0-4.8)


 


Monocytes # (Auto)


 0.5 x10^3/uL


(0.0-1.1)


 


Eosinophils # (Auto)


 0.1 x10^3/uL


(0.0-0.7)


 


Basophils # (Auto)


 0.0 x10^3/uL


(0.0-0.2)


 


Sodium Level


 140 mmol/L


(136-145)


 


Potassium Level


 4.2 mmol/L


(3.5-5.1)


 


Chloride Level


 107 mmol/L


()


 


Carbon Dioxide Level


 26 mmol/L


(21-32)


 


Anion Gap 7 (6-14) 


 


Blood Urea Nitrogen


 15 mg/dL


(7-20)


 


Creatinine


 1.0 mg/dL


(0.6-1.0)


 


Estimated GFR


(Cockcroft-Gault) 64.1 





 


Glucose Level


 99 mg/dL


(70-99)


 


Calcium Level


 8.3 mg/dL


(8.5-10.1)








Laboratory Tests








Test


 10/30/18


06:05


 


White Blood Count


 6.4 x10^3/uL


(4.0-11.0)


 


Red Blood Count


 3.72 x10^6/uL


(3.50-5.40)


 


Hemoglobin


 10.6 g/dL


(12.0-15.5)


 


Hematocrit


 31.8 %


(36.0-47.0)


 


Mean Corpuscular Volume 86 fL () 


 


Mean Corpuscular Hemoglobin 29 pg (25-35) 


 


Mean Corpuscular Hemoglobin


Concent 33 g/dL


(31-37)


 


Red Cell Distribution Width


 19.6 %


(11.5-14.5)


 


Platelet Count


 145 x10^3/uL


(140-400)


 


Neutrophils (%) (Auto) 71 % (31-73) 


 


Lymphocytes (%) (Auto) 19 % (24-48) 


 


Monocytes (%) (Auto) 8 % (0-9) 


 


Eosinophils (%) (Auto) 1 % (0-3) 


 


Basophils (%) (Auto) 0 % (0-3) 


 


Neutrophils # (Auto)


 4.6 x10^3uL


(1.8-7.7)


 


Lymphocytes # (Auto)


 1.2 x10^3/uL


(1.0-4.8)


 


Monocytes # (Auto)


 0.5 x10^3/uL


(0.0-1.1)


 


Eosinophils # (Auto)


 0.1 x10^3/uL


(0.0-0.7)


 


Basophils # (Auto)


 0.0 x10^3/uL


(0.0-0.2)


 


Sodium Level


 140 mmol/L


(136-145)


 


Potassium Level


 4.2 mmol/L


(3.5-5.1)


 


Chloride Level


 107 mmol/L


()


 


Carbon Dioxide Level


 26 mmol/L


(21-32)


 


Anion Gap 7 (6-14) 


 


Blood Urea Nitrogen


 15 mg/dL


(7-20)


 


Creatinine


 1.0 mg/dL


(0.6-1.0)


 


Estimated GFR


(Cockcroft-Gault) 64.1 





 


Glucose Level


 99 mg/dL


(70-99)


 


Calcium Level


 8.3 mg/dL


(8.5-10.1)








Microbiology


10/25/18 Urine Culture - Final, Complete


           


10/25/18 Urine Culture Result 1 (EDGAR) - Final, Complete


Medications





Current Medications


Ondansetron HCl (Zofran) 4 mg 1X  ONCE IV  Last administered on 10/25/18at 13:04

;  Start 10/25/18 at 11:45;  Stop 10/25/18 at 11:46;  Status DC


Ondansetron HCl (Zofran) 4 mg PRN Q8HRS  PRN IV NAUSEA/VOMITING;  Start 10/25/

18 at 14:15;  Stop 10/26/18 at 13:19;  Status DC


Sodium Chloride 1,000 ml @  85 mls/hr [id number] IV  Last administered on 10/25/

18at 16:39;  Start 10/25/18 at 14:04;  Stop 10/26/18 at 14:03;  Status DC


Lorazepam (Ativan) 1 mg 1X  ONCE IM  Last administered on 10/25/18at 21:53;  

Start 10/25/18 at 18:15;  Stop 10/25/18 at 18:16;  Status DC


Acetaminophen (Tylenol) 500 mg BID PO  Last administered on 10/30/18at 08:54;  

Start 10/25/18 at 21:00


Aspirin (Children'S Aspirin) 81 mg DAILY08 PO  Last administered on 10/30/18at 

08:55;  Start 10/26/18 at 08:00


Clopidogrel Bisulfate (Plavix) 75 mg DAILY07 PO  Last administered on 10/30/

18at 05:58;  Start 10/26/18 at 07:00


Atenolol (Tenormin) 25 mg DAILY PO  Last administered on 10/26/18at 10:13;  

Start 10/26/18 at 09:00;  Stop 10/26/18 at 11:14;  Status DC


Meclizine HCl (Antivert) 25 mg PRN Q8HRS  PRN PO DIZZINESS;  Start 10/25/18 at 

18:45


Simvastatin (Zocor) 40 mg QHS PO  Last administered on 10/29/18at 20:26;  Start 

10/25/18 at 21:00


Ceftriaxone Sodium 1 gm/ Dextrose 50 ml @  100 mls/hr Q24H IV ;  Start 10/25/18 

at 18:45;  Status UNV


Lactobacillus Rhamnosus (Culturelle) 1 cap BID PO  Last administered on 10/30/

18at 08:55;  Start 10/25/18 at 21:00


Ceftriaxone Sodium (Rocephin) 1 gm Q24H IVP ;  Start 10/25/18 at 19:00;  Stop 10

/26/18 at 10:15;  Status DC


Enoxaparin Sodium (Lovenox 40mg Syringe) 40 mg DAILY SQ  Last administered on 10

/30/18at 08:55;  Start 10/26/18 at 09:00


Diclofenac Sodium (Voltaren) 1 danyell BID TP ;  Start 10/25/18 at 21:00;  Status 

Cancel


Diclofenac Sodium (Voltaren) 1 danyell BID TP  Last administered on 10/30/18at 08:55

;  Start 10/26/18 at 09:00


Ondansetron HCl (Zofran) 4 mg PRN Q6HRS  PRN IV NAUSEA/VOMITING;  Start 10/26/

18 at 13:30;  Status Cancel


Alprazolam (Xanax) 1 mg 1X  ONCE PO ;  Start 10/26/18 at 21:00;  Stop 10/26/18 

at 21:01;  Status DC


Lorazepam (Ativan) 0.5 mg PRN Q8HRS  PRN PO ANXIETY / AGITATION Last 

administered on 10/29/18at 21:52;  Start 10/28/18 at 03:45


Acetaminophen (Tylenol) 650 mg PRN Q6HRS  PRN PO FEVER;  Start 10/29/18 at 15:00


Ondansetron HCl (Zofran) 4 mg PRN Q6HRS  PRN IV NAUSEA/VOMITING;  Start 10/29/

18 at 15:00


Morphine Sulfate (Morphine Sulfate) 2 mg PRN Q2HR  PRN IV MODERATE TO SEVERE 

PAIN;  Start 10/29/18 at 15:00


Tramadol HCl (Ultram) 50 mg PRN Q6HRS  PRN PO MILD TO MODERATE PAIN;  Start 10/

29/18 at 15:00


Docusate Sodium (Colace) 100 mg PRN DAILY  PRN PO CONSTIPATION;  Start 10/29/18 

at 15:00





Active Scripts


Active


Meclizine Hcl 25 Mg Tablet 1 Tab PO PRN TID PRN


Reported


Tylenol (Acetaminophen) 325 Mg Tablet 500 Mg PO BID


Aspirin 81 Mg Tab.chew 1 Tab PO DAILY


Simvastatin 40 Mg Tablet 1 Tab PO QHS


Clopidogrel (Clopidogrel Bisulfate) 75 Mg Tablet 1 Tab PO DAILY


Metformin Hcl 500 Mg Tablet 1 Tab PO BID


Atenolol 25 Mg Tablet 1 Tab PO DAILY


Vitals/I & O





Vital Sign - Last 24 Hours








 10/29/18 10/29/18 10/29/18 10/29/18





 11:00 15:00 19:00 20:05


 


Temp 97.7 98.7 97.6 





 97.7 98.7 97.6 


 


Pulse 65 68 81 


 


Resp 18 18 17 


 


B/P (MAP) 109/50 (69) 92/55 (67) 116/65 (82) 


 


Pulse Ox 95 100 99 


 


O2 Delivery Room Air Room Air Room Air Room Air


 


    





    





 10/29/18 10/30/18 10/30/18 10/30/18





 23:00 03:00 07:00 07:50


 


Temp 97.1 97.6 97.6 





 97.1 97.6 97.6 


 


Pulse 80 66 74 


 


Resp 17 17 18 


 


B/P (MAP) 110/62 (78) 131/62 (85) 107/49 (68) 


 


Pulse Ox 100 100 97 


 


O2 Delivery Room Air Room Air Room Air Room Air














Intake and Output   


 


 10/29/18 10/29/18 10/30/18





 15:00 23:00 07:00


 


Intake Total 240 ml  60 ml


 


Balance 240 ml  60 ml

















RUPA PIKE MD Oct 30, 2018 10:55

## 2018-10-31 VITALS — DIASTOLIC BLOOD PRESSURE: 46 MMHG | SYSTOLIC BLOOD PRESSURE: 93 MMHG

## 2018-10-31 VITALS — DIASTOLIC BLOOD PRESSURE: 48 MMHG | SYSTOLIC BLOOD PRESSURE: 111 MMHG

## 2018-10-31 VITALS — SYSTOLIC BLOOD PRESSURE: 171 MMHG | DIASTOLIC BLOOD PRESSURE: 57 MMHG

## 2018-10-31 VITALS — DIASTOLIC BLOOD PRESSURE: 69 MMHG | SYSTOLIC BLOOD PRESSURE: 131 MMHG

## 2018-10-31 VITALS — SYSTOLIC BLOOD PRESSURE: 107 MMHG | DIASTOLIC BLOOD PRESSURE: 60 MMHG

## 2018-10-31 VITALS — DIASTOLIC BLOOD PRESSURE: 62 MMHG | SYSTOLIC BLOOD PRESSURE: 121 MMHG

## 2018-10-31 VITALS — SYSTOLIC BLOOD PRESSURE: 97 MMHG | DIASTOLIC BLOOD PRESSURE: 46 MMHG

## 2018-10-31 RX ADMIN — Medication PRN EACH: at 18:57

## 2018-10-31 RX ADMIN — ACETAMINOPHEN PRN MG: 325 TABLET, FILM COATED ORAL at 21:57

## 2018-10-31 RX ADMIN — Medication SCH CAP: at 21:57

## 2018-10-31 RX ADMIN — CLOPIDOGREL BISULFATE SCH MG: 75 TABLET ORAL at 15:56

## 2018-10-31 RX ADMIN — DICLOFENAC SODIUM SCH APP: 10 GEL TOPICAL at 21:58

## 2018-10-31 RX ADMIN — ACETAMINOPHEN SCH MG: 500 TABLET ORAL at 15:57

## 2018-10-31 RX ADMIN — ACETAMINOPHEN SCH MG: 500 TABLET ORAL at 21:00

## 2018-10-31 RX ADMIN — ASPIRIN 81 MG SCH MG: 81 TABLET ORAL at 15:56

## 2018-10-31 RX ADMIN — DICLOFENAC SODIUM SCH APP: 10 GEL TOPICAL at 09:00

## 2018-10-31 RX ADMIN — Medication SCH CAP: at 15:56

## 2018-10-31 RX ADMIN — ACETAMINOPHEN PRN MG: 325 TABLET, FILM COATED ORAL at 22:04

## 2018-10-31 RX ADMIN — SIMVASTATIN SCH MG: 40 TABLET, FILM COATED ORAL at 21:57

## 2018-10-31 NOTE — PDOC
PROGRESS NOTES


Assessment


Dementia, most likely Alzheimer's, consider frontotemporal


Posterior reversible encephalopathy syndrome. She continues to improve, but 

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 Note that 

sedimentation rate and antinuclear antibody studies were negative last admission


Syncope, negative neurological workup for seizures in the past, 24 hour EEG is 

negative for epileptic abnormalities, does show diffuse background slowing 

consistent with dementia or encephalopathy.


Plan


CTA angiogram


Transesophageal echocardiogram


Continue aspirin, clopidrogel, simvastatin


If tests are negative, okay to transfer to rehab, especially since her 

neurological condition continues to slowly improve


Unless there is evidence of vasculitis, holding off on additional studies such 

as brain biopsy, repeat lumbar puncture, or impair immunosuppression


Discussed with Dr. Wilson


Discussed with patients daughter


Subjective


Patient has no complaints


Objective





Vital Signs








  Date Time  Temp Pulse Resp B/P (MAP) Pulse Ox O2 Delivery O2 Flow Rate FiO2


 


10/31/18 08:00      Room Air  


 


10/31/18 07:00 98.4 71 18 97/46 (63) 99   





 98.4       














Intake and Output 


 


 10/31/18





 07:00


 


Intake Total 360 ml


 


Output Total 1 ml


 


Balance 359 ml


 


 


 


Intake Oral 360 ml


 


Output Urine Total 1 ml








PHYSICAL EXAM


Alert. Oriented to place and person, knows month and year.


PERRL.


EOMI.


CN: no focal findings.


Muscle tone: normal.


Muscle strength:  5/5


DTR: 2+


Plantar reflex:  flexor


Gait: not examined in bed.


Sensory exam: no abnormal findings.


No cerebellar signs elicited.


Review of Relevant


I have reviewed the following items marky (where applicable) has been applied.


Labs





Laboratory Tests








Test


 10/30/18


06:05


 


White Blood Count


 6.4 x10^3/uL


(4.0-11.0)


 


Red Blood Count


 3.72 x10^6/uL


(3.50-5.40)


 


Hemoglobin


 10.6 g/dL


(12.0-15.5)


 


Hematocrit


 31.8 %


(36.0-47.0)


 


Mean Corpuscular Volume 86 fL () 


 


Mean Corpuscular Hemoglobin 29 pg (25-35) 


 


Mean Corpuscular Hemoglobin


Concent 33 g/dL


(31-37)


 


Red Cell Distribution Width


 19.6 %


(11.5-14.5)


 


Platelet Count


 145 x10^3/uL


(140-400)


 


Neutrophils (%) (Auto) 71 % (31-73) 


 


Lymphocytes (%) (Auto) 19 % (24-48) 


 


Monocytes (%) (Auto) 8 % (0-9) 


 


Eosinophils (%) (Auto) 1 % (0-3) 


 


Basophils (%) (Auto) 0 % (0-3) 


 


Neutrophils # (Auto)


 4.6 x10^3uL


(1.8-7.7)


 


Lymphocytes # (Auto)


 1.2 x10^3/uL


(1.0-4.8)


 


Monocytes # (Auto)


 0.5 x10^3/uL


(0.0-1.1)


 


Eosinophils # (Auto)


 0.1 x10^3/uL


(0.0-0.7)


 


Basophils # (Auto)


 0.0 x10^3/uL


(0.0-0.2)


 


Sodium Level


 140 mmol/L


(136-145)


 


Potassium Level


 4.2 mmol/L


(3.5-5.1)


 


Chloride Level


 107 mmol/L


()


 


Carbon Dioxide Level


 26 mmol/L


(21-32)


 


Anion Gap 7 (6-14) 


 


Blood Urea Nitrogen


 15 mg/dL


(7-20)


 


Creatinine


 1.0 mg/dL


(0.6-1.0)


 


Estimated GFR


(Cockcroft-Gault) 64.1 





 


Glucose Level


 99 mg/dL


(70-99)


 


Calcium Level


 8.3 mg/dL


(8.5-10.1)








Microbiology


10/25/18 Urine Culture - Final, Complete


           


10/25/18 Urine Culture Result 1 (EDGAR) - Final, Complete


Medications





Current Medications


Ondansetron HCl (Zofran) 4 mg 1X  ONCE IV  Last administered on 10/25/18at 13:04

;  Start 10/25/18 at 11:45;  Stop 10/25/18 at 11:46;  Status DC


Ondansetron HCl (Zofran) 4 mg PRN Q8HRS  PRN IV NAUSEA/VOMITING;  Start 10/25/

18 at 14:15;  Stop 10/26/18 at 13:19;  Status DC


Sodium Chloride 1,000 ml @  85 mls/hr [id number] IV  Last administered on 10/25/

18at 16:39;  Start 10/25/18 at 14:04;  Stop 10/26/18 at 14:03;  Status DC


Lorazepam (Ativan) 1 mg 1X  ONCE IM  Last administered on 10/25/18at 21:53;  

Start 10/25/18 at 18:15;  Stop 10/25/18 at 18:16;  Status DC


Acetaminophen (Tylenol) 500 mg BID PO  Last administered on 10/30/18at 21:00;  

Start 10/25/18 at 21:00


Aspirin (Children'S Aspirin) 81 mg DAILY08 PO  Last administered on 10/30/18at 

08:55;  Start 10/26/18 at 08:00


Clopidogrel Bisulfate (Plavix) 75 mg DAILY07 PO  Last administered on 10/30/

18at 05:58;  Start 10/26/18 at 07:00


Atenolol (Tenormin) 25 mg DAILY PO  Last administered on 10/26/18at 10:13;  

Start 10/26/18 at 09:00;  Stop 10/26/18 at 11:14;  Status DC


Meclizine HCl (Antivert) 25 mg PRN Q8HRS  PRN PO DIZZINESS;  Start 10/25/18 at 

18:45


Simvastatin (Zocor) 40 mg QHS PO  Last administered on 10/30/18at 21:25;  Start 

10/25/18 at 21:00


Ceftriaxone Sodium 1 gm/ Dextrose 50 ml @  100 mls/hr Q24H IV ;  Start 10/25/18 

at 18:45;  Status UNV


Lactobacillus Rhamnosus (Culturelle) 1 cap BID PO  Last administered on 10/30/

18at 21:25;  Start 10/25/18 at 21:00


Ceftriaxone Sodium (Rocephin) 1 gm Q24H IVP ;  Start 10/25/18 at 19:00;  Stop 10

/26/18 at 10:15;  Status DC


Enoxaparin Sodium (Lovenox 40mg Syringe) 40 mg DAILY SQ  Last administered on 10

/30/18at 08:55;  Start 10/26/18 at 09:00


Diclofenac Sodium (Voltaren) 1 danyell BID TP ;  Start 10/25/18 at 21:00;  Status 

Cancel


Diclofenac Sodium (Voltaren) 1 danyell BID TP  Last administered on 10/30/18at 21:28

;  Start 10/26/18 at 09:00


Ondansetron HCl (Zofran) 4 mg PRN Q6HRS  PRN IV NAUSEA/VOMITING;  Start 10/26/

18 at 13:30;  Status Cancel


Alprazolam (Xanax) 1 mg 1X  ONCE PO ;  Start 10/26/18 at 21:00;  Stop 10/26/18 

at 21:01;  Status DC


Lorazepam (Ativan) 0.5 mg PRN Q8HRS  PRN PO ANXIETY / AGITATION Last 

administered on 10/30/18at 22:38;  Start 10/28/18 at 03:45


Acetaminophen (Tylenol) 650 mg PRN Q6HRS  PRN PO FEVER Last administered on 10/

30/18at 21:29;  Start 10/29/18 at 15:00


Ondansetron HCl (Zofran) 4 mg PRN Q6HRS  PRN IV NAUSEA/VOMITING;  Start 10/29/

18 at 15:00


Morphine Sulfate (Morphine Sulfate) 2 mg PRN Q2HR  PRN IV MODERATE TO SEVERE 

PAIN;  Start 10/29/18 at 15:00


Tramadol HCl (Ultram) 50 mg PRN Q6HRS  PRN PO MILD TO MODERATE PAIN;  Start 10/

29/18 at 15:00


Docusate Sodium (Colace) 100 mg PRN DAILY  PRN PO CONSTIPATION;  Start 10/29/18 

at 15:00


Gadobutrol (Gadavist) 6 mmol 1X  ONCE IV  Last administered on 10/30/18at 14:56

;  Start 10/30/18 at 14:45;  Stop 10/30/18 at 14:46;  Status DC


Iohexol (Omnipaque 300 Mg/ml) 75 ml 1X  ONCE IV ;  Start 10/31/18 at 11:00;  

Stop 10/31/18 at 11:01;  Status DC


Info (CONTRAST GIVEN -- Rx MONITORING) 1 each PRN DAILY  PRN MC SEE COMMENTS;  

Start 10/31/18 at 11:00;  Stop 11/2/18 at 10:59





Active Scripts


Active


Meclizine Hcl 25 Mg Tablet 1 Tab PO PRN TID PRN


Reported


Tylenol (Acetaminophen) 325 Mg Tablet 500 Mg PO BID


Aspirin 81 Mg Tab.chew 1 Tab PO DAILY


Simvastatin 40 Mg Tablet 1 Tab PO QHS


Clopidogrel (Clopidogrel Bisulfate) 75 Mg Tablet 1 Tab PO DAILY


Metformin Hcl 500 Mg Tablet 1 Tab PO BID


Atenolol 25 Mg Tablet 1 Tab PO DAILY


Vitals/I & O





Vital Sign - Last 24 Hours








 10/30/18 10/30/18 10/30/18 10/30/18





 15:00 19:00 20:21 23:00


 


Temp 97.8 98.4  98.0





 97.8 98.4  98.0


 


Pulse 80 59  47


 


Resp 18 18  18


 


B/P (MAP) 125/63 (83) 113/62 (79)  117/51 (73)


 


Pulse Ox 99 99  100


 


O2 Delivery Room Air Room Air Room Air Room Air


 


    





    





 10/31/18 10/31/18 10/31/18 





 03:00 07:00 08:00 


 


Temp 97.8 98.4  





 97.8 98.4  


 


Pulse 77 71  


 


Resp 18 18  


 


B/P (MAP) 111/48 (69) 97/46 (63)  


 


Pulse Ox 98 99  


 


O2 Delivery Room Air Room Air Room Air 














Intake and Output   


 


 10/30/18 10/30/18 10/31/18





 15:00 23:00 07:00


 


Intake Total 240 ml 120 ml 


 


Output Total   1 ml


 


Balance 240 ml 120 ml -1 ml








Images


MRI brain:


There are persistent prominent areas of T2 and FLAIR 


hyperintense signal abnormality of the supratentorial parenchyma 


bilaterally although overall decreased associated mass effect. There is 


again variable cortical involvement. Overall extent of signal abnormality 


has decreased, previously seen small focus of the right frontal lobe 


almost completely resolved. There has been development of small foci of 


restricted diffusion left centrum semiovale about 0.4 cm in size, small 


focus of the left frontal lobe about 0.3 cm, focus along the posterior 


left lateral ventricle about 0.4 cm, focus of the left parietal lobe about


0.4 cm, and small focus left temporal lobe about 0.3 cm. There is also 


focus of restricted diffusion of the right cerebellum about 0.8 cm. There 


is no midline shift. There is small focus of parenchymal enhancement of 


the temporal lobe about 0.5 cm axial image 12. There is also small focus 


of enhancement along the left frontal cortical surface 0.3 cm axial image 


18, also probable small focus of the left frontal lobe parietal and 11 0.2


cm. 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 


frontal parietal regions and to a lesser degree of the temporal occipital 


regions. There is preservation of the major arterial flow voids at the 


skull base. There is patchy minimal ethmoid air cell mucosal thickening. 


There has been lens surgery bilaterally. There is preserved marrow signal 


of the clivus. Cerebellar tonsils are normal in location. 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 


cerebellar developmental venous anomaly. There are innumerable foci of 


hemosiderin deposition/microhemorrhage in part present previously although


more numerous in the interval.


 


 


Impression:


1. There is persistent although overall decreased cerebral edema, lesser 


degree of associated mass effect. There has been interval development of 


foci of likely subacute infarcts as stated, largest of the right 


cerebellum, smaller foci of the left frontal, parietal, and temporal 


lobes. There is a small focus of nonspecific parenchymal enhancement of 


the left temporal lobe, also small focus left frontal lobe. There is also 


left leptomeningeal enhancement suggestive of underlying infectious 


etiology. There is other T2 and FLAIR hyperintense abnormality as seen on 


August 2018 exam, likely component of underlying chronic microvascular 


ischemic disease. There are innumerable foci of microhemorrhage more 


numerous than previous exam.











RUPA PIKE MD Oct 31, 2018 11:19

## 2018-10-31 NOTE — RAD
Examination: VENOUS LOWER EXT BILATERAL

 

History: PE

 

Comparison/Correlation: None

 

Findings: Bilateral lower extremity venous duplex ultrasound exam was 

performed. Color Doppler, spectral Doppler, and grayscale imaging was 

performed. Compression and augmentation utilized.

 

Common femoral vein, superficial femoral vein, popliteal vein, greater 

saphenous vein, posterior tibial and peroneal veins are normal 

bilaterally. No thrombus.

 

Impression:

No evidence of lower extremity deep venous thrombus.

 

Electronically signed by: North Padilla MD (10/31/2018 11:45 PM) [facility]

## 2018-10-31 NOTE — RAD
CTA head and neck

 

History: Stroke, cerebral edema

 

Technique: After bolus of intravenous contrast, volumetric CT data 

acquisition was acquired of the head and neck. Multiplanar reconstruction 

images to include MIP and 3-D reconstruction images are submitted.  

Exposure: One or more of the following individualized dose reduction 

techniques were utilized for this examination:  1. Automated exposure 

control  2. Adjustment of the mA and/or kV according to patient size  3. 

Use of iterative reconstruction technique.

 

Contrast: 75 cc Omnipaque 300

 

Comparison: There is no previous similar exam available, correlation made 

with MRI brain performed 10/30/2018

 

Any determination of stenosis is based on NASCET criteria.  

 

CTA head: 

Findings: Both intradural vertebral arteries constitute the basilar 

artery, slightly dominant left vertebral artery. There is plaque of the 

right intradural vertebral artery without significant stenosis. There is 

visualization of right AICA, not visualized on the left on this exam. 

There is visualization of segments of bilateral PICAs. There is 

visualization of segments of bilateral superior cerebellar arteries. No 

significant posterior communicating arteries are visualized on either 

side. There is patent anterior communicating artery. There is 

visualization of segments of the anterior, middle, and posterior cerebral 

arteries bilaterally. There is calcified plaque of the carotid siphons 

bilaterally without significant luminal diameter reduction. Major venous 

sinuses are patent. No focal vessel occlusion or filling defect, 

significant focal stenosis, or aneurysm is identified of the visualized 

intracranial vasculature. Left A1 segment is hypoplastic compared with the

right.

 

Impression:

1.  There is no significant intracranial stenosis or aneurysm.

 

Neck CTA:

 

Findings: As seen on the most inferior images, there is a fairly large 

pulmonary embolism of the left lower lobe pulmonary artery, smaller focus 

in the left upper lobe. There is some emphysema of the visualized lung 

apices.

 

There are some calcified plaque of the carotid bulbs bilaterally greater 

on the right without significant stenosis (less than 30% luminal diameter 

reduction). Common carotid arteries are patent bilaterally without focal 

stenosis. There is also minimal calcified plaque of the mid left common 

carotid artery. Cervical internal carotid arteries are patent bilaterally 

without significant stenosis. Proximal vertebral arteries are tortuous 

greater on the left. There is no significant focal stenosis of the 

cervical vertebral arteries. No dissection flap is identified of the 

cervical arterial vasculature.

 

There is multilevel cervical degenerative disc disease and spondylosis, 

disc osteophyte complexes at C5-C6 and C3-4 resulting in likely central 

canal stenosis on the order of 7 mm. There is a lesser degree of spinal 

stenosis at C4-5. There is multilevel facet and uncovertebral degenerative

change contributing to neural foramina compromise.

 

Impression:

1. There are left pulmonary emboli, pulmonary arteries not fully 

evaluated.

2. There is no significant focal stenosis of the cervical arterial 

vasculature. There is plaque of the bilateral carotid bulbs greater on the

right.

3. There is multilevel cervical degenerative disc disease and spondylosis,

spinal stenosis greatest C5-6 and C3-4.

 

 

**********FOR INTERNAL CODING PURPOSES**********

 

Critical result:

 

Findings discussed with patient's nurse Macarena at 10/31/2018 2:15 PM, to 

inform doctor of findings.

 

RESULT CODE: (C)  

 

 

 

 

 

 

Electronically signed by: Russ Willoughby MD (10/31/2018 2:27 PM) 

[id number]

## 2018-10-31 NOTE — CARD
MR#: [id number]

Account#: [id number]

Accession#: [id number]

Date of Study: 10/31/2018

Ordering Physician: CATE WILLOUGHBY, 

Referring Physician: KATELYNN ABDULLAHI 

Tech: Whitney Ballard





--------------- APPROVED REPORT --------------





EXAM: Transesophageal echocardiogram with color flow Doppler.



INDICATION

Syncope 

Encephalapathy



Reason For Test : Rule out Intracardiac Thrombus.



PROCEDURE

After obtaining informed consent, patient underwent transesophageal echo in the PACU.

Type of Sedation : General Anesthesia

Sedation was administered by Promise Gould CRNA. 

Sedation was achieved with Propofol 100 mg intravenously. 

Transesophageal probe was inserted and advanced into esophagus by Guillermo No MD.

The JOLENE was performed without complications. 

Throughout the procedure, the blood pressure, pulse oximetry, cardiac rhythm, and rate were monitored
.



 LEFT VENTRICLE 

The left ventricle is normal size. There is normal left ventricular wall thickness. The left ventricu
lar systolic function is normal. The ejection fraction is 55-60%. There is normal LV segmental wall m
otion. The left ventricular diastolic function and filling is normal for age. No left ventricle throm
bus noted on this study.



 RIGHT VENTRICLE 

The right ventricle is normal size. There is normal right ventricular wall thickness. The right ventr
icular systolic function is normal.



 ATRIA 

The left atrium is borderline dilated. The right atrium size is normal. The interatrial septum is int
act with no evidence for an atrial septal defect or patent foramen ovale as noted on 2-D or Doppler i
maging. There is no thrombus noted in the left atrial appendage.



 AORTIC VALVE 

The aortic valve is thickened but opens well. Doppler and Color Flow revealed mild aortic regurgitati
on. There is no significant aortic valvular stenosis.



 MITRAL VALVE 

The mitral valve leaflets are thickened. There is no mitral valve stenosis. Doppler and Color-flow re
vealed mild mitral regurgitation.



 TRICUSPID VALVE 

The tricuspid valve is normal in structure and function. Doppler and Color Flow revealed trace tricus
pid regurgitation. There is no tricuspid valve stenosis. 



 PULMONIC VALVE 

The pulmonary valve is normal in structure and function. Doppler and Color Flow revealed mild pulmoni
c valvular regurgitation. There is no pulmonic valvular stenosis.



 GREAT VESSELS 

The aortic root is normal in size.



Critical Notification

Critical Value: No



<Conclusion>

The left ventricle is normal size.

The left ventricular systolic function is normal.

The ejection fraction is 55-60%.

The interatrial septum is intact with no evidence for an atrial septal defect or patent foramen ovale
 as noted on 2-D or Doppler imaging.

There is no thrombus noted in the left atrial appendage.

There is no significant aortic valvular stenosis.

Doppler and Color Flow revealed mild aortic regurgitation.

Doppler and Color-flow revealed mild mitral regurgitation.

Doppler and Color Flow revealed trace tricuspid regurgitation.

No evidence of a source of embolization.



Signed by : Guillermo No MD

Electronically Approved : 10/31/2018 17:46:44

## 2018-10-31 NOTE — PDOC
PROGRESS NOTES


Chief Complaint


Chief Complaint


recent CT showed Cerebral edema, encephalitis or poss press syndrome


Acute encephalopathy on chronic mild dementia, bl subacute stroke on MRI


Fluctuating dementia w/prior delirium 


Moderate malnutrition 


Dehydration


Weakness and debility


Dysphagia


Poor PO intake


Arrhythmia


The left ventricular systolic function is low normal.  PVC's. EF 45-50%


There is moderate concentric left ventricular hypertrophy.


h/o CAD with 3 stents








plan:


fu with neuro, repeat MRI showed bl subacute stroke. talked to dr. Kapadia, 

will do cta, JOLENE today. 


on plavix , statin already


24h EEG ok as per neuro


PAT COnsulted, pt family still want FC and aggressive treatment





Nurse called and neuro called me notifying pt has PE. will do heparin drip, 

pulm consult.


check dvt, check Echo





History of Present Illness


History of Present Illness


Medically stable as can be


Was readmitted after just being one day in SNU because of seizure-like activity 

in SNU


pt is talking ,follow commands, but obviously has dementia not know the days 

place


son at bedside





Full code for now but family meeting is planned at Monday 10:30 with palliative

, still want FC and everything 


They know Pat from recent admission


tirado dcd 10/27 and voiding fine








Vitals


Vitals





Vital Signs








  Date Time  Temp Pulse Resp B/P (MAP) Pulse Ox O2 Delivery O2 Flow Rate FiO2


 


10/31/18 13:18 97.0 62 16 98/56 97 Room Air  





 97.0       











Physical Exam


Physical Exam


GENERAL: Resting quietly, NAD  .


LUNGS:  Clear to auscultation.


HEART:  S1, S2.


ABDOMEN:  Soft, nondistended with positive bowel sounds.


: Tirado


EXTREMITIES:  Without clubbing or cyanosis.  No gross edema.


SKIN:  Warm to touch without signs of any rashes.


CNS: much more awake, and nods feeling better, eat today per RN


PIV


General:  Alert, Cooperative, No acute distress, Other (somnolent)


Heart:  Regular rate (SB 50s), Normal S1, Normal S2, Other (2/6 systolic murmur 

to LLS border)


Lungs:  Clear


Abdomen:  Normal bowel sounds, Soft, No tenderness


Extremities:  No cyanosis


Skin:  No breakdown





Comment


Review of Relevant


I have reviewed the following items marky (where applicable) has been applied.


Labs





Laboratory Tests








Test


 10/30/18


06:05


 


White Blood Count


 6.4 x10^3/uL


(4.0-11.0)


 


Red Blood Count


 3.72 x10^6/uL


(3.50-5.40)


 


Hemoglobin


 10.6 g/dL


(12.0-15.5)


 


Hematocrit


 31.8 %


(36.0-47.0)


 


Mean Corpuscular Volume 86 fL () 


 


Mean Corpuscular Hemoglobin 29 pg (25-35) 


 


Mean Corpuscular Hemoglobin


Concent 33 g/dL


(31-37)


 


Red Cell Distribution Width


 19.6 %


(11.5-14.5)


 


Platelet Count


 145 x10^3/uL


(140-400)


 


Neutrophils (%) (Auto) 71 % (31-73) 


 


Lymphocytes (%) (Auto) 19 % (24-48) 


 


Monocytes (%) (Auto) 8 % (0-9) 


 


Eosinophils (%) (Auto) 1 % (0-3) 


 


Basophils (%) (Auto) 0 % (0-3) 


 


Neutrophils # (Auto)


 4.6 x10^3uL


(1.8-7.7)


 


Lymphocytes # (Auto)


 1.2 x10^3/uL


(1.0-4.8)


 


Monocytes # (Auto)


 0.5 x10^3/uL


(0.0-1.1)


 


Eosinophils # (Auto)


 0.1 x10^3/uL


(0.0-0.7)


 


Basophils # (Auto)


 0.0 x10^3/uL


(0.0-0.2)


 


Sodium Level


 140 mmol/L


(136-145)


 


Potassium Level


 4.2 mmol/L


(3.5-5.1)


 


Chloride Level


 107 mmol/L


()


 


Carbon Dioxide Level


 26 mmol/L


(21-32)


 


Anion Gap 7 (6-14) 


 


Blood Urea Nitrogen


 15 mg/dL


(7-20)


 


Creatinine


 1.0 mg/dL


(0.6-1.0)


 


Estimated GFR


(Cockcroft-Gault) 64.1 





 


Glucose Level


 99 mg/dL


(70-99)


 


Calcium Level


 8.3 mg/dL


(8.5-10.1)








Microbiology


10/25/18 Urine Culture - Final, Complete


           


10/25/18 Urine Culture Result 1 (EDGAR) - Final, Complete


Medications





Current Medications


Ondansetron HCl (Zofran) 4 mg 1X  ONCE IV  Last administered on 10/25/18at 13:04

;  Start 10/25/18 at 11:45;  Stop 10/25/18 at 11:46;  Status DC


Ondansetron HCl (Zofran) 4 mg PRN Q8HRS  PRN IV NAUSEA/VOMITING;  Start 10/25/

18 at 14:15;  Stop 10/26/18 at 13:19;  Status DC


Sodium Chloride 1,000 ml @  85 mls/hr [id number] IV  Last administered on 10/25/

18at 16:39;  Start 10/25/18 at 14:04;  Stop 10/26/18 at 14:03;  Status DC


Lorazepam (Ativan) 1 mg 1X  ONCE IM  Last administered on 10/25/18at 21:53;  

Start 10/25/18 at 18:15;  Stop 10/25/18 at 18:16;  Status DC


Acetaminophen (Tylenol) 500 mg BID PO  Last administered on 10/30/18at 21:00;  

Start 10/25/18 at 21:00


Aspirin (Children'S Aspirin) 81 mg DAILY08 PO  Last administered on 10/30/18at 

08:55;  Start 10/26/18 at 08:00


Clopidogrel Bisulfate (Plavix) 75 mg DAILY07 PO  Last administered on 10/30/

18at 05:58;  Start 10/26/18 at 07:00


Atenolol (Tenormin) 25 mg DAILY PO  Last administered on 10/26/18at 10:13;  

Start 10/26/18 at 09:00;  Stop 10/26/18 at 11:14;  Status DC


Meclizine HCl (Antivert) 25 mg PRN Q8HRS  PRN PO DIZZINESS;  Start 10/25/18 at 

18:45


Simvastatin (Zocor) 40 mg QHS PO  Last administered on 10/30/18at 21:25;  Start 

10/25/18 at 21:00


Ceftriaxone Sodium 1 gm/ Dextrose 50 ml @  100 mls/hr Q24H IV ;  Start 10/25/18 

at 18:45;  Status UNV


Lactobacillus Rhamnosus (Culturelle) 1 cap BID PO  Last administered on 10/30/

18at 21:25;  Start 10/25/18 at 21:00


Ceftriaxone Sodium (Rocephin) 1 gm Q24H IVP ;  Start 10/25/18 at 19:00;  Stop 10

/26/18 at 10:15;  Status DC


Enoxaparin Sodium (Lovenox 40mg Syringe) 40 mg DAILY SQ  Last administered on 10

/30/18at 08:55;  Start 10/26/18 at 09:00


Diclofenac Sodium (Voltaren) 1 danyell BID TP ;  Start 10/25/18 at 21:00;  Status 

Cancel


Diclofenac Sodium (Voltaren) 1 danyell BID TP  Last administered on 10/30/18at 21:28

;  Start 10/26/18 at 09:00


Ondansetron HCl (Zofran) 4 mg PRN Q6HRS  PRN IV NAUSEA/VOMITING;  Start 10/26/

18 at 13:30;  Status Cancel


Alprazolam (Xanax) 1 mg 1X  ONCE PO ;  Start 10/26/18 at 21:00;  Stop 10/26/18 

at 21:01;  Status DC


Lorazepam (Ativan) 0.5 mg PRN Q8HRS  PRN PO ANXIETY / AGITATION Last 

administered on 10/30/18at 22:38;  Start 10/28/18 at 03:45


Acetaminophen (Tylenol) 650 mg PRN Q6HRS  PRN PO FEVER Last administered on 10/

30/18at 21:29;  Start 10/29/18 at 15:00


Ondansetron HCl (Zofran) 4 mg PRN Q6HRS  PRN IV NAUSEA/VOMITING;  Start 10/29/

18 at 15:00


Morphine Sulfate (Morphine Sulfate) 2 mg PRN Q2HR  PRN IV MODERATE TO SEVERE 

PAIN;  Start 10/29/18 at 15:00


Tramadol HCl (Ultram) 50 mg PRN Q6HRS  PRN PO MILD TO MODERATE PAIN;  Start 10/

29/18 at 15:00


Docusate Sodium (Colace) 100 mg PRN DAILY  PRN PO CONSTIPATION;  Start 10/29/18 

at 15:00


Gadobutrol (Gadavist) 6 mmol 1X  ONCE IV  Last administered on 10/30/18at 14:56

;  Start 10/30/18 at 14:45;  Stop 10/30/18 at 14:46;  Status DC


Iohexol (Omnipaque 300 Mg/ml) 75 ml 1X  ONCE IV  Last administered on 10/31/

18at 11:23;  Start 10/31/18 at 11:00;  Stop 10/31/18 at 11:01;  Status DC


Info (CONTRAST GIVEN -- Rx MONITORING) 1 each PRN DAILY  PRN MC SEE COMMENTS;  

Start 10/31/18 at 11:00;  Stop 11/2/18 at 10:59


Benzocaine (Hurricaine One) 3 spray 1X  ONCE MM ;  Start 10/31/18 at 12:15;  

Stop 10/31/18 at 12:16;  Status DC


Lidocaine HCl (Viscous Lidocaine) 15 ml 1X  ONCE SWSW ;  Start 10/31/18 at 12:15

;  Stop 10/31/18 at 12:16;  Status DC


Lidocaine HCl (Xylocaine 2% Topical 30gm Tube) 1 danyell 1X  ONCE TP ;  Start 10/31/

18 at 12:15;  Stop 10/31/18 at 12:16;  Status DC





Active Scripts


Active


Meclizine Hcl 25 Mg Tablet 1 Tab PO PRN TID PRN


Reported


Tylenol (Acetaminophen) 325 Mg Tablet 500 Mg PO BID


Aspirin 81 Mg Tab.chew 1 Tab PO DAILY


Simvastatin 40 Mg Tablet 1 Tab PO QHS


Clopidogrel (Clopidogrel Bisulfate) 75 Mg Tablet 1 Tab PO DAILY


Metformin Hcl 500 Mg Tablet 1 Tab PO BID


Atenolol 25 Mg Tablet 1 Tab PO DAILY


Vitals/I & O





Vital Sign - Last 24 Hours








 10/30/18 10/30/18 10/30/18 10/30/18





 15:00 19:00 20:21 23:00


 


Temp 97.8 98.4  98.0





 97.8 98.4  98.0


 


Pulse 80 59  47


 


Resp 18 18  18


 


B/P (MAP) 125/63 (83) 113/62 (79)  117/51 (73)


 


Pulse Ox 99 99  100


 


O2 Delivery Room Air Room Air Room Air Room Air


 


    





    





 10/31/18 10/31/18 10/31/18 10/31/18





 03:00 07:00 08:00 11:41


 


Temp 97.8 98.4  98.4





 97.8 98.4  98.4


 


Pulse 77 71  71


 


Resp 18 18  18


 


B/P (MAP) 111/48 (69) 97/46 (63)  107/60 (76)


 


Pulse Ox 98 99  100


 


O2 Delivery Room Air Room Air Room Air Room Air


 


    





    





 10/31/18   





 13:18   


 


Temp 97.0   





 97.0   


 


Pulse 62   


 


Resp 16   


 


B/P (MAP) 98/56   


 


Pulse Ox 97   


 


O2 Delivery Room Air   














Intake and Output   


 


 10/30/18 10/30/18 10/31/18





 15:00 23:00 07:00


 


Intake Total 240 ml 120 ml 


 


Output Total   1 ml


 


Balance 240 ml 120 ml -1 ml

















MELVI METZGER MD Oct 31, 2018 13:37

## 2018-11-01 VITALS — DIASTOLIC BLOOD PRESSURE: 85 MMHG | SYSTOLIC BLOOD PRESSURE: 147 MMHG

## 2018-11-01 VITALS — DIASTOLIC BLOOD PRESSURE: 65 MMHG | SYSTOLIC BLOOD PRESSURE: 133 MMHG

## 2018-11-01 VITALS — SYSTOLIC BLOOD PRESSURE: 146 MMHG | DIASTOLIC BLOOD PRESSURE: 61 MMHG

## 2018-11-01 VITALS — SYSTOLIC BLOOD PRESSURE: 144 MMHG | DIASTOLIC BLOOD PRESSURE: 61 MMHG

## 2018-11-01 VITALS — DIASTOLIC BLOOD PRESSURE: 62 MMHG | SYSTOLIC BLOOD PRESSURE: 116 MMHG

## 2018-11-01 VITALS — DIASTOLIC BLOOD PRESSURE: 67 MMHG | SYSTOLIC BLOOD PRESSURE: 127 MMHG

## 2018-11-01 LAB
ANION GAP SERPL CALC-SCNC: 8 MMOL/L (ref 6–14)
BASOPHILS # BLD AUTO: 0 X10^3/UL (ref 0–0.2)
BASOPHILS NFR BLD: 0 % (ref 0–3)
BUN SERPL-MCNC: 12 MG/DL (ref 7–20)
CALCIUM SERPL-MCNC: 8.7 MG/DL (ref 8.5–10.1)
CHLORIDE SERPL-SCNC: 105 MMOL/L (ref 98–107)
CO2 SERPL-SCNC: 28 MMOL/L (ref 21–32)
CREAT SERPL-MCNC: 1 MG/DL (ref 0.6–1)
EOSINOPHIL NFR BLD: 0.1 X10^3/UL (ref 0–0.7)
EOSINOPHIL NFR BLD: 1 % (ref 0–3)
ERYTHROCYTE [DISTWIDTH] IN BLOOD BY AUTOMATED COUNT: 19.7 % (ref 11.5–14.5)
GFR SERPLBLD BASED ON 1.73 SQ M-ARVRAT: 64.1 ML/MIN
GLUCOSE SERPL-MCNC: 102 MG/DL (ref 70–99)
HCT VFR BLD CALC: 33.3 % (ref 36–47)
HGB BLD-MCNC: 11 G/DL (ref 12–15.5)
LYMPHOCYTES # BLD: 1 X10^3/UL (ref 1–4.8)
LYMPHOCYTES NFR BLD AUTO: 19 % (ref 24–48)
MCH RBC QN AUTO: 28 PG (ref 25–35)
MCHC RBC AUTO-ENTMCNC: 33 G/DL (ref 31–37)
MCV RBC AUTO: 86 FL (ref 79–100)
MONO #: 0.4 X10^3/UL (ref 0–1.1)
MONOCYTES NFR BLD: 7 % (ref 0–9)
NEUT #: 3.8 X10^3UL (ref 1.8–7.7)
NEUTROPHILS NFR BLD AUTO: 73 % (ref 31–73)
PLATELET # BLD AUTO: 158 X10^3/UL (ref 140–400)
POTASSIUM SERPL-SCNC: 3.4 MMOL/L (ref 3.5–5.1)
RBC # BLD AUTO: 3.9 X10^6/UL (ref 3.5–5.4)
SODIUM SERPL-SCNC: 141 MMOL/L (ref 136–145)
WBC # BLD AUTO: 5.2 X10^3/UL (ref 4–11)

## 2018-11-01 RX ADMIN — Medication SCH CAP: at 09:53

## 2018-11-01 RX ADMIN — DICLOFENAC SODIUM SCH APP: 10 GEL TOPICAL at 21:00

## 2018-11-01 RX ADMIN — ACETAMINOPHEN SCH MG: 500 TABLET ORAL at 09:53

## 2018-11-01 RX ADMIN — ACETAMINOPHEN SCH MG: 500 TABLET ORAL at 21:33

## 2018-11-01 RX ADMIN — SIMVASTATIN SCH MG: 40 TABLET, FILM COATED ORAL at 21:33

## 2018-11-01 RX ADMIN — Medication SCH CAP: at 21:00

## 2018-11-01 RX ADMIN — ASPIRIN 81 MG SCH MG: 81 TABLET ORAL at 09:53

## 2018-11-01 RX ADMIN — DICLOFENAC SODIUM SCH APP: 10 GEL TOPICAL at 09:00

## 2018-11-01 RX ADMIN — CLOPIDOGREL BISULFATE SCH MG: 75 TABLET ORAL at 06:28

## 2018-11-01 RX ADMIN — Medication PRN EACH: at 19:12

## 2018-11-01 NOTE — CONS
DATE OF CONSULTATION:  



ATTENDING PHYSICIAN:  Dr. Buck.



REASON FOR CONSULTATION:  Pulmonary embolism.



HISTORY OF PRESENT ILLNESS:  The patient is an 83-year-old female who was

brought into [facility] from skilled nursing unit with witnessed

syncopal episode.  The patient was doing rehab in her wheelchair and became

unresponsive at rest.  The patient had no obvious seizure activity.  She was

seen by Neurology and workup was performed including an MRI of the head as well

as CTA of the neck and chest.  The patient was found to have pulmonary embolism

on CTA chest.  She was also found to have several white matter infarcts

throughout the brain.  She underwent transesophageal echo and there was no

evidence of any shunt.  Vasculitis was considered an option, but no further

invasive workup was performed.  I have been asked to see her for further

evaluation.  She is lying in bed.  She does not communicate much.  She nods yes

and no to questions.  Venous Dopplers were negative.  I have reviewed her

imaging studies and also reviewed the charts.  I am unable to obtain much

detailed history from the patient.  The family is not at the bedside.



PAST MEDICAL HISTORY:  Significant for history of hypertension, osteoarthritis.



PAST SURGICAL HISTORY:  Hysterectomy.



FAMILY HISTORY:  Cancer, diabetes, hypertension and stroke.



SOCIAL HISTORY:  Nonsmoker.  She was living at a skilled nursing care.



REVIEW OF SYSTEMS:  Unable to obtain from the patient due to her underlying

dementia.



ALLERGIES:  None.



MEDICATIONS:  All reviewed including heparin protocol.  She is also on Plavix.



PHYSICAL EXAMINATION:

GENERAL:  She is awake, in no obvious respiratory distress on room air with

saturations of 100%.

VITAL SIGNS:  Blood pressure 116/62, afebrile.

HEENT:  Sclerae nonicteric.

NECK:  Supple.

LUNGS:  Clear.

CARDIOVASCULAR:  Regular rate and rhythm.

ABDOMEN:  Soft, nontender.

EXTREMITIES:  With no pitting edema.



LABORATORY DATA:  Reviewed.  White cell count 5.2, hemoglobin 11.0 and platelets

are 158.  BUN and creatinine normal.



CTA of neck revealed evidence of left-sided pulmonary embolism.  Although all

full pulmonary arteries were not evaluated in detail as this was obtained on the

CTA neck.



IMPRESSION:

1.  Acute pulmonary embolism.  This may have accounted for syncopal episode,

although multiple infarcts were another etiology of her syncopal episode.  There

is no evidence of DVT in the lower extremities.  The risk factors are unclear,

could be related to sedentary lifestyle and immobilization due to her advanced

age.

2.  Multiple cerebral infarcts.

3.  Encephalopathy and underlying dementia.

4.  Syncopal episodes could be combination of cerebral infarcts and acute

pulmonary embolism.

5.  No Rt/left shunt on JOLENE



RECOMMENDATION:

1.  We will obtain full CTA chest.

2.  We will need to discuss with the family regarding long-term safety of oral

anticoagulation.

3.  d/w DR Buck. Once she is on oral AC, Plavix can be discontinued

4.  Obtain full CTA chest

5.  Discussed with RN and we will be following.

 



______________________________

WOJCIECH WEINBERG MD



DR:  KRISTA/prashant  JOB#:  [id number] / [id number]

DD:  11/01/2018 09:38  DT:  11/01/2018 10:41

SAMIRA

## 2018-11-01 NOTE — PDOC
PROGRESS NOTES


Assessment


Dementia, most likely Alzheimer's, consider frontotemporal


Posterior reversible encephalopathy syndrome. She continues to improve, but 

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 Note that 

sedimentation rate and antinuclear antibody studies were negative last 

admission. CT angiogram and transesophageal echocardiogram did not show any 

source, so these are small-vessel infarcts


Syncope, negative neurological workup for seizures in the past, 24 hour EEG is 

negative for epileptic abnormalities, does show diffuse background slowing 

consistent with dementia or encephalopathy.


Pulmonary embolus found on the CT angiogram


Plan


Continue aspirin, clopidrogel, simvastatin


Okay to transfer to rehab, especially since her neurological condition 

continues to slowly improve


Holding off on additional studies such as brain biopsy, repeat lumbar puncture, 

or impair immunosuppression


Discussed with patients daughter


Subjective


No complaints, denies headache


Objective





Vital Signs








  Date Time  Temp Pulse Resp B/P (MAP) Pulse Ox O2 Delivery O2 Flow Rate FiO2


 


11/1/18 11:00 99.4 68 17 127/67 (87) 100 Room Air  





 99.4       


 


11/1/18 08:15       2.0 














Intake and Output 


 


 11/1/18





 07:00


 


Intake Total 10 ml


 


Balance 10 ml


 


 


 


Intake Oral 10 ml


 


# Voids 2








PHYSICAL EXAM


Alert. Oriented to place and person, knows month and year.


PERRL.


EOMI.


CN: no focal findings.


Muscle tone: normal.


Muscle strength:  5/5


DTR: 2+


Plantar reflex:  flexor


Gait: not examined in bed.


Sensory exam: no abnormal findings.


No cerebellar signs elicited.


Review of Relevant


I have reviewed the following items marky (where applicable) has been applied.


Labs





Laboratory Tests








Test


 10/31/18


21:40 11/1/18


04:00 11/1/18


11:05


 


Heparin Anti-Xa Act,


Unfractionated > 1.10 IU/mL


(0.30-0.70) 0.94 IU/mL


(0.30-0.70) 0.52 IU/mL


(0.30-0.70)


 


White Blood Count


 


 5.2 x10^3/uL


(4.0-11.0) 





 


Red Blood Count


 


 3.90 x10^6/uL


(3.50-5.40) 





 


Hemoglobin


 


 11.0 g/dL


(12.0-15.5) 





 


Hematocrit


 


 33.3 %


(36.0-47.0) 





 


Mean Corpuscular Volume  86 fL ()  


 


Mean Corpuscular Hemoglobin  28 pg (25-35)  


 


Mean Corpuscular Hemoglobin


Concent 


 33 g/dL


(31-37) 





 


Red Cell Distribution Width


 


 19.7 %


(11.5-14.5) 





 


Platelet Count


 


 158 x10^3/uL


(140-400) 





 


Neutrophils (%) (Auto)  73 % (31-73)  


 


Lymphocytes (%) (Auto)  19 % (24-48)  


 


Monocytes (%) (Auto)  7 % (0-9)  


 


Eosinophils (%) (Auto)  1 % (0-3)  


 


Basophils (%) (Auto)  0 % (0-3)  


 


Neutrophils # (Auto)


 


 3.8 x10^3uL


(1.8-7.7) 





 


Lymphocytes # (Auto)


 


 1.0 x10^3/uL


(1.0-4.8) 





 


Monocytes # (Auto)


 


 0.4 x10^3/uL


(0.0-1.1) 





 


Eosinophils # (Auto)


 


 0.1 x10^3/uL


(0.0-0.7) 





 


Basophils # (Auto)


 


 0.0 x10^3/uL


(0.0-0.2) 





 


Sodium Level


 


 141 mmol/L


(136-145) 





 


Potassium Level


 


 3.4 mmol/L


(3.5-5.1) 





 


Chloride Level


 


 105 mmol/L


() 





 


Carbon Dioxide Level


 


 28 mmol/L


(21-32) 





 


Anion Gap  8 (6-14)  


 


Blood Urea Nitrogen


 


 12 mg/dL


(7-20) 





 


Creatinine


 


 1.0 mg/dL


(0.6-1.0) 





 


Estimated GFR


(Cockcroft-Gault) 


 64.1 


 





 


Glucose Level


 


 102 mg/dL


(70-99) 





 


Calcium Level


 


 8.7 mg/dL


(8.5-10.1) 











Laboratory Tests








Test


 10/31/18


21:40 11/1/18


04:00 11/1/18


11:05


 


Heparin Anti-Xa Act,


Unfractionated > 1.10 IU/mL


(0.30-0.70) 0.94 IU/mL


(0.30-0.70) 0.52 IU/mL


(0.30-0.70)


 


White Blood Count


 


 5.2 x10^3/uL


(4.0-11.0) 





 


Red Blood Count


 


 3.90 x10^6/uL


(3.50-5.40) 





 


Hemoglobin


 


 11.0 g/dL


(12.0-15.5) 





 


Hematocrit


 


 33.3 %


(36.0-47.0) 





 


Mean Corpuscular Volume  86 fL ()  


 


Mean Corpuscular Hemoglobin  28 pg (25-35)  


 


Mean Corpuscular Hemoglobin


Concent 


 33 g/dL


(31-37) 





 


Red Cell Distribution Width


 


 19.7 %


(11.5-14.5) 





 


Platelet Count


 


 158 x10^3/uL


(140-400) 





 


Neutrophils (%) (Auto)  73 % (31-73)  


 


Lymphocytes (%) (Auto)  19 % (24-48)  


 


Monocytes (%) (Auto)  7 % (0-9)  


 


Eosinophils (%) (Auto)  1 % (0-3)  


 


Basophils (%) (Auto)  0 % (0-3)  


 


Neutrophils # (Auto)


 


 3.8 x10^3uL


(1.8-7.7) 





 


Lymphocytes # (Auto)


 


 1.0 x10^3/uL


(1.0-4.8) 





 


Monocytes # (Auto)


 


 0.4 x10^3/uL


(0.0-1.1) 





 


Eosinophils # (Auto)


 


 0.1 x10^3/uL


(0.0-0.7) 





 


Basophils # (Auto)


 


 0.0 x10^3/uL


(0.0-0.2) 





 


Sodium Level


 


 141 mmol/L


(136-145) 





 


Potassium Level


 


 3.4 mmol/L


(3.5-5.1) 





 


Chloride Level


 


 105 mmol/L


() 





 


Carbon Dioxide Level


 


 28 mmol/L


(21-32) 





 


Anion Gap  8 (6-14)  


 


Blood Urea Nitrogen


 


 12 mg/dL


(7-20) 





 


Creatinine


 


 1.0 mg/dL


(0.6-1.0) 





 


Estimated GFR


(Cockcroft-Gault) 


 64.1 


 





 


Glucose Level


 


 102 mg/dL


(70-99) 





 


Calcium Level


 


 8.7 mg/dL


(8.5-10.1) 











Microbiology


10/25/18 Urine Culture - Final, Complete


           


10/25/18 Urine Culture Result 1 (EDGAR) - Final, Complete


Medications





Current Medications


Ondansetron HCl (Zofran) 4 mg 1X  ONCE IV  Last administered on 10/25/18at 13:04

;  Start 10/25/18 at 11:45;  Stop 10/25/18 at 11:46;  Status DC


Ondansetron HCl (Zofran) 4 mg PRN Q8HRS  PRN IV NAUSEA/VOMITING;  Start 10/25/

18 at 14:15;  Stop 10/26/18 at 13:19;  Status DC


Sodium Chloride 1,000 ml @  85 mls/hr [id number] IV  Last administered on 10/25/

18at 16:39;  Start 10/25/18 at 14:04;  Stop 10/26/18 at 14:03;  Status DC


Lorazepam (Ativan) 1 mg 1X  ONCE IM  Last administered on 10/25/18at 21:53;  

Start 10/25/18 at 18:15;  Stop 10/25/18 at 18:16;  Status DC


Acetaminophen (Tylenol) 500 mg BID PO  Last administered on 11/1/18at 09:53;  

Start 10/25/18 at 21:00


Aspirin (Children'S Aspirin) 81 mg DAILY08 PO  Last administered on 11/1/18at 09

:53;  Start 10/26/18 at 08:00


Clopidogrel Bisulfate (Plavix) 75 mg DAILY07 PO  Last administered on 11/1/18at 

06:28;  Start 10/26/18 at 07:00


Atenolol (Tenormin) 25 mg DAILY PO  Last administered on 10/26/18at 10:13;  

Start 10/26/18 at 09:00;  Stop 10/26/18 at 11:14;  Status DC


Meclizine HCl (Antivert) 25 mg PRN Q8HRS  PRN PO DIZZINESS;  Start 10/25/18 at 

18:45


Simvastatin (Zocor) 40 mg QHS PO  Last administered on 10/31/18at 21:57;  Start 

10/25/18 at 21:00


Ceftriaxone Sodium 1 gm/ Dextrose 50 ml @  100 mls/hr Q24H IV ;  Start 10/25/18 

at 18:45;  Status UNV


Lactobacillus Rhamnosus (Culturelle) 1 cap BID PO  Last administered on 11/1/ 18at 09:53;  Start 10/25/18 at 21:00


Ceftriaxone Sodium (Rocephin) 1 gm Q24H IVP ;  Start 10/25/18 at 19:00;  Stop 10

/26/18 at 10:15;  Status DC


Enoxaparin Sodium (Lovenox 40mg Syringe) 40 mg DAILY SQ  Last administered on 10

/30/18at 08:55;  Start 10/26/18 at 09:00;  Stop 10/31/18 at 14:43;  Status DC


Diclofenac Sodium (Voltaren) 1 danyell BID TP ;  Start 10/25/18 at 21:00;  Status 

Cancel


Diclofenac Sodium (Voltaren) 1 danyell BID TP  Last administered on 10/31/18at 21:58

;  Start 10/26/18 at 09:00


Ondansetron HCl (Zofran) 4 mg PRN Q6HRS  PRN IV NAUSEA/VOMITING;  Start 10/26/

18 at 13:30;  Status Cancel


Alprazolam (Xanax) 1 mg 1X  ONCE PO ;  Start 10/26/18 at 21:00;  Stop 10/26/18 

at 21:01;  Status DC


Lorazepam (Ativan) 0.5 mg PRN Q8HRS  PRN PO ANXIETY / AGITATION Last 

administered on 10/31/18at 21:57;  Start 10/28/18 at 03:45


Acetaminophen (Tylenol) 650 mg PRN Q6HRS  PRN PO FEVER Last administered on 10/

31/18at 22:04;  Start 10/29/18 at 15:00


Ondansetron HCl (Zofran) 4 mg PRN Q6HRS  PRN IV NAUSEA/VOMITING;  Start 10/29/

18 at 15:00


Morphine Sulfate (Morphine Sulfate) 2 mg PRN Q2HR  PRN IV MODERATE TO SEVERE 

PAIN;  Start 10/29/18 at 15:00


Tramadol HCl (Ultram) 50 mg PRN Q6HRS  PRN PO MILD TO MODERATE PAIN;  Start 10/

29/18 at 15:00


Docusate Sodium (Colace) 100 mg PRN DAILY  PRN PO CONSTIPATION;  Start 10/29/18 

at 15:00


Gadobutrol (Gadavist) 6 mmol 1X  ONCE IV  Last administered on 10/30/18at 14:56

;  Start 10/30/18 at 14:45;  Stop 10/30/18 at 14:46;  Status DC


Iohexol (Omnipaque 300 Mg/ml) 75 ml 1X  ONCE IV  Last administered on 10/31/

18at 11:23;  Start 10/31/18 at 11:00;  Stop 10/31/18 at 11:01;  Status DC


Info (CONTRAST GIVEN -- Rx MONITORING) 1 each PRN DAILY  PRN MC SEE COMMENTS;  

Start 10/31/18 at 11:00;  Stop 11/2/18 at 10:59


Benzocaine (Hurricaine One) 3 spray 1X  ONCE MM  Last administered on 10/31/

18at 13:36;  Start 10/31/18 at 12:15;  Stop 10/31/18 at 12:16;  Status DC


Lidocaine HCl (Viscous Lidocaine) 15 ml 1X  ONCE SWSW ;  Start 10/31/18 at 12:15

;  Stop 10/31/18 at 12:16;  Status DC


Lidocaine HCl (Xylocaine 2% Topical 30gm Tube) 1 danyell 1X  ONCE TP ;  Start 10/31/

18 at 12:15;  Stop 10/31/18 at 12:16;  Status DC


Propofol 20 ml @ As Directed STK-MED ONCE IV ;  Start 10/31/18 at 14:08;  Stop 

10/31/18 at 14:09;  Status DC


Heparin Sodium (Porcine) (Heparin Sodium) 4,900 unit 1X  ONCE IV  Last 

administered on 10/31/18at 15:41;  Start 10/31/18 at 15:00;  Stop 10/31/18 at 15

:01;  Status DC


Heparin Sodium/ Dextrose 500 ml @ 0 mls/hr CONT  PRN IV SEE I/O RECORD Last 

administered on 10/31/18at 15:46;  Start 10/31/18 at 14:45


Heparin Sodium (Porcine) (Heparin Sodium) 1,800 unit PRN Q6HRS  PRN IV FOR UFH 

LEVEL LESS THAN 0.2;  Start 10/31/18 at 14:45


Heparin Sodium (Porcine) (Heparin Sodium) 900 unit PRN Q6HRS  PRN IV FOR UFH 

LEVEL 0.2 - 0.29;  Start 10/31/18 at 14:45


Info (Anti-Coagulation Monitoring By Pharmacy) 1 each PRN DAILY  PRN MC SEE 

COMMENTS Last administered on 10/31/18at 18:57;  Start 10/31/18 at 15:00


Potassium Chloride (Klor-Con) 40 meq 1X  ONCE PO ;  Start 11/1/18 at 08:15;  

Stop 11/1/18 at 08:16;  Status Cancel


Iohexol (Omnipaque 300 Mg/ml) 75 ml 1X  ONCE IV ;  Start 11/1/18 at 10:00;  

Stop 11/1/18 at 10:06;  Status DC


Info (CONTRAST GIVEN -- Rx MONITORING) 1 each PRN DAILY  PRN MC SEE COMMENTS;  

Start 11/1/18 at 10:15;  Stop 11/3/18 at 10:14


Potassium Chloride (KCl Oral Soln) 40 meq 1X  ONCE PO  Last administered on 11/1 /18at 10:15;  Start 11/1/18 at 10:15;  Stop 11/1/18 at 10:16;  Status DC





Active Scripts


Active


Meclizine Hcl 25 Mg Tablet 1 Tab PO PRN TID PRN


Reported


Tylenol (Acetaminophen) 325 Mg Tablet 500 Mg PO BID


Aspirin 81 Mg Tab.chew 1 Tab PO DAILY


Simvastatin 40 Mg Tablet 1 Tab PO QHS


Clopidogrel (Clopidogrel Bisulfate) 75 Mg Tablet 1 Tab PO DAILY


Metformin Hcl 500 Mg Tablet 1 Tab PO BID


Atenolol 25 Mg Tablet 1 Tab PO DAILY


Vitals/I & O





Vital Sign - Last 24 Hours








 10/31/18 10/31/18 10/31/18 11/1/18





 19:00 20:00 23:00 03:00


 


Temp 97.9  97.9 98.1





 97.9  97.9 98.1


 


Pulse 83  48 52


 


Resp 19  17 16


 


B/P (MAP) 131/69 (89)  121/62 (81) 146/61 (89)


 


Pulse Ox 93  96 97


 


O2 Delivery Room Air Room Air Room Air Room Air


 


    





    





 11/1/18 11/1/18 11/1/18 





 07:00 08:15 11:00 


 


Temp 97.6  99.4 





 97.6  99.4 


 


Pulse 66  68 


 


Resp 18  17 


 


B/P (MAP) 116/62 (80)  127/67 (87) 


 


Pulse Ox 100  100 


 


O2 Delivery Room Air Room Air Room Air 


 


O2 Flow Rate  2.0  














Intake and Output   


 


 10/31/18 10/31/18 11/1/18





 15:00 23:00 07:00


 


Intake Total 10 ml  


 


Balance 10 ml  








Images


CTA head: 


Findings: Both intradural vertebral arteries constitute the basilar 


artery, slightly dominant left vertebral artery. There is plaque of the 


right intradural vertebral artery without significant stenosis. There is 


visualization of right AICA, not visualized on the left on this exam. 


There is visualization of segments of bilateral PICAs. There is 


visualization of segments of bilateral superior cerebellar arteries. No 


significant posterior communicating arteries are visualized on either 


side. There is patent anterior communicating artery. There is 


visualization of segments of the anterior, middle, and posterior cerebral 


arteries bilaterally. There is calcified plaque of the carotid siphons 


bilaterally without significant luminal diameter reduction. Major venous 


sinuses are patent. No focal vessel occlusion or filling defect, 


significant focal stenosis, or aneurysm is identified of the visualized 


intracranial vasculature. Left A1 segment is hypoplastic compared with the


right.


 


Impression:


1.  There is no significant intracranial stenosis or aneurysm.


 


Neck CTA:


 


Findings: As seen on the most inferior images, there is a fairly large 


pulmonary embolism of the left lower lobe pulmonary artery, smaller focus 


in the left upper lobe. There is some emphysema of the visualized lung 


apices.


 


There are some calcified plaque of the carotid bulbs bilaterally greater 


on the right without significant stenosis (less than 30% luminal diameter 


reduction). Common carotid arteries are patent bilaterally without focal 


stenosis. There is also minimal calcified plaque of the mid left common 


carotid artery. Cervical internal carotid arteries are patent bilaterally 


without significant stenosis. Proximal vertebral arteries are tortuous 


greater on the left. There is no significant focal stenosis of the 


cervical vertebral arteries. No dissection flap is identified of the 


cervical arterial vasculature.


 


There is multilevel cervical degenerative disc disease and spondylosis, 


disc osteophyte complexes at C5-C6 and C3-4 resulting in likely central 


canal stenosis on the order of 7 mm. There is a lesser degree of spinal 


stenosis at C4-5. There is multilevel facet and uncovertebral degenerative


change contributing to neural foramina compromise.


 


Impression:


1. There are left pulmonary emboli, pulmonary arteries not fully 


evaluated.


2. There is no significant focal stenosis of the cervical arterial 


vasculature. There is plaque of the bilateral carotid bulbs greater on the


right.


3. There is multilevel cervical degenerative disc disease and spondylosis,


spinal stenosis greatest C5-6 and C3-4.





JOLENE:


LEFT VENTRICLE 


The left ventricle is normal size. There is normal left ventricular wall 

thickness. The left ventricular systolic function is normal. The ejection 

fraction is 55-60%. There is normal LV segmental wall motion. The left 

ventricular diastolic function and filling is normal for age. No left ventricle 

thrombus noted on this study.





 RIGHT VENTRICLE 


The right ventricle is normal size. There is normal right ventricular wall 

thickness. The right ventricular systolic function is normal.





 ATRIA 


The left atrium is borderline dilated. The right atrium size is normal. The 

interatrial septum is intact with no evidence for an atrial septal defect or 

patent foramen ovale as noted on 2-D or Doppler imaging. There is no thrombus 

noted in the left atrial appendage.





 AORTIC VALVE 


The aortic valve is thickened but opens well. Doppler and Color Flow revealed 

mild aortic regurgitation. There is no significant aortic valvular stenosis.





 MITRAL VALVE 


The mitral valve leaflets are thickened. There is no mitral valve stenosis. 

Doppler and Color-flow revealed mild mitral regurgitation.





 TRICUSPID VALVE 


The tricuspid valve is normal in structure and function. Doppler and Color Flow 

revealed trace tricuspid regurgitation. There is no tricuspid valve stenosis. 





 PULMONIC VALVE 


The pulmonary valve is normal in structure and function. Doppler and Color Flow 

revealed mild pulmonic valvular regurgitation. There is no pulmonic valvular 

stenosis.





 GREAT VESSELS 


The aortic root is normal in size.





Critical Notification


Critical Value: No





<Conclusion>


The left ventricle is normal size.


The left ventricular systolic function is normal.


The ejection fraction is 55-60%.


The interatrial septum is intact with no evidence for an atrial septal defect 

or patent foramen ovale as noted on 2-D or Doppler imaging.


There is no thrombus noted in the left atrial appendage.


There is no significant aortic valvular stenosis.


Doppler and Color Flow revealed mild aortic regurgitation.


Doppler and Color-flow revealed mild mitral regurgitation.


Doppler and Color Flow revealed trace tricuspid regurgitation.


No evidence of a source of embolization.











RUPA PIKE MD Nov 1, 2018 16:12

## 2018-11-01 NOTE — RAD
Portable chest, 11/1/2018:

 

HISTORY: Syncope

 

Comparison is made to a study from 11/14/2018. The heart is at the upper 

limits of normal in size. There is an unchanged retrocardiac mass 

compatible with a hiatal hernia. Ectasia of the thoracic aorta may be 

contributing to this density. The pulmonary vascularity is normal. No 

pulmonary infiltrate is seen. There is no evidence of pleural fluid.

 

IMPRESSION: No acute cardiopulmonary abnormality is detected.

 

 

 

Ventilation/perfusion lung scan, 11/1/2018:

 

HISTORY: Syncope, pulmonary embolism

 

The ventilation study was performed utilizing 30.3 mCi of xenon-133. 

Activity in the lungs is heterogeneous. There is mild patchy retention of 

activity in the lung bases, particularly on the left on the washout phase.

 

Perfusion imaging was performed utilizing 6.2 mCi of technetium 99m MAA. 

There is relatively decreased perfusion in the left lower lobe as seen on 

the left lateral and LPO views. There is a small peripheral area of 

decreased activity in the anterolateral aspect of the left upper lobe. 

These are not clearly visualized on the straight anterior or posterior 

views and therefore determining whether these are matched or not is not 

clear. The findings do correspond to the pulmonary emboli incidentally 

noted on yesterday's CTA neck study.  The VQ study shows no evidence of 

right pulmonary emboli.

 

IMPRESSION: Left-sided pulmonary emboli, better demonstrated on 

yesterday's CTA neck exam.

 

Electronically signed by: Rick Moritz, MD (11/1/2018 3:53 PM) [facility]

## 2018-11-01 NOTE — PDOC
PROGRESS NOTES


Chief Complaint


Chief Complaint


recent CT showed Cerebral edema, encephalitis or poss press syndrome


Acute encephalopathy on chronic mild dementia, bl subacute stroke on MRI


Fluctuating dementia w/prior delirium 


Moderate malnutrition 


Dehydration


Weakness and debility


Dysphagia


Poor PO intake


Arrhythmia


The left ventricular systolic function is low normal.  PVC's. EF 45-50%


There is moderate concentric left ventricular hypertrophy.


h/o CAD with 3 stents





History of Present Illness


History of Present Illness


Was readmitted after just being one day in SNU because of seizure-like activity 

in SNU


pt is talking ,follow commands, but obviously has dementia not know the days 

place


son at bedside





Very confused today. Reviewed CT with pulm, going for perfusion scan today. 

Echo reviewed negative for PFO and negative for thrombus.





A/P:


recent CT showed Cerebral edema, encephalitis or poss press syndrome


Acute encephalopathy on chronic mild dementia, bl subacute stroke on MRI


Fluctuating dementia w/prior delirium 


Moderate malnutrition 


Dehydration


Weakness and debility


Dysphagia


Poor PO intake


Arrhythmia


The left ventricular systolic function is low normal.  PVC's. EF 45-50%


There is moderate concentric left ventricular hypertrophy.


h/o CAD with 3 stents








plan:


fu with neuro, repeat MRI showed bl subacute stroke. talked to dr. Kapadia, 


on plavix , statin already


24h EEG ok as per neuro


PAT COnsulted, pt family still want FC and aggressive treatment


VQ scan today, may need eliquis if this is indeed PE, have d/w Dr. Gonzalez





Vitals


Vitals





Vital Signs








  Date Time  Temp Pulse Resp B/P (MAP) Pulse Ox O2 Delivery O2 Flow Rate FiO2


 


11/1/18 03:00 98.1 52 16 146/61 (89) 97 Room Air  





 98.1       


 


10/31/18 14:19       2 











Physical Exam


Physical Exam


GENERAL: Resting quietly, NAD  .


LUNGS:  Clear to auscultation.


HEART:  S1, S2.


ABDOMEN:  Soft, nondistended with positive bowel sounds.


: [place]


EXTREMITIES:  Without clubbing or cyanosis.  No gross edema.


SKIN:  Warm to touch without signs of any rashes.


CNS: much more awake, and nods feeling better, eat today per RN


PIV


General:  Alert, Cooperative, No acute distress, Other (somnolent)


Heart:  Regular rate (SB 50s), Normal S1, Normal S2, Other (2/6 systolic murmur 

to LLS border)


Lungs:  Clear


Abdomen:  Normal bowel sounds, Soft, No tenderness


Extremities:  No cyanosis


Skin:  No breakdown





Labs


LABS





Laboratory Tests








Test


 10/31/18


21:40 11/1/18


04:00


 


Heparin Anti-Xa Act,


Unfractionated > 1.10 IU/mL


(0.30-0.70) 0.94 IU/mL


(0.30-0.70)


 


White Blood Count


 


 5.2 x10^3/uL


(4.0-11.0)


 


Red Blood Count


 


 3.90 x10^6/uL


(3.50-5.40)


 


Hemoglobin


 


 11.0 g/dL


(12.0-15.5)


 


Hematocrit


 


 33.3 %


(36.0-47.0)


 


Mean Corpuscular Volume  86 fL () 


 


Mean Corpuscular Hemoglobin  28 pg (25-35) 


 


Mean Corpuscular Hemoglobin


Concent 


 33 g/dL


(31-37)


 


Red Cell Distribution Width


 


 19.7 %


(11.5-14.5)


 


Platelet Count


 


 158 x10^3/uL


(140-400)


 


Neutrophils (%) (Auto)  73 % (31-73) 


 


Lymphocytes (%) (Auto)  19 % (24-48) 


 


Monocytes (%) (Auto)  7 % (0-9) 


 


Eosinophils (%) (Auto)  1 % (0-3) 


 


Basophils (%) (Auto)  0 % (0-3) 


 


Neutrophils # (Auto)


 


 3.8 x10^3uL


(1.8-7.7)


 


Lymphocytes # (Auto)


 


 1.0 x10^3/uL


(1.0-4.8)


 


Monocytes # (Auto)


 


 0.4 x10^3/uL


(0.0-1.1)


 


Eosinophils # (Auto)


 


 0.1 x10^3/uL


(0.0-0.7)


 


Basophils # (Auto)


 


 0.0 x10^3/uL


(0.0-0.2)


 


Sodium Level


 


 141 mmol/L


(136-145)


 


Potassium Level


 


 3.4 mmol/L


(3.5-5.1)


 


Chloride Level


 


 105 mmol/L


()


 


Carbon Dioxide Level


 


 28 mmol/L


(21-32)


 


Anion Gap  8 (6-14) 


 


Blood Urea Nitrogen


 


 12 mg/dL


(7-20)


 


Creatinine


 


 1.0 mg/dL


(0.6-1.0)


 


Estimated GFR


(Cockcroft-Gault) 


 64.1 





 


Glucose Level


 


 102 mg/dL


(70-99)


 


Calcium Level


 


 8.7 mg/dL


(8.5-10.1)











Comment


Review of Relevant


I have reviewed the following items marky (where applicable) has been applied.


Labs





Laboratory Tests








Test


 10/31/18


21:40 11/1/18


04:00


 


Heparin Anti-Xa Act,


Unfractionated > 1.10 IU/mL


(0.30-0.70) 0.94 IU/mL


(0.30-0.70)


 


White Blood Count


 


 5.2 x10^3/uL


(4.0-11.0)


 


Red Blood Count


 


 3.90 x10^6/uL


(3.50-5.40)


 


Hemoglobin


 


 11.0 g/dL


(12.0-15.5)


 


Hematocrit


 


 33.3 %


(36.0-47.0)


 


Mean Corpuscular Volume  86 fL () 


 


Mean Corpuscular Hemoglobin  28 pg (25-35) 


 


Mean Corpuscular Hemoglobin


Concent 


 33 g/dL


(31-37)


 


Red Cell Distribution Width


 


 19.7 %


(11.5-14.5)


 


Platelet Count


 


 158 x10^3/uL


(140-400)


 


Neutrophils (%) (Auto)  73 % (31-73) 


 


Lymphocytes (%) (Auto)  19 % (24-48) 


 


Monocytes (%) (Auto)  7 % (0-9) 


 


Eosinophils (%) (Auto)  1 % (0-3) 


 


Basophils (%) (Auto)  0 % (0-3) 


 


Neutrophils # (Auto)


 


 3.8 x10^3uL


(1.8-7.7)


 


Lymphocytes # (Auto)


 


 1.0 x10^3/uL


(1.0-4.8)


 


Monocytes # (Auto)


 


 0.4 x10^3/uL


(0.0-1.1)


 


Eosinophils # (Auto)


 


 0.1 x10^3/uL


(0.0-0.7)


 


Basophils # (Auto)


 


 0.0 x10^3/uL


(0.0-0.2)


 


Sodium Level


 


 141 mmol/L


(136-145)


 


Potassium Level


 


 3.4 mmol/L


(3.5-5.1)


 


Chloride Level


 


 105 mmol/L


()


 


Carbon Dioxide Level


 


 28 mmol/L


(21-32)


 


Anion Gap  8 (6-14) 


 


Blood Urea Nitrogen


 


 12 mg/dL


(7-20)


 


Creatinine


 


 1.0 mg/dL


(0.6-1.0)


 


Estimated GFR


(Cockcroft-Gault) 


 64.1 





 


Glucose Level


 


 102 mg/dL


(70-99)


 


Calcium Level


 


 8.7 mg/dL


(8.5-10.1)








Laboratory Tests








Test


 10/31/18


21:40 11/1/18


04:00


 


Heparin Anti-Xa Act,


Unfractionated > 1.10 IU/mL


(0.30-0.70) 0.94 IU/mL


(0.30-0.70)


 


White Blood Count


 


 5.2 x10^3/uL


(4.0-11.0)


 


Red Blood Count


 


 3.90 x10^6/uL


(3.50-5.40)


 


Hemoglobin


 


 11.0 g/dL


(12.0-15.5)


 


Hematocrit


 


 33.3 %


(36.0-47.0)


 


Mean Corpuscular Volume  86 fL () 


 


Mean Corpuscular Hemoglobin  28 pg (25-35) 


 


Mean Corpuscular Hemoglobin


Concent 


 33 g/dL


(31-37)


 


Red Cell Distribution Width


 


 19.7 %


(11.5-14.5)


 


Platelet Count


 


 158 x10^3/uL


(140-400)


 


Neutrophils (%) (Auto)  73 % (31-73) 


 


Lymphocytes (%) (Auto)  19 % (24-48) 


 


Monocytes (%) (Auto)  7 % (0-9) 


 


Eosinophils (%) (Auto)  1 % (0-3) 


 


Basophils (%) (Auto)  0 % (0-3) 


 


Neutrophils # (Auto)


 


 3.8 x10^3uL


(1.8-7.7)


 


Lymphocytes # (Auto)


 


 1.0 x10^3/uL


(1.0-4.8)


 


Monocytes # (Auto)


 


 0.4 x10^3/uL


(0.0-1.1)


 


Eosinophils # (Auto)


 


 0.1 x10^3/uL


(0.0-0.7)


 


Basophils # (Auto)


 


 0.0 x10^3/uL


(0.0-0.2)


 


Sodium Level


 


 141 mmol/L


(136-145)


 


Potassium Level


 


 3.4 mmol/L


(3.5-5.1)


 


Chloride Level


 


 105 mmol/L


()


 


Carbon Dioxide Level


 


 28 mmol/L


(21-32)


 


Anion Gap  8 (6-14) 


 


Blood Urea Nitrogen


 


 12 mg/dL


(7-20)


 


Creatinine


 


 1.0 mg/dL


(0.6-1.0)


 


Estimated GFR


(Cockcroft-Gault) 


 64.1 





 


Glucose Level


 


 102 mg/dL


(70-99)


 


Calcium Level


 


 8.7 mg/dL


(8.5-10.1)








Microbiology


10/25/18 Urine Culture - Final, Complete


           


10/25/18 Urine Culture Result 1 (EDGAR) - Final, Complete


Medications





Current Medications


Ondansetron HCl (Zofran) 4 mg 1X  ONCE IV  Last administered on 10/25/18at 13:04

;  Start 10/25/18 at 11:45;  Stop 10/25/18 at 11:46;  Status DC


Ondansetron HCl (Zofran) 4 mg PRN Q8HRS  PRN IV NAUSEA/VOMITING;  Start 10/25/

18 at 14:15;  Stop 10/26/18 at 13:19;  Status DC


Sodium Chloride 1,000 ml @  85 mls/hr [id number] IV  Last administered on 10/25/

18at 16:39;  Start 10/25/18 at 14:04;  Stop 10/26/18 at 14:03;  Status DC


Lorazepam (Ativan) 1 mg 1X  ONCE IM  Last administered on 10/25/18at 21:53;  

Start 10/25/18 at 18:15;  Stop 10/25/18 at 18:16;  Status DC


Acetaminophen (Tylenol) 500 mg BID PO  Last administered on 10/31/18at 21:00;  

Start 10/25/18 at 21:00


Aspirin (Children'S Aspirin) 81 mg DAILY08 PO  Last administered on 10/31/18at 

15:56;  Start 10/26/18 at 08:00


Clopidogrel Bisulfate (Plavix) 75 mg DAILY07 PO  Last administered on 11/1/18at 

06:28;  Start 10/26/18 at 07:00


Atenolol (Tenormin) 25 mg DAILY PO  Last administered on 10/26/18at 10:13;  

Start 10/26/18 at 09:00;  Stop 10/26/18 at 11:14;  Status DC


Meclizine HCl (Antivert) 25 mg PRN Q8HRS  PRN PO DIZZINESS;  Start 10/25/18 at 

18:45


Simvastatin (Zocor) 40 mg QHS PO  Last administered on 10/31/18at 21:57;  Start 

10/25/18 at 21:00


Ceftriaxone Sodium 1 gm/ Dextrose 50 ml @  100 mls/hr Q24H IV ;  Start 10/25/18 

at 18:45;  Status UNV


Lactobacillus Rhamnosus (Culturelle) 1 cap BID PO  Last administered on 10/31/

18at 21:57;  Start 10/25/18 at 21:00


Ceftriaxone Sodium (Rocephin) 1 gm Q24H IVP ;  Start 10/25/18 at 19:00;  Stop 10

/26/18 at 10:15;  Status DC


Enoxaparin Sodium (Lovenox 40mg Syringe) 40 mg DAILY SQ  Last administered on 10

/30/18at 08:55;  Start 10/26/18 at 09:00;  Stop 10/31/18 at 14:43;  Status DC


Diclofenac Sodium (Voltaren) 1 danyell BID TP ;  Start 10/25/18 at 21:00;  Status 

Cancel


Diclofenac Sodium (Voltaren) 1 danyell BID TP  Last administered on 10/31/18at 21:58

;  Start 10/26/18 at 09:00


Ondansetron HCl (Zofran) 4 mg PRN Q6HRS  PRN IV NAUSEA/VOMITING;  Start 10/26/

18 at 13:30;  Status Cancel


Alprazolam (Xanax) 1 mg 1X  ONCE PO ;  Start 10/26/18 at 21:00;  Stop 10/26/18 

at 21:01;  Status DC


Lorazepam (Ativan) 0.5 mg PRN Q8HRS  PRN PO ANXIETY / AGITATION Last 

administered on 10/31/18at 21:57;  Start 10/28/18 at 03:45


Acetaminophen (Tylenol) 650 mg PRN Q6HRS  PRN PO FEVER Last administered on 10/

31/18at 22:04;  Start 10/29/18 at 15:00


Ondansetron HCl (Zofran) 4 mg PRN Q6HRS  PRN IV NAUSEA/VOMITING;  Start 10/29/

18 at 15:00


Morphine Sulfate (Morphine Sulfate) 2 mg PRN Q2HR  PRN IV MODERATE TO SEVERE 

PAIN;  Start 10/29/18 at 15:00


Tramadol HCl (Ultram) 50 mg PRN Q6HRS  PRN PO MILD TO MODERATE PAIN;  Start 10/

29/18 at 15:00


Docusate Sodium (Colace) 100 mg PRN DAILY  PRN PO CONSTIPATION;  Start 10/29/18 

at 15:00


Gadobutrol (Gadavist) 6 mmol 1X  ONCE IV  Last administered on 10/30/18at 14:56

;  Start 10/30/18 at 14:45;  Stop 10/30/18 at 14:46;  Status DC


Iohexol (Omnipaque 300 Mg/ml) 75 ml 1X  ONCE IV  Last administered on 10/31/

18at 11:23;  Start 10/31/18 at 11:00;  Stop 10/31/18 at 11:01;  Status DC


Info (CONTRAST GIVEN -- Rx MONITORING) 1 each PRN DAILY  PRN MC SEE COMMENTS;  

Start 10/31/18 at 11:00;  Stop 11/2/18 at 10:59


Benzocaine (Hurricaine One) 3 spray 1X  ONCE MM  Last administered on 10/31/

18at 13:36;  Start 10/31/18 at 12:15;  Stop 10/31/18 at 12:16;  Status DC


Lidocaine HCl (Viscous Lidocaine) 15 ml 1X  ONCE SWSW ;  Start 10/31/18 at 12:15

;  Stop 10/31/18 at 12:16;  Status DC


Lidocaine HCl (Xylocaine 2% Topical 30gm Tube) 1 danyell 1X  ONCE TP ;  Start 10/31/

18 at 12:15;  Stop 10/31/18 at 12:16;  Status DC


Propofol 20 ml @ As Directed STK-MED ONCE IV ;  Start 10/31/18 at 14:08;  Stop 

10/31/18 at 14:09;  Status DC


Heparin Sodium (Porcine) (Heparin Sodium) 4,900 unit 1X  ONCE IV  Last 

administered on 10/31/18at 15:41;  Start 10/31/18 at 15:00;  Stop 10/31/18 at 15

:01;  Status DC


Heparin Sodium/ Dextrose 500 ml @ 0 mls/hr CONT  PRN IV SEE I/O RECORD Last 

administered on 10/31/18at 15:46;  Start 10/31/18 at 14:45


Heparin Sodium (Porcine) (Heparin Sodium) 1,800 unit PRN Q6HRS  PRN IV FOR UFH 

LEVEL LESS THAN 0.2;  Start 10/31/18 at 14:45


Heparin Sodium (Porcine) (Heparin Sodium) 900 unit PRN Q6HRS  PRN IV FOR UFH 

LEVEL 0.2 - 0.29;  Start 10/31/18 at 14:45


Info (Anti-Coagulation Monitoring By Pharmacy) 1 each PRN DAILY  PRN MC SEE 

COMMENTS Last administered on 10/31/18at 18:57;  Start 10/31/18 at 15:00





Active Scripts


Active


Meclizine Hcl 25 Mg Tablet 1 Tab PO PRN TID PRN


Reported


Tylenol (Acetaminophen) 325 Mg Tablet 500 Mg PO BID


Aspirin 81 Mg Tab.chew 1 Tab PO DAILY


Simvastatin 40 Mg Tablet 1 Tab PO QHS


Clopidogrel (Clopidogrel Bisulfate) 75 Mg Tablet 1 Tab PO DAILY


Metformin Hcl 500 Mg Tablet 1 Tab PO BID


Atenolol 25 Mg Tablet 1 Tab PO DAILY


Vitals/I & O





Vital Sign - Last 24 Hours








 10/31/18 10/31/18 10/31/18 10/31/18





 11:41 13:18 14:04 14:19


 


Temp 98.4 97.0 98.1 





 98.4 97.0 98.1 


 


Pulse 71 62 83 73


 


Resp 18 16 16 16


 


B/P (MAP) 107/60 (76) 98/56 105/51 105/39


 


Pulse Ox 100 97 96 96


 


O2 Delivery Room Air Room Air Nasal Cannula Nasal Cannula


 


O2 Flow Rate   2 2


 


    





    





 10/31/18 10/31/18 10/31/18 10/31/18





 14:34 15:00 19:00 20:00


 


Temp 98.1 98.4 97.9 





 98.1 98.4 97.9 


 


Pulse 54 62 83 


 


Resp 16 18 19 


 


B/P (MAP) 102/53 93/46 (62) 131/69 (89) 


 


Pulse Ox 98 94 93 


 


O2 Delivery Room Air Room Air Room Air Room Air


 


    





    





 10/31/18 11/1/18  





 23:00 03:00  


 


Temp 97.9 98.1  





 97.9 98.1  


 


Pulse 48 52  


 


Resp 17 16  


 


B/P (MAP) 121/62 (81) 146/61 (89)  


 


Pulse Ox 96 97  


 


O2 Delivery Room Air Room Air  














Intake and Output   


 


 10/31/18 10/31/18 11/1/18





 15:00 23:00 07:00


 


Intake Total 10 ml  


 


Balance 10 ml  

















BENJAMÍN ADAM MD Nov 1, 2018 08:12

## 2018-11-02 VITALS — SYSTOLIC BLOOD PRESSURE: 132 MMHG | DIASTOLIC BLOOD PRESSURE: 89 MMHG

## 2018-11-02 VITALS — DIASTOLIC BLOOD PRESSURE: 58 MMHG | SYSTOLIC BLOOD PRESSURE: 114 MMHG

## 2018-11-02 VITALS — DIASTOLIC BLOOD PRESSURE: 54 MMHG | SYSTOLIC BLOOD PRESSURE: 123 MMHG

## 2018-11-02 VITALS — DIASTOLIC BLOOD PRESSURE: 74 MMHG | SYSTOLIC BLOOD PRESSURE: 141 MMHG

## 2018-11-02 VITALS — DIASTOLIC BLOOD PRESSURE: 67 MMHG | SYSTOLIC BLOOD PRESSURE: 115 MMHG

## 2018-11-02 VITALS — DIASTOLIC BLOOD PRESSURE: 66 MMHG | SYSTOLIC BLOOD PRESSURE: 110 MMHG

## 2018-11-02 LAB
ERYTHROCYTE [DISTWIDTH] IN BLOOD BY AUTOMATED COUNT: 19.5 % (ref 11.5–14.5)
HCT VFR BLD CALC: 30.3 % (ref 36–47)
HGB BLD-MCNC: 10 G/DL (ref 12–15.5)
MCH RBC QN AUTO: 28 PG (ref 25–35)
MCHC RBC AUTO-ENTMCNC: 33 G/DL (ref 31–37)
MCV RBC AUTO: 85 FL (ref 79–100)
PLATELET # BLD AUTO: 137 X10^3/UL (ref 140–400)
RBC # BLD AUTO: 3.57 X10^6/UL (ref 3.5–5.4)
WBC # BLD AUTO: 4.4 X10^3/UL (ref 4–11)

## 2018-11-02 RX ADMIN — Medication SCH CAP: at 10:08

## 2018-11-02 RX ADMIN — ACETAMINOPHEN SCH MG: 500 TABLET ORAL at 10:08

## 2018-11-02 RX ADMIN — DICLOFENAC SODIUM SCH APP: 10 GEL TOPICAL at 09:00

## 2018-11-02 RX ADMIN — Medication SCH CAP: at 21:27

## 2018-11-02 RX ADMIN — SIMVASTATIN SCH MG: 40 TABLET, FILM COATED ORAL at 21:27

## 2018-11-02 RX ADMIN — ACETAMINOPHEN SCH MG: 500 TABLET ORAL at 21:27

## 2018-11-02 RX ADMIN — APIXABAN SCH MG: 5 TABLET, FILM COATED ORAL at 21:27

## 2018-11-02 RX ADMIN — APIXABAN SCH MG: 5 TABLET, FILM COATED ORAL at 12:07

## 2018-11-02 RX ADMIN — Medication PRN EACH: at 12:47

## 2018-11-02 RX ADMIN — DICLOFENAC SODIUM SCH APP: 10 GEL TOPICAL at 21:00

## 2018-11-02 RX ADMIN — CLOPIDOGREL BISULFATE SCH MG: 75 TABLET ORAL at 10:08

## 2018-11-02 RX ADMIN — ASPIRIN 81 MG SCH MG: 81 TABLET ORAL at 10:08

## 2018-11-02 NOTE — PDOC
PROGRESS NOTES


Chief Complaint


Chief Complaint


recent CT showed Cerebral edema, encephalitis or poss press syndrome


Acute encephalopathy on chronic mild dementia, bl subacute stroke on MRI


Fluctuating dementia w/prior delirium 


Moderate malnutrition 


Dehydration


Weakness and debility


Dysphagia


Poor PO intake


Arrhythmia


The left ventricular systolic function is low normal.  PVC's. EF 45-50%


There is moderate concentric left ventricular hypertrophy.


h/o CAD with 3 stents





History of Present Illness


History of Present Illness


Was readmitted after just being one day in SNU because of seizure-like activity 

in SNU


pt is talking ,follow commands, but obviously has dementia not know the days 

place


son at bedside





Much less confused today, recalls me from yesterday, by name. Reviewed CT with 

pulm as well as V/Q perfusion scan with likely PE on left with no other 

defects. Echo reviewed negative for PFO and negative for thrombus.


Discussed with daughter, son bedside and pulmonology.





A/P:


recent CT showed Cerebral edema, encephalitis or poss press syndrome - 

improving mental status


Acute encephalopathy on chronic mild dementia, bl subacute stroke on MRI - ASA, 

will d/c plavix with eliquis today


Fluctuating dementia w/prior delirium  - improved MS today


Moderate malnutrition - on supplements, dietician to see


Dehydration - Improving


Weakness and debility - seen by PT, will need skilled svcs on d/c


Left PE - will start on eliquis 10mg BID for 7 days and 5mg thereafter, stop 

heparin and plavix


Arrhythmia - will change to eliquis with her CVA and PE,


The left ventricular systolic function is low normal.  PVC's. EF 45-50%


There is moderate concentric left ventricular hypertrophy.


h/o CAD with 3 stents - stable, older stents, ok to d/c plavix in favor of 

eliquis





Plan to d/c to SNF, daughter has suggested [facility]. Will likely be stable for 

d/c in the next 24-48 hours





Vitals


Vitals





Vital Signs








  Date Time  Temp Pulse Resp B/P (MAP) Pulse Ox O2 Delivery O2 Flow Rate FiO2


 


11/2/18 03:00 97.9 89 18 132/89 (103) 98 Room Air  





 97.9       


 


11/1/18 20:00       2.0 











Physical Exam


Physical Exam


GENERAL: Resting quietly, NAD  .


LUNGS:  Clear to auscultation.


HEART:  S1, S2.


ABDOMEN:  Soft, nondistended with positive bowel sounds.


: [place]


EXTREMITIES:  Without clubbing or cyanosis.  No gross edema.


SKIN:  Warm to touch without signs of any rashes.


CNS: much more awake, and nods feeling better, eat today per RN


PIV


General:  Alert, Cooperative, No acute distress, Other (somnolent)


Heart:  Regular rate (SB 50s), Normal S1, Normal S2, Other (2/6 systolic murmur 

to LLS border)


Lungs:  Clear


Abdomen:  Normal bowel sounds, Soft, No tenderness


Extremities:  No cyanosis


Skin:  No breakdown





Labs


LABS





Laboratory Tests








Test


 11/1/18


11:05 11/2/18


05:45


 


Heparin Anti-Xa Act,


Unfractionated 0.52 IU/mL


(0.30-0.70) 0.35 IU/mL


(0.30-0.70)


 


White Blood Count


 


 4.4 x10^3/uL


(4.0-11.0)


 


Red Blood Count


 


 3.57 x10^6/uL


(3.50-5.40)


 


Hemoglobin


 


 10.0 g/dL


(12.0-15.5)


 


Hematocrit


 


 30.3 %


(36.0-47.0)


 


Mean Corpuscular Volume  85 fL () 


 


Mean Corpuscular Hemoglobin  28 pg (25-35) 


 


Mean Corpuscular Hemoglobin


Concent 


 33 g/dL


(31-37)


 


Red Cell Distribution Width


 


 19.5 %


(11.5-14.5)


 


Platelet Count


 


 137 x10^3/uL


(140-400)











Comment


Review of Relevant


I have reviewed the following items marky (where applicable) has been applied.


Labs





Laboratory Tests








Test


 10/31/18


21:40 11/1/18


04:00 11/1/18


11:05 11/2/18


05:45


 


Heparin Anti-Xa Act,


Unfractionated > 1.10 IU/mL


(0.30-0.70) 0.94 IU/mL


(0.30-0.70) 0.52 IU/mL


(0.30-0.70) 0.35 IU/mL


(0.30-0.70)


 


White Blood Count


 


 5.2 x10^3/uL


(4.0-11.0) 


 4.4 x10^3/uL


(4.0-11.0)


 


Red Blood Count


 


 3.90 x10^6/uL


(3.50-5.40) 


 3.57 x10^6/uL


(3.50-5.40)


 


Hemoglobin


 


 11.0 g/dL


(12.0-15.5) 


 10.0 g/dL


(12.0-15.5)


 


Hematocrit


 


 33.3 %


(36.0-47.0) 


 30.3 %


(36.0-47.0)


 


Mean Corpuscular Volume  86 fL ()   85 fL () 


 


Mean Corpuscular Hemoglobin  28 pg (25-35)   28 pg (25-35) 


 


Mean Corpuscular Hemoglobin


Concent 


 33 g/dL


(31-37) 


 33 g/dL


(31-37)


 


Red Cell Distribution Width


 


 19.7 %


(11.5-14.5) 


 19.5 %


(11.5-14.5)


 


Platelet Count


 


 158 x10^3/uL


(140-400) 


 137 x10^3/uL


(140-400)


 


Neutrophils (%) (Auto)  73 % (31-73)   


 


Lymphocytes (%) (Auto)  19 % (24-48)   


 


Monocytes (%) (Auto)  7 % (0-9)   


 


Eosinophils (%) (Auto)  1 % (0-3)   


 


Basophils (%) (Auto)  0 % (0-3)   


 


Neutrophils # (Auto)


 


 3.8 x10^3uL


(1.8-7.7) 


 





 


Lymphocytes # (Auto)


 


 1.0 x10^3/uL


(1.0-4.8) 


 





 


Monocytes # (Auto)


 


 0.4 x10^3/uL


(0.0-1.1) 


 





 


Eosinophils # (Auto)


 


 0.1 x10^3/uL


(0.0-0.7) 


 





 


Basophils # (Auto)


 


 0.0 x10^3/uL


(0.0-0.2) 


 





 


Sodium Level


 


 141 mmol/L


(136-145) 


 





 


Potassium Level


 


 3.4 mmol/L


(3.5-5.1) 


 





 


Chloride Level


 


 105 mmol/L


() 


 





 


Carbon Dioxide Level


 


 28 mmol/L


(21-32) 


 





 


Anion Gap  8 (6-14)   


 


Blood Urea Nitrogen


 


 12 mg/dL


(7-20) 


 





 


Creatinine


 


 1.0 mg/dL


(0.6-1.0) 


 





 


Estimated GFR


(Cockcroft-Gault) 


 64.1 


 


 





 


Glucose Level


 


 102 mg/dL


(70-99) 


 





 


Calcium Level


 


 8.7 mg/dL


(8.5-10.1) 


 











Laboratory Tests








Test


 11/1/18


11:05 11/2/18


05:45


 


Heparin Anti-Xa Act,


Unfractionated 0.52 IU/mL


(0.30-0.70) 0.35 IU/mL


(0.30-0.70)


 


White Blood Count


 


 4.4 x10^3/uL


(4.0-11.0)


 


Red Blood Count


 


 3.57 x10^6/uL


(3.50-5.40)


 


Hemoglobin


 


 10.0 g/dL


(12.0-15.5)


 


Hematocrit


 


 30.3 %


(36.0-47.0)


 


Mean Corpuscular Volume  85 fL () 


 


Mean Corpuscular Hemoglobin  28 pg (25-35) 


 


Mean Corpuscular Hemoglobin


Concent 


 33 g/dL


(31-37)


 


Red Cell Distribution Width


 


 19.5 %


(11.5-14.5)


 


Platelet Count


 


 137 x10^3/uL


(140-400)








Microbiology


10/25/18 Urine Culture - Final, Complete


           


10/25/18 Urine Culture Result 1 (EDGAR) - Final, Complete


Medications





Current Medications


Ondansetron HCl (Zofran) 4 mg 1X  ONCE IV  Last administered on 10/25/18at 13:04

;  Start 10/25/18 at 11:45;  Stop 10/25/18 at 11:46;  Status DC


Ondansetron HCl (Zofran) 4 mg PRN Q8HRS  PRN IV NAUSEA/VOMITING;  Start 10/25/

18 at 14:15;  Stop 10/26/18 at 13:19;  Status DC


Sodium Chloride 1,000 ml @  85 mls/hr [id number] IV  Last administered on 10/25/

18at 16:39;  Start 10/25/18 at 14:04;  Stop 10/26/18 at 14:03;  Status DC


Lorazepam (Ativan) 1 mg 1X  ONCE IM  Last administered on 10/25/18at 21:53;  

Start 10/25/18 at 18:15;  Stop 10/25/18 at 18:16;  Status DC


Acetaminophen (Tylenol) 500 mg BID PO  Last administered on 11/1/18at 21:33;  

Start 10/25/18 at 21:00


Aspirin (Children'S Aspirin) 81 mg DAILY08 PO  Last administered on 11/1/18at 09

:53;  Start 10/26/18 at 08:00


Clopidogrel Bisulfate (Plavix) 75 mg DAILY07 PO  Last administered on 11/1/18at 

06:28;  Start 10/26/18 at 07:00


Atenolol (Tenormin) 25 mg DAILY PO  Last administered on 10/26/18at 10:13;  

Start 10/26/18 at 09:00;  Stop 10/26/18 at 11:14;  Status DC


Meclizine HCl (Antivert) 25 mg PRN Q8HRS  PRN PO DIZZINESS;  Start 10/25/18 at 

18:45


Simvastatin (Zocor) 40 mg QHS PO  Last administered on 11/1/18at 21:33;  Start 

10/25/18 at 21:00


Ceftriaxone Sodium 1 gm/ Dextrose 50 ml @  100 mls/hr Q24H IV ;  Start 10/25/18 

at 18:45;  Status UNV


Lactobacillus Rhamnosus (Culturelle) 1 cap BID PO  Last administered on 11/1/ 18at 21:00;  Start 10/25/18 at 21:00


Ceftriaxone Sodium (Rocephin) 1 gm Q24H IVP ;  Start 10/25/18 at 19:00;  Stop 10

/26/18 at 10:15;  Status DC


Enoxaparin Sodium (Lovenox 40mg Syringe) 40 mg DAILY SQ  Last administered on 10

/30/18at 08:55;  Start 10/26/18 at 09:00;  Stop 10/31/18 at 14:43;  Status DC


Diclofenac Sodium (Voltaren) 1 danyell BID TP ;  Start 10/25/18 at 21:00;  Status 

Cancel


Diclofenac Sodium (Voltaren) 1 danyell BID TP  Last administered on 10/31/18at 21:58

;  Start 10/26/18 at 09:00


Ondansetron HCl (Zofran) 4 mg PRN Q6HRS  PRN IV NAUSEA/VOMITING;  Start 10/26/

18 at 13:30;  Status Cancel


Alprazolam (Xanax) 1 mg 1X  ONCE PO ;  Start 10/26/18 at 21:00;  Stop 10/26/18 

at 21:01;  Status DC


Lorazepam (Ativan) 0.5 mg PRN Q8HRS  PRN PO ANXIETY / AGITATION Last 

administered on 11/1/18at 19:01;  Start 10/28/18 at 03:45


Acetaminophen (Tylenol) 650 mg PRN Q6HRS  PRN PO FEVER Last administered on 10/

31/18at 22:04;  Start 10/29/18 at 15:00


Ondansetron HCl (Zofran) 4 mg PRN Q6HRS  PRN IV NAUSEA/VOMITING;  Start 10/29/

18 at 15:00


Morphine Sulfate (Morphine Sulfate) 2 mg PRN Q2HR  PRN IV MODERATE TO SEVERE 

PAIN;  Start 10/29/18 at 15:00


Tramadol HCl (Ultram) 50 mg PRN Q6HRS  PRN PO MILD TO MODERATE PAIN;  Start 10/

29/18 at 15:00


Docusate Sodium (Colace) 100 mg PRN DAILY  PRN PO CONSTIPATION;  Start 10/29/18 

at 15:00


Gadobutrol (Gadavist) 6 mmol 1X  ONCE IV  Last administered on 10/30/18at 14:56

;  Start 10/30/18 at 14:45;  Stop 10/30/18 at 14:46;  Status DC


Iohexol (Omnipaque 300 Mg/ml) 75 ml 1X  ONCE IV  Last administered on 10/31/

18at 11:23;  Start 10/31/18 at 11:00;  Stop 10/31/18 at 11:01;  Status DC


Info (CONTRAST GIVEN -- Rx MONITORING) 1 each PRN DAILY  PRN MC SEE COMMENTS;  

Start 10/31/18 at 11:00;  Stop 11/1/18 at 18:09;  Status DC


Benzocaine (Hurricaine One) 3 spray 1X  ONCE MM  Last administered on 10/31/

18at 13:36;  Start 10/31/18 at 12:15;  Stop 10/31/18 at 12:16;  Status DC


Lidocaine HCl (Viscous Lidocaine) 15 ml 1X  ONCE SWSW ;  Start 10/31/18 at 12:15

;  Stop 10/31/18 at 12:16;  Status DC


Lidocaine HCl (Xylocaine 2% Topical 30gm Tube) 1 danyell 1X  ONCE TP ;  Start 10/31/

18 at 12:15;  Stop 10/31/18 at 12:16;  Status DC


Propofol 20 ml @ As Directed STK-MED ONCE IV ;  Start 10/31/18 at 14:08;  Stop 

10/31/18 at 14:09;  Status DC


Heparin Sodium (Porcine) (Heparin Sodium) 4,900 unit 1X  ONCE IV  Last 

administered on 10/31/18at 15:41;  Start 10/31/18 at 15:00;  Stop 10/31/18 at 15

:01;  Status DC


Heparin Sodium/ Dextrose 500 ml @ 0 mls/hr CONT  PRN IV SEE I/O RECORD Last 

administered on 10/31/18at 15:46;  Start 10/31/18 at 14:45


Heparin Sodium (Porcine) (Heparin Sodium) 1,800 unit PRN Q6HRS  PRN IV FOR UFH 

LEVEL LESS THAN 0.2;  Start 10/31/18 at 14:45


Heparin Sodium (Porcine) (Heparin Sodium) 900 unit PRN Q6HRS  PRN IV FOR UFH 

LEVEL 0.2 - 0.29;  Start 10/31/18 at 14:45


Info (Anti-Coagulation Monitoring By Pharmacy) 1 each PRN DAILY  PRN MC SEE 

COMMENTS Last administered on 11/1/18at 19:12;  Start 10/31/18 at 15:00


Potassium Chloride (Klor-Con) 40 meq 1X  ONCE PO ;  Start 11/1/18 at 08:15;  

Stop 11/1/18 at 08:16;  Status Cancel


Iohexol (Omnipaque 300 Mg/ml) 75 ml 1X  ONCE IV ;  Start 11/1/18 at 10:00;  

Stop 11/1/18 at 10:06;  Status DC


Info (CONTRAST GIVEN -- Rx MONITORING) 1 each PRN DAILY  PRN MC SEE COMMENTS;  

Start 11/1/18 at 10:15;  Stop 11/3/18 at 10:14


Potassium Chloride (KCl Oral Soln) 40 meq 1X  ONCE PO  Last administered on 11/1 /18at 10:15;  Start 11/1/18 at 10:15;  Stop 11/1/18 at 10:16;  Status DC





Active Scripts


Active


Meclizine Hcl 25 Mg Tablet 1 Tab PO PRN TID PRN


Reported


Tylenol (Acetaminophen) 325 Mg Tablet 500 Mg PO BID


Aspirin 81 Mg Tab.chew 1 Tab PO DAILY


Simvastatin 40 Mg Tablet 1 Tab PO QHS


Clopidogrel (Clopidogrel Bisulfate) 75 Mg Tablet 1 Tab PO DAILY


Metformin Hcl 500 Mg Tablet 1 Tab PO BID


Atenolol 25 Mg Tablet 1 Tab PO DAILY


Vitals/I & O





Vital Sign - Last 24 Hours








 11/1/18 11/1/18 11/1/18 11/1/18





 08:15 11:00 15:00 19:00


 


Temp  99.4 98.9 97.9





  99.4 98.9 97.9


 


Pulse  68 75 98


 


Resp  17 18 18


 


B/P (MAP)  127/67 (87) 144/61 (88) 147/85 (105)


 


Pulse Ox  100 100 100


 


O2 Delivery Room Air Room Air Room Air Room Air


 


O2 Flow Rate 2.0   


 


    





    





 11/1/18 11/1/18 11/2/18 





 20:00 23:00 03:00 


 


Temp  97.9 97.9 





  97.9 97.9 


 


Pulse  78 89 


 


Resp  18 18 


 


B/P (MAP)  133/65 (87) 132/89 (103) 


 


Pulse Ox  92 98 


 


O2 Delivery Room Air Room Air Room Air 


 


O2 Flow Rate 2.0   














Intake and Output   


 


 11/1/18 11/1/18 11/2/18





 15:00 23:00 07:00


 


Intake Total  200 ml 360 ml


 


Output Total  2 ml 


 


Balance  198 ml 360 ml

















BENJAMÍN ADAM MD Nov 2, 2018 08:02

## 2018-11-02 NOTE — DISCH
DISCHARGE


DISCHARGE INFORMATION:


DISCHARGE DATE:  Nov 3, 2018


FINAL DIAGNOSIS


CVA


PE


CONDITION ON DISCHARGE:  Stable





CODE STATUS:


Code Status:  Full





SKILLED NURSING:


SNF STAY <30 DAYS:  Yes





HOSPICE:


HOSPICE:  No


HOSPICE EVAL & TREAT:  No





LTAC:


ADMIT TO LTAC:  No





POST DISCHARGE ORDERS:


ACTIVITY ORDERS:  Activity as tolerated


WEIGHT BEARING STATUS:  As tolerated


DIET AFTER DISCHARGE:  Cardiac





CHECKS AFTER DISCHARGE:


CHECKS AFTER DISCHARGE:  Check blood press - daily, Check blood sugar, ac/hs, 

Check your Temp as needed





FOLLOW-UP:


LAB ORDERS FOR FOLLOW-UP:  BMP weekly





TREATMENT/EQUIPMENT ORDERS:


ADAPTIVE EQUIPMENT NEEDED:  None


Physical Therapy For:  Evalulation/Treatment


Occupational Therapy For:  Evaluation/Treatment





DISCHARGE MEDICATIONS:


Home Meds


Active Scripts


Apixaban (ELIQUIS) 5 Mg Tablet, 10 MG PO BID for PE and CVA for 30 Days, #74 TAB


   10mg until 11/7/18, then 5mg BID thereafter


   Prov:BENJAMÍN ADAM MD         11/2/18


Meclizine Hcl (MECLIZINE HCL) 25 Mg Tablet, 1 TAB PO PRN TID PRN for DIZZINESS, 

#30 TAB


   Prov:KINGS ROSAS MD         10/29/16


Reported Medications


Acetaminophen (TYLENOL) 325 Mg Tablet, 500 MG PO BID, TAB


   10/25/18


Aspirin (ASPIRIN) 81 Mg Tab.chew, 1 TAB PO DAILY for blood thinner, #30 TAB 3 

Refills


   10/29/16


Simvastatin (SIMVASTATIN) 40 Mg Tablet, 1 TAB PO QHS for elevated cholesterol, #

30 TAB 5 Refills


   10/29/16


Atenolol (ATENOLOL) 25 Mg Tablet, 1 TAB PO DAILY for htn, #30 TAB 5 Refills


   10/29/16


Discontinued Reported Medications


Clopidogrel Bisulfate (CLOPIDOGREL) 75 Mg Tablet, 1 TAB PO DAILY for stents, #

90 TAB 1 Refill


   10/29/16


Metformin Hcl (METFORMIN HCL) 500 Mg Tablet, 1 TAB PO BID for dm, #60 TAB 3 

Refills


   10/29/16











BENJAMÍN ADAM MD Nov 2, 2018 14:35

## 2018-11-02 NOTE — PDOC
PULMONARY PROGRESS NOTES


Subjective


no soa


Vitals





Vital Signs








  Date Time  Temp Pulse Resp B/P (MAP) Pulse Ox O2 Delivery O2 Flow Rate FiO2


 


11/2/18 07:00 98.8 70 17 114/58 (76) 100 Room Air  





 98.8       


 


11/1/18 20:00       2.0 








ROS:  No Chest Pain, No Increase Cough


General:  Alert, No acute distress


Lungs:  Clear


Cardiovascular:  S1, S2


Abdomen:  Soft


Neuro Exam:  Alert


Extremities:  No Edema


Skin:  Warm


Labs





Laboratory Tests








Test


 10/31/18


21:40 11/1/18


04:00 11/1/18


11:05 11/2/18


05:45


 


Heparin Anti-Xa Act,


Unfractionated > 1.10 IU/mL


(0.30-0.70) 0.94 IU/mL


(0.30-0.70) 0.52 IU/mL


(0.30-0.70) 0.35 IU/mL


(0.30-0.70)


 


White Blood Count


 


 5.2 x10^3/uL


(4.0-11.0) 


 4.4 x10^3/uL


(4.0-11.0)


 


Red Blood Count


 


 3.90 x10^6/uL


(3.50-5.40) 


 3.57 x10^6/uL


(3.50-5.40)


 


Hemoglobin


 


 11.0 g/dL


(12.0-15.5) 


 10.0 g/dL


(12.0-15.5)


 


Hematocrit


 


 33.3 %


(36.0-47.0) 


 30.3 %


(36.0-47.0)


 


Mean Corpuscular Volume  86 fL ()   85 fL () 


 


Mean Corpuscular Hemoglobin  28 pg (25-35)   28 pg (25-35) 


 


Mean Corpuscular Hemoglobin


Concent 


 33 g/dL


(31-37) 


 33 g/dL


(31-37)


 


Red Cell Distribution Width


 


 19.7 %


(11.5-14.5) 


 19.5 %


(11.5-14.5)


 


Platelet Count


 


 158 x10^3/uL


(140-400) 


 137 x10^3/uL


(140-400)


 


Neutrophils (%) (Auto)  73 % (31-73)   


 


Lymphocytes (%) (Auto)  19 % (24-48)   


 


Monocytes (%) (Auto)  7 % (0-9)   


 


Eosinophils (%) (Auto)  1 % (0-3)   


 


Basophils (%) (Auto)  0 % (0-3)   


 


Neutrophils # (Auto)


 


 3.8 x10^3uL


(1.8-7.7) 


 





 


Lymphocytes # (Auto)


 


 1.0 x10^3/uL


(1.0-4.8) 


 





 


Monocytes # (Auto)


 


 0.4 x10^3/uL


(0.0-1.1) 


 





 


Eosinophils # (Auto)


 


 0.1 x10^3/uL


(0.0-0.7) 


 





 


Basophils # (Auto)


 


 0.0 x10^3/uL


(0.0-0.2) 


 





 


Sodium Level


 


 141 mmol/L


(136-145) 


 





 


Potassium Level


 


 3.4 mmol/L


(3.5-5.1) 


 





 


Chloride Level


 


 105 mmol/L


() 


 





 


Carbon Dioxide Level


 


 28 mmol/L


(21-32) 


 





 


Anion Gap  8 (6-14)   


 


Blood Urea Nitrogen


 


 12 mg/dL


(7-20) 


 





 


Creatinine


 


 1.0 mg/dL


(0.6-1.0) 


 





 


Estimated GFR


(Cockcroft-Gault) 


 64.1 


 


 





 


Glucose Level


 


 102 mg/dL


(70-99) 


 





 


Calcium Level


 


 8.7 mg/dL


(8.5-10.1) 


 











Laboratory Tests








Test


 11/1/18


11:05 11/2/18


05:45


 


Heparin Anti-Xa Act,


Unfractionated 0.52 IU/mL


(0.30-0.70) 0.35 IU/mL


(0.30-0.70)


 


White Blood Count


 


 4.4 x10^3/uL


(4.0-11.0)


 


Red Blood Count


 


 3.57 x10^6/uL


(3.50-5.40)


 


Hemoglobin


 


 10.0 g/dL


(12.0-15.5)


 


Hematocrit


 


 30.3 %


(36.0-47.0)


 


Mean Corpuscular Volume  85 fL () 


 


Mean Corpuscular Hemoglobin  28 pg (25-35) 


 


Mean Corpuscular Hemoglobin


Concent 


 33 g/dL


(31-37)


 


Red Cell Distribution Width


 


 19.5 %


(11.5-14.5)


 


Platelet Count


 


 137 x10^3/uL


(140-400)








Medications





Active Scripts








 Medications  Dose


 Route/Sig


 Max Daily Dose Days Date Category


 


 Tylenol


  (Acetaminophen)


 325 Mg Tablet  500 Mg


 PO BID


   10/25/18 Reported


 


 Meclizine Hcl 25


 Mg Tablet  1 Tab


 PO PRN TID PRN


   10/29/16 Rx


 


 Aspirin 81 Mg


 Tab.chew  1 Tab


 PO DAILY


   10/29/16 Reported


 


 Simvastatin 40 Mg


 Tablet  1 Tab


 PO QHS


   10/29/16 Reported


 


 Clopidogrel


  (Clopidogrel


 Bisulfate) 75 Mg


 Tablet  1 Tab


 PO DAILY


   10/29/16 Reported


 


 Metformin Hcl 500


 Mg Tablet  1 Tab


 PO BID


   10/29/16 Reported


 


 Atenolol 25 Mg


 Tablet  1 Tab


 PO DAILY


   10/29/16 Reported











Impression


.


1.  Acute pulmonary embolism.  This may have accounted for syncopal episode,


although multiple infarcts were another etiology of her syncopal episode.  There


is no evidence of DVT in the lower extremities.  The risk factors are unclear,


could be related to sedentary lifestyle and immobilization due to her advanced


age.


2.  Multiple cerebral infarcts.


3.  Encephalopathy and underlying dementia.


4.  Syncopal episodes could be combination of cerebral infarcts and acute


pulmonary embolism.


5.  No Rt/left shunt on JOLENE





Plan


.





1.  V/Q scan suspicious for PE on left side. Better seen on CTA neck


2.   discuss with the son regarding long-term safety of oral


anticoagulation. He understands that she needs to be closely watched when she 

ambulates.


3.  d/w DR Buck. Once she is on oral AC, Plavix can be discontinued


4.  repeat CTA chest in 4-6 weeks


5.  Discussed with RN and we will be following.











WOJCIECH WEINBERG MD Nov 2, 2018 10:39

## 2018-11-02 NOTE — PDOC
PROGRESS NOTES


Assessment


Dementia, most likely Alzheimer's, consider frontotemporal


Posterior reversible encephalopathy syndrome. She continues to improve, but 

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 Note that 

sedimentation rate and antinuclear antibody studies were negative last 

admission. CT angiogram and transesophageal echocardiogram did not show any 

source, so these are small-vessel infarcts


Syncope, negative neurological workup for seizures in the past, 24 hour EEG is 

negative for epileptic abnormalities, does show diffuse background slowing 

consistent with dementia or encephalopathy.


Pulmonary embolus found on the CT angiogram


Plan


Continue aspirin, clopidrogel, simvastatin


Okay to transfer to rehab, especially since her neurological condition 

continues to slowly improve


Holding off on additional studies such as brain biopsy, repeat lumbar puncture, 

or impair immunosuppression


Okay to anticoagulate for the pulmonary embolism


Discussed with patients daughter, she wants patient to go to [place]


Subjective


No complaints, denies headache


Objective





Vital Signs








  Date Time  Temp Pulse Resp B/P (MAP) Pulse Ox O2 Delivery O2 Flow Rate FiO2


 


11/2/18 03:00 97.9 89 18 132/89 (103) 98 Room Air  





 97.9       


 


11/1/18 20:00       2.0 














Intake and Output 


 


 11/2/18





 07:00


 


Intake Total 560 ml


 


Output Total 2 ml


 


Balance 558 ml


 


 


 


Intake Oral 560 ml


 


Emesis 2 ml


 


# Voids 6








PHYSICAL EXAM


Alert. Oriented to place and person, knows month and year.


PERRL.


EOMI.


CN: no focal findings.


Muscle tone: normal.


Muscle strength:  5/5


DTR: 2+


Plantar reflex:  flexor


Gait: not examined in bed.


Sensory exam: no abnormal findings.


No cerebellar signs elicited.


Review of Relevant


I have reviewed the following items marky (where applicable) has been applied.


Labs





Laboratory Tests








Test


 10/31/18


21:40 11/1/18


04:00 11/1/18


11:05 11/2/18


05:45


 


Heparin Anti-Xa Act,


Unfractionated > 1.10 IU/mL


(0.30-0.70) 0.94 IU/mL


(0.30-0.70) 0.52 IU/mL


(0.30-0.70) 0.35 IU/mL


(0.30-0.70)


 


White Blood Count


 


 5.2 x10^3/uL


(4.0-11.0) 


 4.4 x10^3/uL


(4.0-11.0)


 


Red Blood Count


 


 3.90 x10^6/uL


(3.50-5.40) 


 3.57 x10^6/uL


(3.50-5.40)


 


Hemoglobin


 


 11.0 g/dL


(12.0-15.5) 


 10.0 g/dL


(12.0-15.5)


 


Hematocrit


 


 33.3 %


(36.0-47.0) 


 30.3 %


(36.0-47.0)


 


Mean Corpuscular Volume  86 fL ()   85 fL () 


 


Mean Corpuscular Hemoglobin  28 pg (25-35)   28 pg (25-35) 


 


Mean Corpuscular Hemoglobin


Concent 


 33 g/dL


(31-37) 


 33 g/dL


(31-37)


 


Red Cell Distribution Width


 


 19.7 %


(11.5-14.5) 


 19.5 %


(11.5-14.5)


 


Platelet Count


 


 158 x10^3/uL


(140-400) 


 137 x10^3/uL


(140-400)


 


Neutrophils (%) (Auto)  73 % (31-73)   


 


Lymphocytes (%) (Auto)  19 % (24-48)   


 


Monocytes (%) (Auto)  7 % (0-9)   


 


Eosinophils (%) (Auto)  1 % (0-3)   


 


Basophils (%) (Auto)  0 % (0-3)   


 


Neutrophils # (Auto)


 


 3.8 x10^3uL


(1.8-7.7) 


 





 


Lymphocytes # (Auto)


 


 1.0 x10^3/uL


(1.0-4.8) 


 





 


Monocytes # (Auto)


 


 0.4 x10^3/uL


(0.0-1.1) 


 





 


Eosinophils # (Auto)


 


 0.1 x10^3/uL


(0.0-0.7) 


 





 


Basophils # (Auto)


 


 0.0 x10^3/uL


(0.0-0.2) 


 





 


Sodium Level


 


 141 mmol/L


(136-145) 


 





 


Potassium Level


 


 3.4 mmol/L


(3.5-5.1) 


 





 


Chloride Level


 


 105 mmol/L


() 


 





 


Carbon Dioxide Level


 


 28 mmol/L


(21-32) 


 





 


Anion Gap  8 (6-14)   


 


Blood Urea Nitrogen


 


 12 mg/dL


(7-20) 


 





 


Creatinine


 


 1.0 mg/dL


(0.6-1.0) 


 





 


Estimated GFR


(Cockcroft-Gault) 


 64.1 


 


 





 


Glucose Level


 


 102 mg/dL


(70-99) 


 





 


Calcium Level


 


 8.7 mg/dL


(8.5-10.1) 


 











Laboratory Tests








Test


 11/1/18


11:05 11/2/18


05:45


 


Heparin Anti-Xa Act,


Unfractionated 0.52 IU/mL


(0.30-0.70) 0.35 IU/mL


(0.30-0.70)


 


White Blood Count


 


 4.4 x10^3/uL


(4.0-11.0)


 


Red Blood Count


 


 3.57 x10^6/uL


(3.50-5.40)


 


Hemoglobin


 


 10.0 g/dL


(12.0-15.5)


 


Hematocrit


 


 30.3 %


(36.0-47.0)


 


Mean Corpuscular Volume  85 fL () 


 


Mean Corpuscular Hemoglobin  28 pg (25-35) 


 


Mean Corpuscular Hemoglobin


Concent 


 33 g/dL


(31-37)


 


Red Cell Distribution Width


 


 19.5 %


(11.5-14.5)


 


Platelet Count


 


 137 x10^3/uL


(140-400)








Microbiology


10/25/18 Urine Culture - Final, Complete


           


10/25/18 Urine Culture Result 1 (EDGAR) - Final, Complete


Medications





Current Medications


Ondansetron HCl (Zofran) 4 mg 1X  ONCE IV  Last administered on 10/25/18at 13:04

;  Start 10/25/18 at 11:45;  Stop 10/25/18 at 11:46;  Status DC


Ondansetron HCl (Zofran) 4 mg PRN Q8HRS  PRN IV NAUSEA/VOMITING;  Start 10/25/

18 at 14:15;  Stop 10/26/18 at 13:19;  Status DC


Sodium Chloride 1,000 ml @  85 mls/hr [id number] IV  Last administered on 10/25/

18at 16:39;  Start 10/25/18 at 14:04;  Stop 10/26/18 at 14:03;  Status DC


Lorazepam (Ativan) 1 mg 1X  ONCE IM  Last administered on 10/25/18at 21:53;  

Start 10/25/18 at 18:15;  Stop 10/25/18 at 18:16;  Status DC


Acetaminophen (Tylenol) 500 mg BID PO  Last administered on 11/1/18at 21:33;  

Start 10/25/18 at 21:00


Aspirin (Children'S Aspirin) 81 mg DAILY08 PO  Last administered on 11/1/18at 09

:53;  Start 10/26/18 at 08:00


Clopidogrel Bisulfate (Plavix) 75 mg DAILY07 PO  Last administered on 11/1/18at 

06:28;  Start 10/26/18 at 07:00


Atenolol (Tenormin) 25 mg DAILY PO  Last administered on 10/26/18at 10:13;  

Start 10/26/18 at 09:00;  Stop 10/26/18 at 11:14;  Status DC


Meclizine HCl (Antivert) 25 mg PRN Q8HRS  PRN PO DIZZINESS;  Start 10/25/18 at 

18:45


Simvastatin (Zocor) 40 mg QHS PO  Last administered on 11/1/18at 21:33;  Start 

10/25/18 at 21:00


Ceftriaxone Sodium 1 gm/ Dextrose 50 ml @  100 mls/hr Q24H IV ;  Start 10/25/18 

at 18:45;  Status UNV


Lactobacillus Rhamnosus (Culturelle) 1 cap BID PO  Last administered on 11/1/ 18at 21:00;  Start 10/25/18 at 21:00


Ceftriaxone Sodium (Rocephin) 1 gm Q24H IVP ;  Start 10/25/18 at 19:00;  Stop 10

/26/18 at 10:15;  Status DC


Enoxaparin Sodium (Lovenox 40mg Syringe) 40 mg DAILY SQ  Last administered on 10

/30/18at 08:55;  Start 10/26/18 at 09:00;  Stop 10/31/18 at 14:43;  Status DC


Diclofenac Sodium (Voltaren) 1 danyell BID TP ;  Start 10/25/18 at 21:00;  Status 

Cancel


Diclofenac Sodium (Voltaren) 1 danyell BID TP  Last administered on 10/31/18at 21:58

;  Start 10/26/18 at 09:00


Ondansetron HCl (Zofran) 4 mg PRN Q6HRS  PRN IV NAUSEA/VOMITING;  Start 10/26/

18 at 13:30;  Status Cancel


Alprazolam (Xanax) 1 mg 1X  ONCE PO ;  Start 10/26/18 at 21:00;  Stop 10/26/18 

at 21:01;  Status DC


Lorazepam (Ativan) 0.5 mg PRN Q8HRS  PRN PO ANXIETY / AGITATION Last 

administered on 11/1/18at 19:01;  Start 10/28/18 at 03:45


Acetaminophen (Tylenol) 650 mg PRN Q6HRS  PRN PO FEVER Last administered on 10/

31/18at 22:04;  Start 10/29/18 at 15:00


Ondansetron HCl (Zofran) 4 mg PRN Q6HRS  PRN IV NAUSEA/VOMITING;  Start 10/29/

18 at 15:00


Morphine Sulfate (Morphine Sulfate) 2 mg PRN Q2HR  PRN IV MODERATE TO SEVERE 

PAIN;  Start 10/29/18 at 15:00


Tramadol HCl (Ultram) 50 mg PRN Q6HRS  PRN PO MILD TO MODERATE PAIN;  Start 10/

29/18 at 15:00


Docusate Sodium (Colace) 100 mg PRN DAILY  PRN PO CONSTIPATION;  Start 10/29/18 

at 15:00


Gadobutrol (Gadavist) 6 mmol 1X  ONCE IV  Last administered on 10/30/18at 14:56

;  Start 10/30/18 at 14:45;  Stop 10/30/18 at 14:46;  Status DC


Iohexol (Omnipaque 300 Mg/ml) 75 ml 1X  ONCE IV  Last administered on 10/31/

18at 11:23;  Start 10/31/18 at 11:00;  Stop 10/31/18 at 11:01;  Status DC


Info (CONTRAST GIVEN -- Rx MONITORING) 1 each PRN DAILY  PRN MC SEE COMMENTS;  

Start 10/31/18 at 11:00;  Stop 11/1/18 at 18:09;  Status DC


Benzocaine (Hurricaine One) 3 spray 1X  ONCE MM  Last administered on 10/31/

18at 13:36;  Start 10/31/18 at 12:15;  Stop 10/31/18 at 12:16;  Status DC


Lidocaine HCl (Viscous Lidocaine) 15 ml 1X  ONCE SWSW ;  Start 10/31/18 at 12:15

;  Stop 10/31/18 at 12:16;  Status DC


Lidocaine HCl (Xylocaine 2% Topical 30gm Tube) 1 danyell 1X  ONCE TP ;  Start 10/31/

18 at 12:15;  Stop 10/31/18 at 12:16;  Status DC


Propofol 20 ml @ As Directed STK-MED ONCE IV ;  Start 10/31/18 at 14:08;  Stop 

10/31/18 at 14:09;  Status DC


Heparin Sodium (Porcine) (Heparin Sodium) 4,900 unit 1X  ONCE IV  Last 

administered on 10/31/18at 15:41;  Start 10/31/18 at 15:00;  Stop 10/31/18 at 15

:01;  Status DC


Heparin Sodium/ Dextrose 500 ml @ 0 mls/hr CONT  PRN IV SEE I/O RECORD Last 

administered on 10/31/18at 15:46;  Start 10/31/18 at 14:45


Heparin Sodium (Porcine) (Heparin Sodium) 1,800 unit PRN Q6HRS  PRN IV FOR UFH 

LEVEL LESS THAN 0.2;  Start 10/31/18 at 14:45


Heparin Sodium (Porcine) (Heparin Sodium) 900 unit PRN Q6HRS  PRN IV FOR UFH 

LEVEL 0.2 - 0.29;  Start 10/31/18 at 14:45


Info (Anti-Coagulation Monitoring By Pharmacy) 1 each PRN DAILY  PRN MC SEE 

COMMENTS Last administered on 11/1/18at 19:12;  Start 10/31/18 at 15:00


Potassium Chloride (Klor-Con) 40 meq 1X  ONCE PO ;  Start 11/1/18 at 08:15;  

Stop 11/1/18 at 08:16;  Status Cancel


Iohexol (Omnipaque 300 Mg/ml) 75 ml 1X  ONCE IV ;  Start 11/1/18 at 10:00;  

Stop 11/1/18 at 10:06;  Status DC


Info (CONTRAST GIVEN -- Rx MONITORING) 1 each PRN DAILY  PRN MC SEE COMMENTS;  

Start 11/1/18 at 10:15;  Stop 11/3/18 at 10:14


Potassium Chloride (KCl Oral Soln) 40 meq 1X  ONCE PO  Last administered on 11/1 /18at 10:15;  Start 11/1/18 at 10:15;  Stop 11/1/18 at 10:16;  Status DC





Active Scripts


Active


Meclizine Hcl 25 Mg Tablet 1 Tab PO PRN TID PRN


Reported


Tylenol (Acetaminophen) 325 Mg Tablet 500 Mg PO BID


Aspirin 81 Mg Tab.chew 1 Tab PO DAILY


Simvastatin 40 Mg Tablet 1 Tab PO QHS


Clopidogrel (Clopidogrel Bisulfate) 75 Mg Tablet 1 Tab PO DAILY


Metformin Hcl 500 Mg Tablet 1 Tab PO BID


Atenolol 25 Mg Tablet 1 Tab PO DAILY


Vitals/I & O





Vital Sign - Last 24 Hours








 11/1/18 11/1/18 11/1/18 11/1/18





 11:00 15:00 19:00 20:00


 


Temp 99.4 98.9 97.9 





 99.4 98.9 97.9 


 


Pulse 68 75 98 


 


Resp 17 18 18 


 


B/P (MAP) 127/67 (87) 144/61 (88) 147/85 (105) 


 


Pulse Ox 100 100 100 


 


O2 Delivery Room Air Room Air Room Air Room Air


 


O2 Flow Rate    2.0


 


    





    





 11/1/18 11/2/18  





 23:00 03:00  


 


Temp 97.9 97.9  





 97.9 97.9  


 


Pulse 78 89  


 


Resp 18 18  


 


B/P (MAP) 133/65 (87) 132/89 (103)  


 


Pulse Ox 92 98  


 


O2 Delivery Room Air Room Air  














Intake and Output   


 


 11/1/18 11/1/18 11/2/18





 15:00 23:00 07:00


 


Intake Total  200 ml 360 ml


 


Output Total  2 ml 


 


Balance  198 ml 360 ml

















RUPA PIKE MD Nov 2, 2018 09:21

## 2018-11-03 VITALS — SYSTOLIC BLOOD PRESSURE: 136 MMHG | DIASTOLIC BLOOD PRESSURE: 67 MMHG

## 2018-11-03 VITALS
DIASTOLIC BLOOD PRESSURE: 62 MMHG | SYSTOLIC BLOOD PRESSURE: 140 MMHG | SYSTOLIC BLOOD PRESSURE: 140 MMHG | DIASTOLIC BLOOD PRESSURE: 62 MMHG

## 2018-11-03 RX ADMIN — ASPIRIN 81 MG SCH MG: 81 TABLET ORAL at 10:23

## 2018-11-03 RX ADMIN — APIXABAN SCH MG: 5 TABLET, FILM COATED ORAL at 09:00

## 2018-11-03 RX ADMIN — Medication SCH CAP: at 10:23

## 2018-11-03 RX ADMIN — ACETAMINOPHEN SCH MG: 500 TABLET ORAL at 10:23

## 2018-11-03 RX ADMIN — DICLOFENAC SODIUM SCH APP: 10 GEL TOPICAL at 10:32

## 2018-11-03 RX ADMIN — CLOPIDOGREL BISULFATE SCH MG: 75 TABLET ORAL at 10:23

## 2018-11-03 NOTE — PDOC
PROGRESS NOTES


Chief Complaint


Chief Complaint


recent CT showed Cerebral edema, encephalitis or poss press syndrome


Acute encephalopathy on chronic mild dementia, bl subacute stroke on MRI


Fluctuating dementia w/prior delirium 


Moderate malnutrition 


Dehydration


Weakness and debility


Dysphagia


Poor PO intake


Arrhythmia


The left ventricular systolic function is low normal.  PVC's. EF 45-50%


There is moderate concentric left ventricular hypertrophy.


h/o CAD with 3 stents





History of Present Illness


History of Present Illness


Was readmitted after just being one day in SNU because of seizure-like activity 

in SNU


pt is talking ,follow commands, but obviously has dementia not know the days 

place


son at bedside





Much less confused today, recalls me from yesterday, by name. Reviewed CT with 

pulm as well as V/Q perfusion scan with likely PE on left with no other 

defects. Echo reviewed negative for PFO and negative for thrombus.


Discussed with daughter, son bedside and pulmonology.





A/P:


CVA - recent CT showed Cerebral edema, encephalitis or poss press syndrome - 

improving mental status


Acute encephalopathy on chronic mild dementia, bl subacute stroke on MRI - ASA, 

will d/c plavix with eliquis today


Fluctuating dementia w/prior delirium  - improved MS today


Moderate malnutrition - on supplements, dietician to see


Dehydration - Improving


Weakness and debility - seen by PT, will need skilled svcs on d/c


Left PE - will start on eliquis 10mg BID for 7 days and 5mg thereafter, stop 

heparin and plavix


Arrhythmia - will change to eliquis with her CVA and PE,


The left ventricular systolic function is low normal.  PVC's. EF 45-50%


There is moderate concentric left ventricular hypertrophy.


h/o CAD with 3 stents - stable, older stents, ok to d/c plavix in favor of 

eliquis





Plan to d/c to SNF, daughter has suggested [facility]. Will likely be stable for 

d/c in the next 24-48 hours





Vitals


Vitals





Vital Signs








  Date Time  Temp Pulse Resp B/P (MAP) Pulse Ox O2 Delivery O2 Flow Rate FiO2


 


11/2/18 23:00 98.0 86 18 110/66 (81) 97 Room Air  





 98.0       











Physical Exam


Physical Exam


GENERAL: Resting quietly, NAD  .


LUNGS:  Clear to auscultation.


HEART:  S1, S2.


ABDOMEN:  Soft, nondistended with positive bowel sounds.


: [place]


EXTREMITIES:  Without clubbing or cyanosis.  No gross edema.


SKIN:  Warm to touch without signs of any rashes.


CNS: much more awake, and nods feeling better, eat today per RN


PIV


General:  Alert, Cooperative, No acute distress, Other (somnolent)


Heart:  Regular rate (SB 50s), Normal S1, Normal S2, Other (2/6 systolic murmur 

to LLS border)


Lungs:  Clear


Abdomen:  Normal bowel sounds, Soft, No tenderness


Extremities:  No cyanosis


Skin:  No breakdown





Comment


Review of Relevant


I have reviewed the following items marky (where applicable) has been applied.


Labs





Laboratory Tests








Test


 11/1/18


11:05 11/2/18


05:45


 


Heparin Anti-Xa Act,


Unfractionated 0.52 IU/mL


(0.30-0.70) 0.35 IU/mL


(0.30-0.70)


 


White Blood Count


 


 4.4 x10^3/uL


(4.0-11.0)


 


Red Blood Count


 


 3.57 x10^6/uL


(3.50-5.40)


 


Hemoglobin


 


 10.0 g/dL


(12.0-15.5)


 


Hematocrit


 


 30.3 %


(36.0-47.0)


 


Mean Corpuscular Volume  85 fL () 


 


Mean Corpuscular Hemoglobin  28 pg (25-35) 


 


Mean Corpuscular Hemoglobin


Concent 


 33 g/dL


(31-37)


 


Red Cell Distribution Width


 


 19.5 %


(11.5-14.5)


 


Platelet Count


 


 137 x10^3/uL


(140-400)








Microbiology


10/25/18 Urine Culture - Final, Complete


           


10/25/18 Urine Culture Result 1 (EDGAR) - Final, Complete


Medications





Current Medications


Ondansetron HCl (Zofran) 4 mg 1X  ONCE IV  Last administered on 10/25/18at 13:04

;  Start 10/25/18 at 11:45;  Stop 10/25/18 at 11:46;  Status DC


Ondansetron HCl (Zofran) 4 mg PRN Q8HRS  PRN IV NAUSEA/VOMITING;  Start 10/25/

18 at 14:15;  Stop 10/26/18 at 13:19;  Status DC


Sodium Chloride 1,000 ml @  85 mls/hr [id number] IV  Last administered on 10/25/

18at 16:39;  Start 10/25/18 at 14:04;  Stop 10/26/18 at 14:03;  Status DC


Lorazepam (Ativan) 1 mg 1X  ONCE IM  Last administered on 10/25/18at 21:53;  

Start 10/25/18 at 18:15;  Stop 10/25/18 at 18:16;  Status DC


Acetaminophen (Tylenol) 500 mg BID PO  Last administered on 11/2/18at 21:27;  

Start 10/25/18 at 21:00


Aspirin (Children'S Aspirin) 81 mg DAILY08 PO  Last administered on 11/2/18at 10

:08;  Start 10/26/18 at 08:00


Clopidogrel Bisulfate (Plavix) 75 mg DAILY07 PO  Last administered on 11/2/18at 

10:08;  Start 10/26/18 at 07:00


Atenolol (Tenormin) 25 mg DAILY PO  Last administered on 10/26/18at 10:13;  

Start 10/26/18 at 09:00;  Stop 10/26/18 at 11:14;  Status DC


Meclizine HCl (Antivert) 25 mg PRN Q8HRS  PRN PO DIZZINESS;  Start 10/25/18 at 

18:45


Simvastatin (Zocor) 40 mg QHS PO  Last administered on 11/2/18at 21:27;  Start 

10/25/18 at 21:00


Ceftriaxone Sodium 1 gm/ Dextrose 50 ml @  100 mls/hr Q24H IV ;  Start 10/25/18 

at 18:45;  Status UNV


Lactobacillus Rhamnosus (Culturelle) 1 cap BID PO  Last administered on 11/2/ 18at 21:27;  Start 10/25/18 at 21:00


Ceftriaxone Sodium (Rocephin) 1 gm Q24H IVP ;  Start 10/25/18 at 19:00;  Stop 10

/26/18 at 10:15;  Status DC


Enoxaparin Sodium (Lovenox 40mg Syringe) 40 mg DAILY SQ  Last administered on 10

/30/18at 08:55;  Start 10/26/18 at 09:00;  Stop 10/31/18 at 14:43;  Status DC


Diclofenac Sodium (Voltaren) 1 danyell BID TP ;  Start 10/25/18 at 21:00;  Status 

Cancel


Diclofenac Sodium (Voltaren) 1 danyell BID TP  Last administered on 11/2/18at 21:00

;  Start 10/26/18 at 09:00


Ondansetron HCl (Zofran) 4 mg PRN Q6HRS  PRN IV NAUSEA/VOMITING;  Start 10/26/

18 at 13:30;  Status Cancel


Alprazolam (Xanax) 1 mg 1X  ONCE PO ;  Start 10/26/18 at 21:00;  Stop 10/26/18 

at 21:01;  Status DC


Lorazepam (Ativan) 0.5 mg PRN Q8HRS  PRN PO ANXIETY / AGITATION Last 

administered on 11/1/18at 19:01;  Start 10/28/18 at 03:45


Acetaminophen (Tylenol) 650 mg PRN Q6HRS  PRN PO FEVER Last administered on 10/

31/18at 22:04;  Start 10/29/18 at 15:00


Ondansetron HCl (Zofran) 4 mg PRN Q6HRS  PRN IV NAUSEA/VOMITING;  Start 10/29/

18 at 15:00


Morphine Sulfate (Morphine Sulfate) 2 mg PRN Q2HR  PRN IV MODERATE TO SEVERE 

PAIN;  Start 10/29/18 at 15:00


Tramadol HCl (Ultram) 50 mg PRN Q6HRS  PRN PO MILD TO MODERATE PAIN;  Start 10/

29/18 at 15:00


Docusate Sodium (Colace) 100 mg PRN DAILY  PRN PO CONSTIPATION;  Start 10/29/18 

at 15:00


Gadobutrol (Gadavist) 6 mmol 1X  ONCE IV  Last administered on 10/30/18at 14:56

;  Start 10/30/18 at 14:45;  Stop 10/30/18 at 14:46;  Status DC


Iohexol (Omnipaque 300 Mg/ml) 75 ml 1X  ONCE IV  Last administered on 10/31/

18at 11:23;  Start 10/31/18 at 11:00;  Stop 10/31/18 at 11:01;  Status DC


Info (CONTRAST GIVEN -- Rx MONITORING) 1 each PRN DAILY  PRN MC SEE COMMENTS;  

Start 10/31/18 at 11:00;  Stop 11/1/18 at 18:09;  Status DC


Benzocaine (Hurricaine One) 3 spray 1X  ONCE MM  Last administered on 10/31/

18at 13:36;  Start 10/31/18 at 12:15;  Stop 10/31/18 at 12:16;  Status DC


Lidocaine HCl (Viscous Lidocaine) 15 ml 1X  ONCE SWSW ;  Start 10/31/18 at 12:15

;  Stop 10/31/18 at 12:16;  Status DC


Lidocaine HCl (Xylocaine 2% Topical 30gm Tube) 1 danyell 1X  ONCE TP ;  Start 10/31/

18 at 12:15;  Stop 10/31/18 at 12:16;  Status DC


Propofol 20 ml @ As Directed STK-MED ONCE IV ;  Start 10/31/18 at 14:08;  Stop 

10/31/18 at 14:09;  Status DC


Heparin Sodium (Porcine) (Heparin Sodium) 4,900 unit 1X  ONCE IV  Last 

administered on 10/31/18at 15:41;  Start 10/31/18 at 15:00;  Stop 10/31/18 at 15

:01;  Status DC


Heparin Sodium/ Dextrose 500 ml @ 0 mls/hr CONT  PRN IV SEE I/O RECORD Last 

administered on 10/31/18at 15:46;  Start 10/31/18 at 14:45;  Stop 11/2/18 at 10:

41;  Status DC


Heparin Sodium (Porcine) (Heparin Sodium) 1,800 unit PRN Q6HRS  PRN IV FOR UFH 

LEVEL LESS THAN 0.2;  Start 10/31/18 at 14:45;  Stop 11/2/18 at 10:41;  Status 

DC


Heparin Sodium (Porcine) (Heparin Sodium) 900 unit PRN Q6HRS  PRN IV FOR UFH 

LEVEL 0.2 - 0.29;  Start 10/31/18 at 14:45;  Stop 11/2/18 at 10:41;  Status DC


Info (Anti-Coagulation Monitoring By Pharmacy) 1 each PRN DAILY  PRN MC SEE 

COMMENTS Last administered on 11/2/18at 12:47;  Start 10/31/18 at 15:00


Potassium Chloride (Klor-Con) 40 meq 1X  ONCE PO ;  Start 11/1/18 at 08:15;  

Stop 11/1/18 at 08:16;  Status Cancel


Iohexol (Omnipaque 300 Mg/ml) 75 ml 1X  ONCE IV ;  Start 11/1/18 at 10:00;  

Stop 11/1/18 at 10:06;  Status DC


Info (CONTRAST GIVEN -- Rx MONITORING) 1 each PRN DAILY  PRN MC SEE COMMENTS;  

Start 11/1/18 at 10:15;  Stop 11/3/18 at 10:14


Potassium Chloride (KCl Oral Soln) 40 meq 1X  ONCE PO  Last administered on 11/1 /18at 10:15;  Start 11/1/18 at 10:15;  Stop 11/1/18 at 10:16;  Status DC


Apixaban (Eliquis) 10 mg BID PO  Last administered on 11/2/18at 21:27;  Start 11 /2/18 at 10:45





Active Scripts


Active


Eliquis (Apixaban) 5 Mg Tablet 10 Mg PO BID 30 Days


     10mg until 11/7/18, then 5mg BID thereafter


Meclizine Hcl 25 Mg Tablet 1 Tab PO PRN TID PRN


Reported


Tylenol (Acetaminophen) 325 Mg Tablet 500 Mg PO BID


Aspirin 81 Mg Tab.chew 1 Tab PO DAILY


Simvastatin 40 Mg Tablet 1 Tab PO QHS


Atenolol 25 Mg Tablet 1 Tab PO DAILY


Vitals/I & O





Vital Sign - Last 24 Hours








 11/2/18 11/2/18 11/2/18 11/2/18





 11:00 15:35 19:00 20:00


 


Temp 98.5 98.3 97.8 





 98.5 98.3 97.8 


 


Pulse 90 66 79 


 


Resp 18 18 18 


 


B/P (MAP) 115/67 (83) 141/74 (96) 123/54 (77) 


 


Pulse Ox 99 97 98 


 


O2 Delivery Room Air Room Air Room Air Room Air


 


    





    





 11/2/18   





 23:00   


 


Temp 98.0   





 98.0   


 


Pulse 86   


 


Resp 18   


 


B/P (MAP) 110/66 (81)   


 


Pulse Ox 97   


 


O2 Delivery Room Air   














Intake and Output   


 


 11/2/18 11/2/18 11/3/18





 15:00 23:00 07:00


 


Intake Total 0 ml  


 


Balance 0 ml  

















BENJAMÍN ADAM MD Nov 3, 2018 08:43

## 2018-11-03 NOTE — PDOC3
Discharge Summary


Visit Information


Date of Admission:  Oct 25, 2018


Date of Discharge:  Nov 3, 2018


Admitting Diagnosis:  Acute encephalopathy


Final Diagnosis


CVA, Acute PE





Brief Hospital Course


Allergies





 Allergies








Coded Allergies Type Severity Reaction Last Updated Verified


 


  No Known Drug Allergies    10/29/16 No








Vital Signs





Vital Signs








  Date Time  Temp Pulse Resp B/P (MAP) Pulse Ox O2 Delivery O2 Flow Rate FiO2


 


11/3/18 11:00 98.1 77 18 140/62 (88) 100 Room Air  





 98.1       


 


11/3/18 08:00       2.0 








Lab Results





Laboratory Tests








Test


 11/2/18


05:45


 


White Blood Count


 4.4 x10^3/uL


(4.0-11.0)


 


Red Blood Count


 3.57 x10^6/uL


(3.50-5.40)


 


Hemoglobin


 10.0 g/dL


(12.0-15.5)


 


Hematocrit


 30.3 %


(36.0-47.0)


 


Mean Corpuscular Volume 85 fL () 


 


Mean Corpuscular Hemoglobin 28 pg (25-35) 


 


Mean Corpuscular Hemoglobin


Concent 33 g/dL


(31-37)


 


Red Cell Distribution Width


 19.5 %


(11.5-14.5)


 


Platelet Count


 137 x10^3/uL


(140-400)


 


Heparin Anti-Xa Act,


Unfractionated 0.35 IU/mL


(0.30-0.70)








Brief Hospital Course


Was readmitted after just being one day in SNU because of seizure-like activity 

in SNU


pt is talking ,follow commands, but obviously has dementia not know the days 

place


son at bedside





Much less confused today, recalls me from yesterday, by name. Reviewed CT with 

pulm as well as V/Q perfusion scan with likely PE on left with no other 

defects. Echo reviewed negative for PFO and negative for thrombus.


Discussed with daughter, son bedside and pulmonology.





A/P:


CVA - recent CT showed Cerebral edema, encephalitis or poss press syndrome - 

improving mental status


Acute encephalopathy on chronic mild dementia, bl subacute stroke on MRI - ASA, 

will d/c plavix with eliquis today


Fluctuating dementia w/prior delirium  - improved MS today


Moderate malnutrition - on supplements, dietician to see


Dehydration - Improving


Weakness and debility - seen by PT, will need skilled svcs on d/c


Left PE - will start on eliquis 10mg BID for 7 days and 5mg thereafter, stop 

heparin and plavix


Arrhythmia - will change to eliquis with her CVA and PE,


The left ventricular systolic function is low normal.  PVC's. EF 45-50%


There is moderate concentric left ventricular hypertrophy.


h/o CAD with 3 stents - stable, older stents, ok to d/c plavix in favor of 

eliquis





Plan to d/c to SNF, daughter has suggested [place].





Discharge Information


Condition at Discharge:  Improved


Follow Up:  Weeks (1)


Disposition/Orders:  D/C to Another Facility ([facility])


Scheduled


Acetaminophen (Tylenol) 325 Mg Tablet, 500 MG PO BID, (Reported)


   Entered as Reported by: ZOIE KHAN on 10/25/18 1611


   Last Action: Continued on 10/25/18 1833 by KATELYNN ABDULLAHI MD


Apixaban (Eliquis) 5 Mg Tablet, 10 MG PO BID for PE and CVA for 30 Days, #74


   10mg until 11/7/18, then 5mg BID thereafter 


   Prescribed by: BENJAMÍN ADAM MD on 11/2/18 1430


Aspirin (Aspirin) 81 Mg Tab.chew, 1 TAB PO DAILY for blood thinner, #30 Ref 3 (

Reported)


   Entered as Reported by: ZAN CRUZ on 10/29/16 1918


   Last Action: Continued on 10/25/18 1833 by KATELYNN ABDULLAHI MD


Atenolol (Atenolol) 25 Mg Tablet, 1 TAB PO DAILY for htn, #30 Ref 5 (Reported)


   Entered as Reported by: ZAN CRUZ on 10/29/16 1918


   Last Action: Converted on 10/25/18 1833 by KATELYNN ABDULLAHI MD


Simvastatin (Simvastatin) 40 Mg Tablet, 1 TAB PO QHS for elevated cholesterol, #

30 Ref 5 (Reported)


   Entered as Reported by: ZAN CRUZ on 10/29/16 1918


   Last Action: Converted on 10/25/18 1833 by KATELYNN ABDULLAHI MD





Scheduled PRN


Meclizine Hcl (Meclizine Hcl) 25 Mg Tablet, 1 TAB PO PRN TID PRN for DIZZINESS, 

#30


   Prescribed by: KINGS ROSAS on 10/29/16 2034


   Last Action: Converted on 10/25/18 1833 by KATELYNN ABDULLAHI MD





Discontinued Medications


Clopidogrel Bisulfate (Clopidogrel) 75 Mg Tablet, 1 TAB PO DAILY for stents, #

90 Ref 1 (Reported)


   Entered as Reported by: ZAN CRUZ on 10/29/16 1918


   Last Action: Continued on 10/25/18 1833 by KATELYNN ABDULLAHI MD


Metformin Hcl (Metformin Hcl) 500 Mg Tablet, 1 TAB PO BID for dm, #60 Ref 3 (

Reported)


   Entered as Reported by: ZAN CRUZ on 10/29/16 1918


   Last Action: HELD on 10/25/18 1833 by MD KIA FARR CHRISTOPHER S MD Nov 3, 2018 12:59

## 2018-11-03 NOTE — PDOC
PULMONARY PROGRESS NOTES


Subjective


no soa


Vitals





Vital Signs








  Date Time  Temp Pulse Resp B/P (MAP) Pulse Ox O2 Delivery O2 Flow Rate FiO2


 


11/3/18 07:00 97.9 75 20 136/67 (90) 99 Room Air  





 97.9       








ROS:  No Chest Pain, No Increase Cough


General:  Alert, No acute distress


Lungs:  Clear


Cardiovascular:  S1, S2


Abdomen:  Soft


Neuro Exam:  Alert


Extremities:  No Edema


Skin:  Warm


Labs





Laboratory Tests








Test


 11/1/18


11:05 11/2/18


05:45


 


Heparin Anti-Xa Act,


Unfractionated 0.52 IU/mL


(0.30-0.70) 0.35 IU/mL


(0.30-0.70)


 


White Blood Count


 


 4.4 x10^3/uL


(4.0-11.0)


 


Red Blood Count


 


 3.57 x10^6/uL


(3.50-5.40)


 


Hemoglobin


 


 10.0 g/dL


(12.0-15.5)


 


Hematocrit


 


 30.3 %


(36.0-47.0)


 


Mean Corpuscular Volume  85 fL () 


 


Mean Corpuscular Hemoglobin  28 pg (25-35) 


 


Mean Corpuscular Hemoglobin


Concent 


 33 g/dL


(31-37)


 


Red Cell Distribution Width


 


 19.5 %


(11.5-14.5)


 


Platelet Count


 


 137 x10^3/uL


(140-400)








Medications





Active Scripts








 Medications  Dose


 Route/Sig


 Max Daily Dose Days Date Category


 


 Tylenol


  (Acetaminophen)


 325 Mg Tablet  500 Mg


 PO BID


   10/25/18 Reported


 


 Meclizine Hcl 25


 Mg Tablet  1 Tab


 PO PRN TID PRN


   10/29/16 Rx


 


 Aspirin 81 Mg


 Tab.chew  1 Tab


 PO DAILY


   10/29/16 Reported


 


 Simvastatin 40 Mg


 Tablet  1 Tab


 PO QHS


   10/29/16 Reported


 


 Clopidogrel


  (Clopidogrel


 Bisulfate) 75 Mg


 Tablet  1 Tab


 PO DAILY


   10/29/16 Reported


 


 Metformin Hcl 500


 Mg Tablet  1 Tab


 PO BID


   10/29/16 Reported


 


 Atenolol 25 Mg


 Tablet  1 Tab


 PO DAILY


   10/29/16 Reported











Impression


.


1.  Acute pulmonary embolism.  This may have accounted for syncopal episode,


although multiple infarcts were another etiology of her syncopal episode.  There


is no evidence of DVT in the lower extremities.  The risk factors are unclear,


could be related to sedentary lifestyle and immobilization due to her advanced


age.


2.  Multiple cerebral infarcts.


3.  Encephalopathy and underlying dementia.


4.  Syncopal episodes could be combination of cerebral infarcts and acute


pulmonary embolism.


5.  No Rt/left shunt on JOLENE





Plan


.





1.  V/Q scan suspicious for PE on left side. Better seen on CTA neck


2.   discuss with the son regarding long-term safety of oral


anticoagulation. He understands that she needs to be closely watched when she 

ambulates.


3.  d/w DR Buck. she is on oral AC, Plavix can be discontinued


4.  repeat CTA chest in 4-6 weeks


5.  Discussed with RN and family


    ok with dc pulmonary wise. f/u with me in office. appointment given











WOJCIECH WEINBERG MD Nov 3, 2018 09:15

## 2018-12-28 ENCOUNTER — HOSPITAL ENCOUNTER (OUTPATIENT)
Dept: HOSPITAL 61 - CT | Age: 83
Discharge: HOME | End: 2018-12-28
Attending: INTERNAL MEDICINE
Payer: MEDICARE

## 2018-12-28 DIAGNOSIS — I70.0: ICD-10-CM

## 2018-12-28 DIAGNOSIS — I25.10: ICD-10-CM

## 2018-12-28 DIAGNOSIS — Z86.73: ICD-10-CM

## 2018-12-28 DIAGNOSIS — E11.9: ICD-10-CM

## 2018-12-28 DIAGNOSIS — Z86.711: ICD-10-CM

## 2018-12-28 DIAGNOSIS — R91.1: ICD-10-CM

## 2018-12-28 DIAGNOSIS — I11.9: Primary | ICD-10-CM

## 2018-12-28 LAB
BUN SERPL-MCNC: 11 MG/DL (ref 7–20)
CREAT SERPL-MCNC: 1.1 MG/DL (ref 0.6–1)
GFR SERPLBLD BASED ON 1.73 SQ M-ARVRAT: 57.4 ML/MIN

## 2018-12-28 PROCEDURE — 36415 COLL VENOUS BLD VENIPUNCTURE: CPT

## 2018-12-28 PROCEDURE — 82565 ASSAY OF CREATININE: CPT

## 2018-12-28 PROCEDURE — 84520 ASSAY OF UREA NITROGEN: CPT

## 2018-12-28 PROCEDURE — 71275 CT ANGIOGRAPHY CHEST: CPT

## 2018-12-28 NOTE — RAD
Examination: CT angiography chest with IV contrast

 

HISTORY: History of chest pain, pulmonary embolus

 

COMPARISON: None available

 

TECHNIQUE: Axial CT angiographic images of chest were performed with IV 

contrast. Coronal and sagittal 3-D MIP reformats are performed

 

Exposure: One or more of the following individualized dose reduction 

techniques were utilized for this examination:  1. Automated exposure 

control  2. Adjustment of the mA and/or kV according to patient size  3. 

Use of iterative reconstruction technique

 

FINDINGS:

 

The central airways are patent.

 

Moderate cardiomegaly. Coronary artery calcifications identified.

 

Moderate aortic atherosclerosis.

 

There is no evidence of filling defect identified in the main pulmonary 

arterial trunk and right and left main pulmonary arteries.

 

Intrathoracic stomach is identified.

 

There are mild honeycombing changes identified in the periphery of the 

right middle lobe of the lung and right lower lobe of the lung. Minimal 

bibasilar lung atelectasis. There is a 4 mm nodule identified in the right

middle lobe of the lung. No evidence of pleural effusion or pneumothorax. 

The visualized liver, spleen, adrenals grossly appears unremarkable.

 

Slightly hyperdense sludge or tiny gallstones identified within the 

gallbladder. Partially visualized cystic structure identified in the right

kidney measuring 4.3 cm.

 

Moderate degenerative changes thoracic spine.

 

 

 

IMPRESSION:

 

1. No evidence of pulmonary embolism.

 

2. 4 mm nodule identified in the right middle lobe of the lung. Follow-up 

per Fleischner Society guidelines with a follow-up CT in 6-12 months.

 

3. Coronary artery calcifications.

 

4. Hyperdense sludge or tiny gallstones identified within the partially 

visualized gallbladder

 

 

Electronically signed by: Matthew Hogue MD (12/28/2018 2:24 PM) [id number]

## 2019-03-20 ENCOUNTER — HOSPITAL ENCOUNTER (INPATIENT)
Dept: HOSPITAL 61 - ER | Age: 84
LOS: 2 days | Discharge: HOME HEALTH | DRG: 871 | End: 2019-03-22
Attending: INTERNAL MEDICINE | Admitting: INTERNAL MEDICINE
Payer: MEDICARE

## 2019-03-20 VITALS — HEIGHT: 64 IN | BODY MASS INDEX: 26.05 KG/M2 | WEIGHT: 152.56 LBS

## 2019-03-20 VITALS — SYSTOLIC BLOOD PRESSURE: 115 MMHG | DIASTOLIC BLOOD PRESSURE: 92 MMHG

## 2019-03-20 VITALS — SYSTOLIC BLOOD PRESSURE: 119 MMHG | DIASTOLIC BLOOD PRESSURE: 57 MMHG

## 2019-03-20 VITALS — DIASTOLIC BLOOD PRESSURE: 53 MMHG | SYSTOLIC BLOOD PRESSURE: 115 MMHG

## 2019-03-20 VITALS — DIASTOLIC BLOOD PRESSURE: 48 MMHG | SYSTOLIC BLOOD PRESSURE: 127 MMHG

## 2019-03-20 VITALS — SYSTOLIC BLOOD PRESSURE: 122 MMHG | DIASTOLIC BLOOD PRESSURE: 47 MMHG

## 2019-03-20 VITALS — DIASTOLIC BLOOD PRESSURE: 51 MMHG | SYSTOLIC BLOOD PRESSURE: 144 MMHG

## 2019-03-20 VITALS — DIASTOLIC BLOOD PRESSURE: 55 MMHG | SYSTOLIC BLOOD PRESSURE: 135 MMHG

## 2019-03-20 DIAGNOSIS — D64.9: ICD-10-CM

## 2019-03-20 DIAGNOSIS — I50.9: ICD-10-CM

## 2019-03-20 DIAGNOSIS — A41.9: Primary | ICD-10-CM

## 2019-03-20 DIAGNOSIS — F03.90: ICD-10-CM
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
## 2019-03-20 DIAGNOSIS — I11.0: ICD-10-CM

## 2019-03-20 DIAGNOSIS — M19.90: ICD-10-CM

## 2019-03-20 DIAGNOSIS — I25.10: ICD-10-CM

## 2019-03-20 DIAGNOSIS — Z82.49: ICD-10-CM
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
## 2019-03-20 DIAGNOSIS — N17.9: ICD-10-CM

## 2019-03-20 DIAGNOSIS — Z95.5: ICD-10-CM

## 2019-03-20 DIAGNOSIS — E78.5: ICD-10-CM

## 2019-03-20 DIAGNOSIS — E78.00: ICD-10-CM

## 2019-03-20 DIAGNOSIS — I25.2: ICD-10-CM

## 2019-03-20 DIAGNOSIS — E43: ICD-10-CM
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
## 2019-03-20 DIAGNOSIS — N39.0: ICD-10-CM

## 2019-03-20 DIAGNOSIS — Z90.710: ICD-10-CM
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
## 2019-03-20 DIAGNOSIS — E11.9: ICD-10-CM

## 2019-03-20 LAB
ALBUMIN SERPL-MCNC: 2.3 G/DL (ref 3.4–5)
ALBUMIN/GLOB SERPL: 0.7 {RATIO} (ref 1–1.7)
ALP SERPL-CCNC: 53 U/L (ref 46–116)
ALT SERPL-CCNC: 8 U/L (ref 14–59)
ANION GAP SERPL CALC-SCNC: 11 MMOL/L (ref 6–14)
ANISOCYTOSIS BLD QL SMEAR: (no result)
APTT PPP: YELLOW S
AST SERPL-CCNC: 10 U/L (ref 15–37)
BACTERIA #/AREA URNS HPF: 0 /HPF
BASOPHILS # BLD AUTO: 0 X10^3/UL (ref 0–0.2)
BASOPHILS NFR BLD: 0 % (ref 0–3)
BILIRUB SERPL-MCNC: 0.2 MG/DL (ref 0.2–1)
BILIRUB UR QL STRIP: NEGATIVE
BIZZARE CELLS: (no result)
BUN SERPL-MCNC: 10 MG/DL (ref 7–20)
BUN/CREAT SERPL: 8 (ref 6–20)
CALCIUM SERPL-MCNC: 8.4 MG/DL (ref 8.5–10.1)
CHLORIDE SERPL-SCNC: 107 MMOL/L (ref 98–107)
CO2 SERPL-SCNC: 24 MMOL/L (ref 21–32)
CREAT SERPL-MCNC: 1.3 MG/DL (ref 0.6–1)
DACRYOCYTES BLD QL SMEAR: (no result)
EOSINOPHIL NFR BLD: 0 % (ref 0–3)
EOSINOPHIL NFR BLD: 0 X10^3/UL (ref 0–0.7)
ERYTHROCYTE [DISTWIDTH] IN BLOOD BY AUTOMATED COUNT: 21.5 % (ref 11.5–14.5)
FIBRINOGEN PPP-MCNC: CLEAR MG/DL
GFR SERPLBLD BASED ON 1.73 SQ M-ARVRAT: 47.3 ML/MIN
GLOBULIN SER-MCNC: 3.4 G/DL (ref 2.2–3.8)
GLUCOSE SERPL-MCNC: 185 MG/DL (ref 70–99)
HCT VFR BLD CALC: 22.4 % (ref 36–47)
HGB BLD-MCNC: 7 G/DL (ref 12–15.5)
LYMPHOCYTES # BLD: 1 X10^3/UL (ref 1–4.8)
LYMPHOCYTES NFR BLD AUTO: 13 % (ref 24–48)
MCH RBC QN AUTO: 26 PG (ref 25–35)
MCHC RBC AUTO-ENTMCNC: 31 G/DL (ref 31–37)
MCV RBC AUTO: 83 FL (ref 79–100)
MONO #: 0.6 X10^3/UL (ref 0–1.1)
MONOCYTES NFR BLD: 8 % (ref 0–9)
NEUT #: 5.7 X10^3UL (ref 1.8–7.7)
NEUTROPHILS NFR BLD AUTO: 79 % (ref 31–73)
NITRITE UR QL STRIP: NEGATIVE
OVALOCYTES BLD QL SMEAR: (no result)
PH UR STRIP: 6 [PH]
PLATELET # BLD AUTO: 265 X10^3/UL (ref 140–400)
PLATELET # BLD EST: ADEQUATE 10*3/UL
POLYCHROMASIA BLD QL SMEAR: (no result)
POTASSIUM SERPL-SCNC: 4 MMOL/L (ref 3.5–5.1)
PROT SERPL-MCNC: 5.7 G/DL (ref 6.4–8.2)
PROT UR STRIP-MCNC: NEGATIVE MG/DL
RBC # BLD AUTO: 2.7 X10^6/UL (ref 3.5–5.4)
RBC #/AREA URNS HPF: 0 /HPF (ref 0–2)
SODIUM SERPL-SCNC: 142 MMOL/L (ref 136–145)
SQUAMOUS #/AREA URNS LPF: (no result) /LPF
UROBILINOGEN UR-MCNC: 0.2 MG/DL
WBC # BLD AUTO: 7.2 X10^3/UL (ref 4–11)
WBC #/AREA URNS HPF: 0 /HPF (ref 0–4)

## 2019-03-20 PROCEDURE — 87040 BLOOD CULTURE FOR BACTERIA: CPT

## 2019-03-20 PROCEDURE — 81001 URINALYSIS AUTO W/SCOPE: CPT

## 2019-03-20 PROCEDURE — 86850 RBC ANTIBODY SCREEN: CPT

## 2019-03-20 PROCEDURE — 70450 CT HEAD/BRAIN W/O DYE: CPT

## 2019-03-20 PROCEDURE — 82962 GLUCOSE BLOOD TEST: CPT

## 2019-03-20 PROCEDURE — 83605 ASSAY OF LACTIC ACID: CPT

## 2019-03-20 PROCEDURE — 86920 COMPATIBILITY TEST SPIN: CPT

## 2019-03-20 PROCEDURE — P9016 RBC LEUKOCYTES REDUCED: HCPCS

## 2019-03-20 PROCEDURE — 36415 COLL VENOUS BLD VENIPUNCTURE: CPT

## 2019-03-20 PROCEDURE — 72125 CT NECK SPINE W/O DYE: CPT

## 2019-03-20 PROCEDURE — 73521 X-RAY EXAM HIPS BI 2 VIEWS: CPT

## 2019-03-20 PROCEDURE — 85025 COMPLETE CBC W/AUTO DIFF WBC: CPT

## 2019-03-20 PROCEDURE — 86900 BLOOD TYPING SEROLOGIC ABO: CPT

## 2019-03-20 PROCEDURE — 71045 X-RAY EXAM CHEST 1 VIEW: CPT

## 2019-03-20 PROCEDURE — 85007 BL SMEAR W/DIFF WBC COUNT: CPT

## 2019-03-20 PROCEDURE — 72128 CT CHEST SPINE W/O DYE: CPT

## 2019-03-20 PROCEDURE — 86078 PHYS BLOOD BANK SERV REACTJ: CPT

## 2019-03-20 PROCEDURE — 72131 CT LUMBAR SPINE W/O DYE: CPT

## 2019-03-20 PROCEDURE — 86901 BLOOD TYPING SEROLOGIC RH(D): CPT

## 2019-03-20 PROCEDURE — 93005 ELECTROCARDIOGRAM TRACING: CPT

## 2019-03-20 PROCEDURE — 87804 INFLUENZA ASSAY W/OPTIC: CPT

## 2019-03-20 PROCEDURE — 80048 BASIC METABOLIC PNL TOTAL CA: CPT

## 2019-03-20 PROCEDURE — 80053 COMPREHEN METABOLIC PANEL: CPT

## 2019-03-20 PROCEDURE — 30233N1 TRANSFUSION OF NONAUTOLOGOUS RED BLOOD CELLS INTO PERIPHERAL VEIN, PERCUTANEOUS APPROACH: ICD-10-PCS | Performed by: INTERNAL MEDICINE

## 2019-03-20 PROCEDURE — 80061 LIPID PANEL: CPT

## 2019-03-20 RX ADMIN — BACITRACIN SCH MLS/HR: 5000 INJECTION, POWDER, FOR SOLUTION INTRAMUSCULAR at 23:07

## 2019-03-20 RX ADMIN — DICLOFENAC SODIUM SCH APP: 10 GEL TOPICAL at 23:05

## 2019-03-20 RX ADMIN — PIPERACILLIN SODIUM AND TAZOBACTAM SODIUM SCH MLS/HR: 2; .25 INJECTION, POWDER, LYOPHILIZED, FOR SOLUTION INTRAVENOUS at 16:05

## 2019-03-20 RX ADMIN — ACETAMINOPHEN PRN MG: 325 TABLET, FILM COATED ORAL at 20:22

## 2019-03-20 RX ADMIN — PIPERACILLIN SODIUM AND TAZOBACTAM SODIUM SCH MLS/HR: 2; .25 INJECTION, POWDER, LYOPHILIZED, FOR SOLUTION INTRAVENOUS at 23:57

## 2019-03-20 NOTE — RAD
PQRS Compliance statement:

 

One or more of the following individualized dose reduction techniques were

utilized for this examination:

1. Automated exposure control.

2. Adjustment of the mA and/or kV according to patient size.

3. Use of iterative reconstruction technique.

 

Indication:FALL AT HOME HEAD NECK AND BACK PAIN PREV SENT 

 

TECHNIQUE: CT head without IV contrast

 

COMPARISON:None

 

FINDINGS:

No pathologic extra-axial or intra-axial fluid collection. The ventricles 

and basal cisterns are within normal limits. Confluent low-attenuation is 

seen in the periventricular and deep white matter. No acute intracranial 

bleed. No large scalp hematoma. Orbits are within normal limits. No acute 

calvarial fracture. The paranasal sinuses and mastoid air cells are clear.

 

IMPRESSION:

1. No acute intracranial bleed or calvarial fracture.

2. Advanced white matter changes likely secondary to chronic microvascular

ischemic disease.

 

Indication:FALL AT HOME HEAD NECK AND BACK PAIN PREV SENT 

 

TECHNIQUE: CT of the cervical spine without IV contrast with multiplanar 

reformats.

 

COMPARISON:None

 

FINDINGS:

The cervical spine is in normal anatomic alignment. No compression 

deformities. Atlantoaxial joint interval is preserved. Facet joints are in

normal anatomic alignment with multilevel facet arthropathy. No acute 

fractures. Noncontrast appearance of the neck soft tissue is within normal

limits. Clear lung apices.

 

IMPRESSION:

No acute fractures.

 

Electronically signed by: Nestor Hutchins DO (3/20/2019 2:54 PM) [facility]

## 2019-03-20 NOTE — RAD
Examination: CT LUMBAR SPINE WO CONTRAST, CT THORACIC SPINE WO CONTRAST

 

History: FALL AT HOME HEAD NECK AND BACK PAIN 

 

Comparison/Correlation: None

 

Findings: Axial images of the thoracic and lumbar spine were obtained 

without contrast. Sagittal and coronal reformatted images were provided.

 

Alignment of the thoracic spine is normal. Vertebral body heights are 

adequate. Disc space narrowing at the mid thoracic spine consistent with 

age noted. C3-4 and C5-C7 disc space narrowing is present. Spinal canal is

symmetric and unremarkable.

 

Alignment of the lumbar spine is normal. Disc space narrowing is present 

and severe at L2-S1. Vacuum phenomenon noted. Spurring is noted. Facet 

joint degenerative hypertrophy identified. Concentric disc bulge at L3-4 

is present. Mild facet joint degenerative changes are evident.

 

Incidental note is made of a right renal cyst. It is not fully included 

for purposes of this exam.

 

Impression:

Degenerative changes of the thoracic and lumbar spine are present and less

than expected for the patient's age.

 

No fracture or malalignment.

 

 

PQRS Compliance Statement:

 

One or more of the following individualized dose reduction techniques were

utilized for this examination:  

1. Automated exposure control  

2. Adjustment of the mA and/or kV according to patient size  

3. Use of iterative reconstruction technique

 

Electronically signed by: North Padilla MD (3/20/2019 2:54 PM) [id number]

## 2019-03-20 NOTE — PHYS DOC
Past Medical History


Past Medical History:  Arthritis, Dementia, Diabetes-Type II, High Cholesterol, 

Heart Disease, Hypertension, MI, Other


Additional Past Medical Histor:  ENCEPHALOPATHY, HERNIA


Past Surgical History:  Hysterectomy


Additional Past Surgical Histo:  cardiac stent x 3


Alcohol Use:  None


Drug Use:  None





Adult General


Chief Complaint


Chief Complaint:  MECHANICAL FALL





HPI


HPI





Patient is a 83  year old female who presents with rolled off couch yesterday. 

Family states that the patient has continued to have weakness. Family that 

witnessed the patient going off couch states that she did not lose 

consciousness but they are unsure if she hit her head. Patient's family states 

that she is unable to walk appropriately today. Patient denies any pain. 

Patient answers all my questions appropriately.





Review of Systems


Review of Systems





Constitutional: Denies fever or chills []


Eyes: Denies change in visual acuity, redness, or eye pain []


HENT: Denies nasal congestion or sore throat []


Respiratory: Denies cough or shortness of breath []


Cardiovascular: No additional information not addressed in HPI []


GI: Denies abdominal pain, nausea, vomiting, bloody stools or diarrhea []


: Denies dysuria or hematuria []


Musculoskeletal: Denies back pain or joint pain []


Integument: Denies rash or skin lesions []


Neurologic: Denies headache, focal weakness or sensory changes []


Endocrine: Denies polyuria or polydipsia []





All other systems were reviewed and found to be within normal limits, except as 

documented in this note.





Current Medications


Current Medications





Current Medications








 Medications


  (Trade)  Dose


 Ordered  Sig/Td  Start Time


 Stop Time Status Last Admin


Dose Admin


 


 Acetaminophen


  (Tylenol)  650 mg  PRN Q4HRS  PRN  3/20/19 14:45


 3/21/19 14:44   





 


 Ondansetron HCl


  (Zofran)  4 mg  PRN Q8HRS  PRN  3/20/19 14:45


 3/21/19 14:44   





 


 Piperacillin Sod/


 Tazobactam Sod


  (Zosyn Per


 Pharmacy)  1 each  PRN DAILY  PRN  3/20/19 14:45


     





 


 Piperacillin Sod/


 Tazobactam Sod


 2.25 gm/Sodium


 Chloride  50 ml @ 


 100 mls/hr  Q6HRS  3/20/19 15:00


     





 


 Sodium Chloride  1,000 ml @ 


 1,000 mls/hr  1X  ONCE  3/20/19 14:45


 3/20/19 15:44   














Allergies


Allergies





Allergies








Coded Allergies Type Severity Reaction Last Updated Verified


 


  No Known Drug Allergies    10/29/16 No











Physical Exam


Physical Exam





Constitutional: Well developed, well nourished, no acute distress, non-toxic 

appearance. []


HENT: Normocephalic, atraumatic, bilateral external ears normal, oropharynx 

moist, no oral exudates, nose normal. []


Eyes: PERRLA, EOMI, conjunctiva normal, no discharge. [] 


Neck: Normal range of motion, no tenderness, supple, no stridor. [] 


Cardiovascular:Heart rate regular Tachy rhythm, no murmur []


Lungs & Thorax:  Bilateral breath sounds clear to auscultation []


Abdomen: Bowel sounds normal, soft, no tenderness, no masses, no pulsatile 

masses. [] 


Skin: Warm, dry, no erythema, no rash. Pale mucus membranes [] 


Back: No tenderness, no CVA tenderness. [] 


Extremities: No tenderness, no cyanosis, no clubbing, ROM intact, no edema. [] 


Neurologic: Alert and oriented X 3, normal motor function, normal sensory 

function, no focal deficits noted. []


Psychologic: Affect normal, judgement normal, mood normal. []





Current Patient Data


Vital Signs





 Vital Signs








  Date Time  Temp Pulse Resp B/P (MAP) Pulse Ox O2 Delivery O2 Flow Rate FiO2


 


3/20/19 12:00 101.9 100 28 133/63 (86) 98 Room Air  





 101.9       








Lab Values





 Laboratory Tests








Test


 3/20/19


13:35


 


Sodium Level


 142 mmol/L


(136-145)


 


Potassium Level


 4.0 mmol/L


(3.5-5.1)


 


Chloride Level


 107 mmol/L


()


 


Carbon Dioxide Level


 24 mmol/L


(21-32)


 


Anion Gap 11 (6-14)  


 


Blood Urea Nitrogen


 10 mg/dL


(7-20)


 


Creatinine


 1.3 mg/dL


(0.6-1.0)  H


 


Estimated GFR


(Cockcroft-Gault) 47.3  





 


BUN/Creatinine Ratio 8 (6-20)  


 


Glucose Level


 185 mg/dL


(70-99)  H


 


Lactic Acid Level


 2.9 mmol/L


(0.4-2.0)  H


 


Calcium Level


 8.4 mg/dL


(8.5-10.1)  L


 


Total Bilirubin


 0.2 mg/dL


(0.2-1.0)


 


Aspartate Amino Transferase


(AST) 10 U/L (15-37)


L


 


Alanine Aminotransferase (ALT)


 8 U/L (14-59)


L


 


Alkaline Phosphatase


 53 U/L


()


 


Total Protein


 5.7 g/dL


(6.4-8.2)  L


 


Albumin


 2.3 g/dL


(3.4-5.0)  L


 


Albumin/Globulin Ratio


 0.7 (1.0-1.7)


L





 Laboratory Tests


3/20/19 13:35











EKG


EKG


Sinus Tachy, no STEMI


Interpretation Time:


1221 and read by Dr Gilbert





Radiology/Procedures


Radiology/Procedures


[]


Impressions:


[address] [address] [phone]


 


 IMAGING REPORT





 Signed





PATIENT: MARY OBREGON ACCOUNT: [id number] MRN#: [id number]


: 1935 LOCATION: ER AGE: 83


SEX: F EXAM DT: 19 ACCESSION#: [id number]


STATUS: REG ER ORD. PHYSICIAN: WAYNE GALINDO 


REASON: FALL YESTERDAY, LEFT HIP PAIN


PROCEDURE: PORTABLE CHEST 1V





Examination: PORTABLE CHEST 1V


 


History: WEAKNESS. HX HTN, DIABETES, MI


 


Comparison/Correlation: 1/15/2019 portable chest x-ray exam


 


Findings: Portable upright frontal view chest was obtained. Heart size 


normal. Size to large size hiatal hernia is present. No infiltrate, 


pneumothorax, or pleural effusion. Degenerative changes are present 


involving the thoracolumbar spine. Degenerative remodeling of the 


glenohumeral joints greater on the left noted.


 


 


Impression:


No active disease.


 


Hiatal hernia.


 


Electronically signed by: North Merlos MD (3/20/2019 2:55 PM) [id number]














DICTATED and SIGNED BY:     NORTH MERLOS MD


DATE:     19 1455





 [address] [address] [phone]


 


 IMAGING REPORT





 Signed





PATIENT: MARY OBREGON ACCOUNT: [id number] MRN#: [id number]


: 1935 LOCATION: ER AGE: 83


SEX: F EXAM DT: 19 ACCESSION#: [id number]


STATUS: REG ER ORD. PHYSICIAN: WAYNE GALINDO 


REASON: fall


PROCEDURE: CT HEAD AND CERVICAL SPINE WO





PQRS Compliance statement:


 


One or more of the following individualized dose reduction techniques were


utilized for this examination:


1. Automated exposure control.


2. Adjustment of the mA and/or kV according to patient size.


3. Use of iterative reconstruction technique.


 


Indication:FALL AT HOME HEAD NECK AND BACK PAIN PREV SENT 


 


TECHNIQUE: CT head without IV contrast


 


COMPARISON:None


 


FINDINGS:


No pathologic extra-axial or intra-axial fluid collection. The ventricles 


and basal cisterns are within normal limits. Confluent low-attenuation is 


seen in the periventricular and deep white matter. No acute intracranial 


bleed. No large scalp hematoma. Orbits are within normal limits. No acute 


calvarial fracture. The paranasal sinuses and mastoid air cells are clear.


 


IMPRESSION:


1. No acute intracranial bleed or calvarial fracture.


2. Advanced white matter changes likely secondary to chronic microvascular


ischemic disease.


 


Indication:FALL AT HOME HEAD NECK AND BACK PAIN PREV SENT 


 


TECHNIQUE: CT of the cervical spine without IV contrast with multiplanar 


reformats.


 


COMPARISON:None


 


FINDINGS:


The cervical spine is in normal anatomic alignment. No compression 


deformities. Atlantoaxial joint interval is preserved. Facet joints are in


normal anatomic alignment with multilevel facet arthropathy. No acute 


fractures. Noncontrast appearance of the neck soft tissue is within normal


limits. Clear lung apices.


 


IMPRESSION:


No acute fractures.


 


Electronically signed by: Nestor Hutchins DO (3/20/2019 2:54 PM) [facility]














DICTATED and SIGNED BY:     NESTOR HUTCHINS DO


DATE:     19 1454





 [facility]


 [address] [place] [address]


 [phone]


 


 IMAGING REPORT





 Signed





PATIENT: MARY OBREGON ACCOUNT: [id number] MRN#: [id number]


: 1935 LOCATION: ER AGE: 83


SEX: F EXAM DT: 19 ACCESSION#: [id number]


STATUS: REG ER ORD. PHYSICIAN: WAYNE GALINDO 


REASON: fall


PROCEDURE: CT LUMBAR SPINE WO CONTRAST





Examination: CT LUMBAR SPINE WO CONTRAST, CT THORACIC SPINE WO CONTRAST


 


History: FALL AT HOME HEAD NECK AND BACK PAIN 


 


Comparison/Correlation: None


 


Findings: Axial images of the thoracic and lumbar spine were obtained 


without contrast. Sagittal and coronal reformatted images were provided.


 


Alignment of the thoracic spine is normal. Vertebral body heights are 


adequate. Disc space narrowing at the mid thoracic spine consistent with 


age noted. C3-4 and C5-C7 disc space narrowing is present. Spinal canal is


symmetric and unremarkable.


 


Alignment of the lumbar spine is normal. Disc space narrowing is present 


and severe at L2-S1. Vacuum phenomenon noted. Spurring is noted. Facet 


joint degenerative hypertrophy identified. Concentric disc bulge at L3-4 


is present. Mild facet joint degenerative changes are evident.


 


Incidental note is made of a right renal cyst. It is not fully included 


for purposes of this exam.


 


Impression:


Degenerative changes of the thoracic and lumbar spine are present and less


than expected for the patient's age.


 


No fracture or malalignment.


 


 


PQRS Compliance Statement:


 


One or more of the following individualized dose reduction techniques were


utilized for this examination:  


1. Automated exposure control  


2. Adjustment of the mA and/or kV according to patient size  


3. Use of iterative reconstruction technique


 


Electronically signed by: North Merlos MD (3/20/2019 2:54 PM) [id number]














DICTATED and SIGNED BY:     NORTH MERLOS MD


DATE:     19 1454





 [facility]


 [address] [place] [address]


 [phone]


 


 IMAGING REPORT





 Signed





PATIENT: MARY OBREGON ACCOUNT: [id number] MRN#: [id number]


: 1935 LOCATION: ER AGE: 83


SEX: F EXAM DT: 19 ACCESSION#: [id number]


STATUS: REG ER ORD. PHYSICIAN: WAYNE GALINDO 


REASON: FALL YESTERDAY, LEFT HIP PAIN


PROCEDURE: HIP BILATERAL WITH PELVIS





Examination: HIP BILATERAL WITH PELVIS


 


History: PT'S FAMILY STATES SHE FELL OFF OF THE COUCH YESTERDAY. PAIN TO 


BOTH HIPS


 


Comparison/Correlation: None


 


Findings: Frontal view of the pelvis was obtained. Frog-leg lateral view 


right hip and frog-leg lateral view of the left hip were obtained. Exam 


was performed with portable technique.


 


Disc space narrowing of the low lumbar spine is evident. Mild sacroiliac 


degenerative changes.


 


Right hip joint space is adequate. Left hip joint degenerative remodeling 


is mild to moderate with spurring evident. No acute fracture or bony 


destructive finding. Vascular calcifications involve the proximal thighs 


medially.


 


 


Impression:


No acute fracture or bony destruction. Consider further evaluation if 


fracture is a persistent concern.


 


Left hip joint degenerative remodeling.


 


Electronically signed by: North Merlos MD (3/20/2019 2:57 PM) [id number]














DICTATED and SIGNED BY:     NORTH MERLOS MD


DATE:     19 1457








Course & Med Decision Making


Course & Med Decision Making


Patient is a 83  year old female who presents with rolled off couch yesterday. 

Family states that the patient has continued to have weakness. Family that 

witnessed the patient going off couch states that she did not lose 

consciousness but they are unsure if she hit her head. Patient's family states 

that she is unable to walk appropriately today. Patient denies any pain. 

Patient answers all my questions appropriately. There is no pain, deformity, 

rotation, bruising or swelling to lower extremities or hips. There is no pain 

with pelvic rock. Abdomen is soft and nontender. Lungs are clear to 

auscultation all lobes. EKG shows sinus tach but no STEMI. PERRLA. 

Neurologically intact. Alert and oriented. Skin pink warm and dry. Patients 

membranes are moist but pale. Patients moving all extremities with normal range 

of motion and strength. Patient does have a fever for 1.9. Patient was recently 

told to stop her request and aspirin because her hemoglobin had been dropping. 

Patient denies chest pain, shortness of air, dizziness, nausea, vomiting, 

diarrhea, abdominal pain, dysuria. Patient has 1+ pedal edema in bilaterally. 

There is no focal bony tenderness, bruising, deformity on palpating the 

cervical spine and thoracic spine or lumbar spine. There is no pain with or 

deformity or bruising seen or felt when palpating the head. Patient states her 

cardiologist is Dr. Ramos, PCP is Dr Nevarez per our computer system. . 





1420: I talked to Dr. Greenfield about admission. Dr. Greenfield states to put the 

patient on Zosyn per pharmacy. Lactic acid is 2.9. At this time the patient's 

urinalysis and CBC are not back from lab. Family refusing straight cath urine. 





1519: All CT reports and x-ray show no acute findings.





Dragon Disclaimer


Dragon Disclaimer


This electronic medical record was generated, in whole or in part, using a 

voice recognition dictation system.





Departure


Departure


Impression:  


 Primary Impression:  


 Sepsis


Disposition:  09 ADMITTED AS INPATIENT


Admitting Physician:  Noreen Greenfield


Condition:  STABLE


Referrals:  


VIMAL NEVAREZ MD (PCP)





Problem Qualifiers








 Primary Impression:  


 Sepsis


 Sepsis type:  sepsis due to unspecified organism  Qualified Codes:  A41.9 - 

Sepsis, unspecified organism








WAYNE GALINDO APRN Mar 20, 2019 13:17

## 2019-03-20 NOTE — RAD
Examination: PORTABLE CHEST 1V

 

History: WEAKNESS. HX HTN, DIABETES, MI

 

Comparison/Correlation: 1/15/2019 portable chest x-ray exam

 

Findings: Portable upright frontal view chest was obtained. Heart size 

normal. Size to large size hiatal hernia is present. No infiltrate, 

pneumothorax, or pleural effusion. Degenerative changes are present 

involving the thoracolumbar spine. Degenerative remodeling of the 

glenohumeral joints greater on the left noted.

 

 

Impression:

No active disease.

 

Hiatal hernia.

 

Electronically signed by: North Padilla MD (3/20/2019 2:55 PM) [id number]

## 2019-03-20 NOTE — RAD
Examination: HIP BILATERAL WITH PELVIS

 

History: PT'S FAMILY STATES SHE FELL OFF OF THE COUCH YESTERDAY. PAIN TO 

BOTH HIPS

 

Comparison/Correlation: None

 

Findings: Frontal view of the pelvis was obtained. Frog-leg lateral view 

right hip and frog-leg lateral view of the left hip were obtained. Exam 

was performed with portable technique.

 

Disc space narrowing of the low lumbar spine is evident. Mild sacroiliac 

degenerative changes.

 

Right hip joint space is adequate. Left hip joint degenerative remodeling 

is mild to moderate with spurring evident. No acute fracture or bony 

destructive finding. Vascular calcifications involve the proximal thighs 

medially.

 

 

Impression:

No acute fracture or bony destruction. Consider further evaluation if 

fracture is a persistent concern.

 

Left hip joint degenerative remodeling.

 

Electronically signed by: North Padilla MD (3/20/2019 2:57 PM) [id number]

## 2019-03-20 NOTE — EKG
[facility]

              [address] [place] [address]

Test Date:    2019               Test Time:    12:21:55

Pat Name:     MARY OBREGON            Department:   

Patient ID:   [id number]           Room:          

Gender:       F                        Technician:   

:          1935               Requested By: WAYNE GALINDO

Order Number: [id number]           Reading MD:   Ayush Rizvi MD

                                 Measurements

Intervals                              Axis          

Rate:         104                      P:            42

RI:           176                      QRS:          -7

QRSD:         86                       T:            33

QT:           328                                    

QTc:          437                                    

                           Interpretive Statements

SINUS TACHYCARDIA

INFERIOR INFARCT

Electronically Signed On 3- 16:29:45 CDT by Ayush Rizvi MD

## 2019-03-20 NOTE — RAD
Examination: CT LUMBAR SPINE WO CONTRAST, CT THORACIC SPINE WO CONTRAST

 

History: FALL AT HOME HEAD NECK AND BACK PAIN 

 

Comparison/Correlation: None

 

Findings: Axial images of the thoracic and lumbar spine were obtained 

without contrast. Sagittal and coronal reformatted images were provided.

 

Alignment of the thoracic spine is normal. Vertebral body heights are 

adequate. Disc space narrowing at the mid thoracic spine consistent with 

age noted. C3-4 and C5-C7 disc space narrowing is present. Spinal canal is

symmetric and unremarkable.

 

Alignment of the lumbar spine is normal. Disc space narrowing is present 

and severe at L2-S1. Vacuum phenomenon noted. Spurring is noted. Facet 

joint degenerative hypertrophy identified. Concentric disc bulge at L3-4 

is present. Mild facet joint degenerative changes are evident.

 

Incidental note is made of a right renal cyst. It is not fully included 

for purposes of this exam.

 

Impression:

Degenerative changes of the thoracic and lumbar spine are present and less

than expected for the patient's age.

 

No fracture or malalignment.

 

 

PQRS Compliance Statement:

 

One or more of the following individualized dose reduction techniques were

utilized for this examination:  

1. Automated exposure control  

2. Adjustment of the mA and/or kV according to patient size  

3. Use of iterative reconstruction technique

 

Electronically signed by: North Padilla MD (3/20/2019 2:54 PM) [id number]

## 2019-03-20 NOTE — HP
ADMIT DATE:  03/20/2019



CHIEF COMPLAINT:  Low hemoglobin, shortness of breath, weakness, fell out of

bed.



HISTORY OF PRESENT ILLNESS:  The patient is a pleasant 83-year-old female who

has been on Eliquis for some time, but her cardiologist has been holding it

because her hemoglobin has been dropping.  Now, she has developed shortness of

breath and weakness.  She rolled off the bed and fell.  Her family brought her

in for evaluation, rates her symptoms at 9/10.  She has associated weakness.  It

has been occurring for several days.  Moving makes it worse, sitting seems to

make it better.  I discussed the case with ER physician.  It appears the patient

might be developing some early sepsis.  Her lactic acid is high at 2.9.  She had

a temperature of 101.9.  She is a little tachycardic.  We are going to admit the

patient and consult Infectious Disease.



PAST MEDICAL HISTORY:  Dementia, arthritis, diabetes, hypertension,

hyperlipidemia, myocardial infarction, encephalopathy, hernia repair,

hysterectomy, cardiac stents x 3.



ALLERGIES:  None.



FAMILY HISTORY:  Hypertension.



SOCIAL HISTORY:  She is retired.  She does not drink, smoke or take drugs.



MEDICATIONS:  Reviewed.  She is on 12 including Eliquis, although it is being

held. Simvastatin, atenolol, aspirin, Voltaren, Tylenol, Keppra, meclizine,

prednisone, cyanocobalamin, and vitamin D.



REVIEW OF SYSTEMS:  

GENERAL:  She complains of weakness.

SKIN:  No bruising, hair changes or rashes.

EYES:  No blurred, double or loss of vision.

NOSE AND THROAT:  No history of nosebleeds, hoarseness or sore throat.

HEART:  No history of palpitations, chest pain or shortness of breath on

exertion.

LUNGS:  She complains of shortness of breath.

GASTROINTESTINAL:  Denies changes in appetite, nausea, vomiting, diarrhea or

constipation.

GENITOURINARY:  No history of frequency, urgency, hesitancy or nocturia.

NEUROLOGIC:  Denies history of numbness, tingling, tremor or weakness.

PSYCHIATRIC:  No history of panic, anxiety or depression.

ENDOCRINE:  No history of heat or cold intolerance, polyuria or polydipsia.

EXTREMITIES:  Denies muscle weakness, joint pain, pain on walking or stiffness.

 

PHYSICAL EXAMINATION:

VITAL SIGNS:  Temperature 101.9, pulse 100, respirations 28, blood pressure

133/63.

GENERAL:  She is alert, cooperative, very pleasant.  She has three daughters

present, they are good support for her.

HEART:  Tachycardic S1, S2.

LUNGS:  Clear.

ABDOMEN:  Soft, little tender.

EXTREMITIES:  Trace edema.

SKIN:  No rashes.

ENDOCRINE:  No thyromegaly.

LYMPHATICS:  No cervical nodes.

HEMATOPOIETIC:  No bruising.

PSYCHIATRIC:  She is stable. 



LABORATORY DATA:  Creatinine high at 1.3, glucose is high at 185.  Lactic acid

level high at 2.9.  Albumin is low at 2.3.  Chest x-ray negative.  CT of the

head and neck negative.  Lumbar spine CT, DJD of the lumbar spine.  Thoracic

spine CT, no acute disease.  Hip and pelvis x-rays, no acute disease.



ASSESSMENT AND PLAN:  Sepsis.  The patient has been admitted.  We will start IV

antibiotics.  Consult ID, consult Cardiology.  We will try to resume most of her

home meds except hold the Eliquis.  Frequent labs.  Deep venous thrombosis

prophylaxis.  Full code.

IV antibiotics.



PROGNOSIS:  Guarded.

 



______________________________

JOSE M PATINO DO



DR:  DELMAR/prashant  JOB#:  [id number] / [id number]

DD:  03/20/2019 15:29  DT:  03/20/2019 16:06

## 2019-03-21 VITALS — DIASTOLIC BLOOD PRESSURE: 58 MMHG | SYSTOLIC BLOOD PRESSURE: 116 MMHG

## 2019-03-21 VITALS — DIASTOLIC BLOOD PRESSURE: 45 MMHG | SYSTOLIC BLOOD PRESSURE: 120 MMHG

## 2019-03-21 VITALS — DIASTOLIC BLOOD PRESSURE: 69 MMHG | SYSTOLIC BLOOD PRESSURE: 121 MMHG

## 2019-03-21 VITALS — SYSTOLIC BLOOD PRESSURE: 149 MMHG | DIASTOLIC BLOOD PRESSURE: 65 MMHG

## 2019-03-21 VITALS — DIASTOLIC BLOOD PRESSURE: 59 MMHG | SYSTOLIC BLOOD PRESSURE: 122 MMHG

## 2019-03-21 VITALS — SYSTOLIC BLOOD PRESSURE: 125 MMHG | DIASTOLIC BLOOD PRESSURE: 59 MMHG

## 2019-03-21 VITALS — SYSTOLIC BLOOD PRESSURE: 125 MMHG | DIASTOLIC BLOOD PRESSURE: 55 MMHG

## 2019-03-21 VITALS — DIASTOLIC BLOOD PRESSURE: 68 MMHG | SYSTOLIC BLOOD PRESSURE: 141 MMHG

## 2019-03-21 LAB
ANION GAP SERPL CALC-SCNC: 9 MMOL/L (ref 6–14)
BASOPHILS # BLD AUTO: 0 X10^3/UL (ref 0–0.2)
BASOPHILS NFR BLD: 0 % (ref 0–3)
BUN SERPL-MCNC: 9 MG/DL (ref 7–20)
CALCIUM SERPL-MCNC: 7.5 MG/DL (ref 8.5–10.1)
CHLORIDE SERPL-SCNC: 109 MMOL/L (ref 98–107)
CHOLEST SERPL-MCNC: 159 MG/DL (ref 0–200)
CHOLEST/HDLC SERPL: 3.1 {RATIO}
CO2 SERPL-SCNC: 24 MMOL/L (ref 21–32)
CREAT SERPL-MCNC: 1.1 MG/DL (ref 0.6–1)
EOSINOPHIL NFR BLD: 0 % (ref 0–3)
EOSINOPHIL NFR BLD: 0 X10^3/UL (ref 0–0.7)
ERYTHROCYTE [DISTWIDTH] IN BLOOD BY AUTOMATED COUNT: 19.2 % (ref 11.5–14.5)
GFR SERPLBLD BASED ON 1.73 SQ M-ARVRAT: 57.4 ML/MIN
GLUCOSE SERPL-MCNC: 114 MG/DL (ref 70–99)
HCT VFR BLD CALC: 22.3 % (ref 36–47)
HDLC SERPL-MCNC: 52 MG/DL (ref 40–60)
HGB BLD-MCNC: 7.1 G/DL (ref 12–15.5)
INFLUENZA A PATIENT: NEGATIVE
INFLUENZA B PATIENT: NEGATIVE
LDLC: 91 MG/DL (ref 0–100)
LYMPHOCYTES # BLD: 0.7 X10^3/UL (ref 1–4.8)
LYMPHOCYTES NFR BLD AUTO: 11 % (ref 24–48)
MCH RBC QN AUTO: 26 PG (ref 25–35)
MCHC RBC AUTO-ENTMCNC: 32 G/DL (ref 31–37)
MCV RBC AUTO: 82 FL (ref 79–100)
MONO #: 0.4 X10^3/UL (ref 0–1.1)
MONOCYTES NFR BLD: 6 % (ref 0–9)
NEUT #: 5 X10^3UL (ref 1.8–7.7)
NEUTROPHILS NFR BLD AUTO: 82 % (ref 31–73)
PLATELET # BLD AUTO: 228 X10^3/UL (ref 140–400)
POTASSIUM SERPL-SCNC: 3.6 MMOL/L (ref 3.5–5.1)
RBC # BLD AUTO: 2.73 X10^6/UL (ref 3.5–5.4)
SODIUM SERPL-SCNC: 142 MMOL/L (ref 136–145)
TRIGL SERPL-MCNC: 78 MG/DL (ref 0–150)
VLDLC: 16 MG/DL (ref 0–40)
WBC # BLD AUTO: 6.1 X10^3/UL (ref 4–11)

## 2019-03-21 RX ADMIN — ASPIRIN 81 MG SCH MG: 81 TABLET ORAL at 08:00

## 2019-03-21 RX ADMIN — ACETAMINOPHEN PRN MG: 325 TABLET, FILM COATED ORAL at 07:19

## 2019-03-21 RX ADMIN — DICLOFENAC SODIUM SCH APP: 10 GEL TOPICAL at 08:32

## 2019-03-21 RX ADMIN — PIPERACILLIN SODIUM AND TAZOBACTAM SODIUM SCH MLS/HR: 2; .25 INJECTION, POWDER, LYOPHILIZED, FOR SOLUTION INTRAVENOUS at 11:30

## 2019-03-21 RX ADMIN — ATENOLOL SCH MG: 25 TABLET ORAL at 08:32

## 2019-03-21 RX ADMIN — OSELTAMIVIR PHOSPHATE SCH MG: 30 CAPSULE ORAL at 10:03

## 2019-03-21 RX ADMIN — BACITRACIN SCH MLS/HR: 5000 INJECTION, POWDER, FOR SOLUTION INTRAMUSCULAR at 13:13

## 2019-03-21 RX ADMIN — PANTOPRAZOLE SODIUM SCH MG: 40 TABLET, DELAYED RELEASE ORAL at 07:19

## 2019-03-21 RX ADMIN — DICLOFENAC SODIUM SCH APP: 10 GEL TOPICAL at 20:53

## 2019-03-21 RX ADMIN — OSELTAMIVIR PHOSPHATE SCH MG: 30 CAPSULE ORAL at 20:51

## 2019-03-21 RX ADMIN — Medication SCH CAP: at 20:51

## 2019-03-21 RX ADMIN — DICLOFENAC SODIUM SCH APP: 10 GEL TOPICAL at 17:31

## 2019-03-21 RX ADMIN — PIPERACILLIN SODIUM AND TAZOBACTAM SODIUM SCH MLS/HR: 2; .25 INJECTION, POWDER, LYOPHILIZED, FOR SOLUTION INTRAVENOUS at 23:44

## 2019-03-21 RX ADMIN — PIPERACILLIN SODIUM AND TAZOBACTAM SODIUM SCH MLS/HR: 2; .25 INJECTION, POWDER, LYOPHILIZED, FOR SOLUTION INTRAVENOUS at 17:30

## 2019-03-21 RX ADMIN — DICLOFENAC SODIUM SCH APP: 10 GEL TOPICAL at 12:26

## 2019-03-21 RX ADMIN — PIPERACILLIN SODIUM AND TAZOBACTAM SODIUM SCH MLS/HR: 2; .25 INJECTION, POWDER, LYOPHILIZED, FOR SOLUTION INTRAVENOUS at 06:07

## 2019-03-21 NOTE — NUR
Pt was admitted yesterday with sepsis with T of 101.6 per report @ ED and was given tylenol. 
A unit of PRBC was ordered. Before initiation of the transfusion, pt was shivering and felt 
cold, T went down to 99.5. After 15 mins. T up to 100.1, then 101.6. Dr Greenfield was notified 
and to continue with transfusion. No SOB, output sufficient with yellow urine. Pt monitored. 
Lowest Temp last night was 98.8.

-------------------------------------------------------------------------------

Addendum: 03/21/19 at 2016 by SHEEBA KEITA RN

-------------------------------------------------------------------------------

correction: Temp @ ED per report was 101.9

## 2019-03-21 NOTE — NUR
SW following pt for anticipated dc needs. Chart reviewed. Pt lives at home with family and 
was previously discharged to SNU. PT/OT currently pending. SW will await for PT/OT 
recommendation to eval dc needs. Will continue to follow.

## 2019-03-21 NOTE — CONS
DATE OF CONSULTATION:  03/21/2019



REQUESTING PHYSICIAN:  Dr. Greenfield.



REASON FOR CONSULTATION:  Fever and lactic acidosis.



HISTORY OF PRESENT ILLNESS:  This is an 83-year-old -American female who

came in with weakness and fever.  The patient fell out of the bed.  Her x-rays

were okay.  The patient continues to have fever.  Chest x-ray and urinalysis

unremarkable.  Influenza screen not done.



The patient is not able to provide much information.  Most of information was

obtained through the family at the bedside and the chart.



PAST MEDICAL HISTORY:  Positive for dementia, arthritis, diabetes, hypertension,

hyperlipidemia, coronary artery disease, encephalopathy.



SOCIAL HISTORY:  Negative for smoking, alcohol, or illicit drug use.



ALLERGIES:  No known drug allergies.



CURRENT MEDICATIONS:  Reviewed.  The patient is on Zosyn.



REVIEW OF SYSTEMS:  As per HPI, all other systems reviewed are negative through

the patient's nurse and family.  No nausea, vomiting, diarrhea noted.



PHYSICAL EXAMINATION:

GENERAL:  Awake female, who does not communicate.  At least, she did not

communicate at all to me, not in any distress.

VITAL SIGNS:  Stable with a T-max of 100.5.

HEENT:  NAD.

NECK:  Supple, no JVP, no lymphadenopathy.

LUNGS:  Clear.

HEART:  S1, S2 regular.

ABDOMEN:  Benign.

EXTREMITIES:  No edema or cyanosis.

SKIN:  Unremarkable.

NEUROLOGIC:  The patient is neurologically unable to , although she is

alert, awake and moves all the extremities.



LABORATORY DATA:  White count is 7.2, hemoglobin 7.0, platelets are normal.  BUN

and creatinine is 10 and 1.3.  Her lactic acid was 2.9.  Urinalysis

unremarkable.  Chest x-ray is unremarkable.  Cultures are pending and all the

other x-rays reviewed.



IMPRESSION:

1.  Fever.

2.  Lactic acidosis.

3.  Weakness, rule out influenza.

4.  Dementia.

5.  Status post fall.

6.  Diabetes.

7.  Hypertension.

8.  Coronary artery disease.



RECOMMENDATIONS:  Recommend continue Zosyn.  Get influenza screen and start

Tamiflu, supportive care.  Check cultures and we will continue to follow. 

Discussion with family done at the bedside.



Thank you very much, Dr. Greenfield, for giving me the opportunity to participate in

this patient's care.

 



______________________________

CATRINA R. GALLAGHER, MD



DR:  ANGEL/prashant  JOB#:  [id number] / [id number]

DD:  03/21/2019 08:56  DT:  03/21/2019 18:19

## 2019-03-21 NOTE — PDOC
Infectious Disease Note


Vital Sign


Vital Signs





Vital Signs








  Date Time  Temp Pulse Resp B/P (MAP) Pulse Ox O2 Delivery O2 Flow Rate FiO2


 


3/21/19 08:32  90  120/45    


 


3/21/19 08:20 100.5    100 Room Air  





 100.5       


 


3/21/19 08:18   17     











Labs


Lab





Laboratory Tests








Test


 3/20/19


13:35 3/20/19


15:00 3/20/19


17:12 3/20/19


20:40


 


Sodium Level


 142 mmol/L


(136-145) 


 


 





 


Potassium Level


 4.0 mmol/L


(3.5-5.1) 


 


 





 


Chloride Level


 107 mmol/L


() 


 


 





 


Carbon Dioxide Level


 24 mmol/L


(21-32) 


 


 





 


Anion Gap 11 (6-14)    


 


Blood Urea Nitrogen


 10 mg/dL


(7-20) 


 


 





 


Creatinine


 1.3 mg/dL


(0.6-1.0) 


 


 





 


Estimated GFR


(Cockcroft-Gault) 47.3 


 


 


 





 


BUN/Creatinine Ratio 8 (6-20)    


 


Glucose Level


 185 mg/dL


(70-99) 


 


 





 


Lactic Acid Level


 2.9 mmol/L


(0.4-2.0) 


 


 





 


Calcium Level


 8.4 mg/dL


(8.5-10.1) 


 


 





 


Total Bilirubin


 0.2 mg/dL


(0.2-1.0) 


 


 





 


Aspartate Amino Transf


(AST/SGOT) 10 U/L (15-37) 


 


 


 





 


Alanine Aminotransferase


(ALT/SGPT) 8 U/L (14-59) 


 


 


 





 


Alkaline Phosphatase


 53 U/L


() 


 


 





 


Total Protein


 5.7 g/dL


(6.4-8.2) 


 


 





 


Albumin


 2.3 g/dL


(3.4-5.0) 


 


 





 


Albumin/Globulin Ratio 0.7 (1.0-1.7)    


 


White Blood Count


 


 7.2 x10^3/uL


(4.0-11.0) 


 





 


Red Blood Count


 


 2.70 x10^6/uL


(3.50-5.40) 


 





 


Hemoglobin


 


 7.0 g/dL


(12.0-15.5) 


 





 


Hematocrit


 


 22.4 %


(36.0-47.0) 


 





 


Mean Corpuscular Volume  83 fL ()   


 


Mean Corpuscular Hemoglobin  26 pg (25-35)   


 


Mean Corpuscular Hemoglobin


Concent 


 31 g/dL


(31-37) 


 





 


Red Cell Distribution Width


 


 21.5 %


(11.5-14.5) 


 





 


Platelet Count


 


 265 x10^3/uL


(140-400) 


 





 


Neutrophils (%) (Auto)  79 % (31-73)   


 


Lymphocytes (%) (Auto)  13 % (24-48)   


 


Monocytes (%) (Auto)  8 % (0-9)   


 


Eosinophils (%) (Auto)  0 % (0-3)   


 


Basophils (%) (Auto)  0 % (0-3)   


 


Neutrophils # (Auto)


 


 5.7 x10^3uL


(1.8-7.7) 


 





 


Lymphocytes # (Auto)


 


 1.0 x10^3/uL


(1.0-4.8) 


 





 


Monocytes # (Auto)


 


 0.6 x10^3/uL


(0.0-1.1) 


 





 


Eosinophils # (Auto)


 


 0.0 x10^3/uL


(0.0-0.7) 


 





 


Basophils # (Auto)


 


 0.0 x10^3/uL


(0.0-0.2) 


 





 


Platelet Estimate


 


 Adequate


(ADEQUATE) 


 





 


Giant Platelets  Occ   


 


Polychromasia  Mod   


 


Anisocytosis  Mod   


 


Tear Drop Cells  Few   


 


Ovalocytes  Mod   


 


RBC Morphology Bizarre Forms  Few   


 


Glucose (Fingerstick)


 


 


 126 mg/dL


(70-99) 





 


Urine Collection Type    Unknown 


 


Urine Color    Yellow 


 


Urine Clarity    Clear 


 


Urine pH    6.0 


 


Urine Specific Gravity    1.015 


 


Urine Protein


 


 


 


 Negative mg/dL


(NEG-TRACE)


 


Urine Glucose (UA)


 


 


 


 Negative mg/dL


(NEG)


 


Urine Ketones (Stick)


 


 


 


 Negative mg/dL


(NEG)


 


Urine Blood    Negative (NEG) 


 


Urine Nitrite    Negative (NEG) 


 


Urine Bilirubin    Negative (NEG) 


 


Urine Urobilinogen Dipstick


 


 


 


 0.2 mg/dL (0.2


mg/dL)


 


Urine Leukocyte Esterase    Negative (NEG) 


 


Urine RBC    0 /HPF (0-2) 


 


Urine WBC    0 /HPF (0-4) 


 


Urine Squamous Epithelial


Cells 


 


 


 Few /LPF 





 


Urine Bacteria    0 /HPF (0-FEW) 


 


Urine Mucus    Slight /LPF 


 


Test


 3/20/19


20:44 3/20/19


23:55 3/21/19


07:14 





 


Glucose (Fingerstick)


 90 mg/dL


(70-99) 


 105 mg/dL


(70-99) 





 


Lactic Acid Level


 


 0.7 mmol/L


(0.4-2.0) 


 














Objective


Assessment


Fever,,, possible influenza


Weakness


Dementia





Plan


Plan of Care


check influenza


culture


supportive care


cont zosyn , add tamiflu


d/w family











CATRINA GALLAGHER MD Mar 21, 2019 08:53

## 2019-03-21 NOTE — PDOC
PROGRESS NOTES


Chief Complaint


Chief Complaint


Meets SIRS criteria, tachycardic, febrile - improving


Shortness of breath - resolved


Recent fall 


Low hemoglobin (7.0 on 3/20)


Elevated lactic acid improving





History of Present Illness


History of Present Illness


Ms. Deal presented with fever, tachycardic, admitted for meeting SIRS. 

Patient was also weak and fell recently. Patient seen and examined at bedside. 

Discussed with daughter present in room. Patient is resting comfortably, no new 

complaints per daughter. Currently on Zosyn. ID following. Patient able to get 

out of bed, tirado removed.





Vitals


Vitals





Vital Signs








  Date Time  Temp Pulse Resp B/P (MAP) Pulse Ox O2 Delivery O2 Flow Rate FiO2


 


3/21/19 08:32  90  120/45    


 


3/21/19 08:20 100.5    100 Room Air  





 100.5       


 


3/21/19 08:18   17     











Physical Exam


General:  Alert, Oriented X3


Heart:  Regular rate, No murmurs


Lungs:  Clear


Abdomen:  Normal bowel sounds, Soft, No tenderness


Extremities:  No clubbing, No cyanosis, No edema


Skin:  No rashes, No significant lesion





Labs


LABS





Laboratory Tests








Test


 3/20/19


13:35 3/20/19


15:00 3/20/19


17:12 3/20/19


20:40


 


Sodium Level


 142 mmol/L


(136-145) 


 


 





 


Potassium Level


 4.0 mmol/L


(3.5-5.1) 


 


 





 


Chloride Level


 107 mmol/L


() 


 


 





 


Carbon Dioxide Level


 24 mmol/L


(21-32) 


 


 





 


Anion Gap 11 (6-14)    


 


Blood Urea Nitrogen


 10 mg/dL


(7-20) 


 


 





 


Creatinine


 1.3 mg/dL


(0.6-1.0) 


 


 





 


Estimated GFR


(Cockcroft-Gault) 47.3 


 


 


 





 


BUN/Creatinine Ratio 8 (6-20)    


 


Glucose Level


 185 mg/dL


(70-99) 


 


 





 


Lactic Acid Level


 2.9 mmol/L


(0.4-2.0) 


 


 





 


Calcium Level


 8.4 mg/dL


(8.5-10.1) 


 


 





 


Total Bilirubin


 0.2 mg/dL


(0.2-1.0) 


 


 





 


Aspartate Amino Transf


(AST/SGOT) 10 U/L (15-37) 


 


 


 





 


Alanine Aminotransferase


(ALT/SGPT) 8 U/L (14-59) 


 


 


 





 


Alkaline Phosphatase


 53 U/L


() 


 


 





 


Total Protein


 5.7 g/dL


(6.4-8.2) 


 


 





 


Albumin


 2.3 g/dL


(3.4-5.0) 


 


 





 


Albumin/Globulin Ratio 0.7 (1.0-1.7)    


 


White Blood Count


 


 7.2 x10^3/uL


(4.0-11.0) 


 





 


Red Blood Count


 


 2.70 x10^6/uL


(3.50-5.40) 


 





 


Hemoglobin


 


 7.0 g/dL


(12.0-15.5) 


 





 


Hematocrit


 


 22.4 %


(36.0-47.0) 


 





 


Mean Corpuscular Volume  83 fL ()   


 


Mean Corpuscular Hemoglobin  26 pg (25-35)   


 


Mean Corpuscular Hemoglobin


Concent 


 31 g/dL


(31-37) 


 





 


Red Cell Distribution Width


 


 21.5 %


(11.5-14.5) 


 





 


Platelet Count


 


 265 x10^3/uL


(140-400) 


 





 


Neutrophils (%) (Auto)  79 % (31-73)   


 


Lymphocytes (%) (Auto)  13 % (24-48)   


 


Monocytes (%) (Auto)  8 % (0-9)   


 


Eosinophils (%) (Auto)  0 % (0-3)   


 


Basophils (%) (Auto)  0 % (0-3)   


 


Neutrophils # (Auto)


 


 5.7 x10^3uL


(1.8-7.7) 


 





 


Lymphocytes # (Auto)


 


 1.0 x10^3/uL


(1.0-4.8) 


 





 


Monocytes # (Auto)


 


 0.6 x10^3/uL


(0.0-1.1) 


 





 


Eosinophils # (Auto)


 


 0.0 x10^3/uL


(0.0-0.7) 


 





 


Basophils # (Auto)


 


 0.0 x10^3/uL


(0.0-0.2) 


 





 


Platelet Estimate


 


 Adequate


(ADEQUATE) 


 





 


Giant Platelets  Occ   


 


Polychromasia  Mod   


 


Anisocytosis  Mod   


 


Tear Drop Cells  Few   


 


Ovalocytes  Mod   


 


RBC Morphology Bizarre Forms  Few   


 


Glucose (Fingerstick)


 


 


 126 mg/dL


(70-99) 





 


Urine Collection Type    Unknown 


 


Urine Color    Yellow 


 


Urine Clarity    Clear 


 


Urine pH    6.0 


 


Urine Specific Gravity    1.015 


 


Urine Protein


 


 


 


 Negative mg/dL


(NEG-TRACE)


 


Urine Glucose (UA)


 


 


 


 Negative mg/dL


(NEG)


 


Urine Ketones (Stick)


 


 


 


 Negative mg/dL


(NEG)


 


Urine Blood    Negative (NEG) 


 


Urine Nitrite    Negative (NEG) 


 


Urine Bilirubin    Negative (NEG) 


 


Urine Urobilinogen Dipstick


 


 


 


 0.2 mg/dL (0.2


mg/dL)


 


Urine Leukocyte Esterase    Negative (NEG) 


 


Urine RBC    0 /HPF (0-2) 


 


Urine WBC    0 /HPF (0-4) 


 


Urine Squamous Epithelial


Cells 


 


 


 Few /LPF 





 


Urine Bacteria    0 /HPF (0-FEW) 


 


Urine Mucus    Slight /LPF 


 


Test


 3/20/19


20:44 3/20/19


23:55 3/21/19


07:14 3/21/19


08:55


 


Glucose (Fingerstick)


 90 mg/dL


(70-99) 


 105 mg/dL


(70-99) 





 


Lactic Acid Level


 


 0.7 mmol/L


(0.4-2.0) 


 





 


White Blood Count


 


 


 


 6.1 x10^3/uL


(4.0-11.0)


 


Red Blood Count


 


 


 


 2.73 x10^6/uL


(3.50-5.40)


 


Hemoglobin


 


 


 


 7.1 g/dL


(12.0-15.5)


 


Hematocrit


 


 


 


 22.3 %


(36.0-47.0)


 


Mean Corpuscular Volume    82 fL () 


 


Mean Corpuscular Hemoglobin    26 pg (25-35) 


 


Mean Corpuscular Hemoglobin


Concent 


 


 


 32 g/dL


(31-37)


 


Red Cell Distribution Width


 


 


 


 19.2 %


(11.5-14.5)


 


Platelet Count


 


 


 


 228 x10^3/uL


(140-400)


 


Neutrophils (%) (Auto)    82 % (31-73) 


 


Lymphocytes (%) (Auto)    11 % (24-48) 


 


Monocytes (%) (Auto)    6 % (0-9) 


 


Eosinophils (%) (Auto)    0 % (0-3) 


 


Basophils (%) (Auto)    0 % (0-3) 


 


Neutrophils # (Auto)


 


 


 


 5.0 x10^3uL


(1.8-7.7)


 


Lymphocytes # (Auto)


 


 


 


 0.7 x10^3/uL


(1.0-4.8)


 


Monocytes # (Auto)


 


 


 


 0.4 x10^3/uL


(0.0-1.1)


 


Eosinophils # (Auto)


 


 


 


 0.0 x10^3/uL


(0.0-0.7)


 


Basophils # (Auto)


 


 


 


 0.0 x10^3/uL


(0.0-0.2)


 


Sodium Level


 


 


 


 142 mmol/L


(136-145)


 


Potassium Level


 


 


 


 3.6 mmol/L


(3.5-5.1)


 


Chloride Level


 


 


 


 109 mmol/L


()


 


Carbon Dioxide Level


 


 


 


 24 mmol/L


(21-32)


 


Anion Gap    9 (6-14) 


 


Blood Urea Nitrogen    9 mg/dL (7-20) 


 


Creatinine


 


 


 


 1.1 mg/dL


(0.6-1.0)


 


Estimated GFR


(Cockcroft-Gault) 


 


 


 57.4 





 


Glucose Level


 


 


 


 114 mg/dL


(70-99)


 


Calcium Level


 


 


 


 7.5 mg/dL


(8.5-10.1)


 


Test


 3/21/19


09:07 


 


 





 


Influenza Type A Antigen


 Negative


(NEGATIVE) 


 


 





 


Influenza Type B Antigen


 Negative


(NEGATIVE) 


 


 














Review of Systems


Review of Systems


Denies cough


Denies shortness of breath


Denies dysuria





Assessment and Plan


Assessmemt and Plan


Problems


Medical Problems:


(1) Sepsis


Status: Acute  





Assessment:


Meets SIRS criteria, tachycardic, febrile - improving


Shortness of breath - resolved


Recent fall 


Low hemoglobin (7.0 on 3/20)


Elevated lactic acid - improving





Plan:


Flu negative, CXR normal, no UTI, no leukocytosis - continue to monitor CBC and 

CMP


Appreciate ID input


Continue zosyn


Continue home meds


Monitor hemoglobin


PT/OT





Comment


Review of Relevant


I have reviewed the following items marky (where applicable) has been applied.


Labs





Laboratory Tests








Test


 3/20/19


13:35 3/20/19


15:00 3/20/19


17:12 3/20/19


20:40


 


Sodium Level


 142 mmol/L


(136-145) 


 


 





 


Potassium Level


 4.0 mmol/L


(3.5-5.1) 


 


 





 


Chloride Level


 107 mmol/L


() 


 


 





 


Carbon Dioxide Level


 24 mmol/L


(21-32) 


 


 





 


Anion Gap 11 (6-14)    


 


Blood Urea Nitrogen


 10 mg/dL


(7-20) 


 


 





 


Creatinine


 1.3 mg/dL


(0.6-1.0) 


 


 





 


Estimated GFR


(Cockcroft-Gault) 47.3 


 


 


 





 


BUN/Creatinine Ratio 8 (6-20)    


 


Glucose Level


 185 mg/dL


(70-99) 


 


 





 


Lactic Acid Level


 2.9 mmol/L


(0.4-2.0) 


 


 





 


Calcium Level


 8.4 mg/dL


(8.5-10.1) 


 


 





 


Total Bilirubin


 0.2 mg/dL


(0.2-1.0) 


 


 





 


Aspartate Amino Transf


(AST/SGOT) 10 U/L (15-37) 


 


 


 





 


Alanine Aminotransferase


(ALT/SGPT) 8 U/L (14-59) 


 


 


 





 


Alkaline Phosphatase


 53 U/L


() 


 


 





 


Total Protein


 5.7 g/dL


(6.4-8.2) 


 


 





 


Albumin


 2.3 g/dL


(3.4-5.0) 


 


 





 


Albumin/Globulin Ratio 0.7 (1.0-1.7)    


 


White Blood Count


 


 7.2 x10^3/uL


(4.0-11.0) 


 





 


Red Blood Count


 


 2.70 x10^6/uL


(3.50-5.40) 


 





 


Hemoglobin


 


 7.0 g/dL


(12.0-15.5) 


 





 


Hematocrit


 


 22.4 %


(36.0-47.0) 


 





 


Mean Corpuscular Volume  83 fL ()   


 


Mean Corpuscular Hemoglobin  26 pg (25-35)   


 


Mean Corpuscular Hemoglobin


Concent 


 31 g/dL


(31-37) 


 





 


Red Cell Distribution Width


 


 21.5 %


(11.5-14.5) 


 





 


Platelet Count


 


 265 x10^3/uL


(140-400) 


 





 


Neutrophils (%) (Auto)  79 % (31-73)   


 


Lymphocytes (%) (Auto)  13 % (24-48)   


 


Monocytes (%) (Auto)  8 % (0-9)   


 


Eosinophils (%) (Auto)  0 % (0-3)   


 


Basophils (%) (Auto)  0 % (0-3)   


 


Neutrophils # (Auto)


 


 5.7 x10^3uL


(1.8-7.7) 


 





 


Lymphocytes # (Auto)


 


 1.0 x10^3/uL


(1.0-4.8) 


 





 


Monocytes # (Auto)


 


 0.6 x10^3/uL


(0.0-1.1) 


 





 


Eosinophils # (Auto)


 


 0.0 x10^3/uL


(0.0-0.7) 


 





 


Basophils # (Auto)


 


 0.0 x10^3/uL


(0.0-0.2) 


 





 


Platelet Estimate


 


 Adequate


(ADEQUATE) 


 





 


Giant Platelets  Occ   


 


Polychromasia  Mod   


 


Anisocytosis  Mod   


 


Tear Drop Cells  Few   


 


Ovalocytes  Mod   


 


RBC Morphology Bizarre Forms  Few   


 


Glucose (Fingerstick)


 


 


 126 mg/dL


(70-99) 





 


Urine Collection Type    Unknown 


 


Urine Color    Yellow 


 


Urine Clarity    Clear 


 


Urine pH    6.0 


 


Urine Specific Gravity    1.015 


 


Urine Protein


 


 


 


 Negative mg/dL


(NEG-TRACE)


 


Urine Glucose (UA)


 


 


 


 Negative mg/dL


(NEG)


 


Urine Ketones (Stick)


 


 


 


 Negative mg/dL


(NEG)


 


Urine Blood    Negative (NEG) 


 


Urine Nitrite    Negative (NEG) 


 


Urine Bilirubin    Negative (NEG) 


 


Urine Urobilinogen Dipstick


 


 


 


 0.2 mg/dL (0.2


mg/dL)


 


Urine Leukocyte Esterase    Negative (NEG) 


 


Urine RBC    0 /HPF (0-2) 


 


Urine WBC    0 /HPF (0-4) 


 


Urine Squamous Epithelial


Cells 


 


 


 Few /LPF 





 


Urine Bacteria    0 /HPF (0-FEW) 


 


Urine Mucus    Slight /LPF 


 


Test


 3/20/19


20:44 3/20/19


23:55 3/21/19


07:14 3/21/19


08:55


 


Glucose (Fingerstick)


 90 mg/dL


(70-99) 


 105 mg/dL


(70-99) 





 


Lactic Acid Level


 


 0.7 mmol/L


(0.4-2.0) 


 





 


White Blood Count


 


 


 


 6.1 x10^3/uL


(4.0-11.0)


 


Red Blood Count


 


 


 


 2.73 x10^6/uL


(3.50-5.40)


 


Hemoglobin


 


 


 


 7.1 g/dL


(12.0-15.5)


 


Hematocrit


 


 


 


 22.3 %


(36.0-47.0)


 


Mean Corpuscular Volume    82 fL () 


 


Mean Corpuscular Hemoglobin    26 pg (25-35) 


 


Mean Corpuscular Hemoglobin


Concent 


 


 


 32 g/dL


(31-37)


 


Red Cell Distribution Width


 


 


 


 19.2 %


(11.5-14.5)


 


Platelet Count


 


 


 


 228 x10^3/uL


(140-400)


 


Neutrophils (%) (Auto)    82 % (31-73) 


 


Lymphocytes (%) (Auto)    11 % (24-48) 


 


Monocytes (%) (Auto)    6 % (0-9) 


 


Eosinophils (%) (Auto)    0 % (0-3) 


 


Basophils (%) (Auto)    0 % (0-3) 


 


Neutrophils # (Auto)


 


 


 


 5.0 x10^3uL


(1.8-7.7)


 


Lymphocytes # (Auto)


 


 


 


 0.7 x10^3/uL


(1.0-4.8)


 


Monocytes # (Auto)


 


 


 


 0.4 x10^3/uL


(0.0-1.1)


 


Eosinophils # (Auto)


 


 


 


 0.0 x10^3/uL


(0.0-0.7)


 


Basophils # (Auto)


 


 


 


 0.0 x10^3/uL


(0.0-0.2)


 


Sodium Level


 


 


 


 142 mmol/L


(136-145)


 


Potassium Level


 


 


 


 3.6 mmol/L


(3.5-5.1)


 


Chloride Level


 


 


 


 109 mmol/L


()


 


Carbon Dioxide Level


 


 


 


 24 mmol/L


(21-32)


 


Anion Gap    9 (6-14) 


 


Blood Urea Nitrogen    9 mg/dL (7-20) 


 


Creatinine


 


 


 


 1.1 mg/dL


(0.6-1.0)


 


Estimated GFR


(Cockcroft-Gault) 


 


 


 57.4 





 


Glucose Level


 


 


 


 114 mg/dL


(70-99)


 


Calcium Level


 


 


 


 7.5 mg/dL


(8.5-10.1)


 


Test


 3/21/19


09:07 


 


 





 


Influenza Type A Antigen


 Negative


(NEGATIVE) 


 


 





 


Influenza Type B Antigen


 Negative


(NEGATIVE) 


 


 











Laboratory Tests








Test


 3/20/19


13:35 3/20/19


15:00 3/20/19


17:12 3/20/19


20:40


 


Sodium Level


 142 mmol/L


(136-145) 


 


 





 


Potassium Level


 4.0 mmol/L


(3.5-5.1) 


 


 





 


Chloride Level


 107 mmol/L


() 


 


 





 


Carbon Dioxide Level


 24 mmol/L


(21-32) 


 


 





 


Anion Gap 11 (6-14)    


 


Blood Urea Nitrogen


 10 mg/dL


(7-20) 


 


 





 


Creatinine


 1.3 mg/dL


(0.6-1.0) 


 


 





 


Estimated GFR


(Cockcroft-Gault) 47.3 


 


 


 





 


BUN/Creatinine Ratio 8 (6-20)    


 


Glucose Level


 185 mg/dL


(70-99) 


 


 





 


Lactic Acid Level


 2.9 mmol/L


(0.4-2.0) 


 


 





 


Calcium Level


 8.4 mg/dL


(8.5-10.1) 


 


 





 


Total Bilirubin


 0.2 mg/dL


(0.2-1.0) 


 


 





 


Aspartate Amino Transf


(AST/SGOT) 10 U/L (15-37) 


 


 


 





 


Alanine Aminotransferase


(ALT/SGPT) 8 U/L (14-59) 


 


 


 





 


Alkaline Phosphatase


 53 U/L


() 


 


 





 


Total Protein


 5.7 g/dL


(6.4-8.2) 


 


 





 


Albumin


 2.3 g/dL


(3.4-5.0) 


 


 





 


Albumin/Globulin Ratio 0.7 (1.0-1.7)    


 


White Blood Count


 


 7.2 x10^3/uL


(4.0-11.0) 


 





 


Red Blood Count


 


 2.70 x10^6/uL


(3.50-5.40) 


 





 


Hemoglobin


 


 7.0 g/dL


(12.0-15.5) 


 





 


Hematocrit


 


 22.4 %


(36.0-47.0) 


 





 


Mean Corpuscular Volume  83 fL ()   


 


Mean Corpuscular Hemoglobin  26 pg (25-35)   


 


Mean Corpuscular Hemoglobin


Concent 


 31 g/dL


(31-37) 


 





 


Red Cell Distribution Width


 


 21.5 %


(11.5-14.5) 


 





 


Platelet Count


 


 265 x10^3/uL


(140-400) 


 





 


Neutrophils (%) (Auto)  79 % (31-73)   


 


Lymphocytes (%) (Auto)  13 % (24-48)   


 


Monocytes (%) (Auto)  8 % (0-9)   


 


Eosinophils (%) (Auto)  0 % (0-3)   


 


Basophils (%) (Auto)  0 % (0-3)   


 


Neutrophils # (Auto)


 


 5.7 x10^3uL


(1.8-7.7) 


 





 


Lymphocytes # (Auto)


 


 1.0 x10^3/uL


(1.0-4.8) 


 





 


Monocytes # (Auto)


 


 0.6 x10^3/uL


(0.0-1.1) 


 





 


Eosinophils # (Auto)


 


 0.0 x10^3/uL


(0.0-0.7) 


 





 


Basophils # (Auto)


 


 0.0 x10^3/uL


(0.0-0.2) 


 





 


Platelet Estimate


 


 Adequate


(ADEQUATE) 


 





 


Giant Platelets  Occ   


 


Polychromasia  Mod   


 


Anisocytosis  Mod   


 


Tear Drop Cells  Few   


 


Ovalocytes  Mod   


 


RBC Morphology Bizarre Forms  Few   


 


Glucose (Fingerstick)


 


 


 126 mg/dL


(70-99) 





 


Urine Collection Type    Unknown 


 


Urine Color    Yellow 


 


Urine Clarity    Clear 


 


Urine pH    6.0 


 


Urine Specific Gravity    1.015 


 


Urine Protein


 


 


 


 Negative mg/dL


(NEG-TRACE)


 


Urine Glucose (UA)


 


 


 


 Negative mg/dL


(NEG)


 


Urine Ketones (Stick)


 


 


 


 Negative mg/dL


(NEG)


 


Urine Blood    Negative (NEG) 


 


Urine Nitrite    Negative (NEG) 


 


Urine Bilirubin    Negative (NEG) 


 


Urine Urobilinogen Dipstick


 


 


 


 0.2 mg/dL (0.2


mg/dL)


 


Urine Leukocyte Esterase    Negative (NEG) 


 


Urine RBC    0 /HPF (0-2) 


 


Urine WBC    0 /HPF (0-4) 


 


Urine Squamous Epithelial


Cells 


 


 


 Few /LPF 





 


Urine Bacteria    0 /HPF (0-FEW) 


 


Urine Mucus    Slight /LPF 


 


Test


 3/20/19


20:44 3/20/19


23:55 3/21/19


07:14 3/21/19


08:55


 


Glucose (Fingerstick)


 90 mg/dL


(70-99) 


 105 mg/dL


(70-99) 





 


Lactic Acid Level


 


 0.7 mmol/L


(0.4-2.0) 


 





 


White Blood Count


 


 


 


 6.1 x10^3/uL


(4.0-11.0)


 


Red Blood Count


 


 


 


 2.73 x10^6/uL


(3.50-5.40)


 


Hemoglobin


 


 


 


 7.1 g/dL


(12.0-15.5)


 


Hematocrit


 


 


 


 22.3 %


(36.0-47.0)


 


Mean Corpuscular Volume    82 fL () 


 


Mean Corpuscular Hemoglobin    26 pg (25-35) 


 


Mean Corpuscular Hemoglobin


Concent 


 


 


 32 g/dL


(31-37)


 


Red Cell Distribution Width


 


 


 


 19.2 %


(11.5-14.5)


 


Platelet Count


 


 


 


 228 x10^3/uL


(140-400)


 


Neutrophils (%) (Auto)    82 % (31-73) 


 


Lymphocytes (%) (Auto)    11 % (24-48) 


 


Monocytes (%) (Auto)    6 % (0-9) 


 


Eosinophils (%) (Auto)    0 % (0-3) 


 


Basophils (%) (Auto)    0 % (0-3) 


 


Neutrophils # (Auto)


 


 


 


 5.0 x10^3uL


(1.8-7.7)


 


Lymphocytes # (Auto)


 


 


 


 0.7 x10^3/uL


(1.0-4.8)


 


Monocytes # (Auto)


 


 


 


 0.4 x10^3/uL


(0.0-1.1)


 


Eosinophils # (Auto)


 


 


 


 0.0 x10^3/uL


(0.0-0.7)


 


Basophils # (Auto)


 


 


 


 0.0 x10^3/uL


(0.0-0.2)


 


Sodium Level


 


 


 


 142 mmol/L


(136-145)


 


Potassium Level


 


 


 


 3.6 mmol/L


(3.5-5.1)


 


Chloride Level


 


 


 


 109 mmol/L


()


 


Carbon Dioxide Level


 


 


 


 24 mmol/L


(21-32)


 


Anion Gap    9 (6-14) 


 


Blood Urea Nitrogen    9 mg/dL (7-20) 


 


Creatinine


 


 


 


 1.1 mg/dL


(0.6-1.0)


 


Estimated GFR


(Cockcroft-Gault) 


 


 


 57.4 





 


Glucose Level


 


 


 


 114 mg/dL


(70-99)


 


Calcium Level


 


 


 


 7.5 mg/dL


(8.5-10.1)


 


Test


 3/21/19


09:07 


 


 





 


Influenza Type A Antigen


 Negative


(NEGATIVE) 


 


 





 


Influenza Type B Antigen


 Negative


(NEGATIVE) 


 


 











Medications





Current Medications


Acetaminophen (Tylenol) 650 mg 1X  ONCE PO  Last administered on 3/20/19at 13:38

;  Start 3/20/19 at 13:30;  Stop 3/20/19 at 13:31;  Status DC


Sodium Chloride 1,000 ml @  1,000 mls/hr 1X  ONCE IV  Last administered on 3/20/

19at 15:08;  Start 3/20/19 at 14:45;  Stop 3/20/19 at 15:44;  Status DC


Sodium Chloride 1,000 ml @  1,000 mls/hr 1X  ONCE IV  Last administered on 3/20/

19at 16:00;  Start 3/20/19 at 14:45;  Stop 3/20/19 at 15:44;  Status DC


Piperacillin Sod/ Tazobactam Sod (Zosyn Per Pharmacy) 1 each PRN DAILY  PRN [facility] 

SEE COMMENTS;  Start 3/20/19 at 14:45


Ondansetron HCl (Zofran) 4 mg PRN Q8HRS  PRN IV NAUSEA/VOMITING;  Start 3/20/19 

at 14:45;  Stop 3/21/19 at 14:44


Acetaminophen (Tylenol) 650 mg PRN Q4HRS  PRN PO FEVER Last administered on 3/21

/19at 07:19;  Start 3/20/19 at 14:45;  Stop 3/21/19 at 14:44


Piperacillin Sod/ Tazobactam Sod 2.25 gm/Sodium Chloride 50 ml @  100 mls/hr 

Q6HRS IV  Last administered on 3/21/19at 06:07;  Start 3/20/19 at 15:00


Sodium Chloride 1,000 ml @  75 mls/hr [id number] IV  Last administered on 3/20/

19at 23:07;  Start 3/20/19 at 18:00


Aspirin (Children'S Aspirin) 81 mg DAILY08 PO ;  Start 3/21/19 at 08:00


Vitamin D (Vitamin D3) 1,000 unit DAILYBFRSUP PO ;  Start 3/21/19 at 17:00


Diclofenac Sodium (Voltaren) 1 danyell QID TP  Last administered on 3/21/19at 08:32

;  Start 3/20/19 at 21:00


Levetiracetam (Keppra) 500 mg BID PO  Last administered on 3/21/19at 08:31;  

Start 3/20/19 at 21:00


Atenolol (Tenormin) 25 mg DAILY PO  Last administered on 3/21/19at 08:32;  

Start 3/21/19 at 09:00


Vitamin B Complex (Arjun B) 1 tab DAILYBFRSUP PO ;  Start 3/21/19 at 17:00


Meclizine HCl (Antivert) 25 mg PRN Q8HRS  PRN PO DIZZINESS;  Start 3/20/19 at 21

:15


Pantoprazole Sodium (Protonix) 40 mg DAILYAC PO  Last administered on 3/21/19at 

07:19;  Start 3/21/19 at 07:30


Simvastatin (Zocor) 40 mg QHS PO ;  Start 3/21/19 at 21:00


Oseltamivir Phosphate (Tamiflu) 30 mg BID PO  Last administered on 3/21/19at 10:

03;  Start 3/21/19 at 09:00;  Stop 3/26/19 at 08:59





Active Scripts


Active


Keppra (Levetiracetam) 500 Mg Tablet 500 Mg PO BID


Meclizine Hcl 25 Mg Tablet 1 Tab PO PRN TID PRN


Reported


Protonix (Pantoprazole Sodium) 20 Mg Tablet.dr 1 Tab PO DAILY


Vitamin B12-Folic Acid Tablet (Cyanocobalamin/Folic Acid) 1 Each Tablet 1 Each 

PO DAILYBFRSUP


Vitamin D3 (Cholecalciferol (Vitamin D3)) 1,000 Unit Tablet 1 Tab PO DAILYBFRSUP


Voltaren (Diclofenac Sodium) 100 Gm Gel..gram. 1 Gm TP PRN QID


Aspirin 81 Mg Tab.chew 1 Tab PO DAILY


Simvastatin 40 Mg Tablet 1 Tab PO QHS


Atenolol 25 Mg Tablet 1 Tab PO DAILY


Vitals/I & O





Vital Sign - Last 24 Hours








 3/20/19 3/20/19 3/20/19 3/20/19





 12:00 13:41 14:00 14:42


 


Temp 101.9  98.9 





 101.9  98.9 


 


Pulse 100 90 89 96


 


Resp 28  17 


 


B/P (MAP) 133/63 (86)  119/57 (77) 


 


Pulse Ox 98 96 99 96


 


O2 Delivery Room Air  Room Air 


 


    





    





 3/20/19 3/20/19 3/20/19 3/20/19





 15:00 15:30 16:00 17:43


 


Pulse 90 88 90 


 


Pulse Ox   97 


 


O2 Delivery    Room Air





 3/20/19 3/20/19 3/20/19 3/20/19





 19:00 20:00 20:18 20:36


 


Temp 99.3  99.5 100.1





 99.3  99.5 100.1


 


Pulse 83  95 93


 


Resp 18 20 20


 


B/P (MAP) 115/92 (100)  135/55 144/51


 


Pulse Ox 98   


 


O2 Delivery Room Air Room Air  


 


    





    





 3/20/19 3/20/19 3/20/19 3/20/19





 21:36 22:36 23:00 23:00


 


Temp 101.6 99.0 99.9 99.9





 101.6 99.0 99.9 99.9


 


Pulse 91 96 83 95


 


Resp 21 20 18 20


 


B/P (MAP) 122/47 127/48 115/92 123/53 (76)


 


Pulse Ox    95


 


O2 Delivery    Room Air


 


    





    





 3/21/19 3/21/19 3/21/19 3/21/19





 00:00 03:03 07:00 07:23


 


Temp 98.8 98.9 97.6 





 98.8 98.9 97.6 


 


Pulse 93 93 95 


 


Resp 18 20 17 


 


B/P (MAP) 125/55 (78) 121/69 (86) 122/59 (80) 


 


Pulse Ox  99 95 


 


O2 Delivery Room Air Room Air Room Air Room Air


 


    





    





 3/21/19 3/21/19 3/21/19 





 08:18 08:20 08:32 


 


Temp 100.5 100.5  





 100.5 100.5  


 


Pulse 90 90 90 


 


Resp 17   


 


B/P (MAP) 120/45 (70) 120/45 (70) 120/45 


 


Pulse Ox 100 100  


 


O2 Delivery Room Air Room Air  














Intake and Output   


 


 3/20/19 3/20/19 3/21/19





 14:59 22:59 06:59


 


Intake Total  2050 ml 


 


Output Total   700 ml


 


Balance  2050 ml -700 ml

















JOSE M PATINO III DO Mar 21, 2019 10:27

## 2019-03-21 NOTE — PDOC2
CARDIAC CONSULT


DATE OF CONSULT


Date of Consult


DATE: 3/21/19 


TIME: 11:39





REASON FOR CONSULT


Reason for Consult:


Routine cardiac management





REFERRING PHYSICIAN


Referring Physician:


Dr. Greenfield





SOURCE


Source:  Chart review, Patient





HISTORY OF PRESENT ILLNESS


HISTORY OF PRESENT ILLNESS


This is an 82 yo female who presented secondary to weakness for the last couple 

of day. Has been unable to ambulate as usual. Has been dizzy, and slightly 

short of breath. Yesterday, was laying on the couch and rolled off. Has had 

subjective fevers. Due to persistent weakness and fevers, patient came into the 

ED for further evaluation and treatment. Denies any chest pain, palpitations, 

diaphoresis, or nausea/vomiting. Has a history of CAD s/p stent placement. 

Follows with routine cardiologist, but unable to provide name. HPI obtained 

from caregiver as patient has dementia and unable to provide recall events.





PAST MEDICAL HISTORY


Cardiovascular:  CAD, CHF, HTN, Hyperlipidemia


Pulmonary:  No pertinent hx


CENTRAL NERVOUS SYSTEM:  Dementia, Seizure


GI:  No pertinent hx


Heme/Onc:  No pertinent hx


Hepatobiliary:  No pertinent hx


Psych:  No pertinent hx


Musculoskeletal:  Osteoarthritis


Rheumatologic:  No pertinent hx


Infectious disease:  No pertinent hx


ENT:  No pertinent hx


Renal/:  No pertinent hx


Endocrine:  Diabetes





PAST SURGICAL HISTORY


Past Surgical History:  Appendectomy, Hysterectomy





FAMILY HISTORY


Family History:  Hypertension





SOCIAL HISTORY


Smoke:  No


ALCOHOL:  none


Drugs:  None


Lives:  with Family





CURRENT MEDICATIONS


CURRENT MEDICATIONS





Current Medications








 Medications


  (Trade)  Dose


 Ordered  Sig/Td


 Route


 PRN Reason  Start Time


 Stop Time Status Last Admin


Dose Admin


 


 Acetaminophen


  (Tylenol)  650 mg  1X  ONCE


 PO


   3/20/19 13:30


 3/20/19 13:31 DC 3/20/19 13:38





 


 Sodium Chloride  1,000 ml @ 


 1,000 mls/hr  1X  ONCE


 IV


   3/20/19 14:45


 3/20/19 15:44 DC 3/20/19 15:08





 


 Sodium Chloride  1,000 ml @ 


 1,000 mls/hr  1X  ONCE


 IV


   3/20/19 14:45


 3/20/19 15:44 DC 3/20/19 16:00





 


 Acetaminophen


  (Tylenol)  650 mg  PRN Q4HRS  PRN


 PO


 FEVER  3/20/19 14:45


 3/21/19 14:44  3/21/19 07:19





 


 Piperacillin Sod/


 Tazobactam Sod


 2.25 gm/Sodium


 Chloride  50 ml @ 


 100 mls/hr  Q6HRS


 IV


   3/20/19 15:00


    3/21/19 11:30





 


 Sodium Chloride  1,000 ml @ 


 75 mls/hr  [id number]


 IV


   3/20/19 18:00


    3/20/19 23:07





 


 Diclofenac Sodium


  (Voltaren)  1 danyell  QID


 TP


   3/20/19 21:00


    3/21/19 08:32





 


 Levetiracetam


  (Keppra)  500 mg  BID


 PO


   3/20/19 21:00


    3/21/19 08:31





 


 Atenolol


  (Tenormin)  25 mg  DAILY


 PO


   3/21/19 09:00


    3/21/19 08:32





 


 Pantoprazole


 Sodium


  (Protonix)  40 mg  DAILYAC


 PO


   3/21/19 07:30


    3/21/19 07:19





 


 Oseltamivir


 Phosphate


  (Tamiflu)  30 mg  BID


 PO


   3/21/19 09:00


 3/26/19 08:59  3/21/19 10:03














ALLERGIES


ALLERGIES:  


Coded Allergies:  


     No Known Drug Allergies (Unverified , 10/29/16)





ROS


Review of System


14 point ROS conducted with pertinent positives noted above in HPI although 

limited due to dementia





PHYSICAL EXAM


General:  Alert, Cooperative, No acute distress, Other (alert to person and 

place)


HEENT:  Atraumatic


Lungs:  Clear to auscultation


Heart:  Regular rate, Normal S1, Normal S2, Other (2/6 systolic murmur )


Abdomen:  Soft


Extremities:  No edema, Normal pulses


Skin:  No significant lesion


Neuro:  Normal speech, Sensation intact


Psych/Mental Status:  Mental status NL, Mood NL


MUSCULOSKELETAL:  Osteoarthritic changes both hands





VITALS


VITALS





Vital Signs








  Date Time  Temp Pulse Resp B/P (MAP) Pulse Ox O2 Delivery O2 Flow Rate FiO2


 


3/21/19 08:32  90  120/45    


 


3/21/19 08:20 100.5    100 Room Air  





 100.5       


 


3/21/19 08:18   17     











LABS


Lab:





Laboratory Tests








Test


 3/20/19


13:35 3/20/19


15:00 3/20/19


17:12 3/20/19


20:40


 


Sodium Level


 142 mmol/L


(136-145) 


 


 





 


Potassium Level


 4.0 mmol/L


(3.5-5.1) 


 


 





 


Chloride Level


 107 mmol/L


() 


 


 





 


Carbon Dioxide Level


 24 mmol/L


(21-32) 


 


 





 


Anion Gap 11 (6-14)    


 


Blood Urea Nitrogen


 10 mg/dL


(7-20) 


 


 





 


Creatinine


 1.3 mg/dL


(0.6-1.0) 


 


 





 


Estimated GFR


(Cockcroft-Gault) 47.3 


 


 


 





 


BUN/Creatinine Ratio 8 (6-20)    


 


Glucose Level


 185 mg/dL


(70-99) 


 


 





 


Lactic Acid Level


 2.9 mmol/L


(0.4-2.0) 


 


 





 


Calcium Level


 8.4 mg/dL


(8.5-10.1) 


 


 





 


Total Bilirubin


 0.2 mg/dL


(0.2-1.0) 


 


 





 


Aspartate Amino Transf


(AST/SGOT) 10 U/L (15-37) 


 


 


 





 


Alanine Aminotransferase


(ALT/SGPT) 8 U/L (14-59) 


 


 


 





 


Alkaline Phosphatase


 53 U/L


() 


 


 





 


Total Protein


 5.7 g/dL


(6.4-8.2) 


 


 





 


Albumin


 2.3 g/dL


(3.4-5.0) 


 


 





 


Albumin/Globulin Ratio 0.7 (1.0-1.7)    


 


White Blood Count


 


 7.2 x10^3/uL


(4.0-11.0) 


 





 


Red Blood Count


 


 2.70 x10^6/uL


(3.50-5.40) 


 





 


Hemoglobin


 


 7.0 g/dL


(12.0-15.5) 


 





 


Hematocrit


 


 22.4 %


(36.0-47.0) 


 





 


Mean Corpuscular Volume  83 fL ()   


 


Mean Corpuscular Hemoglobin  26 pg (25-35)   


 


Mean Corpuscular Hemoglobin


Concent 


 31 g/dL


(31-37) 


 





 


Red Cell Distribution Width


 


 21.5 %


(11.5-14.5) 


 





 


Platelet Count


 


 265 x10^3/uL


(140-400) 


 





 


Neutrophils (%) (Auto)  79 % (31-73)   


 


Lymphocytes (%) (Auto)  13 % (24-48)   


 


Monocytes (%) (Auto)  8 % (0-9)   


 


Eosinophils (%) (Auto)  0 % (0-3)   


 


Basophils (%) (Auto)  0 % (0-3)   


 


Neutrophils # (Auto)


 


 5.7 x10^3uL


(1.8-7.7) 


 





 


Lymphocytes # (Auto)


 


 1.0 x10^3/uL


(1.0-4.8) 


 





 


Monocytes # (Auto)


 


 0.6 x10^3/uL


(0.0-1.1) 


 





 


Eosinophils # (Auto)


 


 0.0 x10^3/uL


(0.0-0.7) 


 





 


Basophils # (Auto)


 


 0.0 x10^3/uL


(0.0-0.2) 


 





 


Platelet Estimate


 


 Adequate


(ADEQUATE) 


 





 


Giant Platelets  Occ   


 


Polychromasia  Mod   


 


Anisocytosis  Mod   


 


Tear Drop Cells  Few   


 


Ovalocytes  Mod   


 


RBC Morphology Bizarre Forms  Few   


 


Glucose (Fingerstick)


 


 


 126 mg/dL


(70-99) 





 


Urine Collection Type    Unknown 


 


Urine Color    Yellow 


 


Urine Clarity    Clear 


 


Urine pH    6.0 


 


Urine Specific Gravity    1.015 


 


Urine Protein


 


 


 


 Negative mg/dL


(NEG-TRACE)


 


Urine Glucose (UA)


 


 


 


 Negative mg/dL


(NEG)


 


Urine Ketones (Stick)


 


 


 


 Negative mg/dL


(NEG)


 


Urine Blood    Negative (NEG) 


 


Urine Nitrite    Negative (NEG) 


 


Urine Bilirubin    Negative (NEG) 


 


Urine Urobilinogen Dipstick


 


 


 


 0.2 mg/dL (0.2


mg/dL)


 


Urine Leukocyte Esterase    Negative (NEG) 


 


Urine RBC    0 /HPF (0-2) 


 


Urine WBC    0 /HPF (0-4) 


 


Urine Squamous Epithelial


Cells 


 


 


 Few /LPF 





 


Urine Bacteria    0 /HPF (0-FEW) 


 


Urine Mucus    Slight /LPF 


 


Test


 3/20/19


20:44 3/20/19


23:55 3/21/19


07:14 3/21/19


08:55


 


Glucose (Fingerstick)


 90 mg/dL


(70-99) 


 105 mg/dL


(70-99) 





 


Lactic Acid Level


 


 0.7 mmol/L


(0.4-2.0) 


 





 


White Blood Count


 


 


 


 6.1 x10^3/uL


(4.0-11.0)


 


Red Blood Count


 


 


 


 2.73 x10^6/uL


(3.50-5.40)


 


Hemoglobin


 


 


 


 7.1 g/dL


(12.0-15.5)


 


Hematocrit


 


 


 


 22.3 %


(36.0-47.0)


 


Mean Corpuscular Volume    82 fL () 


 


Mean Corpuscular Hemoglobin    26 pg (25-35) 


 


Mean Corpuscular Hemoglobin


Concent 


 


 


 32 g/dL


(31-37)


 


Red Cell Distribution Width


 


 


 


 19.2 %


(11.5-14.5)


 


Platelet Count


 


 


 


 228 x10^3/uL


(140-400)


 


Neutrophils (%) (Auto)    82 % (31-73) 


 


Lymphocytes (%) (Auto)    11 % (24-48) 


 


Monocytes (%) (Auto)    6 % (0-9) 


 


Eosinophils (%) (Auto)    0 % (0-3) 


 


Basophils (%) (Auto)    0 % (0-3) 


 


Neutrophils # (Auto)


 


 


 


 5.0 x10^3uL


(1.8-7.7)


 


Lymphocytes # (Auto)


 


 


 


 0.7 x10^3/uL


(1.0-4.8)


 


Monocytes # (Auto)


 


 


 


 0.4 x10^3/uL


(0.0-1.1)


 


Eosinophils # (Auto)


 


 


 


 0.0 x10^3/uL


(0.0-0.7)


 


Basophils # (Auto)


 


 


 


 0.0 x10^3/uL


(0.0-0.2)


 


Sodium Level


 


 


 


 142 mmol/L


(136-145)


 


Potassium Level


 


 


 


 3.6 mmol/L


(3.5-5.1)


 


Chloride Level


 


 


 


 109 mmol/L


()


 


Carbon Dioxide Level


 


 


 


 24 mmol/L


(21-32)


 


Anion Gap    9 (6-14) 


 


Blood Urea Nitrogen    9 mg/dL (7-20) 


 


Creatinine


 


 


 


 1.1 mg/dL


(0.6-1.0)


 


Estimated GFR


(Cockcroft-Gault) 


 


 


 57.4 





 


Glucose Level


 


 


 


 114 mg/dL


(70-99)


 


Calcium Level


 


 


 


 7.5 mg/dL


(8.5-10.1)


 


Test


 3/21/19


09:07 


 


 





 


Influenza Type A Antigen


 Negative


(NEGATIVE) 


 


 





 


Influenza Type B Antigen


 Negative


(NEGATIVE) 


 


 














ECHOCARDIOGRAM


ECHOCARDIOGRAM


<Conclusion>


The left ventricular systolic function is low normal. Difficult to estimate due 

to PVC's. EF 45-50%


There is moderate concentric left ventricular hypertrophy.





DATE:     10/21/18 1242








<Conclusion>


The left ventricle is normal size.


The left ventricular systolic function is normal.


The ejection fraction is 55-60%.


The interatrial septum is intact with no evidence for an atrial septal defect 

or patent foramen ovale as noted on 2-D or Doppler imaging.


There is no thrombus noted in the left atrial appendage.


There is no significant aortic valvular stenosis.


Doppler and Color Flow revealed mild aortic regurgitation.


Doppler and Color-flow revealed mild mitral regurgitation.


Doppler and Color Flow revealed trace tricuspid regurgitation.


No evidence of a source of embolization.





DATE:     10/31/18 4656





ASSESSMENT/PLAN


ASSESSMENT/PLAN


1.  Weakness


2.  Anemia; hgb 7.1


3.  CAD s/p PCI/stent. Stable, chest pain free. Recent echo showed preserved LV 

systolic function


4.  Hypertension; controlled


5.  Hyperlipidemia; statin


6.  Diabetes, II


7.  EL


8.  Lactic acidosis, fevers, ? acute viral illness. Influenza negative


9.  Dementia 








Recommendations 





Secondary prevention including ASA, BB, statin


Supportive care


Follow up with primary cardiologist upon discharge.











ESTELA MARTIN Mar 21, 2019 11:46

## 2019-03-22 VITALS — DIASTOLIC BLOOD PRESSURE: 40 MMHG | SYSTOLIC BLOOD PRESSURE: 97 MMHG

## 2019-03-22 VITALS — SYSTOLIC BLOOD PRESSURE: 122 MMHG | DIASTOLIC BLOOD PRESSURE: 54 MMHG

## 2019-03-22 VITALS — DIASTOLIC BLOOD PRESSURE: 64 MMHG | SYSTOLIC BLOOD PRESSURE: 129 MMHG

## 2019-03-22 LAB
% LYMPHS: 25 % (ref 24–48)
% MONOS: 8 % (ref 0–10)
% SEGS: 67 % (ref 35–66)
ANISOCYTOSIS BLD QL SMEAR: PRESENT
BASOPHILS # BLD AUTO: 0 X10^3/UL (ref 0–0.2)
BASOPHILS NFR BLD: 0 % (ref 0–3)
EOSINOPHIL NFR BLD: 0 X10^3/UL (ref 0–0.7)
EOSINOPHIL NFR BLD: 1 % (ref 0–3)
ERYTHROCYTE [DISTWIDTH] IN BLOOD BY AUTOMATED COUNT: 19.7 % (ref 11.5–14.5)
HCT VFR BLD CALC: 23.3 % (ref 36–47)
HGB BLD-MCNC: 7.4 G/DL (ref 12–15.5)
LYMPHOCYTES # BLD: 0.6 X10^3/UL (ref 1–4.8)
LYMPHOCYTES NFR BLD AUTO: 13 % (ref 24–48)
MCH RBC QN AUTO: 26 PG (ref 25–35)
MCHC RBC AUTO-ENTMCNC: 32 G/DL (ref 31–37)
MCV RBC AUTO: 82 FL (ref 79–100)
MONO #: 0.3 X10^3/UL (ref 0–1.1)
MONOCYTES NFR BLD: 7 % (ref 0–9)
NEUT #: 3.8 X10^3UL (ref 1.8–7.7)
NEUTROPHILS NFR BLD AUTO: 79 % (ref 31–73)
PLATELET # BLD AUTO: 239 X10^3/UL (ref 140–400)
PLATELET # BLD EST: ADEQUATE 10*3/UL
RBC # BLD AUTO: 2.84 X10^6/UL (ref 3.5–5.4)
WBC # BLD AUTO: 4.8 X10^3/UL (ref 4–11)

## 2019-03-22 RX ADMIN — PIPERACILLIN SODIUM AND TAZOBACTAM SODIUM SCH MLS/HR: 2; .25 INJECTION, POWDER, LYOPHILIZED, FOR SOLUTION INTRAVENOUS at 11:35

## 2019-03-22 RX ADMIN — DICLOFENAC SODIUM SCH APP: 10 GEL TOPICAL at 13:00

## 2019-03-22 RX ADMIN — PIPERACILLIN SODIUM AND TAZOBACTAM SODIUM SCH MLS/HR: 2; .25 INJECTION, POWDER, LYOPHILIZED, FOR SOLUTION INTRAVENOUS at 06:00

## 2019-03-22 RX ADMIN — BACITRACIN SCH MLS/HR: 5000 INJECTION, POWDER, FOR SOLUTION INTRAMUSCULAR at 02:52

## 2019-03-22 RX ADMIN — BACITRACIN SCH MLS/HR: 5000 INJECTION, POWDER, FOR SOLUTION INTRAMUSCULAR at 10:00

## 2019-03-22 RX ADMIN — Medication SCH CAP: at 08:23

## 2019-03-22 RX ADMIN — ASPIRIN 81 MG SCH MG: 81 TABLET ORAL at 08:00

## 2019-03-22 RX ADMIN — ATENOLOL SCH MG: 25 TABLET ORAL at 08:23

## 2019-03-22 RX ADMIN — OSELTAMIVIR PHOSPHATE SCH MG: 30 CAPSULE ORAL at 08:23

## 2019-03-22 RX ADMIN — DICLOFENAC SODIUM SCH APP: 10 GEL TOPICAL at 08:24

## 2019-03-22 RX ADMIN — PANTOPRAZOLE SODIUM SCH MG: 40 TABLET, DELAYED RELEASE ORAL at 07:16

## 2019-03-22 NOTE — SNU/HH DC
DISCHARGE WITH HOME HEALTH


DISCHARGE INFORMATION:


Final Diagnosis:


Problems


Medical Problems:


(1) Sepsis


Status: Acute  








Condition on Discharge:  Stable





CODE STATUS:


Code Status:  Full





HOME HEALTH:


Face to Face:


I certify this patient is under my care and that I, or a nurse practitioner or 

physician's assistant working with me, had a face to face encounter that meets 

the physician face to face encounter requirements with this patient on [].


Medical Complications:  Other (sepsis)


Physical Therapy For:  Evalulation/Treatment


Occupational Therapy For:  Evaluation/Treatment


Home Health Aide For:  Self-care


MSW For:  Community Resources





POST DISCHARGE ORDERS:


Activity Instructions for Disc:  Activity as tolerated


Weight Bearing Status after Di:  As tolerated


DIET AFTER DISCHARGE:  Cardiac





CHECKS AFTER DISCHARGE:


Checks after discharge:  Check blood press - daily, Check blood sugar, ac/hs, 

Check your Temp as needed





FOLLOW-UP:


Follow Up With:  Primary Care Physician in 1-2 weeks.


DC TO SNF LABS:  BMP weekly





TREATMENT/EQUIPMENT ORDERS:


Adaptive Equipment Issued:  None





CERTIFICATION STATEMENT:


Certification Statement:


Certification Statement: Based on the above finding, I certify that this 

patient is confined to the home and needs intermittent skilled nursing care, 

physical therapy and/or speech therapy, or continues to need occupational 

therapy.~ This patient is under my care, and I have initiated the establishment 

of the plan of care.~ This patient will be followed by myself or a community 

physician who will periodically review the plan of care.


Home Meds


Active Scripts


Levetiracetam (KEPPRA) 500 Mg Tablet, 500 MG PO BID for seizure prevention, #60 

TAB 2 Refills


   Prov:AUNG GRIMES MD         1/24/19


Meclizine Hcl (MECLIZINE HCL) 25 Mg Tablet, 1 TAB PO PRN TID PRN for DIZZINESS, 

#30 TAB


   Prov:KINGS ROSAS MD         10/29/16


Reported Medications


Pantoprazole Sodium (PROTONIX) 20 Mg Tablet.dr, 1 TAB PO DAILY for heartburn, #

30 TAB


   3/20/19


Cyanocobalamin/Folic Acid (VITAMIN B12-FOLIC ACID TABLET) 1 Each Tablet, 1 EACH 

PO DAILYBFRSUP for def, TAB


   1/20/19


Cholecalciferol (Vitamin D3) (VITAMIN D3) 1,000 Unit Tablet, 1 TAB PO 

DAILYBFRSUP for n/a, #30 TAB 5 Refills


   1/20/19


Diclofenac Sodium (VOLTAREN) 100 Gm Gel..gram., 1 GM TP PRN QID for knee pain, #

100 GM 2 Refills


   1/20/19


Aspirin (ASPIRIN) 81 Mg Tab.chew, 1 TAB PO DAILY for blood thinner, #30 TAB 3 

Refills


   10/29/16


Simvastatin (SIMVASTATIN) 40 Mg Tablet, 1 TAB PO QHS for elevated cholesterol, #

30 TAB 5 Refills


   10/29/16


Atenolol (ATENOLOL) 25 Mg Tablet, 1 TAB PO DAILY for htn, #30 TAB 5 Refills


   10/29/16











JOSE M PATINO III DO Mar 22, 2019 10:55

## 2019-03-22 NOTE — NUR
Discharge Note:



MARY OBREGON



Discharge instructions and discharge home medications reviewed with Family Member and a copy 
given. All questions have been answered and understanding verbalized. 



The following instructions and handouts were given: discharge instructions, new 
prescription, education and follow up recommendation.



Discontinued lines and drains: Peripheral IV discontinued intact.



Patient discharged to Home w/services with Family Member via Wheelchair off unit by CNA.

## 2019-03-22 NOTE — PDOC
Infectious Disease Note


Subjective


Subjective


awake, feeling good, wants to go home says





ROMANA AVENDANO


no n/v/d/fever





Vital Sign


Vital Signs





Vital Signs








  Date Time  Temp Pulse Resp B/P (MAP) Pulse Ox O2 Delivery O2 Flow Rate FiO2


 


3/22/19 08:25      Room Air  


 


3/22/19 08:23  77  122/54    


 


3/22/19 07:30 98.5  18  97   





 98.5       











Physical Exam


PHYSICAL EXAM


GENERAL:  Awake female, who does not communicate.  At least, she did not


communicate at all to me, not in any distress.


VITAL SIGNS:  Stable 


HEENT:  NAD.


NECK:  Supple, no JVP, no lymphadenopathy.


LUNGS:  Clear.


HEART:  S1, S2 regular.


ABDOMEN:  Benign.


EXTREMITIES:  No edema or cyanosis.


SKIN:  Unremarkable.


NEUROLOGIC:  The patient is neurologically unable to , although she is


alert, awake and moves all the extremities.





Labs


Lab





Laboratory Tests








Test


 3/21/19


11:40 3/21/19


16:20 3/21/19


19:40 3/22/19


07:48


 


Glucose (Fingerstick)


 118 mg/dL


(70-99) 126 mg/dL


(70-99) 82 mg/dL


(70-99) 93 mg/dL


(70-99)


 


Test


 3/22/19


09:15 


 


 





 


White Blood Count


 4.8 x10^3/uL


(4.0-11.0) 


 


 





 


Red Blood Count


 2.84 x10^6/uL


(3.50-5.40) 


 


 





 


Hemoglobin


 7.4 g/dL


(12.0-15.5) 


 


 





 


Hematocrit


 23.3 %


(36.0-47.0) 


 


 





 


Mean Corpuscular Volume 82 fL ()    


 


Mean Corpuscular Hemoglobin 26 pg (25-35)    


 


Mean Corpuscular Hemoglobin


Concent 32 g/dL


(31-37) 


 


 





 


Red Cell Distribution Width


 19.7 %


(11.5-14.5) 


 


 





 


Platelet Count


 239 x10^3/uL


(140-400) 


 


 





 


Neutrophils (%) (Auto) 79 % (31-73)    


 


Lymphocytes (%) (Auto) 13 % (24-48)    


 


Monocytes (%) (Auto) 7 % (0-9)    


 


Eosinophils (%) (Auto) 1 % (0-3)    


 


Basophils (%) (Auto) 0 % (0-3)    


 


Neutrophils # (Auto)


 3.8 x10^3uL


(1.8-7.7) 


 


 





 


Lymphocytes # (Auto)


 0.6 x10^3/uL


(1.0-4.8) 


 


 





 


Monocytes # (Auto)


 0.3 x10^3/uL


(0.0-1.1) 


 


 





 


Eosinophils # (Auto)


 0.0 x10^3/uL


(0.0-0.7) 


 


 





 


Basophils # (Auto)


 0.0 x10^3/uL


(0.0-0.2) 


 


 











Micro





Microbiology


3/20/19 Blood Culture - Preliminary, Resulted


          NO GROWTH AFTER 1 DAY





Objective


Assessment


Fever,,, possible influenza


Weakness


Dementia





Plan


Plan of Care


d/c antibiotics


d/c ok


d/w family


d/w CATRINA Chen MD Mar 22, 2019 09:55

## 2019-03-22 NOTE — DS
DATE OF DISCHARGE:  03/22/2019



ADMISSION DIAGNOSIS:  Sepsis.



DISCHARGE DIAGNOSIS:  Resolving sepsis secondary to urinary tract infection.



HOSPITAL COURSE:  The patient is a pleasant middle-aged female who presented

with sepsis.  She was admitted.  We gave her IV antibiotics and fluids.  Within

48 hours, she has returned to her baseline.  Her white count is decreased to

within normal limits.  Her tachycardia resolved.  There were no more fevers.  We

plan to discharge with close outpatient followup.  The patient was seen and

examined this morning.  Her heart tones were normal.  Her lungs were clear.



DISPOSITION:  Home.



ACTIVITY:  As tolerated.



DIET:  Low sodium.



MEDICATIONS:  Please see the MRAD.



TOTAL TIME:  34 minutes.

 



______________________________

JOSE M PATINO DO



DR:  DELMAR/prashant  JOB#:  [id number] / [id number]

DD:  03/22/2019 11:17  DT:  03/22/2019 12:05

## 2019-03-22 NOTE — NUR
ALICIA following pt. Pt discharging home with home health and had [facility] in the past. 
Ya from [facility] notified. AIME FUENTES.

## 2019-03-22 NOTE — PDOC
PROGRESS NOTES


Chief Complaint


Chief Complaint


Meets SIRS criteria, tachycardic, febrile - improving


Shortness of breath - resolved


Recent fall 


Low hemoglobin (7.0 on 3/20)


Elevated lactic acid improving





History of Present Illness


History of Present Illness


Ms. Deal presented with fever, tachycardic, admitted for meeting SIRS. 

Patient was also weak and fell recently. Patient seen and examined at bedside. 

She is resting comfortable and eager to go home.She has no new complaints. 

Discussed case with ID, okay to discharge. Patient able to get out of bed, 

tirado removed.





Vitals


Vitals





Vital Signs








  Date Time  Temp Pulse Resp B/P (MAP) Pulse Ox O2 Delivery O2 Flow Rate FiO2


 


3/22/19 11:00 97.9 76 18 129/64 (85) 97 Room Air  





 97.9       











Physical Exam


General:  Alert, Oriented X3, Cooperative, No acute distress


Heart:  Regular rate, Normal S1, Normal S2, Other (2/6 systolic murmur )


Lungs:  Clear


Abdomen:  Normal bowel sounds, Soft, No tenderness


Extremities:  No clubbing, No cyanosis, No edema, Normal pulses


Skin:  No rashes, No significant lesion





Labs


LABS





Laboratory Tests








Test


 3/21/19


11:40 3/21/19


16:20 3/21/19


19:40 3/22/19


07:48


 


Glucose (Fingerstick)


 118 mg/dL


(70-99) 126 mg/dL


(70-99) 82 mg/dL


(70-99) 93 mg/dL


(70-99)


 


Test


 3/22/19


09:15 


 


 





 


White Blood Count


 4.8 x10^3/uL


(4.0-11.0) 


 


 





 


Red Blood Count


 2.84 x10^6/uL


(3.50-5.40) 


 


 





 


Hemoglobin


 7.4 g/dL


(12.0-15.5) 


 


 





 


Hematocrit


 23.3 %


(36.0-47.0) 


 


 





 


Mean Corpuscular Volume 82 fL ()    


 


Mean Corpuscular Hemoglobin 26 pg (25-35)    


 


Mean Corpuscular Hemoglobin


Concent 32 g/dL


(31-37) 


 


 





 


Red Cell Distribution Width


 19.7 %


(11.5-14.5) 


 


 





 


Platelet Count


 239 x10^3/uL


(140-400) 


 


 





 


Neutrophils (%) (Auto) 79 % (31-73)    


 


Lymphocytes (%) (Auto) 13 % (24-48)    


 


Monocytes (%) (Auto) 7 % (0-9)    


 


Eosinophils (%) (Auto) 1 % (0-3)    


 


Basophils (%) (Auto) 0 % (0-3)    


 


Neutrophils # (Auto)


 3.8 x10^3uL


(1.8-7.7) 


 


 





 


Lymphocytes # (Auto)


 0.6 x10^3/uL


(1.0-4.8) 


 


 





 


Monocytes # (Auto)


 0.3 x10^3/uL


(0.0-1.1) 


 


 





 


Eosinophils # (Auto)


 0.0 x10^3/uL


(0.0-0.7) 


 


 





 


Basophils # (Auto)


 0.0 x10^3/uL


(0.0-0.2) 


 


 














Review of Systems


Review of Systems


Denies fevers/chills


Denies shortness of breath


Denies chest pain





Assessment and Plan


Assessmemt and Plan


Problems


Medical Problems:


(1) Sepsis


Status: Acute  





Assessment:


Meets SIRS criteria, tachycardic, febrile - improving


Shortness of breath - resolved


Recent fall 


Low hemoglobin (7.4 on 3/22) s/p transfusion


Elevated lactic acid improving





Plan:


D/C zosyn, okay to discharge per ID, prescribed augmentin


Follow up with PCP and Cards outpatient


Monitor labs


Return to ED with worsening symptoms.


Discharge home with home health today





Comment


Review of Relevant


I have reviewed the following items marky (where applicable) has been applied.


Labs





Laboratory Tests








Test


 3/20/19


13:35 3/20/19


15:00 3/20/19


17:12 3/20/19


20:40


 


Sodium Level


 142 mmol/L


(136-145) 


 


 





 


Potassium Level


 4.0 mmol/L


(3.5-5.1) 


 


 





 


Chloride Level


 107 mmol/L


() 


 


 





 


Carbon Dioxide Level


 24 mmol/L


(21-32) 


 


 





 


Anion Gap 11 (6-14)    


 


Blood Urea Nitrogen


 10 mg/dL


(7-20) 


 


 





 


Creatinine


 1.3 mg/dL


(0.6-1.0) 


 


 





 


Estimated GFR


(Cockcroft-Gault) 47.3 


 


 


 





 


BUN/Creatinine Ratio 8 (6-20)    


 


Glucose Level


 185 mg/dL


(70-99) 


 


 





 


Lactic Acid Level


 2.9 mmol/L


(0.4-2.0) 


 


 





 


Calcium Level


 8.4 mg/dL


(8.5-10.1) 


 


 





 


Total Bilirubin


 0.2 mg/dL


(0.2-1.0) 


 


 





 


Aspartate Amino Transf


(AST/SGOT) 10 U/L (15-37) 


 


 


 





 


Alanine Aminotransferase


(ALT/SGPT) 8 U/L (14-59) 


 


 


 





 


Alkaline Phosphatase


 53 U/L


() 


 


 





 


Total Protein


 5.7 g/dL


(6.4-8.2) 


 


 





 


Albumin


 2.3 g/dL


(3.4-5.0) 


 


 





 


Albumin/Globulin Ratio 0.7 (1.0-1.7)    


 


White Blood Count


 


 7.2 x10^3/uL


(4.0-11.0) 


 





 


Red Blood Count


 


 2.70 x10^6/uL


(3.50-5.40) 


 





 


Hemoglobin


 


 7.0 g/dL


(12.0-15.5) 


 





 


Hematocrit


 


 22.4 %


(36.0-47.0) 


 





 


Mean Corpuscular Volume  83 fL ()   


 


Mean Corpuscular Hemoglobin  26 pg (25-35)   


 


Mean Corpuscular Hemoglobin


Concent 


 31 g/dL


(31-37) 


 





 


Red Cell Distribution Width


 


 21.5 %


(11.5-14.5) 


 





 


Platelet Count


 


 265 x10^3/uL


(140-400) 


 





 


Neutrophils (%) (Auto)  79 % (31-73)   


 


Lymphocytes (%) (Auto)  13 % (24-48)   


 


Monocytes (%) (Auto)  8 % (0-9)   


 


Eosinophils (%) (Auto)  0 % (0-3)   


 


Basophils (%) (Auto)  0 % (0-3)   


 


Neutrophils # (Auto)


 


 5.7 x10^3uL


(1.8-7.7) 


 





 


Lymphocytes # (Auto)


 


 1.0 x10^3/uL


(1.0-4.8) 


 





 


Monocytes # (Auto)


 


 0.6 x10^3/uL


(0.0-1.1) 


 





 


Eosinophils # (Auto)


 


 0.0 x10^3/uL


(0.0-0.7) 


 





 


Basophils # (Auto)


 


 0.0 x10^3/uL


(0.0-0.2) 


 





 


Platelet Estimate


 


 Adequate


(ADEQUATE) 


 





 


Giant Platelets  Occ   


 


Polychromasia  Mod   


 


Anisocytosis  Mod   


 


Tear Drop Cells  Few   


 


Ovalocytes  Mod   


 


RBC Morphology Bizarre Forms  Few   


 


Glucose (Fingerstick)


 


 


 126 mg/dL


(70-99) 





 


Urine Collection Type    Unknown 


 


Urine Color    Yellow 


 


Urine Clarity    Clear 


 


Urine pH    6.0 


 


Urine Specific Gravity    1.015 


 


Urine Protein


 


 


 


 Negative mg/dL


(NEG-TRACE)


 


Urine Glucose (UA)


 


 


 


 Negative mg/dL


(NEG)


 


Urine Ketones (Stick)


 


 


 


 Negative mg/dL


(NEG)


 


Urine Blood    Negative (NEG) 


 


Urine Nitrite    Negative (NEG) 


 


Urine Bilirubin    Negative (NEG) 


 


Urine Urobilinogen Dipstick


 


 


 


 0.2 mg/dL (0.2


mg/dL)


 


Urine Leukocyte Esterase    Negative (NEG) 


 


Urine RBC    0 /HPF (0-2) 


 


Urine WBC    0 /HPF (0-4) 


 


Urine Squamous Epithelial


Cells 


 


 


 Few /LPF 





 


Urine Bacteria    0 /HPF (0-FEW) 


 


Urine Mucus    Slight /LPF 


 


Test


 3/20/19


20:44 3/20/19


23:55 3/21/19


07:14 3/21/19


08:55


 


Glucose (Fingerstick)


 90 mg/dL


(70-99) 


 105 mg/dL


(70-99) 





 


Lactic Acid Level


 


 0.7 mmol/L


(0.4-2.0) 


 





 


White Blood Count


 


 


 


 6.1 x10^3/uL


(4.0-11.0)


 


Red Blood Count


 


 


 


 2.73 x10^6/uL


(3.50-5.40)


 


Hemoglobin


 


 


 


 7.1 g/dL


(12.0-15.5)


 


Hematocrit


 


 


 


 22.3 %


(36.0-47.0)


 


Mean Corpuscular Volume    82 fL () 


 


Mean Corpuscular Hemoglobin    26 pg (25-35) 


 


Mean Corpuscular Hemoglobin


Concent 


 


 


 32 g/dL


(31-37)


 


Red Cell Distribution Width


 


 


 


 19.2 %


(11.5-14.5)


 


Platelet Count


 


 


 


 228 x10^3/uL


(140-400)


 


Neutrophils (%) (Auto)    82 % (31-73) 


 


Lymphocytes (%) (Auto)    11 % (24-48) 


 


Monocytes (%) (Auto)    6 % (0-9) 


 


Eosinophils (%) (Auto)    0 % (0-3) 


 


Basophils (%) (Auto)    0 % (0-3) 


 


Neutrophils # (Auto)


 


 


 


 5.0 x10^3uL


(1.8-7.7)


 


Lymphocytes # (Auto)


 


 


 


 0.7 x10^3/uL


(1.0-4.8)


 


Monocytes # (Auto)


 


 


 


 0.4 x10^3/uL


(0.0-1.1)


 


Eosinophils # (Auto)


 


 


 


 0.0 x10^3/uL


(0.0-0.7)


 


Basophils # (Auto)


 


 


 


 0.0 x10^3/uL


(0.0-0.2)


 


Sodium Level


 


 


 


 142 mmol/L


(136-145)


 


Potassium Level


 


 


 


 3.6 mmol/L


(3.5-5.1)


 


Chloride Level


 


 


 


 109 mmol/L


()


 


Carbon Dioxide Level


 


 


 


 24 mmol/L


(21-32)


 


Anion Gap    9 (6-14) 


 


Blood Urea Nitrogen    9 mg/dL (7-20) 


 


Creatinine


 


 


 


 1.1 mg/dL


(0.6-1.0)


 


Estimated GFR


(Cockcroft-Gault) 


 


 


 57.4 





 


Glucose Level


 


 


 


 114 mg/dL


(70-99)


 


Calcium Level


 


 


 


 7.5 mg/dL


(8.5-10.1)


 


Triglycerides Level


 


 


 


 78 mg/dL


(0-150)


 


Cholesterol Level


 


 


 


 159 mg/dL


(0-200)


 


LDL Cholesterol, Calculated


 


 


 


 91 mg/dL


(0-100)


 


VLDL Cholesterol, Calculated


 


 


 


 16 mg/dL


(0-40)


 


Non-HDL Cholesterol Calculated


 


 


 


 107 mg/dL


(0-129)


 


HDL Cholesterol


 


 


 


 52 mg/dL


(40-60)


 


Cholesterol/HDL Ratio    3.1 


 


Test


 3/21/19


09:07 3/21/19


11:40 3/21/19


16:20 3/21/19


19:40


 


Influenza Type A Antigen


 Negative


(NEGATIVE) 


 


 





 


Influenza Type B Antigen


 Negative


(NEGATIVE) 


 


 





 


Glucose (Fingerstick)


 


 118 mg/dL


(70-99) 126 mg/dL


(70-99) 82 mg/dL


(70-99)


 


Test


 3/22/19


07:48 3/22/19


09:15 


 





 


Glucose (Fingerstick)


 93 mg/dL


(70-99) 


 


 





 


White Blood Count


 


 4.8 x10^3/uL


(4.0-11.0) 


 





 


Red Blood Count


 


 2.84 x10^6/uL


(3.50-5.40) 


 





 


Hemoglobin


 


 7.4 g/dL


(12.0-15.5) 


 





 


Hematocrit


 


 23.3 %


(36.0-47.0) 


 





 


Mean Corpuscular Volume  82 fL ()   


 


Mean Corpuscular Hemoglobin  26 pg (25-35)   


 


Mean Corpuscular Hemoglobin


Concent 


 32 g/dL


(31-37) 


 





 


Red Cell Distribution Width


 


 19.7 %


(11.5-14.5) 


 





 


Platelet Count


 


 239 x10^3/uL


(140-400) 


 





 


Neutrophils (%) (Auto)  79 % (31-73)   


 


Lymphocytes (%) (Auto)  13 % (24-48)   


 


Monocytes (%) (Auto)  7 % (0-9)   


 


Eosinophils (%) (Auto)  1 % (0-3)   


 


Basophils (%) (Auto)  0 % (0-3)   


 


Neutrophils # (Auto)


 


 3.8 x10^3uL


(1.8-7.7) 


 





 


Lymphocytes # (Auto)


 


 0.6 x10^3/uL


(1.0-4.8) 


 





 


Monocytes # (Auto)


 


 0.3 x10^3/uL


(0.0-1.1) 


 





 


Eosinophils # (Auto)


 


 0.0 x10^3/uL


(0.0-0.7) 


 





 


Basophils # (Auto)


 


 0.0 x10^3/uL


(0.0-0.2) 


 











Laboratory Tests








Test


 3/21/19


11:40 3/21/19


16:20 3/21/19


19:40 3/22/19


07:48


 


Glucose (Fingerstick)


 118 mg/dL


(70-99) 126 mg/dL


(70-99) 82 mg/dL


(70-99) 93 mg/dL


(70-99)


 


Test


 3/22/19


09:15 


 


 





 


White Blood Count


 4.8 x10^3/uL


(4.0-11.0) 


 


 





 


Red Blood Count


 2.84 x10^6/uL


(3.50-5.40) 


 


 





 


Hemoglobin


 7.4 g/dL


(12.0-15.5) 


 


 





 


Hematocrit


 23.3 %


(36.0-47.0) 


 


 





 


Mean Corpuscular Volume 82 fL ()    


 


Mean Corpuscular Hemoglobin 26 pg (25-35)    


 


Mean Corpuscular Hemoglobin


Concent 32 g/dL


(31-37) 


 


 





 


Red Cell Distribution Width


 19.7 %


(11.5-14.5) 


 


 





 


Platelet Count


 239 x10^3/uL


(140-400) 


 


 





 


Neutrophils (%) (Auto) 79 % (31-73)    


 


Lymphocytes (%) (Auto) 13 % (24-48)    


 


Monocytes (%) (Auto) 7 % (0-9)    


 


Eosinophils (%) (Auto) 1 % (0-3)    


 


Basophils (%) (Auto) 0 % (0-3)    


 


Neutrophils # (Auto)


 3.8 x10^3uL


(1.8-7.7) 


 


 





 


Lymphocytes # (Auto)


 0.6 x10^3/uL


(1.0-4.8) 


 


 





 


Monocytes # (Auto)


 0.3 x10^3/uL


(0.0-1.1) 


 


 





 


Eosinophils # (Auto)


 0.0 x10^3/uL


(0.0-0.7) 


 


 





 


Basophils # (Auto)


 0.0 x10^3/uL


(0.0-0.2) 


 


 











Microbiology


3/20/19 Blood Culture - Preliminary, Resulted


          NO GROWTH AFTER 1 DAY


Medications





Current Medications


Acetaminophen (Tylenol) 650 mg 1X  ONCE PO  Last administered on 3/20/19at 13:38

;  Start 3/20/19 at 13:30;  Stop 3/20/19 at 13:31;  Status DC


Sodium Chloride 1,000 ml @  1,000 mls/hr 1X  ONCE IV  Last administered on 3/20/

19at 15:08;  Start 3/20/19 at 14:45;  Stop 3/20/19 at 15:44;  Status DC


Sodium Chloride 1,000 ml @  1,000 mls/hr 1X  ONCE IV  Last administered on 3/20/

19at 16:00;  Start 3/20/19 at 14:45;  Stop 3/20/19 at 15:44;  Status DC


Piperacillin Sod/ Tazobactam Sod (Zosyn Per Pharmacy) 1 each PRN DAILY  PRN [facility] 

SEE COMMENTS;  Start 3/20/19 at 14:45


Ondansetron HCl (Zofran) 4 mg PRN Q8HRS  PRN IV NAUSEA/VOMITING;  Start 3/20/19 

at 14:45;  Stop 3/21/19 at 14:44;  Status DC


Acetaminophen (Tylenol) 650 mg PRN Q4HRS  PRN PO FEVER Last administered on 3/21

/19at 07:19;  Start 3/20/19 at 14:45;  Stop 3/21/19 at 14:44;  Status DC


Piperacillin Sod/ Tazobactam Sod 2.25 gm/Sodium Chloride 50 ml @  100 mls/hr 

Q6HRS IV  Last administered on 3/22/19at 06:00;  Start 3/20/19 at 15:00


Sodium Chloride 1,000 ml @  75 mls/hr [id number] IV  Last administered on 3/22/

19at 02:52;  Start 3/20/19 at 18:00


Aspirin (Children'S Aspirin) 81 mg DAILY08 PO ;  Start 3/21/19 at 08:00


Vitamin D (Vitamin D3) 1,000 unit DAILYBFRSUP PO  Last administered on 3/21/

19at 17:31;  Start 3/21/19 at 17:00


Diclofenac Sodium (Voltaren) 1 danyell QID TP  Last administered on 3/22/19at 08:24

;  Start 3/20/19 at 21:00


Levetiracetam (Keppra) 500 mg BID PO  Last administered on 3/22/19at 08:23;  

Start 3/20/19 at 21:00


Atenolol (Tenormin) 25 mg DAILY PO  Last administered on 3/22/19at 08:23;  

Start 3/21/19 at 09:00


Vitamin B Complex (Arjun B) 1 tab DAILYBFRSUP PO  Last administered on 3/21/19at 

17:31;  Start 3/21/19 at 17:00


Meclizine HCl (Antivert) 25 mg PRN Q8HRS  PRN PO DIZZINESS;  Start 3/20/19 at 21

:15


Pantoprazole Sodium (Protonix) 40 mg DAILYAC PO  Last administered on 3/22/19at 

07:16;  Start 3/21/19 at 07:30


Simvastatin (Zocor) 40 mg QHS PO  Last administered on 3/21/19at 20:51;  Start 3

/21/19 at 21:00


Oseltamivir Phosphate (Tamiflu) 30 mg BID PO  Last administered on 3/22/19at 08:

23;  Start 3/21/19 at 09:00;  Stop 3/26/19 at 08:59


Lactobacillus Rhamnosus (Culturelle) 1 cap BID PO  Last administered on 3/22/

19at 08:23;  Start 3/21/19 at 21:00


Acetaminophen (Tylenol) 650 mg PRN Q4HRS  PRN PO MILD PAIN / TEMP Last 

administered on 3/21/19at 20:52;  Start 3/21/19 at 20:45





Active Scripts


Active


Keppra (Levetiracetam) 500 Mg Tablet 500 Mg PO BID


Meclizine Hcl 25 Mg Tablet 1 Tab PO PRN TID PRN


Reported


Protonix (Pantoprazole Sodium) 20 Mg Tablet.dr 1 Tab PO DAILY


Vitamin B12-Folic Acid Tablet (Cyanocobalamin/Folic Acid) 1 Each Tablet 1 Each 

PO DAILYBFRSUP


Vitamin D3 (Cholecalciferol (Vitamin D3)) 1,000 Unit Tablet 1 Tab PO DAILYBFRSUP


Voltaren (Diclofenac Sodium) 100 Gm Gel..gram. 1 Gm TP PRN QID


Aspirin 81 Mg Tab.chew 1 Tab PO DAILY


Simvastatin 40 Mg Tablet 1 Tab PO QHS


Atenolol 25 Mg Tablet 1 Tab PO DAILY


Vitals/I & O





Vital Sign - Last 24 Hours








 3/21/19 3/21/19 3/21/19 3/21/19





 15:00 19:00 20:00 23:00


 


Temp 97.4 99.5  98.7





 97.4 99.5  98.7


 


Pulse 73 85  90


 


Resp 17 17  17


 


B/P (MAP) 141/68 (92) 125/59 (81)  116/58 (77)


 


Pulse Ox 95 96  96


 


O2 Delivery Room Air Room Air Room Air Room Air


 


    





    





 3/22/19 3/22/19 3/22/19 3/22/19





 03:00 07:30 08:23 08:25


 


Temp 98.0 98.5  





 98.0 98.5  


 


Pulse 75 77 77 


 


Resp 17 18  


 


B/P (MAP) 97/40 (59) 122/54 (76) 122/54 


 


Pulse Ox 94 97  


 


O2 Delivery Room Air Room Air  Room Air


 


    





    





 3/22/19   





 11:00   


 


Temp 97.9   





 97.9   


 


Pulse 76   


 


Resp 18   


 


B/P (MAP) 129/64 (85)   


 


Pulse Ox 97   


 


O2 Delivery Room Air   














Intake and Output   


 


 3/21/19 3/21/19 3/22/19





 15:00 23:00 07:00


 


Intake Total 1400 ml 280 ml 0 ml


 


Output Total 1575 ml 300 ml 


 


Balance -175 ml -20 ml 0 ml

















JOSE M PATINO III DO Mar 22, 2019 11:19

## 2019-03-31 ENCOUNTER — HOSPITAL ENCOUNTER (INPATIENT)
Dept: HOSPITAL 61 - ER | Age: 84
LOS: 3 days | Discharge: HOME HEALTH | DRG: 280 | End: 2019-04-03
Attending: INTERNAL MEDICINE | Admitting: INTERNAL MEDICINE
Payer: MEDICARE

## 2019-03-31 VITALS — SYSTOLIC BLOOD PRESSURE: 152 MMHG | DIASTOLIC BLOOD PRESSURE: 78 MMHG

## 2019-03-31 VITALS — BODY MASS INDEX: 25.91 KG/M2 | HEIGHT: 63 IN | WEIGHT: 146.25 LBS

## 2019-03-31 DIAGNOSIS — Z82.49: ICD-10-CM

## 2019-03-31 DIAGNOSIS — I50.32: ICD-10-CM

## 2019-03-31 DIAGNOSIS — E43: ICD-10-CM

## 2019-03-31 DIAGNOSIS — E78.00: ICD-10-CM

## 2019-03-31 DIAGNOSIS — Z86.73: ICD-10-CM

## 2019-03-31 DIAGNOSIS — G93.40: ICD-10-CM

## 2019-03-31 DIAGNOSIS — Z86.711: ICD-10-CM

## 2019-03-31 DIAGNOSIS — E11.9: ICD-10-CM

## 2019-03-31 DIAGNOSIS — F03.90: ICD-10-CM

## 2019-03-31 DIAGNOSIS — Z72.820: ICD-10-CM

## 2019-03-31 DIAGNOSIS — I25.10: ICD-10-CM

## 2019-03-31 DIAGNOSIS — G40.209: ICD-10-CM

## 2019-03-31 DIAGNOSIS — E78.5: ICD-10-CM

## 2019-03-31 DIAGNOSIS — I67.7: ICD-10-CM

## 2019-03-31 DIAGNOSIS — D64.9: ICD-10-CM

## 2019-03-31 DIAGNOSIS — I21.4: Primary | ICD-10-CM

## 2019-03-31 DIAGNOSIS — I68.0: ICD-10-CM

## 2019-03-31 DIAGNOSIS — Z90.710: ICD-10-CM

## 2019-03-31 DIAGNOSIS — K21.9: ICD-10-CM

## 2019-03-31 DIAGNOSIS — E85.4: ICD-10-CM

## 2019-03-31 DIAGNOSIS — M19.90: ICD-10-CM

## 2019-03-31 DIAGNOSIS — I08.1: ICD-10-CM

## 2019-03-31 DIAGNOSIS — Z95.5: ICD-10-CM

## 2019-03-31 DIAGNOSIS — Z51.5: ICD-10-CM

## 2019-03-31 DIAGNOSIS — I11.0: ICD-10-CM

## 2019-03-31 LAB
% BANDS: 9 % (ref 0–9)
% LYMPHS: 8 % (ref 24–48)
% MONOS: 4 % (ref 0–10)
% MYELOS: 2 % (ref 0–0)
% SEGS: 76 % (ref 35–66)
ALBUMIN SERPL-MCNC: 2.2 G/DL (ref 3.4–5)
ALBUMIN/GLOB SERPL: 0.7 {RATIO} (ref 1–1.7)
ALP SERPL-CCNC: 67 U/L (ref 46–116)
ALT SERPL-CCNC: 6 U/L (ref 14–59)
ANION GAP SERPL CALC-SCNC: 7 MMOL/L (ref 6–14)
ANISOCYTOSIS BLD QL SMEAR: (no result)
APTT BLD: 31 SEC (ref 24–38)
APTT PPP: YELLOW S
AST SERPL-CCNC: 16 U/L (ref 15–37)
BACTERIA #/AREA URNS HPF: 0 /HPF
BASOPHILS # BLD AUTO: 0 X10^3/UL (ref 0–0.2)
BASOPHILS NFR BLD AUTO: 1 % (ref 0–3)
BASOPHILS NFR BLD: 0 % (ref 0–3)
BILIRUB SERPL-MCNC: 0.3 MG/DL (ref 0.2–1)
BILIRUB UR QL STRIP: NEGATIVE
BIZZARE CELLS: (no result)
BUN SERPL-MCNC: 8 MG/DL (ref 7–20)
BUN/CREAT SERPL: 9 (ref 6–20)
CALCIUM SERPL-MCNC: 8.1 MG/DL (ref 8.5–10.1)
CHLORIDE SERPL-SCNC: 106 MMOL/L (ref 98–107)
CK SERPL-CCNC: 26 U/L (ref 26–192)
CO2 SERPL-SCNC: 28 MMOL/L (ref 21–32)
CREAT SERPL-MCNC: 0.9 MG/DL (ref 0.6–1)
DACRYOCYTES BLD QL SMEAR: (no result)
EOSINOPHIL NFR BLD: 0.1 X10^3/UL (ref 0–0.7)
EOSINOPHIL NFR BLD: 1 % (ref 0–3)
ERYTHROCYTE [DISTWIDTH] IN BLOOD BY AUTOMATED COUNT: 21 % (ref 11.5–14.5)
FIBRINOGEN PPP-MCNC: CLEAR MG/DL
GFR SERPLBLD BASED ON 1.73 SQ M-ARVRAT: 72.4 ML/MIN
GLOBULIN SER-MCNC: 3 G/DL (ref 2.2–3.8)
GLUCOSE SERPL-MCNC: 115 MG/DL (ref 70–99)
HCT VFR BLD CALC: 26.3 % (ref 36–47)
HGB BLD-MCNC: 8.1 G/DL (ref 12–15.5)
HYALINE CASTS #/AREA URNS LPF: (no result) /HPF
INFLUENZA A PATIENT: NEGATIVE
INFLUENZA B PATIENT: NEGATIVE
LIPASE: 67 U/L (ref 73–393)
LYMPHOCYTES # BLD: 1 X10^3/UL (ref 1–4.8)
LYMPHOCYTES NFR BLD AUTO: 12 % (ref 24–48)
MCH RBC QN AUTO: 25 PG (ref 25–35)
MCHC RBC AUTO-ENTMCNC: 31 G/DL (ref 31–37)
MCV RBC AUTO: 80 FL (ref 79–100)
MONO #: 0.7 X10^3/UL (ref 0–1.1)
MONOCYTES NFR BLD: 8 % (ref 0–9)
NEUT #: 6.9 X10^3UL (ref 1.8–7.7)
NEUTROPHILS NFR BLD AUTO: 80 % (ref 31–73)
NITRITE UR QL STRIP: NEGATIVE
NRBC # BLD MANUAL: 2 10*3/UL
OVALOCYTES BLD QL SMEAR: PRESENT
PH UR STRIP: 6 [PH]
PLATELET # BLD AUTO: 344 X10^3/UL (ref 140–400)
PLATELET # BLD EST: ADEQUATE 10*3/UL
POIKILOCYTOSIS BLD QL SMEAR: (no result)
POLYCHROMASIA BLD QL SMEAR: SLIGHT
POTASSIUM SERPL-SCNC: 3.8 MMOL/L (ref 3.5–5.1)
PROT SERPL-MCNC: 5.2 G/DL (ref 6.4–8.2)
PROT UR STRIP-MCNC: 30 MG/DL
PROTHROMBIN TIME: 13.4 SEC (ref 11.7–14)
RBC # BLD AUTO: 3.3 X10^6/UL (ref 3.5–5.4)
RBC #/AREA URNS HPF: (no result) /HPF (ref 0–2)
SCHISTOCYTES BLD QL SMEAR: (no result)
SODIUM SERPL-SCNC: 141 MMOL/L (ref 136–145)
SQUAMOUS #/AREA URNS LPF: (no result) /LPF
UROBILINOGEN UR-MCNC: 1 MG/DL
WBC # BLD AUTO: 8.6 X10^3/UL (ref 4–11)
WBC #/AREA URNS HPF: (no result) /HPF (ref 0–4)

## 2019-03-31 PROCEDURE — 85730 THROMBOPLASTIN TIME PARTIAL: CPT

## 2019-03-31 PROCEDURE — 96374 THER/PROPH/DIAG INJ IV PUSH: CPT

## 2019-03-31 PROCEDURE — 85007 BL SMEAR W/DIFF WBC COUNT: CPT

## 2019-03-31 PROCEDURE — 87040 BLOOD CULTURE FOR BACTERIA: CPT

## 2019-03-31 PROCEDURE — 70450 CT HEAD/BRAIN W/O DYE: CPT

## 2019-03-31 PROCEDURE — 36415 COLL VENOUS BLD VENIPUNCTURE: CPT

## 2019-03-31 PROCEDURE — 94640 AIRWAY INHALATION TREATMENT: CPT

## 2019-03-31 PROCEDURE — 80048 BASIC METABOLIC PNL TOTAL CA: CPT

## 2019-03-31 PROCEDURE — 83880 ASSAY OF NATRIURETIC PEPTIDE: CPT

## 2019-03-31 PROCEDURE — 93306 TTE W/DOPPLER COMPLETE: CPT

## 2019-03-31 PROCEDURE — 80177 DRUG SCRN QUAN LEVETIRACETAM: CPT

## 2019-03-31 PROCEDURE — 80053 COMPREHEN METABOLIC PANEL: CPT

## 2019-03-31 PROCEDURE — 93005 ELECTROCARDIOGRAM TRACING: CPT

## 2019-03-31 PROCEDURE — 83735 ASSAY OF MAGNESIUM: CPT

## 2019-03-31 PROCEDURE — 82553 CREATINE MB FRACTION: CPT

## 2019-03-31 PROCEDURE — 80061 LIPID PANEL: CPT

## 2019-03-31 PROCEDURE — 82962 GLUCOSE BLOOD TEST: CPT

## 2019-03-31 PROCEDURE — 87804 INFLUENZA ASSAY W/OPTIC: CPT

## 2019-03-31 PROCEDURE — 81001 URINALYSIS AUTO W/SCOPE: CPT

## 2019-03-31 PROCEDURE — 71045 X-RAY EXAM CHEST 1 VIEW: CPT

## 2019-03-31 PROCEDURE — 85610 PROTHROMBIN TIME: CPT

## 2019-03-31 PROCEDURE — 85027 COMPLETE CBC AUTOMATED: CPT

## 2019-03-31 PROCEDURE — 83605 ASSAY OF LACTIC ACID: CPT

## 2019-03-31 PROCEDURE — 84484 ASSAY OF TROPONIN QUANT: CPT

## 2019-03-31 PROCEDURE — 83690 ASSAY OF LIPASE: CPT

## 2019-03-31 PROCEDURE — 85025 COMPLETE CBC W/AUTO DIFF WBC: CPT

## 2019-03-31 NOTE — RAD
Examination: PORTABLE CHEST 1V

 

History: CHEST PAIN SOA

 

Comparison/Correlation: 3/20/2019 portable chest x-ray exam

 

Findings: Portable semiupright frontal view chest was obtained. Heart size

is mildly enlarged. Large hiatal hernia noted. No infiltrate or definite 

pleural effusion. No pneumothorax.

 

Left glenohumeral joint degenerative remodeling and spurring is notable. 

Right glenohumeral joint narrowing is present with spurring is a lesser 

extent.

 

Impression:

Cardiomegaly.

 

Hiatal hernia.

 

Electronically signed by: North Padilla MD (3/31/2019 8:10 PM) [facility]

## 2019-03-31 NOTE — RAD
CT head without contrast dated 3/31/2019.

 

Comparison made to 3/20/2019.

 

CLINICAL INDICATION: Confusion.

 

TECHNIQUE:

 

Contiguous axial imaging the head was performed from skull base to vertex.

No contrast administered. 

One or more of the following individualized dose reduction techniques were

utilized for this examination:  

1. Automated exposure control  

2. Adjustment of the mA and/or kV according to patient size  

3. Use of iterative reconstruction technique.

 

FINDINGS:

 

Ventricles and sulci are mildly prominent for age. No midline shift or 

mass effect. Mild to moderate patchy low density in the deep/subcortical 

periventricular white matter. No hemorrhage or extra-axial collection. 

Posterior fossa and brainstem unremarkable.

 

Mild to moderate mucosal thickening of the maxillary sinuses and ethmoid 

air cells. Mastoid air cells are clear. No apparent calvarial abnormality.

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

 

IMPRESSION:

1. No evidence of acute intracranial hemorrhage or mass.

2. Mild to moderate chronic small vessel ischemic changes and atrophy.

2. Mild sinus disease.

 

Electronically signed by: Aguila Petersen MD (3/31/2019 7:57 PM) 

[facility]

## 2019-03-31 NOTE — NUR
Patient brought up to room accompanied by daughter, Ann Marie. Patient has spontaneous movement 
and speech but not able to respond on command. NIH assessment completed at bedside. 
Patient's daughter stated that patient has been having right sided weakness for the past 
week. Has not been able to use her right arm during meals and has been using her left arm 
instead. This evening, she noticed patient has facial drooping and slurred speech and 
decided to bring her in to be seen for a consistent cough. Dr. Dao paged and notified, 
neurology consulted. Will continue to monitor.

## 2019-03-31 NOTE — PHYS DOC
Past Medical History


Past Medical History:  Arthritis, Dementia, Diabetes-Type II, High Cholesterol, 

Heart Disease, Hypertension, MI, Other


Additional Past Medical Histor:  ENCEPHALOPATHY, HERNIA


Past Surgical History:  Hysterectomy


Additional Past Surgical Histo:  cardiac stent x 3


Alcohol Use:  None


Drug Use:  None





Adult General


Chief Complaint


Chief Complaint:  CHEST PAIN





HPI


HPI





Patient is a 83  year old  female was brought here for evaluation of chest pain 

and shortness of air for several days.  Her family said she has been swollen, 

mostly in her legs and her abdomen area.  There are multiple complains.  Patient

's family said about 4 days ago, she started having problem with her right 

side.  Her family said her right side was weaker, she has trouble using her 

right upper extremity.  Family also said patient has been coughing, felt like 

she may have a fever.  The story here is very poor.  Patient could not provide 

any information.  Patient was acting confused.





Review of Systems


Review of Systems





Constitutional: Denies fever or chills []


Eyes: Denies change in visual acuity, redness, or eye pain []


HENT: Denies nasal congestion or sore throat []


Respiratory:  Positive for cough and shortness of breath []


Cardiovascular: No additional information not addressed in HPI []


GI: Denies abdominal pain, nausea, vomiting, bloody stools or diarrhea []


: Denies dysuria or hematuria []


Musculoskeletal: Denies back pain or joint pain.  Positive for swelling in 

legs. 


Integument: Denies rash or skin lesions []


Neurologic: Denies headache, Positive for RIGHT SIDE WEAKNESS, positive for 

confusion 


Endocrine: Denies polyuria or polydipsia []





All other systems were reviewed and found to be within normal limits, except as 

documented in this note.





Current Medications


Current Medications





Current Medications








 Medications


  (Trade)  Dose


 Ordered  Sig/[facility]  Start Time


 Stop Time Status Last Admin


Dose Admin


 


 Furosemide


  (Lasix)  40 mg  1X  ONCE  3/31/19 21:30


 3/31/19 21:31 DC 3/31/19 22:11


40 MG











Allergies


Allergies





Allergies








Coded Allergies Type Severity Reaction Last Updated Verified


 


  No Known Drug Allergies    10/29/16 No











Physical Exam


Physical Exam





Constitutional: Well developed, well nourished, no acute distress, non-toxic 

appearance. []


HENT: Normocephalic, atraumatic, bilateral external ears normal, oropharynx 

moist, no oral exudates, nose normal. []


Eyes: PERRLA, EOMI, conjunctiva normal, no discharge.  BILATERAL PERIORBITAL 

EDEMA, NO ERYTHEMA, NO  CONJUNCTIVAL HEMORRHAGE.


Neck: Normal range of motion, no tenderness, supple, no stridor. [] 


Cardiovascular:Heart rate regular rhythm, no murmur.  Bilateral lower 

extremities pitting edema.  


Lungs & Thorax:  Bilateral breath sounds with rales at bases. 


Abdomen: Bowel sounds normal, soft, no tenderness, no masses, no pulsatile 

masses. [] 


Skin: Warm, dry, no erythema, no rash. [] 


Back: No tenderness, no CVA tenderness. [] 


Extremities: No tenderness, no cyanosis, no clubbing, ROM intact,  Positive for 

pitting edema in lower extremities.


Neurologic: Patient was awake, but very confused, speech slurred, observed 

moving all of her extremities but right side appeared weaker.  


Psychologic: Not able to evaluate due to her current condition.





Current Patient Data


Vital Signs





 Vital Signs








  Date Time  Temp Pulse Resp B/P (MAP) Pulse Ox O2 Delivery O2 Flow Rate FiO2


 


3/31/19 21:29  106 40 127/68 (87) 97   


 


3/31/19 19:11 100.2     Room Air  





 100.2       








Lab Values





 Laboratory Tests








Test


 3/31/19


19:17 3/31/19


19:29 3/31/19


19:34


 


Urine Collection Type U cath    


 


Urine Color Yellow    


 


Urine Clarity Clear    


 


Urine pH 6.0    


 


Urine Specific Gravity >=1.030    


 


Urine Protein


 30 mg/dL


(NEG-TRACE) 


 





 


Urine Glucose (UA)


 Negative mg/dL


(NEG) 


 





 


Urine Ketones (Stick)


 Negative mg/dL


(NEG) 


 





 


Urine Blood


 Negative (NEG)


 


 





 


Urine Nitrite


 Negative (NEG)


 


 





 


Urine Bilirubin


 Negative (NEG)


 


 





 


Urine Urobilinogen Dipstick


 1.0 mg/dL (0.2


mg/dL) 


 





 


Urine Leukocyte Esterase


 Negative (NEG)


 


 





 


Urine RBC


 Occ /HPF (0-2)


 


 





 


Urine WBC


 Occ /HPF (0-4)


 


 





 


Urine Squamous Epithelial


Cells Occ /LPF  


 


 





 


Urine Bacteria


 0 /HPF (0-FEW)


 


 





 


Urine Hyaline Casts


 Occasional


/HPF 


 





 


Urine Mucus Mod /LPF    


 


White Blood Count


 


 8.6 x10^3/uL


(4.0-11.0) 





 


Red Blood Count


 


 3.30 x10^6/uL


(3.50-5.40)  L 





 


Hemoglobin


 


 8.1 g/dL


(12.0-15.5)  L 





 


Hematocrit


 


 26.3 %


(36.0-47.0)  L 





 


Mean Corpuscular Volume


 


 80 fL ()


 





 


Mean Corpuscular Hemoglobin  25 pg (25-35)   


 


Mean Corpuscular Hemoglobin


Concent 


 31 g/dL


(31-37) 





 


Red Cell Distribution Width


 


 21.0 %


(11.5-14.5)  H 





 


Platelet Count


 


 344 x10^3/uL


(140-400) 





 


Neutrophils (%) (Auto)  80 % (31-73)  H 


 


Lymphocytes (%) (Auto)  12 % (24-48)  L 


 


Monocytes (%) (Auto)  8 % (0-9)   


 


Eosinophils (%) (Auto)  1 % (0-3)   


 


Basophils (%) (Auto)  0 % (0-3)   


 


Neutrophils # (Auto)


 


 6.9 x10^3uL


(1.8-7.7) 





 


Lymphocytes # (Auto)


 


 1.0 x10^3/uL


(1.0-4.8) 





 


Monocytes # (Auto)


 


 0.7 x10^3/uL


(0.0-1.1) 





 


Eosinophils # (Auto)


 


 0.1 x10^3/uL


(0.0-0.7) 





 


Basophils # (Auto)


 


 0.0 x10^3/uL


(0.0-0.2) 





 


Segmented Neutrophils %  76 % (35-66)  H 


 


Band Neutrophils %  9 % (0-9)   


 


Lymphocytes %  8 % (24-48)  L 


 


Monocytes %  4 % (0-10)   


 


Basophils %  1 % (0-3)   


 


Myelocytes %  2 % (0-0)  H 


 


Nucleated Red Blood Cells  2   


 


Platelet Estimate


 


 Adequate


(ADEQUATE) 





 


Polychromasia  Slight   


 


Poikilocytosis  Mod   


 


Anisocytosis  Mod   


 


Tear Drop Cells  Occ   


 


Ovalocytes  Present   


 


Schistocytes  Few   


 


RBC Morphology Bizarre Forms  Few   


 


Prothrombin Time


 


 13.4 SEC


(11.7-14.0) 





 


Prothrombin Time INR  1.1 (0.8-1.1)   


 


PTT


 


 31 SEC (24-38)


 





 


Sodium Level


 


 141 mmol/L


(136-145) 





 


Potassium Level


 


 3.8 mmol/L


(3.5-5.1) 





 


Chloride Level


 


 106 mmol/L


() 





 


Carbon Dioxide Level


 


 28 mmol/L


(21-32) 





 


Anion Gap  7 (6-14)   


 


Blood Urea Nitrogen


 


 8 mg/dL (7-20)


 





 


Creatinine


 


 0.9 mg/dL


(0.6-1.0) 





 


Estimated GFR


(Cockcroft-Gault) 


 72.4  


 





 


BUN/Creatinine Ratio  9 (6-20)   


 


Glucose Level


 


 115 mg/dL


(70-99)  H 





 


Lactic Acid Level


 


 1.2 mmol/L


(0.4-2.0) 





 


Calcium Level


 


 8.1 mg/dL


(8.5-10.1)  L 





 


Total Bilirubin


 


 0.3 mg/dL


(0.2-1.0) 





 


Aspartate Amino Transferase


(AST) 


 16 U/L (15-37)


 





 


Alanine Aminotransferase (ALT)


 


 6 U/L (14-59)


L 





 


Alkaline Phosphatase


 


 67 U/L


() 





 


Creatine Kinase


 


 26 U/L


() 





 


Creatine Kinase MB (Mass)


 


 < 0.5 ng/mL


(0.0-3.6) 





 


Creatine Kinase MB Relative


Index 


  % (0-4)  


 





 


Troponin I Quantitative


 


 < 0.017 ng/mL


(0.000-0.055) 





 


NT-Pro-B-Type Natriuretic


Peptide 


 540 pg/mL


(0-449)  H 





 


Total Protein


 


 5.2 g/dL


(6.4-8.2)  L 





 


Albumin


 


 2.2 g/dL


(3.4-5.0)  L 





 


Albumin/Globulin Ratio


 


 0.7 (1.0-1.7)


L 





 


Lipase


 


 67 U/L


()  L 





 


Influenza Type A Antigen


 


 


 Negative


(NEGATIVE)


 


Influenza Type B Antigen


 


 


 Negative


(NEGATIVE)





 Laboratory Tests


3/31/19 19:29








 Laboratory Tests


3/31/19 19:29














EKG


EKG


EKG was read by this physician at 1859, rate of 113 bpm, sinus tachycardia, no 

stemi.





Radiology/Procedures


Radiology/Procedures


[][facility]


 [address] [place] [address]


 [phone]


 


 IMAGING REPORT





 Signed





PATIENT: MARY OBREGON ACCOUNT: [id number] MRN#: [id number]


: 1935 LOCATION: ER AGE: 83


SEX: F EXAM DT: 19 ACCESSION#: [id number]


STATUS: PRE ER ORD. PHYSICIAN: MARY BARLOW DO 


REASON: cough, chest pain


PROCEDURE: PORTABLE CHEST 1V





Examination: PORTABLE CHEST 1V


 


History: CHEST PAIN SOA


 


Comparison/Correlation: 3/20/2019 portable chest x-ray exam


 


Findings: Portable semiupright frontal view chest was obtained. Heart size


is mildly enlarged. Large hiatal hernia noted. No infiltrate or definite 


pleural effusion. No pneumothorax.


 


Left glenohumeral joint degenerative remodeling and spurring is notable. 


Right glenohumeral joint narrowing is present with spurring is a lesser 


extent.


 


Impression:


Cardiomegaly.


 


Hiatal hernia.


 


Electronically signed by: North Merlos MD (3/31/2019 8:10 PM) [facility]














DICTATED and SIGNED BY:     NORTH MERLOS MD


DATE:     19








 [facility]


 [address] [place] [address]


 [phone]


 


 IMAGING REPORT





 Signed





PATIENT: MARY OBREGON ACCOUNT: [id number] MRN#: [id number]


: 1935 LOCATION: ER AGE: 83


SEX: F EXAM DT: 19 ACCESSION#: [id number]


STATUS: PRE ER ORD. PHYSICIAN: MARY BARLOW DO 


REASON: confusion


PROCEDURE: CT HEAD WO CONTRAST





CT head without contrast dated 3/31/2019.


 


Comparison made to 3/20/2019.


 


CLINICAL INDICATION: Confusion.


 


TECHNIQUE:


 


Contiguous axial imaging the head was performed from skull base to vertex.


No contrast administered. 


One or more of the following individualized dose reduction techniques were


utilized for this examination:  


1. Automated exposure control  


2. Adjustment of the mA and/or kV according to patient size  


3. Use of iterative reconstruction technique.


 


FINDINGS:


 


Ventricles and sulci are mildly prominent for age. No midline shift or 


mass effect. Mild to moderate patchy low density in the deep/subcortical 


periventricular white matter. No hemorrhage or extra-axial collection. 


Posterior fossa and brainstem unremarkable.


 


Mild to moderate mucosal thickening of the maxillary sinuses and ethmoid 


air cells. Mastoid air cells are clear. No apparent calvarial abnormality.


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


 


IMPRESSION:


1. No evidence of acute intracranial hemorrhage or mass.


2. Mild to moderate chronic small vessel ischemic changes and atrophy.


2. Mild sinus disease.


 


Electronically signed by: Aguila Petersen MD (3/31/2019 7:57 PM) 


[facility]














DICTATED and SIGNED BY:     AGUILA PETERSEN MD


DATE:     19





Course & Med Decision Making


Course & Med Decision Making


Pertinent Labs and Imaging studies reviewed. (See chart for details)





[]





Dragon Disclaimer


Dragon Disclaimer


This electronic medical record was generated, in whole or in part, using a 

voice recognition dictation system.





Departure


Departure


Impression:  


 Primary Impression:  


 Chest pain


 Additional Impressions:  


 CHF (congestive heart failure)


 Encephalopathy


Disposition:   ADMITTED AS INPATIENT


Admitting Physician:  Purvi Dao


Condition:  STABLE


Referrals:  


VIMAL BERNAL MD (PCP)





Problem Qualifiers











MARY BARLOW DO Mar 31, 2019 20:26

## 2019-04-01 VITALS — DIASTOLIC BLOOD PRESSURE: 56 MMHG | SYSTOLIC BLOOD PRESSURE: 104 MMHG

## 2019-04-01 VITALS — SYSTOLIC BLOOD PRESSURE: 105 MMHG | DIASTOLIC BLOOD PRESSURE: 57 MMHG

## 2019-04-01 VITALS — DIASTOLIC BLOOD PRESSURE: 51 MMHG | SYSTOLIC BLOOD PRESSURE: 100 MMHG

## 2019-04-01 VITALS — SYSTOLIC BLOOD PRESSURE: 108 MMHG | DIASTOLIC BLOOD PRESSURE: 55 MMHG

## 2019-04-01 VITALS — DIASTOLIC BLOOD PRESSURE: 65 MMHG | SYSTOLIC BLOOD PRESSURE: 133 MMHG

## 2019-04-01 VITALS — SYSTOLIC BLOOD PRESSURE: 108 MMHG | DIASTOLIC BLOOD PRESSURE: 56 MMHG

## 2019-04-01 RX ADMIN — IPRATROPIUM BROMIDE AND ALBUTEROL SULFATE SCH ML: .5; 3 SOLUTION RESPIRATORY (INHALATION) at 07:51

## 2019-04-01 RX ADMIN — FOLIC ACID-PYRIDOXINE-CYANOCOBALAMIN TAB 2.5-25-2 MG SCH TAB: 2.5-25-2 TAB at 16:23

## 2019-04-01 RX ADMIN — ENOXAPARIN SODIUM SCH MG: 40 INJECTION SUBCUTANEOUS at 16:22

## 2019-04-01 RX ADMIN — VITAMIN D, TAB 1000IU (100/BT) SCH UNIT: 25 TAB at 16:23

## 2019-04-01 RX ADMIN — IPRATROPIUM BROMIDE AND ALBUTEROL SULFATE SCH ML: .5; 3 SOLUTION RESPIRATORY (INHALATION) at 11:26

## 2019-04-01 RX ADMIN — IPRATROPIUM BROMIDE AND ALBUTEROL SULFATE SCH ML: .5; 3 SOLUTION RESPIRATORY (INHALATION) at 19:17

## 2019-04-01 RX ADMIN — SIMVASTATIN SCH MG: 40 TABLET, FILM COATED ORAL at 21:47

## 2019-04-01 RX ADMIN — IPRATROPIUM BROMIDE AND ALBUTEROL SULFATE SCH ML: .5; 3 SOLUTION RESPIRATORY (INHALATION) at 15:35

## 2019-04-01 RX ADMIN — PANTOPRAZOLE SODIUM SCH MG: 40 TABLET, DELAYED RELEASE ORAL at 16:21

## 2019-04-01 RX ADMIN — ATENOLOL SCH MG: 25 TABLET ORAL at 16:22

## 2019-04-01 RX ADMIN — ASPIRIN 81 MG SCH MG: 81 TABLET ORAL at 16:20

## 2019-04-01 NOTE — PDOC1
History and Physical


Date of Admission


Date of Admission


DATE: 4/1/19 


TIME: 13:46





Identification/Chief Complaint


Chief Complaint


 seen in er for evaluation of chest pain and shortness of air for several days.

  Her family said she has been swollen, mostly in her legs and her abdomen 

area.  


 Patient's family said about 4 days ago, she started having problem with her 

right side.  Her family said her right side was weaker, she has trouble using 

her right upper extremity. 


 Family also said patient has been coughing, felt like she may have a fever. 

some wheeze noted with cough


[facility] neuroradiologist raised the possibility of cerebral amyloidosis with 

angiitis  last year





Past Medical History


Past Medical History:  Arthritis, Dementia, Diabetes-Type II, High Cholesterol, 

Heart Disease, Hypertension, MI, Other


Additional Past Medical Histor:  ENCEPHALOPATHY, HERNIA


Past Surgical History:  Hysterectomy


Additional Past Surgical Histo:  cardiac stent x 3


Alcohol Use:  None


Drug Use:  None





FAMILY HX HYPERLIPIDEMIA


Cardiovascular:  CAD, HTN, Hyperlipidemia


Pulmonary:  Pulmonary embolus


CENTRAL NERVOUS SYSTEM:  CVA, Dementia (amyloid angiitis), Seizure


GI:  No pertinent hx


Heme/Onc:  No pertinent hx


Hepatobiliary:  No pertinent hx


Psych:  No pertinent hx


Musculoskeletal:  Osteoarthritis, Weakness


Rheumatologic:  No pertinent hx


Infectious disease:  No pertinent hx


Renal/:  No pertinent hx


Endocrine:  Diabetes





Past Surgical History


Past Surgical History:  Appendectomy, Hysterectomy, Other (cortonary stent)





Family History


Family History:  High Cholestrol, Hypertension


Family History:  Parent





Social History


Smoke:  No


ALCOHOL:  none


Drugs:  None





Current Problem List


Problem List


Problems


Medical Problems:


(1) Chest pain


Status: Acute  





(2) CHF (congestive heart failure)


Status: Acute  











Current Medications


Current Medications





Current Medications


Furosemide (Lasix) 40 mg 1X  ONCE IVP  Last administered on 3/31/19at 22:11;  

Start 3/31/19 at 21:30;  Stop 3/31/19 at 21:31;  Status DC


Ondansetron HCl (Zofran) 4 mg PRN Q8HRS  PRN IV NAUSEA/VOMITING 1ST CHOICE;  

Start 3/31/19 at 22:00;  Stop 4/1/19 at 21:59


Morphine Sulfate (Morphine Sulfate) 2 mg PRN Q2HR  PRN IV SEVERE PAIN;  Start 3/

31/19 at 22:00;  Stop 4/1/19 at 21:59


Albuterol/ Ipratropium (Duoneb) 3 ml RTQID NEB  Last administered on 4/1/19at 11

:26;  Start 4/1/19 at 08:00;  Stop 4/2/19 at 07:59


Acetaminophen (Tylenol Supp) 650 mg PRN Q6HRS  PRN [place] MILD PAIN / TEMP Last 

administered on 4/1/19at 03:36;  Start 4/1/19 at 03:15





Active Scripts


Active


Keppra (Levetiracetam) 500 Mg Tablet 500 Mg PO BID


Meclizine Hcl 25 Mg Tablet 1 Tab PO PRN TID PRN


Reported


Protonix (Pantoprazole Sodium) 20 Mg Tablet.dr 1 Tab PO DAILY


Vitamin B12-Folic Acid Tablet (Cyanocobalamin/Folic Acid) 1 Each Tablet 1 Each 

PO DAILYBFRSUP


Vitamin D3 (Cholecalciferol (Vitamin D3)) 1,000 Unit Tablet 1 Tab PO DAILYBFRSUP


Voltaren (Diclofenac Sodium) 100 Gm Gel..gram. 1 Gm TP PRN QID


Aspirin 81 Mg Tab.chew 1 Tab PO DAILY


Simvastatin 40 Mg Tablet 1 Tab PO QHS


Atenolol 25 Mg Tablet 1 Tab PO DAILY





Allergies


Allergies:  


Coded Allergies:  


     No Known Drug Allergies (Unverified , 10/29/16)





ROS


Review of System





Review of Systems


Review of Systems





Constitutional: Denies fever or chills []


Eyes: Denies change in visual acuity, redness, or eye pain []


HENT: Denies nasal congestion or sore throat []


Respiratory:  Positive for cough and shortness of breath []


Cardiovascular: No additional information not addressed in HPI []


GI: Denies abdominal pain, nausea, vomiting, bloody stools or diarrhea []


: Denies dysuria or hematuria []


Musculoskeletal: Denies back pain or joint pain.  Positive for swelling in 

legs. 


Integument: Denies rash or skin lesions []


Neurologic: Denies headache, Positive for RIGHT SIDE WEAKNESS, positive for 

confusion 


Endocrine: Denies polyuria or polydipsia []





All other systems were reviewed and found to be within normal limits, except as 

documented in this note.





Physical Exam


Physical Exam





Physical Exam


Physical Exam





Constitutional: Well developed, well nourished, no acute distress, non-toxic 

appearance. []


HENT: Normocephalic, atraumatic, bilateral external ears normal, oropharynx 

moist, no oral exudates, nose normal. []


Eyes: PERRLA, EOMI, conjunctiva normal, no discharge.  BILATERAL PERIORBITAL 

EDEMA, NO ERYTHEMA, NO  CONJUNCTIVAL HEMORRHAGE.


Neck: Normal range of motion, no tenderness, supple, no stridor. [] 


Cardiovascular:Heart rate regular rhythm, no murmur.  Bilateral lower 

extremities pitting edema.  


Lungs & Thorax:  Bilateral breath sounds with rales at bases. 


Abdomen: Bowel sounds normal, soft, no tenderness, no masses, no pulsatile 

masses. [] 


Skin: Warm, dry, no erythema, no rash. [] 


Back: No tenderness, no CVA tenderness. [] 


Extremities: No tenderness, no cyanosis, no clubbing, ROM intact,  Positive for 

pitting edema in lower extremities.


Neurologic: Patient was awake, but  confused, speech ok , observed moving all 

of her extremities but right side appeared weaker.   does not know current 

president RIGHT  WEAK


Psychologic: Not able to evaluate due to her current condition.


General:  Cooperative


Breasts:  Not examined


Rectal Exam:  not examined


PELVIC:  Examination not indicated


Extremities:  No clubbing, No cyanosis, No edema


Skin:  No breakdown





Vitals


Vitals





Vital Signs








  Date Time  Temp Pulse Resp B/P (MAP) Pulse Ox O2 Delivery O2 Flow Rate FiO2


 


4/1/19 11:40 99.7 86 20 108/56 (73) 94 Room Air  





 99.7       











Labs


Labs





Laboratory Tests








Test


 3/31/19


19:17 3/31/19


19:29 3/31/19


19:34 4/1/19


10:20


 


Urine Collection Type U cath    


 


Urine Color Yellow    


 


Urine Clarity Clear    


 


Urine pH 6.0    


 


Urine Specific Gravity >=1.030    


 


Urine Protein


 30 mg/dL


(NEG-TRACE) 


 


 





 


Urine Glucose (UA)


 Negative mg/dL


(NEG) 


 


 





 


Urine Ketones (Stick)


 Negative mg/dL


(NEG) 


 


 





 


Urine Blood Negative (NEG)    


 


Urine Nitrite Negative (NEG)    


 


Urine Bilirubin Negative (NEG)    


 


Urine Urobilinogen Dipstick


 1.0 mg/dL (0.2


mg/dL) 


 


 





 


Urine Leukocyte Esterase Negative (NEG)    


 


Urine RBC Occ /HPF (0-2)    


 


Urine WBC Occ /HPF (0-4)    


 


Urine Squamous Epithelial


Cells Occ /LPF 


 


 


 





 


Urine Bacteria 0 /HPF (0-FEW)    


 


Urine Hyaline Casts


 Occasional


/HPF 


 


 





 


Urine Mucus Mod /LPF    


 


White Blood Count


 


 8.6 x10^3/uL


(4.0-11.0) 


 





 


Red Blood Count


 


 3.30 x10^6/uL


(3.50-5.40) 


 





 


Hemoglobin


 


 8.1 g/dL


(12.0-15.5) 


 





 


Hematocrit


 


 26.3 %


(36.0-47.0) 


 





 


Mean Corpuscular Volume  80 fL ()   


 


Mean Corpuscular Hemoglobin  25 pg (25-35)   


 


Mean Corpuscular Hemoglobin


Concent 


 31 g/dL


(31-37) 


 





 


Red Cell Distribution Width


 


 21.0 %


(11.5-14.5) 


 





 


Platelet Count


 


 344 x10^3/uL


(140-400) 


 





 


Neutrophils (%) (Auto)  80 % (31-73)   


 


Lymphocytes (%) (Auto)  12 % (24-48)   


 


Monocytes (%) (Auto)  8 % (0-9)   


 


Eosinophils (%) (Auto)  1 % (0-3)   


 


Basophils (%) (Auto)  0 % (0-3)   


 


Neutrophils # (Auto)


 


 6.9 x10^3uL


(1.8-7.7) 


 





 


Lymphocytes # (Auto)


 


 1.0 x10^3/uL


(1.0-4.8) 


 





 


Monocytes # (Auto)


 


 0.7 x10^3/uL


(0.0-1.1) 


 





 


Eosinophils # (Auto)


 


 0.1 x10^3/uL


(0.0-0.7) 


 





 


Basophils # (Auto)


 


 0.0 x10^3/uL


(0.0-0.2) 


 





 


Segmented Neutrophils %  76 % (35-66)   


 


Band Neutrophils %  9 % (0-9)   


 


Lymphocytes %  8 % (24-48)   


 


Monocytes %  4 % (0-10)   


 


Basophils %  1 % (0-3)   


 


Myelocytes %  2 % (0-0)   


 


Nucleated Red Blood Cells  2   


 


Platelet Estimate


 


 Adequate


(ADEQUATE) 


 





 


Polychromasia  Slight   


 


Poikilocytosis  Mod   


 


Anisocytosis  Mod   


 


Tear Drop Cells  Occ   


 


Ovalocytes  Present   


 


Schistocytes  Few   


 


RBC Morphology Bizarre Forms  Few   


 


Prothrombin Time


 


 13.4 SEC


(11.7-14.0) 


 





 


Prothromb Time International


Ratio 


 1.1 (0.8-1.1) 


 


 





 


Activated Partial


Thromboplast Time 


 31 SEC (24-38) 


 


 





 


Sodium Level


 


 141 mmol/L


(136-145) 


 





 


Potassium Level


 


 3.8 mmol/L


(3.5-5.1) 


 





 


Chloride Level


 


 106 mmol/L


() 


 





 


Carbon Dioxide Level


 


 28 mmol/L


(21-32) 


 





 


Anion Gap  7 (6-14)   


 


Blood Urea Nitrogen  8 mg/dL (7-20)   


 


Creatinine


 


 0.9 mg/dL


(0.6-1.0) 


 





 


Estimated GFR


(Cockcroft-Gault) 


 72.4 


 


 





 


BUN/Creatinine Ratio  9 (6-20)   


 


Glucose Level


 


 115 mg/dL


(70-99) 


 





 


Lactic Acid Level


 


 1.2 mmol/L


(0.4-2.0) 


 





 


Calcium Level


 


 8.1 mg/dL


(8.5-10.1) 


 





 


Total Bilirubin


 


 0.3 mg/dL


(0.2-1.0) 


 





 


Aspartate Amino Transf


(AST/SGOT) 


 16 U/L (15-37) 


 


 





 


Alanine Aminotransferase


(ALT/SGPT) 


 6 U/L (14-59) 


 


 





 


Alkaline Phosphatase


 


 67 U/L


() 


 





 


Creatine Kinase


 


 26 U/L


() 


 





 


Creatine Kinase MB (Mass)


 


 < 0.5 ng/mL


(0.0-3.6) 


 





 


Creatine Kinase MB Relative


Index 


  % (0-4) 


 


 





 


Troponin I Quantitative


 


 < 0.017 ng/mL


(0.000-0.055) 


 0.537 ng/mL


(0.000-0.055)


 


NT-Pro-B-Type Natriuretic


Peptide 


 540 pg/mL


(0-449) 


 





 


Total Protein


 


 5.2 g/dL


(6.4-8.2) 


 





 


Albumin


 


 2.2 g/dL


(3.4-5.0) 


 





 


Albumin/Globulin Ratio  0.7 (1.0-1.7)   


 


Lipase


 


 67 U/L


() 


 





 


Influenza Type A Antigen


 


 


 Negative


(NEGATIVE) 





 


Influenza Type B Antigen


 


 


 Negative


(NEGATIVE) 











Laboratory Tests








Test


 3/31/19


19:17 3/31/19


19:29 3/31/19


19:34 4/1/19


10:20


 


Urine Collection Type U cath    


 


Urine Color Yellow    


 


Urine Clarity Clear    


 


Urine pH 6.0    


 


Urine Specific Gravity >=1.030    


 


Urine Protein


 30 mg/dL


(NEG-TRACE) 


 


 





 


Urine Glucose (UA)


 Negative mg/dL


(NEG) 


 


 





 


Urine Ketones (Stick)


 Negative mg/dL


(NEG) 


 


 





 


Urine Blood Negative (NEG)    


 


Urine Nitrite Negative (NEG)    


 


Urine Bilirubin Negative (NEG)    


 


Urine Urobilinogen Dipstick


 1.0 mg/dL (0.2


mg/dL) 


 


 





 


Urine Leukocyte Esterase Negative (NEG)    


 


Urine RBC Occ /HPF (0-2)    


 


Urine WBC Occ /HPF (0-4)    


 


Urine Squamous Epithelial


Cells Occ /LPF 


 


 


 





 


Urine Bacteria 0 /HPF (0-FEW)    


 


Urine Hyaline Casts


 Occasional


/HPF 


 


 





 


Urine Mucus Mod /LPF    


 


White Blood Count


 


 8.6 x10^3/uL


(4.0-11.0) 


 





 


Red Blood Count


 


 3.30 x10^6/uL


(3.50-5.40) 


 





 


Hemoglobin


 


 8.1 g/dL


(12.0-15.5) 


 





 


Hematocrit


 


 26.3 %


(36.0-47.0) 


 





 


Mean Corpuscular Volume  80 fL ()   


 


Mean Corpuscular Hemoglobin  25 pg (25-35)   


 


Mean Corpuscular Hemoglobin


Concent 


 31 g/dL


(31-37) 


 





 


Red Cell Distribution Width


 


 21.0 %


(11.5-14.5) 


 





 


Platelet Count


 


 344 x10^3/uL


(140-400) 


 





 


Neutrophils (%) (Auto)  80 % (31-73)   


 


Lymphocytes (%) (Auto)  12 % (24-48)   


 


Monocytes (%) (Auto)  8 % (0-9)   


 


Eosinophils (%) (Auto)  1 % (0-3)   


 


Basophils (%) (Auto)  0 % (0-3)   


 


Neutrophils # (Auto)


 


 6.9 x10^3uL


(1.8-7.7) 


 





 


Lymphocytes # (Auto)


 


 1.0 x10^3/uL


(1.0-4.8) 


 





 


Monocytes # (Auto)


 


 0.7 x10^3/uL


(0.0-1.1) 


 





 


Eosinophils # (Auto)


 


 0.1 x10^3/uL


(0.0-0.7) 


 





 


Basophils # (Auto)


 


 0.0 x10^3/uL


(0.0-0.2) 


 





 


Segmented Neutrophils %  76 % (35-66)   


 


Band Neutrophils %  9 % (0-9)   


 


Lymphocytes %  8 % (24-48)   


 


Monocytes %  4 % (0-10)   


 


Basophils %  1 % (0-3)   


 


Myelocytes %  2 % (0-0)   


 


Nucleated Red Blood Cells  2   


 


Platelet Estimate


 


 Adequate


(ADEQUATE) 


 





 


Polychromasia  Slight   


 


Poikilocytosis  Mod   


 


Anisocytosis  Mod   


 


Tear Drop Cells  Occ   


 


Ovalocytes  Present   


 


Schistocytes  Few   


 


RBC Morphology Bizarre Forms  Few   


 


Prothrombin Time


 


 13.4 SEC


(11.7-14.0) 


 





 


Prothromb Time International


Ratio 


 1.1 (0.8-1.1) 


 


 





 


Activated Partial


Thromboplast Time 


 31 SEC (24-38) 


 


 





 


Sodium Level


 


 141 mmol/L


(136-145) 


 





 


Potassium Level


 


 3.8 mmol/L


(3.5-5.1) 


 





 


Chloride Level


 


 106 mmol/L


() 


 





 


Carbon Dioxide Level


 


 28 mmol/L


(21-32) 


 





 


Anion Gap  7 (6-14)   


 


Blood Urea Nitrogen  8 mg/dL (7-20)   


 


Creatinine


 


 0.9 mg/dL


(0.6-1.0) 


 





 


Estimated GFR


(Cockcroft-Gault) 


 72.4 


 


 





 


BUN/Creatinine Ratio  9 (6-20)   


 


Glucose Level


 


 115 mg/dL


(70-99) 


 





 


Lactic Acid Level


 


 1.2 mmol/L


(0.4-2.0) 


 





 


Calcium Level


 


 8.1 mg/dL


(8.5-10.1) 


 





 


Total Bilirubin


 


 0.3 mg/dL


(0.2-1.0) 


 





 


Aspartate Amino Transf


(AST/SGOT) 


 16 U/L (15-37) 


 


 





 


Alanine Aminotransferase


(ALT/SGPT) 


 6 U/L (14-59) 


 


 





 


Alkaline Phosphatase


 


 67 U/L


() 


 





 


Creatine Kinase


 


 26 U/L


() 


 





 


Creatine Kinase MB (Mass)


 


 < 0.5 ng/mL


(0.0-3.6) 


 





 


Creatine Kinase MB Relative


Index 


  % (0-4) 


 


 





 


Troponin I Quantitative


 


 < 0.017 ng/mL


(0.000-0.055) 


 0.537 ng/mL


(0.000-0.055)


 


NT-Pro-B-Type Natriuretic


Peptide 


 540 pg/mL


(0-449) 


 





 


Total Protein


 


 5.2 g/dL


(6.4-8.2) 


 





 


Albumin


 


 2.2 g/dL


(3.4-5.0) 


 





 


Albumin/Globulin Ratio  0.7 (1.0-1.7)   


 


Lipase


 


 67 U/L


() 


 





 


Influenza Type A Antigen


 


 


 Negative


(NEGATIVE) 





 


Influenza Type B Antigen


 


 


 Negative


(NEGATIVE) 














Images


Images





 LEFT VENTRICLE 


The left ventricle is normal size. There is normal left ventricular wall 

thickness. The left ventricular systolic function is normal. The ejection 

fraction is 55-60%. There is normal LV segmental wall motion. The left 

ventricular diastolic function and filling is normal for age. No left ventricle 

thrombus noted on this study.





 RIGHT VENTRICLE 


The right ventricle is normal size. There is normal right ventricular wall 

thickness. The right ventricular systolic function is normal.





 ATRIA 


The left atrium is borderline dilated. The right atrium size is normal. The 

interatrial septum is intact with no evidence for an atrial septal defect or 

patent foramen ovale as noted on 2-D or Doppler imaging. There is no thrombus 

noted in the left atrial appendage.





 AORTIC VALVE 


The aortic valve is thickened but opens well. Doppler and Color Flow revealed 

mild aortic regurgitation. There is no significant aortic valvular stenosis.





 MITRAL VALVE 


The mitral valve leaflets are thickened. There is no mitral valve stenosis. 

Doppler and Color-flow revealed mild mitral regurgitation.





 TRICUSPID VALVE 


The tricuspid valve is normal in structure and function. Doppler and Color Flow 

revealed trace tricuspid regurgitation. There is no tricuspid valve stenosis. 





 PULMONIC VALVE 


The pulmonary valve is normal in structure and function. Doppler and Color Flow 

revealed mild pulmonic valvular regurgitation. There is no pulmonic valvular 

stenosis.





 GREAT VESSELS 


The aortic root is normal in size.





Critical Notification


Critical Value: No





<Conclusion>


The left ventricle is normal size.


The left ventricular systolic function is normal.


The ejection fraction is 55-60%.


The interatrial septum is intact with no evidence for an atrial septal defect 

or patent foramen ovale as noted on 2-D or Doppler imaging.


There is no thrombus noted in the left atrial appendage.


There is no significant aortic valvular stenosis.


Doppler and Color Flow revealed mild aortic regurgitation.


Doppler and Color-flow revealed mild mitral regurgitation.


Doppler and Color Flow revealed trace tricuspid regurgitation.


No evidence of a source of embolization.





Signed by : Ross No MD


Electronically Approved : 10/31/2018 17:46:44














DICTATED and SIGNED BY:     ROSS NO MD


DATE:     10/31/18 1746


 


MRI of the Brain without and with Contrast 1/15/2019


 


Clinical History: Altered mental status and seizures.


 


Technique: Unenhanced T1-weighted sagittal and axial and FLAIR, 


T2-weighted, gradient echo and diffusion-weighted axial images of the 


brain were obtained. FLAIR coronal images through the temporal lobes were 


obtained. After the intravenous administration of 6 cc of Gadavist, 


enhanced T1-weighted axial and coronal images of the brain were obtained.


 


Findings: Comparison is made to the patient's CT scan of the head 


performed earlier today. Additional comparison is made to the patient's 


MRI of the brain dated 10/30/2018.


 


Images from the study are degraded by patient motion.


 


There is generalized parenchymal atrophy. Patchy, confluent and multiple 


small focal areas of increased signal intensity are seen within the 


periventricular and subcortical white matter of both cerebral hemispheres 


on the FLAIR and T2-weighted images consistent with areas of small vessel 


ischemic disease. These have not significantly changed. Multiple small 


focal areas of markedly decreased signal intensity are seen scattered 


throughout both cerebral hemispheres on the gradient echo images, left 


greater than right. These likely reflect areas of microhemorrhage. They 


are unchanged. 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 


cerebellar hemisphere, unchanged.


 


Areas of cerebral edema are seen involving the left frontal lobe along 


with both parietal lobes, left greater than right. This has increased 


since the previous examination. There is associated mass effect without 


evidence of midline shift. Leptomeningeal enhancement is seen surrounding 


both cerebral hemispheres, left greater than right.


 


No area of restricted diffusion is seen to suggest evidence of acute 


ischemia/infarction. No extra-axial fluid collection is seen.


 


Mild to moderate mucosal thickening is seen throughout the paranasal 


sinuses. There are small bilateral mastoid effusions. Normal flow voids 


are seen within the major vascular structures surrounding the brain 


parenchyma. 


 


IMPRESSION: Areas of cerebral edema are again seen involving both cerebral


hemispheres, left greater than right. No midline shift is seen. The edema 


has increased since the previous examination.


 


Electronically signed by: Armando Jon MD (1/15/2019 3:30 PM) [facility]














DICTATED and SIGNED BY:     ARMANDO JON MD


DATE:     01/15/19 1510





CT head without contrast dated 3/31/2019.


 


Comparison made to 3/20/2019.


 


CLINICAL INDICATION: Confusion.


 


TECHNIQUE:


 


Contiguous axial imaging the head was performed from skull base to vertex.


No contrast administered. 


One or more of the following individualized dose reduction techniques were


utilized for this examination:  


1. Automated exposure control  


2. Adjustment of the mA and/or kV according to patient size  


3. Use of iterative reconstruction technique.


 


FINDINGS:


 


Ventricles and sulci are mildly prominent for age. No midline shift or 


mass effect. Mild to moderate patchy low density in the deep/subcortical 


periventricular white matter. No hemorrhage or extra-axial collection. 


Posterior fossa and brainstem unremarkable.


 


Mild to moderate mucosal thickening of the maxillary sinuses and ethmoid 


air cells. Mastoid air cells are clear. No apparent calvarial abnormality.


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


 


IMPRESSION:


1. No evidence of acute intracranial hemorrhage or mass.


2. Mild to moderate chronic small vessel ischemic changes and atrophy.


2. Mild sinus disease.


 


Electronically signed by: Aguila Petersen MD (3/31/2019 7:57 PM) 


[email or web address]








Examination: PORTABLE CHEST 1V


 


History: CHEST PAIN SOA


 


Comparison/Correlation: 3/20/2019 portable chest x-ray exam


 


Findings: Portable semiupright frontal view chest was obtained. Heart size


is mildly enlarged. Large hiatal hernia noted. No infiltrate or definite 


pleural effusion. No pneumothorax.


 


Left glenohumeral joint degenerative remodeling and spurring is notable. 


Right glenohumeral joint narrowing is present with spurring is a lesser 


extent.


 


Impression:


Cardiomegaly.


 


Hiatal hernia.


 


Electronically signed by: North Padilla MD (3/31/2019 8:10 PM) [facility]











VTE Prophylaxis Ordered


VTE Prophylaxis Devices:  No


VTE Pharmacological Prophylaxi:  Yes





Assessment/Plan


Assessment/Plan


 Impression:  


 Chest pain


 CHF (congestive heart failure)


 Encephalopathy


 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


 Mild to moderate chronic small vessel ischemic changes and atrophy.on CT HEAD


 DIABETES


 HYPERTENSION


 severe protein-caloric malnutrition


 wheezing, possible silent aspiration vs reactive airways


 GERD








ADMITTED 





cvc admit


Neurology consult


Cardiology consult


conservative approach given family desire for non-aggressive route


duonebs qid


lovenox dvt prophylaxis


SWALLOW PRECAUTIONS





59 MIN pt exam, chart review, > 50 of time spent with exam, chart review, pt 

care coordination











KATELYNN ABDULLAHI MD Apr 1, 2019 13:46

## 2019-04-01 NOTE — PDOC2
CARDIAC CONSULT


DATE OF CONSULT


Date of Consult


DATE: 4/1/19 


TIME: 09:51





REASON FOR CONSULT


Reason for Consult:


chest pain





REFERRING PHYSICIAN


Referring Physician:


Dr. Gilbert





SOURCE


Source:  Caregiver, Chart review





HISTORY OF PRESENT ILLNESS


HISTORY OF PRESENT ILLNESS


This is an 84 yo female who presented secondary to persistent cough, shortness 

of breath. HPI obtained from family/caregiver and chart review as patient has 

dementia and is unable to provide additional information. Patient has had 

persistent cough for the last 3-4 days. Cough is wet, but non-productive. 

Yesterday, family reports that she was coughing so hard that she was unable to 

catch her breath. Son states that she said her chest hurt from coughing so 

hard. No dizziness, shortness of breath at rest, diaphoresis, or nausea/

vomiting. Has had fevers at home. Has a history of CAD s/p remote stent. 

Follows with [facility]. Had appointment last month.





PAST MEDICAL HISTORY


Past Medical History


Cardiovascular:  CAD, CHF, HTN, Hyperlipidemia


Pulmonary:  No pertinent hx


CENTRAL NERVOUS SYSTEM:  Dementia, Seizure


GI:  No pertinent hx


Heme/Onc:  No pertinent hx


Hepatobiliary:  No pertinent hx


Psych:  No pertinent hx


Musculoskeletal:  Osteoarthritis


Rheumatologic:  No pertinent hx


Infectious disease:  No pertinent hx


ENT:  No pertinent hx


Renal/:  No pertinent hx


Endocrine:  Diabetes





PAST SURGICAL HISTORY


Past Surgical History


 Appendectomy, Hysterectomy





FAMILY HISTORY


Family History:  Hypertension





SOCIAL HISTORY


Social History


Smoke:  No


ALCOHOL:  none


Drugs:  None


Lives:  with Family





CURRENT MEDICATIONS


CURRENT MEDICATIONS





Current Medications








 Medications


  (Trade)  Dose


 Ordered  Sig/Td


 Route


 PRN Reason  Start Time


 Stop Time Status Last Admin


Dose Admin


 


 Furosemide


  (Lasix)  40 mg  1X  ONCE


 IVP


   3/31/19 21:30


 3/31/19 21:31 DC 3/31/19 22:11





 


 Albuterol/


 Ipratropium


  (Duoneb)  3 ml  RTQID


 NEB


   4/1/19 08:00


 4/2/19 07:59  4/1/19 07:51





 


 Acetaminophen


  (Tylenol Supp)  650 mg  PRN Q6HRS  PRN


 RI


 MILD PAIN / TEMP  4/1/19 03:15


    4/1/19 03:36














ALLERGIES


ALLERGIES:  


Coded Allergies:  


     No Known Drug Allergies (Unverified , 10/29/16)





ROS


Review of System


unobtainable.





PHYSICAL EXAM


PHYSICAL EXAM


General:  Alert, Cooperative, No acute distress, Other (alert to self only)


HEENT:  Atraumatic


Lungs:  diminished bases. expiratory wheezes


Heart:  Regular rate, Normal S1, Normal S2, Other (2/6 systolic murmur )


Abdomen:  Soft


Extremities:  trace LE edema bilaterally, Normal pulses


Skin:  No significant lesion


Neuro:  Normal speech, Sensation intact


Psych/Mental Status:  Mental status NL, Mood NL


MUSCULOSKELETAL:  Osteoarthritic changes both hands





VITALS


VITALS





Vital Signs








  Date Time  Temp Pulse Resp B/P (MAP) Pulse Ox O2 Delivery O2 Flow Rate FiO2


 


4/1/19 07:54     95 Room Air  


 


4/1/19 07:00 100.0 89 20 105/57 (73)    





 100.0       











LABS


Lab:





Laboratory Tests








Test


 3/31/19


19:17 3/31/19


19:29 3/31/19


19:34


 


Urine Collection Type U cath   


 


Urine Color Yellow   


 


Urine Clarity Clear   


 


Urine pH 6.0   


 


Urine Specific Gravity >=1.030   


 


Urine Protein


 30 mg/dL


(NEG-TRACE) 


 





 


Urine Glucose (UA)


 Negative mg/dL


(NEG) 


 





 


Urine Ketones (Stick)


 Negative mg/dL


(NEG) 


 





 


Urine Blood Negative (NEG)   


 


Urine Nitrite Negative (NEG)   


 


Urine Bilirubin Negative (NEG)   


 


Urine Urobilinogen Dipstick


 1.0 mg/dL (0.2


mg/dL) 


 





 


Urine Leukocyte Esterase Negative (NEG)   


 


Urine RBC Occ /HPF (0-2)   


 


Urine WBC Occ /HPF (0-4)   


 


Urine Squamous Epithelial


Cells Occ /LPF 


 


 





 


Urine Bacteria 0 /HPF (0-FEW)   


 


Urine Hyaline Casts


 Occasional


/HPF 


 





 


Urine Mucus Mod /LPF   


 


White Blood Count


 


 8.6 x10^3/uL


(4.0-11.0) 





 


Red Blood Count


 


 3.30 x10^6/uL


(3.50-5.40) 





 


Hemoglobin


 


 8.1 g/dL


(12.0-15.5) 





 


Hematocrit


 


 26.3 %


(36.0-47.0) 





 


Mean Corpuscular Volume  80 fL ()  


 


Mean Corpuscular Hemoglobin  25 pg (25-35)  


 


Mean Corpuscular Hemoglobin


Concent 


 31 g/dL


(31-37) 





 


Red Cell Distribution Width


 


 21.0 %


(11.5-14.5) 





 


Platelet Count


 


 344 x10^3/uL


(140-400) 





 


Neutrophils (%) (Auto)  80 % (31-73)  


 


Lymphocytes (%) (Auto)  12 % (24-48)  


 


Monocytes (%) (Auto)  8 % (0-9)  


 


Eosinophils (%) (Auto)  1 % (0-3)  


 


Basophils (%) (Auto)  0 % (0-3)  


 


Neutrophils # (Auto)


 


 6.9 x10^3uL


(1.8-7.7) 





 


Lymphocytes # (Auto)


 


 1.0 x10^3/uL


(1.0-4.8) 





 


Monocytes # (Auto)


 


 0.7 x10^3/uL


(0.0-1.1) 





 


Eosinophils # (Auto)


 


 0.1 x10^3/uL


(0.0-0.7) 





 


Basophils # (Auto)


 


 0.0 x10^3/uL


(0.0-0.2) 





 


Segmented Neutrophils %  76 % (35-66)  


 


Band Neutrophils %  9 % (0-9)  


 


Lymphocytes %  8 % (24-48)  


 


Monocytes %  4 % (0-10)  


 


Basophils %  1 % (0-3)  


 


Myelocytes %  2 % (0-0)  


 


Nucleated Red Blood Cells  2  


 


Platelet Estimate


 


 Adequate


(ADEQUATE) 





 


Polychromasia  Slight  


 


Poikilocytosis  Mod  


 


Anisocytosis  Mod  


 


Tear Drop Cells  Occ  


 


Ovalocytes  Present  


 


Schistocytes  Few  


 


RBC Morphology Bizarre Forms  Few  


 


Prothrombin Time


 


 13.4 SEC


(11.7-14.0) 





 


Prothromb Time International


Ratio 


 1.1 (0.8-1.1) 


 





 


Activated Partial


Thromboplast Time 


 31 SEC (24-38) 


 





 


Sodium Level


 


 141 mmol/L


(136-145) 





 


Potassium Level


 


 3.8 mmol/L


(3.5-5.1) 





 


Chloride Level


 


 106 mmol/L


() 





 


Carbon Dioxide Level


 


 28 mmol/L


(21-32) 





 


Anion Gap  7 (6-14)  


 


Blood Urea Nitrogen  8 mg/dL (7-20)  


 


Creatinine


 


 0.9 mg/dL


(0.6-1.0) 





 


Estimated GFR


(Cockcroft-Gault) 


 72.4 


 





 


BUN/Creatinine Ratio  9 (6-20)  


 


Glucose Level


 


 115 mg/dL


(70-99) 





 


Lactic Acid Level


 


 1.2 mmol/L


(0.4-2.0) 





 


Calcium Level


 


 8.1 mg/dL


(8.5-10.1) 





 


Total Bilirubin


 


 0.3 mg/dL


(0.2-1.0) 





 


Aspartate Amino Transf


(AST/SGOT) 


 16 U/L (15-37) 


 





 


Alanine Aminotransferase


(ALT/SGPT) 


 6 U/L (14-59) 


 





 


Alkaline Phosphatase


 


 67 U/L


() 





 


Creatine Kinase


 


 26 U/L


() 





 


Creatine Kinase MB (Mass)


 


 < 0.5 ng/mL


(0.0-3.6) 





 


Creatine Kinase MB Relative


Index 


  % (0-4) 


 





 


Troponin I Quantitative


 


 < 0.017 ng/mL


(0.000-0.055) 





 


NT-Pro-B-Type Natriuretic


Peptide 


 540 pg/mL


(0-449) 





 


Total Protein


 


 5.2 g/dL


(6.4-8.2) 





 


Albumin


 


 2.2 g/dL


(3.4-5.0) 





 


Albumin/Globulin Ratio  0.7 (1.0-1.7)  


 


Lipase


 


 67 U/L


() 





 


Influenza Type A Antigen


 


 


 Negative


(NEGATIVE)


 


Influenza Type B Antigen


 


 


 Negative


(NEGATIVE)











ECHOCARDIOGRAM


ECHOCARDIOGRAM


<Conclusion>


The left ventricular systolic function is low normal. Difficult to estimate due 

to PVC's. EF 45-50%


There is moderate concentric left ventricular hypertrophy.





DATE:     10/21/18 1242





ASSESSMENT/PLAN


ASSESSMENT/PLAN


1.  Chest pain; associated with persistent coughing per family report. initial 

troponin negative. Recent echo showed LVEF 45-50% as noted above. 


2.  Dyspnea, persisted cough. NT pro BNP mildly elevated, but insignificant 

based upon age-adjustment. CXR without significant vascular congestion. Doubt 

overt HF. Received IV Lasix 


3.  CAD s/p PCI/stent. Stable, chest pain free. Follows closely with [facility].


4.  Febrile 


5.  Hypertension; controlled


6.  Hyperlipidemia; statin


7.  Diabetes, II


8.  Anemia 


9.  Dementia 


10.  Right-sided weakness; as per neuro








Recommendations





Trend troponin


Obtain cardiac record from [facility]


Given advanced age and comorbidities, recommend conservative management 

approach.


Continue ASA, statin, BB.











ESTELA MARTIN Apr 1, 2019 10:00

## 2019-04-01 NOTE — PDOC2
NEUROLOGY CONSULT


Date of Admission


Date of Admission


DATE: 4/1/19 


TIME: 10:47





Reason for Consult


Reason for Consult:


Right-sided weakness





Referring Physician


Referring Physician:


Dr. Greenfield





Source


Source:  Caregiver, Chart review, Patient





History of Present Illness


History of Present Illness


The patient is an 83-year-old right-handed female with chief complaint of right-

sided weakness. She first saw my associate, Dr. Trevino, in September 2017. She had 

gradually increasing dementia. MRI and EEG studies were negative. She presented 

in August of last year with transient ischemic attack symptoms with negative 

workup. She presented in October 2018 with altered mental status and was found 

to have multiple white matter abnormalities with microhemorrhagic component and 

cerebral edema. The possibility of posterior reversible encephalopathy was 

raised. She did have lumbar puncture showing 24 white blood cells, 100% 

mononuclear, 223 red blood cells, glucose 82, protein 270.8. There was no 

change with antibiotics. YENI, sedimentation rate, Treponema pallidum, West Nile 

virus, HSV, and viral cultures were negative. She came back in November with 

syncope and EEG was negative for epileptic abnormality, MRI showed the same 

white matter abnormalities. She came back in on January 15, 2019, with a single 

seizure. MRI again showed the multiple white matter abnormalities with 

hemorrhagic component and cerebral edema. EEG was negative for epileptic 

activity. Lumbar puncture showed glucose 74, protein 122.7, 41 white cells, 

1150 red blood cells. Tests were negative for paraneoplastic syndrome, 

autoimmune encephalitis, and Creutzfeldt-Addy disease. We requested transfer 

to both [facility] and [facility], but both refused. They stated they 

had nothing else to offer. I did discuss the case with Dr. Hardy at [facility], 

who discussed it with their neuroradiologist. The neuroradiologist raised the 

possibility of cerebral amyloidosis with angiitis. I had a full discussion with 

the family, recommending that we could either take a palliative approach or 

pursue high-dose steroid therapy. At first they decided for the palliative 

approach, but they then changed their mind. The patient received 1 g of 

intravenous Solu-Medrol daily for 5 days and since then has been on oral 

prednisone, 70 mg daily. She had a marked improvement in her mental status. I 

had a discharge-day meeting with the patient and her family. I told them that 

we would continue the steroids for 3 weeks and then would have to make a 

decision. Either we would taper off the steroids and switch to palliative care, 

or I would refer her to a tertiary-care hospital for more advanced auto-immune 

therapies. The patient returned to the office  on 2/13/19. After a long 

discussion with family we decided that we would make more of a palliative 

approach and taper off steroids. I also suggested that the patient follow-up 

with [facility] for future medical treatment as they would be  better able to help with 

any tertiary-care neurology problems. I see the patient was just here to weeks 

ago for sepsis and then returns last night because of a week of increasing right

-sided weakness. The daughter says the patient is doing better today. At 

baseline, she is mostly in her wheelchair. She has had no seizures.





Past Medical History


Cardiovascular:  CAD, HTN, Hyperlipidemia


Pulmonary:  Pulmonary embolus


CENTRAL NERVOUS SYSTEM:  CVA, Dementia (amyloid angiitis), Seizure


Endocrine:  Diabetes





Past Surgical History


Past Surgical History:  Appendectomy, Hysterectomy, Other (cortonary stent)





Family History


Family History:  Cancer





Social History


Social History


Lives with daughter, no alcohol or tobacco





Current Medications


Current Medications





Current Medications


Furosemide (Lasix) 40 mg 1X  ONCE IVP  Last administered on 3/31/19at 22:11;  

Start 3/31/19 at 21:30;  Stop 3/31/19 at 21:31;  Status DC


Ondansetron HCl (Zofran) 4 mg PRN Q8HRS  PRN IV NAUSEA/VOMITING 1ST CHOICE;  

Start 3/31/19 at 22:00;  Stop 4/1/19 at 21:59


Morphine Sulfate (Morphine Sulfate) 2 mg PRN Q2HR  PRN IV SEVERE PAIN;  Start 3/

31/19 at 22:00;  Stop 4/1/19 at 21:59


Albuterol/ Ipratropium (Duoneb) 3 ml RTQID NEB  Last administered on 4/1/19at 07

:51;  Start 4/1/19 at 08:00;  Stop 4/2/19 at 07:59


Acetaminophen (Tylenol Supp) 650 mg PRN Q6HRS  PRN VT MILD PAIN / TEMP Last 

administered on 4/1/19at 03:36;  Start 4/1/19 at 03:15





Active Scripts


Active


Keppra (Levetiracetam) 500 Mg Tablet 500 Mg PO BID


Meclizine Hcl 25 Mg Tablet 1 Tab PO PRN TID PRN


Reported


Protonix (Pantoprazole Sodium) 20 Mg Tablet.dr 1 Tab PO DAILY


Vitamin B12-Folic Acid Tablet (Cyanocobalamin/Folic Acid) 1 Each Tablet 1 Each 

PO DAILYBFRSUP


Vitamin D3 (Cholecalciferol (Vitamin D3)) 1,000 Unit Tablet 1 Tab PO DAILYBFRSUP


Voltaren (Diclofenac Sodium) 100 Gm Gel..gram. 1 Gm TP PRN QID


Aspirin 81 Mg Tab.chew 1 Tab PO DAILY


Simvastatin 40 Mg Tablet 1 Tab PO QHS


Atenolol 25 Mg Tablet 1 Tab PO DAILY





Allergies


Allergies:  


Coded Allergies:  


     No Known Drug Allergies (Unverified , 10/29/16)





ROS


Review of System


Negative for fever, chills, weight loss, shortness of breath, chest pain, 

indigestion, hematochezia, melena, and dysuria.  Full 14-point review of 

systems is negative.





Physical Exam


Physical Examination


General:


Well-developed, well-nourished black female in no acute distress


HEENT:


Normocephalic andatraumatic. Tympanic membranes clear.Temporal arteries

pulsatile and nontender.Fundoscopic exam unremarkable


Neck:


Supple without bruit, no meningismus


Musculoskeletal:


Stability:see neurologic. Gait exam:see neurologic. Tone:see neurologic.

Strength:see neurologic.


Neurological:


Mental Status: orientation, memory, attention span/concentration, language, 

fund of knowledge: knows location, not name of hospital, does not know the date

, does not know the president. Cranial Nerves:Pupils equal and reactive to 

light, extraocular movements areintact, visual fields are full to 

confrontation. Facial sensation is normal. There is no facial asymmetry. 

Vestibulo-ocular reflex is intact. Palate elevates and tongue protrudes in 

midline. All other cranial related problems are negative except as mentioned 

before.Reflexes:2+ and symmetric with flexor plantar responses. Motor:4/5 

strength , slightly weaker and right arm. Coordination:Finger-nose finger and 

heel-to-shin testing are normal. Rapid alternating movements and fine finger 

movements are intact. Gait: not tested. Sensory:Normal pinprick, vibration, 

light touch, proprioception.





Vitals


VITALS





Vital Signs








  Date Time  Temp Pulse Resp B/P (MAP) Pulse Ox O2 Delivery O2 Flow Rate FiO2


 


4/1/19 07:54     95 Room Air  


 


4/1/19 07:00 100.0 89 20 105/57 (73)    





 100.0       











Labs


Labs





Laboratory Tests








Test


 3/31/19


19:17 3/31/19


19:29 3/31/19


19:34


 


Urine Collection Type U cath   


 


Urine Color Yellow   


 


Urine Clarity Clear   


 


Urine pH 6.0   


 


Urine Specific Gravity >=1.030   


 


Urine Protein


 30 mg/dL


(NEG-TRACE) 


 





 


Urine Glucose (UA)


 Negative mg/dL


(NEG) 


 





 


Urine Ketones (Stick)


 Negative mg/dL


(NEG) 


 





 


Urine Blood Negative (NEG)   


 


Urine Nitrite Negative (NEG)   


 


Urine Bilirubin Negative (NEG)   


 


Urine Urobilinogen Dipstick


 1.0 mg/dL (0.2


mg/dL) 


 





 


Urine Leukocyte Esterase Negative (NEG)   


 


Urine RBC Occ /HPF (0-2)   


 


Urine WBC Occ /HPF (0-4)   


 


Urine Squamous Epithelial


Cells Occ /LPF 


 


 





 


Urine Bacteria 0 /HPF (0-FEW)   


 


Urine Hyaline Casts


 Occasional


/HPF 


 





 


Urine Mucus Mod /LPF   


 


White Blood Count


 


 8.6 x10^3/uL


(4.0-11.0) 





 


Red Blood Count


 


 3.30 x10^6/uL


(3.50-5.40) 





 


Hemoglobin


 


 8.1 g/dL


(12.0-15.5) 





 


Hematocrit


 


 26.3 %


(36.0-47.0) 





 


Mean Corpuscular Volume  80 fL ()  


 


Mean Corpuscular Hemoglobin  25 pg (25-35)  


 


Mean Corpuscular Hemoglobin


Concent 


 31 g/dL


(31-37) 





 


Red Cell Distribution Width


 


 21.0 %


(11.5-14.5) 





 


Platelet Count


 


 344 x10^3/uL


(140-400) 





 


Neutrophils (%) (Auto)  80 % (31-73)  


 


Lymphocytes (%) (Auto)  12 % (24-48)  


 


Monocytes (%) (Auto)  8 % (0-9)  


 


Eosinophils (%) (Auto)  1 % (0-3)  


 


Basophils (%) (Auto)  0 % (0-3)  


 


Neutrophils # (Auto)


 


 6.9 x10^3uL


(1.8-7.7) 





 


Lymphocytes # (Auto)


 


 1.0 x10^3/uL


(1.0-4.8) 





 


Monocytes # (Auto)


 


 0.7 x10^3/uL


(0.0-1.1) 





 


Eosinophils # (Auto)


 


 0.1 x10^3/uL


(0.0-0.7) 





 


Basophils # (Auto)


 


 0.0 x10^3/uL


(0.0-0.2) 





 


Segmented Neutrophils %  76 % (35-66)  


 


Band Neutrophils %  9 % (0-9)  


 


Lymphocytes %  8 % (24-48)  


 


Monocytes %  4 % (0-10)  


 


Basophils %  1 % (0-3)  


 


Myelocytes %  2 % (0-0)  


 


Nucleated Red Blood Cells  2  


 


Platelet Estimate


 


 Adequate


(ADEQUATE) 





 


Polychromasia  Slight  


 


Poikilocytosis  Mod  


 


Anisocytosis  Mod  


 


Tear Drop Cells  Occ  


 


Ovalocytes  Present  


 


Schistocytes  Few  


 


RBC Morphology Bizarre Forms  Few  


 


Prothrombin Time


 


 13.4 SEC


(11.7-14.0) 





 


Prothromb Time International


Ratio 


 1.1 (0.8-1.1) 


 





 


Activated Partial


Thromboplast Time 


 31 SEC (24-38) 


 





 


Sodium Level


 


 141 mmol/L


(136-145) 





 


Potassium Level


 


 3.8 mmol/L


(3.5-5.1) 





 


Chloride Level


 


 106 mmol/L


() 





 


Carbon Dioxide Level


 


 28 mmol/L


(21-32) 





 


Anion Gap  7 (6-14)  


 


Blood Urea Nitrogen  8 mg/dL (7-20)  


 


Creatinine


 


 0.9 mg/dL


(0.6-1.0) 





 


Estimated GFR


(Cockcroft-Gault) 


 72.4 


 





 


BUN/Creatinine Ratio  9 (6-20)  


 


Glucose Level


 


 115 mg/dL


(70-99) 





 


Lactic Acid Level


 


 1.2 mmol/L


(0.4-2.0) 





 


Calcium Level


 


 8.1 mg/dL


(8.5-10.1) 





 


Total Bilirubin


 


 0.3 mg/dL


(0.2-1.0) 





 


Aspartate Amino Transf


(AST/SGOT) 


 16 U/L (15-37) 


 





 


Alanine Aminotransferase


(ALT/SGPT) 


 6 U/L (14-59) 


 





 


Alkaline Phosphatase


 


 67 U/L


() 





 


Creatine Kinase


 


 26 U/L


() 





 


Creatine Kinase MB (Mass)


 


 < 0.5 ng/mL


(0.0-3.6) 





 


Creatine Kinase MB Relative


Index 


  % (0-4) 


 





 


Troponin I Quantitative


 


 < 0.017 ng/mL


(0.000-0.055) 





 


NT-Pro-B-Type Natriuretic


Peptide 


 540 pg/mL


(0-449) 





 


Total Protein


 


 5.2 g/dL


(6.4-8.2) 





 


Albumin


 


 2.2 g/dL


(3.4-5.0) 





 


Albumin/Globulin Ratio  0.7 (1.0-1.7)  


 


Lipase


 


 67 U/L


() 





 


Influenza Type A Antigen


 


 


 Negative


(NEGATIVE)


 


Influenza Type B Antigen


 


 


 Negative


(NEGATIVE)








Laboratory Tests








Test


 3/31/19


19:17 3/31/19


19:29 3/31/19


19:34


 


Urine Collection Type U cath   


 


Urine Color Yellow   


 


Urine Clarity Clear   


 


Urine pH 6.0   


 


Urine Specific Gravity >=1.030   


 


Urine Protein


 30 mg/dL


(NEG-TRACE) 


 





 


Urine Glucose (UA)


 Negative mg/dL


(NEG) 


 





 


Urine Ketones (Stick)


 Negative mg/dL


(NEG) 


 





 


Urine Blood Negative (NEG)   


 


Urine Nitrite Negative (NEG)   


 


Urine Bilirubin Negative (NEG)   


 


Urine Urobilinogen Dipstick


 1.0 mg/dL (0.2


mg/dL) 


 





 


Urine Leukocyte Esterase Negative (NEG)   


 


Urine RBC Occ /HPF (0-2)   


 


Urine WBC Occ /HPF (0-4)   


 


Urine Squamous Epithelial


Cells Occ /LPF 


 


 





 


Urine Bacteria 0 /HPF (0-FEW)   


 


Urine Hyaline Casts


 Occasional


/HPF 


 





 


Urine Mucus Mod /LPF   


 


White Blood Count


 


 8.6 x10^3/uL


(4.0-11.0) 





 


Red Blood Count


 


 3.30 x10^6/uL


(3.50-5.40) 





 


Hemoglobin


 


 8.1 g/dL


(12.0-15.5) 





 


Hematocrit


 


 26.3 %


(36.0-47.0) 





 


Mean Corpuscular Volume  80 fL ()  


 


Mean Corpuscular Hemoglobin  25 pg (25-35)  


 


Mean Corpuscular Hemoglobin


Concent 


 31 g/dL


(31-37) 





 


Red Cell Distribution Width


 


 21.0 %


(11.5-14.5) 





 


Platelet Count


 


 344 x10^3/uL


(140-400) 





 


Neutrophils (%) (Auto)  80 % (31-73)  


 


Lymphocytes (%) (Auto)  12 % (24-48)  


 


Monocytes (%) (Auto)  8 % (0-9)  


 


Eosinophils (%) (Auto)  1 % (0-3)  


 


Basophils (%) (Auto)  0 % (0-3)  


 


Neutrophils # (Auto)


 


 6.9 x10^3uL


(1.8-7.7) 





 


Lymphocytes # (Auto)


 


 1.0 x10^3/uL


(1.0-4.8) 





 


Monocytes # (Auto)


 


 0.7 x10^3/uL


(0.0-1.1) 





 


Eosinophils # (Auto)


 


 0.1 x10^3/uL


(0.0-0.7) 





 


Basophils # (Auto)


 


 0.0 x10^3/uL


(0.0-0.2) 





 


Segmented Neutrophils %  76 % (35-66)  


 


Band Neutrophils %  9 % (0-9)  


 


Lymphocytes %  8 % (24-48)  


 


Monocytes %  4 % (0-10)  


 


Basophils %  1 % (0-3)  


 


Myelocytes %  2 % (0-0)  


 


Nucleated Red Blood Cells  2  


 


Platelet Estimate


 


 Adequate


(ADEQUATE) 





 


Polychromasia  Slight  


 


Poikilocytosis  Mod  


 


Anisocytosis  Mod  


 


Tear Drop Cells  Occ  


 


Ovalocytes  Present  


 


Schistocytes  Few  


 


RBC Morphology Bizarre Forms  Few  


 


Prothrombin Time


 


 13.4 SEC


(11.7-14.0) 





 


Prothromb Time International


Ratio 


 1.1 (0.8-1.1) 


 





 


Activated Partial


Thromboplast Time 


 31 SEC (24-38) 


 





 


Sodium Level


 


 141 mmol/L


(136-145) 





 


Potassium Level


 


 3.8 mmol/L


(3.5-5.1) 





 


Chloride Level


 


 106 mmol/L


() 





 


Carbon Dioxide Level


 


 28 mmol/L


(21-32) 





 


Anion Gap  7 (6-14)  


 


Blood Urea Nitrogen  8 mg/dL (7-20)  


 


Creatinine


 


 0.9 mg/dL


(0.6-1.0) 





 


Estimated GFR


(Cockcroft-Gault) 


 72.4 


 





 


BUN/Creatinine Ratio  9 (6-20)  


 


Glucose Level


 


 115 mg/dL


(70-99) 





 


Lactic Acid Level


 


 1.2 mmol/L


(0.4-2.0) 





 


Calcium Level


 


 8.1 mg/dL


(8.5-10.1) 





 


Total Bilirubin


 


 0.3 mg/dL


(0.2-1.0) 





 


Aspartate Amino Transf


(AST/SGOT) 


 16 U/L (15-37) 


 





 


Alanine Aminotransferase


(ALT/SGPT) 


 6 U/L (14-59) 


 





 


Alkaline Phosphatase


 


 67 U/L


() 





 


Creatine Kinase


 


 26 U/L


() 





 


Creatine Kinase MB (Mass)


 


 < 0.5 ng/mL


(0.0-3.6) 





 


Creatine Kinase MB Relative


Index 


  % (0-4) 


 





 


Troponin I Quantitative


 


 < 0.017 ng/mL


(0.000-0.055) 





 


NT-Pro-B-Type Natriuretic


Peptide 


 540 pg/mL


(0-449) 





 


Total Protein


 


 5.2 g/dL


(6.4-8.2) 





 


Albumin


 


 2.2 g/dL


(3.4-5.0) 





 


Albumin/Globulin Ratio  0.7 (1.0-1.7)  


 


Lipase


 


 67 U/L


() 





 


Influenza Type A Antigen


 


 


 Negative


(NEGATIVE)


 


Influenza Type B Antigen


 


 


 Negative


(NEGATIVE)











Images


Images


CT head without contrast dated 3/31/2019.


 


Comparison made to 3/20/2019.


 


CLINICAL INDICATION: Confusion.


 


TECHNIQUE:


 


Contiguous axial imaging the head was performed from skull base to vertex.


No contrast administered. 


One or more of the following individualized dose reduction techniques were


utilized for this examination:  


1. Automated exposure control  


2. Adjustment of the mA and/or kV according to patient size  


3. Use of iterative reconstruction technique.


 


FINDINGS:


 


Ventricles and sulci are mildly prominent for age. No midline shift or 


mass effect. Mild to moderate patchy low density in the deep/subcortical 


periventricular white matter. No hemorrhage or extra-axial collection. 


Posterior fossa and brainstem unremarkable.


 


Mild to moderate mucosal thickening of the maxillary sinuses and ethmoid 


air cells. Mastoid air cells are clear. No apparent calvarial abnormality.


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


 


IMPRESSION:


1. No evidence of acute intracranial hemorrhage or mass.


2. Mild to moderate chronic small vessel ischemic changes and atrophy.


2. Mild sinus disease.





Assessment/Plan


Assessment/Plan


Impression:


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 

demented state and multiple cerebral infarcts. I suppose she may have had a new 

one last week but the right arm weakness is only minimal compared to the left.


History of seizures





Recommendations:


The patient has had extensive workup and we treated her as above with steroids, 

but after a lengthy family meeting, decided that the patient was not a 

candidate for further aggressive therapy, which would involve aggressively 

suppressing the immune system.


Therefore, I see no need to repeat imaging studies such as MRI of the brain or 

repeat her lumbar puncture.


Rehabilitation modalities.


Observation.


Discussed with patients daughter.





Thank you for letting me help with the patient's care.











RUPA PIKE MD Apr 1, 2019 10:50

## 2019-04-01 NOTE — EKG
[facility]

              [address] [place] [address]

Test Date:    2019               Test Time:    18:56:02

Pat Name:     MARY OBREGON            Department:   

Patient ID:   [id number]           Room:         202 1

Gender:       F                        Technician:   

:          [id number]               Requested By: MARY BARLOW

Order Number: [id number]           Reading MD:   Ayush Rizvi MD

                                 Measurements

Intervals                              Axis          

Rate:         113                      P:            36

WI:           162                      QRS:          24

QRSD:         82                       T:            60

QT:           332                                    

QTc:          461                                    

                           Interpretive Statements

PROBABLE SINUS TACHYCARDIA

CANNOT RULE OUT AFIB

BASELINE ARTIFACT

PVC

NON-SPECIFIC ST/T CHANGES

Electronically Signed On 2019 14:47:42 CDT by Ayush Rizvi MD

## 2019-04-01 NOTE — NUR
SS following for discharge planning. SS reviewed pt chart. Pt has had previous admissions to 
[facility]. Pt was discharged to skilled nursing unit at [facility] in January of 2019 and has had services with [facility] at home. 
Pt is from home with family. PT/OT ordered. SS will await PT/OT evaluations and 
recommendations and will proceed accordingly with discharge planning.

## 2019-04-02 VITALS — SYSTOLIC BLOOD PRESSURE: 115 MMHG | DIASTOLIC BLOOD PRESSURE: 58 MMHG

## 2019-04-02 VITALS — SYSTOLIC BLOOD PRESSURE: 102 MMHG | DIASTOLIC BLOOD PRESSURE: 50 MMHG

## 2019-04-02 VITALS — SYSTOLIC BLOOD PRESSURE: 108 MMHG | DIASTOLIC BLOOD PRESSURE: 52 MMHG

## 2019-04-02 VITALS — DIASTOLIC BLOOD PRESSURE: 55 MMHG | SYSTOLIC BLOOD PRESSURE: 99 MMHG

## 2019-04-02 VITALS — SYSTOLIC BLOOD PRESSURE: 131 MMHG | DIASTOLIC BLOOD PRESSURE: 61 MMHG

## 2019-04-02 VITALS — DIASTOLIC BLOOD PRESSURE: 71 MMHG | SYSTOLIC BLOOD PRESSURE: 163 MMHG

## 2019-04-02 LAB
ANION GAP SERPL CALC-SCNC: 11 MMOL/L (ref 6–14)
BUN SERPL-MCNC: 13 MG/DL (ref 7–20)
CALCIUM SERPL-MCNC: 7.9 MG/DL (ref 8.5–10.1)
CHLORIDE SERPL-SCNC: 105 MMOL/L (ref 98–107)
CHOLEST SERPL-MCNC: 142 MG/DL (ref 0–200)
CHOLEST/HDLC SERPL: 3.8 {RATIO}
CO2 SERPL-SCNC: 27 MMOL/L (ref 21–32)
CREAT SERPL-MCNC: 1.3 MG/DL (ref 0.6–1)
ERYTHROCYTE [DISTWIDTH] IN BLOOD BY AUTOMATED COUNT: 21.2 % (ref 11.5–14.5)
GFR SERPLBLD BASED ON 1.73 SQ M-ARVRAT: 47.3 ML/MIN
GLUCOSE SERPL-MCNC: 226 MG/DL (ref 70–99)
HCT VFR BLD CALC: 28 % (ref 36–47)
HDLC SERPL-MCNC: 37 MG/DL (ref 40–60)
HGB BLD-MCNC: 8.7 G/DL (ref 12–15.5)
LDLC: 82 MG/DL (ref 0–100)
MAGNESIUM SERPL-MCNC: 1.9 MG/DL (ref 1.8–2.4)
MCH RBC QN AUTO: 25 PG (ref 25–35)
MCHC RBC AUTO-ENTMCNC: 31 G/DL (ref 31–37)
MCV RBC AUTO: 80 FL (ref 79–100)
PLATELET # BLD AUTO: 341 X10^3/UL (ref 140–400)
POTASSIUM SERPL-SCNC: 3.1 MMOL/L (ref 3.5–5.1)
RBC # BLD AUTO: 3.53 X10^6/UL (ref 3.5–5.4)
SODIUM SERPL-SCNC: 143 MMOL/L (ref 136–145)
TRIGL SERPL-MCNC: 114 MG/DL (ref 0–150)
VLDLC: 23 MG/DL (ref 0–40)
WBC # BLD AUTO: 7.3 X10^3/UL (ref 4–11)

## 2019-04-02 RX ADMIN — VITAMIN D, TAB 1000IU (100/BT) SCH UNIT: 25 TAB at 17:00

## 2019-04-02 RX ADMIN — PANTOPRAZOLE SODIUM SCH MG: 40 TABLET, DELAYED RELEASE ORAL at 11:19

## 2019-04-02 RX ADMIN — FOLIC ACID-PYRIDOXINE-CYANOCOBALAMIN TAB 2.5-25-2 MG SCH TAB: 2.5-25-2 TAB at 17:00

## 2019-04-02 RX ADMIN — ASPIRIN 81 MG SCH MG: 81 TABLET ORAL at 11:20

## 2019-04-02 RX ADMIN — SIMVASTATIN SCH MG: 40 TABLET, FILM COATED ORAL at 22:09

## 2019-04-02 RX ADMIN — ATENOLOL SCH MG: 25 TABLET ORAL at 11:20

## 2019-04-02 RX ADMIN — ENOXAPARIN SODIUM SCH MG: 40 INJECTION SUBCUTANEOUS at 18:34

## 2019-04-02 NOTE — PDOC
PROGRESS NOTES


Assessment


Problems


Medical Problems:


(1) Chest pain


Status: Acute  





(2) CHF (congestive heart failure)


Status: Acute  





Sleep deprivation, daughter says patient was up all night coughing


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 

demented state and multiple cerebral infarcts. 


I suppose she may have had a new stroke last week but the right arm weakness is 

only minimal compared to the left.


History of seizures


Plan


Holding on aggressively suppressing the immune system.


No need to repeat imaging studies such as MRI of the brain or repeat her lumbar 

puncture.


Rehabilitation modalities.


Observation.


Discussed with patients daughter.


Subjective


Patient indicates she is in no pain.


Objective





Vital Signs








  Date Time  Temp Pulse Resp B/P (MAP) Pulse Ox O2 Delivery O2 Flow Rate FiO2


 


4/2/19 03:00 98.7 81 20 99/55 (70) 92 Room Air  





 98.7       














Intake and Output 


 


 4/2/19





 06:59


 


Intake Total 100 ml


 


Output Total 100 ml


 


Balance 0 ml


 


 


 


Intake Oral 100 ml


 


Output Urine Total 100 ml


 


# Voids 1








PHYSICAL EXAM


Sleepy, arouses, nods yes and no to questions


PERRL.


EOMI.


CN: no focal findings.


Muscle tone: normal.


Muscle strength: 3/5, maybe a little weaker in the right arm, but not 

significantly


DTR: 1+


Plantar reflex:  flexor


Gait: not examined in bed.


Sensory exam: no abnormal findings.


No cerebellar signs elicited.


Review of Relevant


I have reviewed the following items marky (where applicable) has been applied.


Labs





Laboratory Tests








Test


 3/31/19


19:17 3/31/19


19:29 3/31/19


19:34 4/1/19


10:20


 


Urine Collection Type U cath    


 


Urine Color Yellow    


 


Urine Clarity Clear    


 


Urine pH 6.0    


 


Urine Specific Gravity >=1.030    


 


Urine Protein


 30 mg/dL


(NEG-TRACE) 


 


 





 


Urine Glucose (UA)


 Negative mg/dL


(NEG) 


 


 





 


Urine Ketones (Stick)


 Negative mg/dL


(NEG) 


 


 





 


Urine Blood Negative (NEG)    


 


Urine Nitrite Negative (NEG)    


 


Urine Bilirubin Negative (NEG)    


 


Urine Urobilinogen Dipstick


 1.0 mg/dL (0.2


mg/dL) 


 


 





 


Urine Leukocyte Esterase Negative (NEG)    


 


Urine RBC Occ /HPF (0-2)    


 


Urine WBC Occ /HPF (0-4)    


 


Urine Squamous Epithelial


Cells Occ /LPF 


 


 


 





 


Urine Bacteria 0 /HPF (0-FEW)    


 


Urine Hyaline Casts


 Occasional


/HPF 


 


 





 


Urine Mucus Mod /LPF    


 


White Blood Count


 


 8.6 x10^3/uL


(4.0-11.0) 


 





 


Red Blood Count


 


 3.30 x10^6/uL


(3.50-5.40) 


 





 


Hemoglobin


 


 8.1 g/dL


(12.0-15.5) 


 





 


Hematocrit


 


 26.3 %


(36.0-47.0) 


 





 


Mean Corpuscular Volume  80 fL ()   


 


Mean Corpuscular Hemoglobin  25 pg (25-35)   


 


Mean Corpuscular Hemoglobin


Concent 


 31 g/dL


(31-37) 


 





 


Red Cell Distribution Width


 


 21.0 %


(11.5-14.5) 


 





 


Platelet Count


 


 344 x10^3/uL


(140-400) 


 





 


Neutrophils (%) (Auto)  80 % (31-73)   


 


Lymphocytes (%) (Auto)  12 % (24-48)   


 


Monocytes (%) (Auto)  8 % (0-9)   


 


Eosinophils (%) (Auto)  1 % (0-3)   


 


Basophils (%) (Auto)  0 % (0-3)   


 


Neutrophils # (Auto)


 


 6.9 x10^3uL


(1.8-7.7) 


 





 


Lymphocytes # (Auto)


 


 1.0 x10^3/uL


(1.0-4.8) 


 





 


Monocytes # (Auto)


 


 0.7 x10^3/uL


(0.0-1.1) 


 





 


Eosinophils # (Auto)


 


 0.1 x10^3/uL


(0.0-0.7) 


 





 


Basophils # (Auto)


 


 0.0 x10^3/uL


(0.0-0.2) 


 





 


Segmented Neutrophils %  76 % (35-66)   


 


Band Neutrophils %  9 % (0-9)   


 


Lymphocytes %  8 % (24-48)   


 


Monocytes %  4 % (0-10)   


 


Basophils %  1 % (0-3)   


 


Myelocytes %  2 % (0-0)   


 


Nucleated Red Blood Cells  2   


 


Platelet Estimate


 


 Adequate


(ADEQUATE) 


 





 


Polychromasia  Slight   


 


Poikilocytosis  Mod   


 


Anisocytosis  Mod   


 


Tear Drop Cells  Occ   


 


Ovalocytes  Present   


 


Schistocytes  Few   


 


RBC Morphology Bizarre Forms  Few   


 


Prothrombin Time


 


 13.4 SEC


(11.7-14.0) 


 





 


Prothromb Time International


Ratio 


 1.1 (0.8-1.1) 


 


 





 


Activated Partial


Thromboplast Time 


 31 SEC (24-38) 


 


 





 


Sodium Level


 


 141 mmol/L


(136-145) 


 





 


Potassium Level


 


 3.8 mmol/L


(3.5-5.1) 


 





 


Chloride Level


 


 106 mmol/L


() 


 





 


Carbon Dioxide Level


 


 28 mmol/L


(21-32) 


 





 


Anion Gap  7 (6-14)   


 


Blood Urea Nitrogen  8 mg/dL (7-20)   


 


Creatinine


 


 0.9 mg/dL


(0.6-1.0) 


 





 


Estimated GFR


(Cockcroft-Gault) 


 72.4 


 


 





 


BUN/Creatinine Ratio  9 (6-20)   


 


Glucose Level


 


 115 mg/dL


(70-99) 


 





 


Lactic Acid Level


 


 1.2 mmol/L


(0.4-2.0) 


 





 


Calcium Level


 


 8.1 mg/dL


(8.5-10.1) 


 





 


Total Bilirubin


 


 0.3 mg/dL


(0.2-1.0) 


 





 


Aspartate Amino Transf


(AST/SGOT) 


 16 U/L (15-37) 


 


 





 


Alanine Aminotransferase


(ALT/SGPT) 


 6 U/L (14-59) 


 


 





 


Alkaline Phosphatase


 


 67 U/L


() 


 





 


Creatine Kinase


 


 26 U/L


() 


 





 


Creatine Kinase MB (Mass)


 


 < 0.5 ng/mL


(0.0-3.6) 


 





 


Creatine Kinase MB Relative


Index 


  % (0-4) 


 


 





 


Troponin I Quantitative


 


 < 0.017 ng/mL


(0.000-0.055) 


 0.537 ng/mL


(0.000-0.055)


 


NT-Pro-B-Type Natriuretic


Peptide 


 540 pg/mL


(0-449) 


 





 


Total Protein


 


 5.2 g/dL


(6.4-8.2) 


 





 


Albumin


 


 2.2 g/dL


(3.4-5.0) 


 





 


Albumin/Globulin Ratio  0.7 (1.0-1.7)   


 


Lipase


 


 67 U/L


() 


 





 


Influenza Type A Antigen


 


 


 Negative


(NEGATIVE) 





 


Influenza Type B Antigen


 


 


 Negative


(NEGATIVE) 





 


Test


 4/1/19


16:05 4/1/19


17:01 4/1/19


20:51 4/2/19


07:47


 


Troponin I Quantitative


 0.368 ng/mL


(0.000-0.055) 


 


 





 


Glucose (Fingerstick)


 


 141 mg/dL


(70-99) 120 mg/dL


(70-99) 101 mg/dL


(70-99)








Laboratory Tests








Test


 4/1/19


16:05 4/1/19


17:01 4/1/19


20:51 4/2/19


07:47


 


Troponin I Quantitative


 0.368 ng/mL


(0.000-0.055) 


 


 





 


Glucose (Fingerstick)


 


 141 mg/dL


(70-99) 120 mg/dL


(70-99) 101 mg/dL


(70-99)








Microbiology


3/31/19 Blood Culture - Preliminary, Resulted


          NO GROWTH AFTER 1 DAY


Medications





Current Medications


Furosemide (Lasix) 40 mg 1X  ONCE IVP  Last administered on 3/31/19at 22:11;  

Start 3/31/19 at 21:30;  Stop 3/31/19 at 21:31;  Status DC


Ondansetron HCl (Zofran) 4 mg PRN Q8HRS  PRN IV NAUSEA/VOMITING 1ST CHOICE;  

Start 3/31/19 at 22:00;  Stop 4/1/19 at 21:59;  Status DC


Morphine Sulfate (Morphine Sulfate) 2 mg PRN Q2HR  PRN IV SEVERE PAIN;  Start 3/

31/19 at 22:00;  Stop 4/1/19 at 21:59;  Status DC


Albuterol/ Ipratropium (Duoneb) 3 ml RTQID NEB  Last administered on 4/1/19at 19

:17;  Start 4/1/19 at 08:00;  Stop 4/2/19 at 07:59;  Status DC


Acetaminophen (Tylenol Supp) 650 mg PRN Q6HRS  PRN GA MILD PAIN / TEMP Last 

administered on 4/1/19at 03:36;  Start 4/1/19 at 03:15


Aspirin (Children'S Aspirin) 81 mg DAILY PO  Last administered on 4/1/19at 16:20

;  Start 4/1/19 at 16:00


Vitamin D (Vitamin D3) 1,000 unit DAILYBFRSUP PO  Last administered on 4/1/19at 

16:23;  Start 4/1/19 at 17:00


Diclofenac Sodium (Voltaren) 1 danyell PRN QID  PRN TP PAIN;  Start 4/1/19 at 15:15


Levetiracetam (Keppra) 500 mg BID PO  Last administered on 4/1/19at 21:47;  

Start 4/1/19 at 21:00


Atenolol (Tenormin) 25 mg DAILY PO  Last administered on 4/1/19at 16:22;  Start 

4/1/19 at 16:00


Vitamin B Complex (Folbic Tablet) 1 tab DAILYBFRSUP PO  Last administered on 4/1 /19at 16:23;  Start 4/1/19 at 17:00


Meclizine HCl (Antivert) 25 mg PRN Q6HRS  PRN PO DIZZINESS;  Start 4/1/19 at 15:

30


Pantoprazole Sodium (Protonix) 40 mg DAILYAC PO  Last administered on 4/1/19at 

16:21;  Start 4/1/19 at 16:00


Simvastatin (Zocor) 40 mg QHS PO  Last administered on 4/1/19at 21:47;  Start 4/ 1/19 at 21:00


Enoxaparin Sodium (Lovenox 40mg Syringe) 40 mg Q24H SQ  Last administered on 4/1 /19at 16:22;  Start 4/1/19 at 16:00


Acetaminophen (Tylenol) 650 mg PRN Q6HRS  PRN PO MILD PAIN / TEMP Last 

administered on 4/1/19at 23:00;  Start 4/1/19 at 23:15





Active Scripts


Active


Keppra (Levetiracetam) 500 Mg Tablet 500 Mg PO BID


Meclizine Hcl 25 Mg Tablet 1 Tab PO PRN TID PRN


Reported


Protonix (Pantoprazole Sodium) 20 Mg Tablet.dr 1 Tab PO DAILY


Vitamin B12-Folic Acid Tablet (Cyanocobalamin/Folic Acid) 1 Each Tablet 1 Each 

PO DAILYBFRSUP


Vitamin D3 (Cholecalciferol (Vitamin D3)) 1,000 Unit Tablet 1 Tab PO DAILYBFRSUP


Voltaren (Diclofenac Sodium) 100 Gm Gel..gram. 1 Gm TP PRN QID


Aspirin 81 Mg Tab.chew 1 Tab PO DAILY


Simvastatin 40 Mg Tablet 1 Tab PO QHS


Atenolol 25 Mg Tablet 1 Tab PO DAILY


Vitals/I & O





Vital Sign - Last 24 Hours








 4/1/19 4/1/19 4/1/19 4/1/19





 11:27 11:40 14:37 15:36


 


Temp  99.7 98.3 





  99.7 98.3 


 


Pulse  86 96 


 


Resp  20 20 


 


B/P (MAP)  108/56 (73) 104/56 (72) 


 


Pulse Ox 95 94 95 95


 


O2 Delivery Room Air Room Air Room Air Room Air


 


    





    





 4/1/19 4/1/19 4/1/19 4/1/19





 16:22 19:18 19:40 20:00


 


Temp   100.3 





   100.3 


 


Pulse 110  94 


 


Resp   20 


 


B/P (MAP) 104/56  100/51 (67) 


 


Pulse Ox  97 98 


 


O2 Delivery  Room Air Room Air Room Air


 


    





    





 4/1/19 4/2/19  





 22:40 03:00  


 


Temp 100.0 98.7  





 100.0 98.7  


 


Pulse 92 81  


 


Resp 20 20  


 


B/P (MAP) 108/55 (72) 99/55 (70)  


 


Pulse Ox 96 92  


 


O2 Delivery Room Air Room Air  














Intake and Output   


 


 4/1/19 4/1/19 4/2/19





 14:59 22:59 06:59


 


Intake Total   100 ml


 


Output Total  100 ml 


 


Balance  -100 ml 100 ml

















RUPA PIKE MD Apr 2, 2019 10:28

## 2019-04-02 NOTE — CARD
MR#: [id number]

Account#: [id number]

Accession#: [id number]

Date of Study: 04/02/2019

Ordering Physician: CATE WILLOUGHBY, 

Referring Physician: TORIE MAXWELL 

Tech: Tabitha June JOSE





--------------- APPROVED REPORT --------------





EXAM: Two-dimensional and M-mode echocardiogram with Doppler and color Doppler.



Other Information 

Quality : Technically LimitedHR: 90bpm

Rhythm : NSRTechnically limited study due to  body habitus.



INDICATION

Non STEMI



2D DIMENSIONS 

RVDd2.9 (2.9-3.5cm)IVSd2.0 (0.7-1.1cm)

Aortic Root(2D)2.6 (2.0-3.7cm)LVDd3.4 (3.9-5.9cm)

LVOT Diameter1.8 (1.8-2.4cm)PWd0.8 (0.7-1.1cm)

LVDs2.3 (2.5-4.0cm)FS (%) 33.4 %

SV29.7 mlLVEF(%)63.2 (>50%)



Aortic Valve

AoV Peak Marshall.126.5cm/sAoV VTI16.1cm

AO Peak GR.6.4mmHgLVOT Peak Marshall.62.1cm/s

LVOT  VTI 9.06cmAO Mean GR.3mmHg

ULYSSES (VMAX)0.95xo0GMP   (VTI)1.39cm2



Mitral Valve

MV E Wwpoplki57.3cm/sMV DECEL [id number]

MV A Qoenufav33.5cm/sMV [id number]

E/A  Ratio0.7MVA (PHT)3.03cm2



Pulmonary Valve

PV Peak Nujwsdyo55.6cm/sPV Peak Grad.2mmHg



Tricuspid Valve

TR P. [id number]/sRAP [id number]

TR Peak Gr.[id number]



 LEFT VENTRICLE 

The left ventricle cavity is small. Proximal septal thickening is noted. The left ventricular systoli
c function is normal. The Ejection Fraction is 55-60%. There is normal LV segmental wall motion. Cali
smitral Doppler flow pattern is Grade I-abnormal relaxation pattern.



 RIGHT VENTRICLE 

The right ventricle is normal size. There is normal right ventricular wall thickness. The right ventr
icular systolic function is normal.



 ATRIA 

The left atrium is small. In most views the left atrium appears compressed. The right atrium size is 
normal. The interatrial septum is intact with no evidence for an atrial septal defect or patent jere
en ovale as noted on 2-D or Doppler imaging.



 AORTIC VALVE 

The aortic valve is calcified but opens well. The aortic valve is trileaflet. Doppler and Color Flow 
revealed no significant aortic regurgitation. There is no significant aortic valvular stenosis.



 MITRAL VALVE 

The mitral valve is thickened but opens well. There is no evidence of mitral valve prolapse. There is
 no mitral valve stenosis. Doppler and Color-flow revealed mild mitral regurgitation.



 TRICUSPID VALVE 

The tricuspid valve is normal in structure and function. Doppler and Color Flow revealed trace to mil
d tricuspid regurgitation. The PA pressure was estimated at 34 mmHg. There is no tricuspid valve prol
apse or vegetation. There is no tricuspid valve stenosis.



 PULMONIC VALVE 

The pulmonic valve is not well visualized.



 GREAT VESSELS 

The aortic root is normal in size. The ascending aorta is normal in size. The IVC is normal in size a
nd collapses >50% with inspiration.



 PERICARDIAL EFFUSION 

There is no evidence of significant pericardial effusion.



Critical Notification

Critical Value: No



<Conclusion>

The left ventricular systolic function is normal.

The Ejection Fraction is 55-60%.

There is normal LV segmental wall motion.

Transmitral Doppler flow pattern is Grade I-abnormal relaxation pattern.

Mild mitral regurgitation.

Trace to mild tricuspid regurgitation.

The PA pressure was estimated at 34 mmHg.

There is no evidence of significant pericardial effusion.



Signed by : Marcin Grewal, 

Electronically Approved : 04/02/2019 13:17:55

## 2019-04-02 NOTE — NUR
RN NOTE

patient had a seizure and son came out of the room stating something is wrong with my mom. 
this rn went into the room and got a set of vitals noticed patient had lost control of her 
bladder and was non responsive. sternal rub performed and patient did not respond pupils 
were fixed and patient began to drool. patient was transferred from the chair back into the 
bed and with head of bed elevated at 45 degrees. After approx 5-10 minutes patient began to 
respond. patient son and daughter were at bedside. rapid called and dr almanza was on the 
floor at this time

-------------------------------------------------------------------------------

Addendum: 04/02/19 at 1928 by ALINE GHOSH RN

-------------------------------------------------------------------------------

dr steiner notified of seizures no new orders received continue with plan of care

## 2019-04-02 NOTE — EKG
[facility]

              [address] [place] [address]

Test Date:    2019               Test Time:    12:08:21

Pat Name:     MARY OBREGON            Department:   

Patient ID:   [id number]           Room:         202 1

Gender:       F                        Technician:   JESSE

:          1935               Requested By: CATE WILLOUGHBY

Order Number: [id number]           Reading MD:   Ayush Rizvi MD

                                 Measurements

Intervals                              Axis          

Rate:         94                       P:            -62

IL:           168                      QRS:          -3

QRSD:         90                       T:            34

QT:           340                                    

QTc:          430                                    

                           Interpretive Statements

SR

PVC

NON-SPECIFIC ST/T CHANGES

Electronically Signed On 2019 15:52:56 CDT by Ayush Rizvi MD

## 2019-04-02 NOTE — NUR
SS following up with discharge planning. SS met with pt and pt's son in room to discuss 
discharge planning. Pt's son reported that pt will discharge to home when ready. SS 
attempted to discuss current services with [facility] and benefits that pt 
could have with palliative home healthcare. Pt's family declined palliative home healthcare 
at this time and reported that they were satisfied with the services they have been getting 
with [facility]. SS recommended SW with home healthcare to assess for needs in 
the home. Pt's family requesting chest scan prior to discharge from the hospital. Pt's RN 
notified. SS will await discharge orders for home healthcare and will proceed accordingly 
with discharge planning.

## 2019-04-02 NOTE — PDOC
PROGRESS NOTES


Chief Complaint


Chief Complaint


Assessment/Plan


 Impression:  


 Chest pain


 CHF (congestive heart failure)


 Encephalopathy


 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


 Mild to moderate chronic small vessel ischemic changes and atrophy.on CT HEAD


 DIABETES


 HYPERTENSION


 severe protein-caloric malnutrition


 wheezing, possible silent aspiration vs reactive airways


 GERD








Plan:


follow recommendations from consultants, no need for further imaging studies 

from a neurological standpoint of view since the patient seems to be stable


Palliative consult as per consultant has been requested will follow


katelyn huertas


lovenox dvt prophylaxis


SWALLOW PRECAUTIONS





History of Present Illness


History of Present Illness


Patient seems to be overwhelmed at the time of my evaluation. Patient has had 

an echocardiogram and EKG done in lab work and she voices that she is tired of 

being poked. Son is at bedside he does not seem to be very engaged in the care 

of his mother and it seems psych care falls mostly on the patient's daughters. 

Reassurances been provided CONCERNS addressed to the best of my abilities





Vitals


Vitals





Vital Signs








  Date Time  Temp Pulse Resp B/P (MAP) Pulse Ox O2 Delivery O2 Flow Rate FiO2


 


4/2/19 11:43      Room Air  


 


4/2/19 11:20  93  102/50    


 


4/2/19 11:00 98.7  20  95   





 98.7       











Physical Exam


General:  Cooperative


Lungs:  Clear


Extremities:  No clubbing, No cyanosis, No edema


Skin:  No breakdown





Labs


LABS





Laboratory Tests








Test


 4/1/19


16:05 4/1/19


17:01 4/1/19


20:51 4/2/19


07:47


 


Troponin I Quantitative


 0.368 ng/mL


(0.000-0.055) 


 


 





 


Glucose (Fingerstick)


 


 141 mg/dL


(70-99) 120 mg/dL


(70-99) 101 mg/dL


(70-99)


 


Test


 4/2/19


11:55 4/2/19


12:05 


 





 


Glucose (Fingerstick)


 224 mg/dL


(70-99) 


 


 





 


White Blood Count


 


 7.3 x10^3/uL


(4.0-11.0) 


 





 


Red Blood Count


 


 3.53 x10^6/uL


(3.50-5.40) 


 





 


Hemoglobin


 


 8.7 g/dL


(12.0-15.5) 


 





 


Hematocrit


 


 28.0 %


(36.0-47.0) 


 





 


Mean Corpuscular Volume  80 fL ()   


 


Mean Corpuscular Hemoglobin  25 pg (25-35)   


 


Mean Corpuscular Hemoglobin


Concent 


 31 g/dL


(31-37) 


 





 


Red Cell Distribution Width


 


 21.2 %


(11.5-14.5) 


 





 


Platelet Count


 


 341 x10^3/uL


(140-400) 


 





 


Sodium Level


 


 143 mmol/L


(136-145) 


 





 


Potassium Level


 


 3.1 mmol/L


(3.5-5.1) 


 





 


Chloride Level


 


 105 mmol/L


() 


 





 


Carbon Dioxide Level


 


 27 mmol/L


(21-32) 


 





 


Anion Gap  11 (6-14)   


 


Blood Urea Nitrogen


 


 13 mg/dL


(7-20) 


 





 


Creatinine


 


 1.3 mg/dL


(0.6-1.0) 


 





 


Estimated GFR


(Cockcroft-Gault) 


 47.3 


 


 





 


Glucose Level


 


 226 mg/dL


(70-99) 


 





 


Calcium Level


 


 7.9 mg/dL


(8.5-10.1) 


 





 


Magnesium Level


 


 1.9 mg/dL


(1.8-2.4) 


 





 


Triglycerides Level


 


 114 mg/dL


(0-150) 


 





 


Cholesterol Level


 


 142 mg/dL


(0-200) 


 





 


LDL Cholesterol, Calculated


 


 82 mg/dL


(0-100) 


 





 


VLDL Cholesterol, Calculated


 


 23 mg/dL


(0-40) 


 





 


Non-HDL Cholesterol Calculated


 


 105 mg/dL


(0-129) 


 





 


HDL Cholesterol


 


 37 mg/dL


(40-60) 


 





 


Cholesterol/HDL Ratio  3.8   











Review of Systems


Review of Systems


Pertinent as per history of present illness otherwise 14 point review of system 

is negative





Assessment and Plan


Assessmemt and Plan


Problems


Medical Problems:


(1) Chest pain


Status: Acute  





(2) CHF (congestive heart failure)


Status: Acute  











Comment


Review of Relevant


I have reviewed the following items marky (where applicable) has been applied.


Labs





Laboratory Tests








Test


 3/31/19


19:17 3/31/19


19:29 3/31/19


19:34 4/1/19


10:20


 


Urine Collection Type U cath    


 


Urine Color Yellow    


 


Urine Clarity Clear    


 


Urine pH 6.0    


 


Urine Specific Gravity >=1.030    


 


Urine Protein


 30 mg/dL


(NEG-TRACE) 


 


 





 


Urine Glucose (UA)


 Negative mg/dL


(NEG) 


 


 





 


Urine Ketones (Stick)


 Negative mg/dL


(NEG) 


 


 





 


Urine Blood Negative (NEG)    


 


Urine Nitrite Negative (NEG)    


 


Urine Bilirubin Negative (NEG)    


 


Urine Urobilinogen Dipstick


 1.0 mg/dL (0.2


mg/dL) 


 


 





 


Urine Leukocyte Esterase Negative (NEG)    


 


Urine RBC Occ /HPF (0-2)    


 


Urine WBC Occ /HPF (0-4)    


 


Urine Squamous Epithelial


Cells Occ /LPF 


 


 


 





 


Urine Bacteria 0 /HPF (0-FEW)    


 


Urine Hyaline Casts


 Occasional


/HPF 


 


 





 


Urine Mucus Mod /LPF    


 


White Blood Count


 


 8.6 x10^3/uL


(4.0-11.0) 


 





 


Red Blood Count


 


 3.30 x10^6/uL


(3.50-5.40) 


 





 


Hemoglobin


 


 8.1 g/dL


(12.0-15.5) 


 





 


Hematocrit


 


 26.3 %


(36.0-47.0) 


 





 


Mean Corpuscular Volume  80 fL ()   


 


Mean Corpuscular Hemoglobin  25 pg (25-35)   


 


Mean Corpuscular Hemoglobin


Concent 


 31 g/dL


(31-37) 


 





 


Red Cell Distribution Width


 


 21.0 %


(11.5-14.5) 


 





 


Platelet Count


 


 344 x10^3/uL


(140-400) 


 





 


Neutrophils (%) (Auto)  80 % (31-73)   


 


Lymphocytes (%) (Auto)  12 % (24-48)   


 


Monocytes (%) (Auto)  8 % (0-9)   


 


Eosinophils (%) (Auto)  1 % (0-3)   


 


Basophils (%) (Auto)  0 % (0-3)   


 


Neutrophils # (Auto)


 


 6.9 x10^3uL


(1.8-7.7) 


 





 


Lymphocytes # (Auto)


 


 1.0 x10^3/uL


(1.0-4.8) 


 





 


Monocytes # (Auto)


 


 0.7 x10^3/uL


(0.0-1.1) 


 





 


Eosinophils # (Auto)


 


 0.1 x10^3/uL


(0.0-0.7) 


 





 


Basophils # (Auto)


 


 0.0 x10^3/uL


(0.0-0.2) 


 





 


Segmented Neutrophils %  76 % (35-66)   


 


Band Neutrophils %  9 % (0-9)   


 


Lymphocytes %  8 % (24-48)   


 


Monocytes %  4 % (0-10)   


 


Basophils %  1 % (0-3)   


 


Myelocytes %  2 % (0-0)   


 


Nucleated Red Blood Cells  2   


 


Platelet Estimate


 


 Adequate


(ADEQUATE) 


 





 


Polychromasia  Slight   


 


Poikilocytosis  Mod   


 


Anisocytosis  Mod   


 


Tear Drop Cells  Occ   


 


Ovalocytes  Present   


 


Schistocytes  Few   


 


RBC Morphology Bizarre Forms  Few   


 


Prothrombin Time


 


 13.4 SEC


(11.7-14.0) 


 





 


Prothromb Time International


Ratio 


 1.1 (0.8-1.1) 


 


 





 


Activated Partial


Thromboplast Time 


 31 SEC (24-38) 


 


 





 


Sodium Level


 


 141 mmol/L


(136-145) 


 





 


Potassium Level


 


 3.8 mmol/L


(3.5-5.1) 


 





 


Chloride Level


 


 106 mmol/L


() 


 





 


Carbon Dioxide Level


 


 28 mmol/L


(21-32) 


 





 


Anion Gap  7 (6-14)   


 


Blood Urea Nitrogen  8 mg/dL (7-20)   


 


Creatinine


 


 0.9 mg/dL


(0.6-1.0) 


 





 


Estimated GFR


(Cockcroft-Gault) 


 72.4 


 


 





 


BUN/Creatinine Ratio  9 (6-20)   


 


Glucose Level


 


 115 mg/dL


(70-99) 


 





 


Lactic Acid Level


 


 1.2 mmol/L


(0.4-2.0) 


 





 


Calcium Level


 


 8.1 mg/dL


(8.5-10.1) 


 





 


Total Bilirubin


 


 0.3 mg/dL


(0.2-1.0) 


 





 


Aspartate Amino Transf


(AST/SGOT) 


 16 U/L (15-37) 


 


 





 


Alanine Aminotransferase


(ALT/SGPT) 


 6 U/L (14-59) 


 


 





 


Alkaline Phosphatase


 


 67 U/L


() 


 





 


Creatine Kinase


 


 26 U/L


() 


 





 


Creatine Kinase MB (Mass)


 


 < 0.5 ng/mL


(0.0-3.6) 


 





 


Creatine Kinase MB Relative


Index 


  % (0-4) 


 


 





 


Troponin I Quantitative


 


 < 0.017 ng/mL


(0.000-0.055) 


 0.537 ng/mL


(0.000-0.055)


 


NT-Pro-B-Type Natriuretic


Peptide 


 540 pg/mL


(0-449) 


 





 


Total Protein


 


 5.2 g/dL


(6.4-8.2) 


 





 


Albumin


 


 2.2 g/dL


(3.4-5.0) 


 





 


Albumin/Globulin Ratio  0.7 (1.0-1.7)   


 


Lipase


 


 67 U/L


() 


 





 


Influenza Type A Antigen


 


 


 Negative


(NEGATIVE) 





 


Influenza Type B Antigen


 


 


 Negative


(NEGATIVE) 





 


Test


 4/1/19


16:05 4/1/19


17:01 4/1/19


20:51 4/2/19


07:47


 


Troponin I Quantitative


 0.368 ng/mL


(0.000-0.055) 


 


 





 


Glucose (Fingerstick)


 


 141 mg/dL


(70-99) 120 mg/dL


(70-99) 101 mg/dL


(70-99)


 


Test


 4/2/19


11:55 4/2/19


12:05 


 





 


Glucose (Fingerstick)


 224 mg/dL


(70-99) 


 


 





 


White Blood Count


 


 7.3 x10^3/uL


(4.0-11.0) 


 





 


Red Blood Count


 


 3.53 x10^6/uL


(3.50-5.40) 


 





 


Hemoglobin


 


 8.7 g/dL


(12.0-15.5) 


 





 


Hematocrit


 


 28.0 %


(36.0-47.0) 


 





 


Mean Corpuscular Volume  80 fL ()   


 


Mean Corpuscular Hemoglobin  25 pg (25-35)   


 


Mean Corpuscular Hemoglobin


Concent 


 31 g/dL


(31-37) 


 





 


Red Cell Distribution Width


 


 21.2 %


(11.5-14.5) 


 





 


Platelet Count


 


 341 x10^3/uL


(140-400) 


 





 


Sodium Level


 


 143 mmol/L


(136-145) 


 





 


Potassium Level


 


 3.1 mmol/L


(3.5-5.1) 


 





 


Chloride Level


 


 105 mmol/L


() 


 





 


Carbon Dioxide Level


 


 27 mmol/L


(21-32) 


 





 


Anion Gap  11 (6-14)   


 


Blood Urea Nitrogen


 


 13 mg/dL


(7-20) 


 





 


Creatinine


 


 1.3 mg/dL


(0.6-1.0) 


 





 


Estimated GFR


(Cockcroft-Gault) 


 47.3 


 


 





 


Glucose Level


 


 226 mg/dL


(70-99) 


 





 


Calcium Level


 


 7.9 mg/dL


(8.5-10.1) 


 





 


Magnesium Level


 


 1.9 mg/dL


(1.8-2.4) 


 





 


Triglycerides Level


 


 114 mg/dL


(0-150) 


 





 


Cholesterol Level


 


 142 mg/dL


(0-200) 


 





 


LDL Cholesterol, Calculated


 


 82 mg/dL


(0-100) 


 





 


VLDL Cholesterol, Calculated


 


 23 mg/dL


(0-40) 


 





 


Non-HDL Cholesterol Calculated


 


 105 mg/dL


(0-129) 


 





 


HDL Cholesterol


 


 37 mg/dL


(40-60) 


 





 


Cholesterol/HDL Ratio  3.8   








Laboratory Tests








Test


 4/1/19


16:05 4/1/19


17:01 4/1/19


20:51 4/2/19


07:47


 


Troponin I Quantitative


 0.368 ng/mL


(0.000-0.055) 


 


 





 


Glucose (Fingerstick)


 


 141 mg/dL


(70-99) 120 mg/dL


(70-99) 101 mg/dL


(70-99)


 


Test


 4/2/19


11:55 4/2/19


12:05 


 





 


Glucose (Fingerstick)


 224 mg/dL


(70-99) 


 


 





 


White Blood Count


 


 7.3 x10^3/uL


(4.0-11.0) 


 





 


Red Blood Count


 


 3.53 x10^6/uL


(3.50-5.40) 


 





 


Hemoglobin


 


 8.7 g/dL


(12.0-15.5) 


 





 


Hematocrit


 


 28.0 %


(36.0-47.0) 


 





 


Mean Corpuscular Volume  80 fL ()   


 


Mean Corpuscular Hemoglobin  25 pg (25-35)   


 


Mean Corpuscular Hemoglobin


Concent 


 31 g/dL


(31-37) 


 





 


Red Cell Distribution Width


 


 21.2 %


(11.5-14.5) 


 





 


Platelet Count


 


 341 x10^3/uL


(140-400) 


 





 


Sodium Level


 


 143 mmol/L


(136-145) 


 





 


Potassium Level


 


 3.1 mmol/L


(3.5-5.1) 


 





 


Chloride Level


 


 105 mmol/L


() 


 





 


Carbon Dioxide Level


 


 27 mmol/L


(21-32) 


 





 


Anion Gap  11 (6-14)   


 


Blood Urea Nitrogen


 


 13 mg/dL


(7-20) 


 





 


Creatinine


 


 1.3 mg/dL


(0.6-1.0) 


 





 


Estimated GFR


(Cockcroft-Gault) 


 47.3 


 


 





 


Glucose Level


 


 226 mg/dL


(70-99) 


 





 


Calcium Level


 


 7.9 mg/dL


(8.5-10.1) 


 





 


Magnesium Level


 


 1.9 mg/dL


(1.8-2.4) 


 





 


Triglycerides Level


 


 114 mg/dL


(0-150) 


 





 


Cholesterol Level


 


 142 mg/dL


(0-200) 


 





 


LDL Cholesterol, Calculated


 


 82 mg/dL


(0-100) 


 





 


VLDL Cholesterol, Calculated


 


 23 mg/dL


(0-40) 


 





 


Non-HDL Cholesterol Calculated


 


 105 mg/dL


(0-129) 


 





 


HDL Cholesterol


 


 37 mg/dL


(40-60) 


 





 


Cholesterol/HDL Ratio  3.8   








Microbiology


3/31/19 Blood Culture - Preliminary, Resulted


          NO GROWTH AFTER 1 DAY


Medications





Current Medications


Furosemide (Lasix) 40 mg 1X  ONCE IVP  Last administered on 3/31/19at 22:11;  

Start 3/31/19 at 21:30;  Stop 3/31/19 at 21:31;  Status DC


Ondansetron HCl (Zofran) 4 mg PRN Q8HRS  PRN IV NAUSEA/VOMITING 1ST CHOICE;  

Start 3/31/19 at 22:00;  Stop 4/1/19 at 21:59;  Status DC


Morphine Sulfate (Morphine Sulfate) 2 mg PRN Q2HR  PRN IV SEVERE PAIN;  Start 3/

31/19 at 22:00;  Stop 4/1/19 at 21:59;  Status DC


Albuterol/ Ipratropium (Duoneb) 3 ml RTQID NEB  Last administered on 4/1/19at 19

:17;  Start 4/1/19 at 08:00;  Stop 4/2/19 at 07:59;  Status DC


Acetaminophen (Tylenol Supp) 650 mg PRN Q6HRS  PRN ND MILD PAIN / TEMP Last 

administered on 4/1/19at 03:36;  Start 4/1/19 at 03:15


Aspirin (Children'S Aspirin) 81 mg DAILY PO  Last administered on 4/2/19at 11:20

;  Start 4/1/19 at 16:00


Vitamin D (Vitamin D3) 1,000 unit DAILYBFRSUP PO  Last administered on 4/1/19at 

16:23;  Start 4/1/19 at 17:00


Diclofenac Sodium (Voltaren) 1 danyell PRN QID  PRN TP PAIN;  Start 4/1/19 at 15:15


Levetiracetam (Keppra) 500 mg BID PO  Last administered on 4/2/19at 11:20;  

Start 4/1/19 at 21:00


Atenolol (Tenormin) 25 mg DAILY PO  Last administered on 4/2/19at 11:20;  Start 

4/1/19 at 16:00


Vitamin B Complex (Folbic Tablet) 1 tab DAILYBFRSUP PO  Last administered on 4/1 /19at 16:23;  Start 4/1/19 at 17:00


Meclizine HCl (Antivert) 25 mg PRN Q6HRS  PRN PO DIZZINESS;  Start 4/1/19 at 15:

30


Pantoprazole Sodium (Protonix) 40 mg DAILYAC PO  Last administered on 4/2/19at 

11:19;  Start 4/1/19 at 16:00


Simvastatin (Zocor) 40 mg QHS PO  Last administered on 4/1/19at 21:47;  Start 4/ 1/19 at 21:00


Enoxaparin Sodium (Lovenox 40mg Syringe) 40 mg Q24H SQ  Last administered on 4/1 /19at 16:22;  Start 4/1/19 at 16:00


Acetaminophen (Tylenol) 650 mg PRN Q6HRS  PRN PO MILD PAIN / TEMP Last 

administered on 4/1/19at 23:00;  Start 4/1/19 at 23:15


Potassium Chloride (Klor-Con) 40 meq 1X  ONCE PO ;  Start 4/2/19 at 13:30;  

Stop 4/2/19 at 13:31;  Status DC





Active Scripts


Active


Keppra (Levetiracetam) 500 Mg Tablet 500 Mg PO BID


Meclizine Hcl 25 Mg Tablet 1 Tab PO PRN TID PRN


Reported


Protonix (Pantoprazole Sodium) 20 Mg Tablet.dr 1 Tab PO DAILY


Vitamin B12-Folic Acid Tablet (Cyanocobalamin/Folic Acid) 1 Each Tablet 1 Each 

PO DAILYBFRSUP


Vitamin D3 (Cholecalciferol (Vitamin D3)) 1,000 Unit Tablet 1 Tab PO DAILYBFRSUP


Voltaren (Diclofenac Sodium) 100 Gm Gel..gram. 1 Gm TP PRN QID


Aspirin 81 Mg Tab.chew 1 Tab PO DAILY


Simvastatin 40 Mg Tablet 1 Tab PO QHS


Atenolol 25 Mg Tablet 1 Tab PO DAILY


Vitals/I & O





Vital Sign - Last 24 Hours








 4/1/19 4/1/19 4/1/19 4/1/19





 15:36 16:22 19:18 19:40


 


Temp    100.3





    100.3


 


Pulse  110  94


 


Resp    20


 


B/P (MAP)  104/56  100/51 (67)


 


Pulse Ox 95  97 98


 


O2 Delivery Room Air  Room Air Room Air


 


    





    





 4/1/19 4/1/19 4/2/19 4/2/19





 20:00 22:40 03:00 08:30


 


Temp  100.0 98.7 





  100.0 98.7 


 


Pulse  92 81 


 


Resp  20 20 


 


B/P (MAP)  108/55 (72) 99/55 (70) 


 


Pulse Ox  96 92 


 


O2 Delivery Room Air Room Air Room Air Room Air


 


    





    





 4/2/19 4/2/19 4/2/19 





 11:00 11:20 11:43 


 


Temp 98.7   





 98.7   


 


Pulse 93 93  


 


Resp 20   


 


B/P (MAP) 102/50 (67) 102/50  


 


Pulse Ox 95   


 


O2 Delivery Room Air  Room Air 














Intake and Output   


 


 4/1/19 4/1/19 4/2/19





 14:59 22:59 06:59


 


Intake Total   100 ml


 


Output Total  100 ml 


 


Balance  -100 ml 100 ml

















HERO LOVE MD Apr 2, 2019 14:48

## 2019-04-03 VITALS — SYSTOLIC BLOOD PRESSURE: 112 MMHG | DIASTOLIC BLOOD PRESSURE: 56 MMHG

## 2019-04-03 VITALS — DIASTOLIC BLOOD PRESSURE: 53 MMHG | SYSTOLIC BLOOD PRESSURE: 110 MMHG

## 2019-04-03 RX ADMIN — PANTOPRAZOLE SODIUM SCH MG: 40 TABLET, DELAYED RELEASE ORAL at 10:03

## 2019-04-03 RX ADMIN — ATENOLOL SCH MG: 25 TABLET ORAL at 10:03

## 2019-04-03 RX ADMIN — ASPIRIN 81 MG SCH MG: 81 TABLET ORAL at 10:03

## 2019-04-03 NOTE — NUR
Discharge Note:



MARY OBREGON



Discharge instructions and discharge home medications reviewed with Care Giver and Daughter 
Karen and a copy given. All questions have been answered and understanding verbalized. 



The following instructions and handouts were given: CP, Seizures, CHF



Discontinued lines and drains:  intact.



Patient discharged to Home w/services with Primary Care Giver via Wheelchair

## 2019-04-03 NOTE — NUR
SS following up with discharge planning. Discharge orders received for home healthcare. SS 
phoned and faxed referral to [facility], [phone]; fax [phone], for 
resumption of care services. Pt's RN notified.

## 2019-04-03 NOTE — SNU/HH DC
DISCHARGE WITH HOME HEALTH


DISCHARGE INFORMATION:


Discharge Date:  Apr 3, 2019


Final Diagnosis:


Problems


Medical Problems:


(1) Seizure disorder


Status: Acute  





(2)Encephalopathy resolved


Status: Acute  





Condition on Discharge:  Stable





CODE STATUS:


Code Status:  Full





HOME HEALTH:


Face to Face:


I certify this patient is under my care and that I, or a nurse practitioner or 

physician's assistant working with me, had a face to face encounter that meets 

the physician face to face encounter requirements with this patient on [].


Medical Complications:  Dementia


RN For Eval/Treatment:  Yes


Physical Therapy For:  Evalulation/Treatment


Occupational Therapy For:  Evaluation/Treatment


Pt Meets Homebound Status:  Unsteady balance w/ amb,, Extreme weakness w/ amb.





POST DISCHARGE ORDERS:


Activity Instructions for Disc:  Activity as tolerated


Weight Bearing Status after Di:  As tolerated


DIET AFTER DISCHARGE:  Cardiac





CHECKS AFTER DISCHARGE:


Checks after discharge:  Check blood press - daily, Check blood sugar, ac/hs, 

Check your Temp as needed





FOLLOW-UP:


DC TO SNF LABS:  BMP weekly





TREATMENT/EQUIPMENT ORDERS:


Adaptive Equipment Issued:  None





CERTIFICATION STATEMENT:


Certification Statement:


Certification Statement: Based on the above finding, I certify that this 

patient is confined to the home and needs intermittent skilled nursing care, 

physical therapy and/or speech therapy, or continues to need occupational 

therapy.~ This patient is under my care, and I have initiated the establishment 

of the plan of care.~ This patient will be followed by myself or a community 

physician who will periodically review the plan of care.


Home Meds


Active Scripts


Levetiracetam (KEPPRA) 500 Mg Tablet, 500 MG PO BID for seizure prevention, #60 

TAB 2 Refills


   Prov:AUNG GRIMES MD         1/24/19


Meclizine Hcl (MECLIZINE HCL) 25 Mg Tablet, 1 TAB PO PRN TID PRN for DIZZINESS, 

#30 TAB


   Prov:KINGS ROSAS MD         10/29/16


Reported Medications


Pantoprazole Sodium (PROTONIX) 20 Mg Tablet.dr, 1 TAB PO DAILY for heartburn, #

30 TAB


   3/20/19


Cyanocobalamin/Folic Acid (VITAMIN B12-FOLIC ACID TABLET) 1 Each Tablet, 1 EACH 

PO DAILYBFRSUP for def, TAB


   1/20/19


Cholecalciferol (Vitamin D3) (VITAMIN D3) 1,000 Unit Tablet, 1 TAB PO 

DAILYBFRSUP for n/a, #30 TAB 5 Refills


   1/20/19


Diclofenac Sodium (VOLTAREN) 100 Gm Gel..gram., 1 GM TP PRN QID for knee pain, #

100 GM 2 Refills


   1/20/19


Aspirin (ASPIRIN) 81 Mg Tab.chew, 1 TAB PO DAILY for blood thinner, #30 TAB 3 

Refills


   10/29/16


Simvastatin (SIMVASTATIN) 40 Mg Tablet, 1 TAB PO QHS for elevated cholesterol, #

30 TAB 5 Refills


   10/29/16


Atenolol (ATENOLOL) 25 Mg Tablet, 1 TAB PO DAILY for htn, #30 TAB 5 Refills


   10/29/16











HERO LOVE MD Apr 3, 2019 09:36

## 2019-04-03 NOTE — PDOC3
Discharge Summary


Visit Information


Date of Admission:  Apr 1, 2019


Date of Discharge:  Apr 3, 2019


Admitting Diagnosis Comment:


Chest pain


 CHF (congestive heart failure)


 Encephalopathy


 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


 Mild to moderate chronic small vessel ischemic changes and atrophy.on CT HEAD


 DIABETES


 HYPERTENSION


 severe protein-caloric malnutrition


 wheezing, possible silent aspiration vs reactive airways


 GERD


Final Diagnosis


            Chest pain ACS ruled out


 CHF (congestive heart failure) now compensated


 Encephalopathy resolved


 Seizure disorder currently on Keppra, most likely associated with her 

underlying angiopathy


 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


 Mild to moderate chronic small vessel ischemic changes and atrophy.on CT HEAD


 DIABETES


 HYPERTENSION


 severe protein-caloric malnutrition


 wheezing, resolved


 GERD





Brief Hospital Course


Allergies





 Allergies








Coded Allergies Type Severity Reaction Last Updated Verified


 


  No Known Drug Allergies    10/29/16 No








Vital Signs





Vital Signs








  Date Time  Temp Pulse Resp B/P (MAP) Pulse Ox O2 Delivery O2 Flow Rate FiO2


 


4/3/19 07:00 98.6 82 16 112/56 (74) 95 Room Air  





 98.6       





General:  Cooperative


Lungs:  Clear


Extremities:  No clubbing, No cyanosis, No edema


Skin:  No breakdown


Lab Results





Laboratory Tests








Test


 4/1/19


10:20 4/1/19


16:05 4/1/19


17:01 4/1/19


20:51


 


Troponin I Quantitative


 0.537 ng/mL


(0.000-0.055) 0.368 ng/mL


(0.000-0.055) 


 





 


Glucose (Fingerstick)


 


 


 141 mg/dL


(70-99) 120 mg/dL


(70-99)


 


Test


 4/2/19


07:47 4/2/19


11:55 4/2/19


12:05 4/2/19


15:56


 


Glucose (Fingerstick)


 101 mg/dL


(70-99) 224 mg/dL


(70-99) 


 144 mg/dL


(70-99)


 


White Blood Count


 


 


 7.3 x10^3/uL


(4.0-11.0) 





 


Red Blood Count


 


 


 3.53 x10^6/uL


(3.50-5.40) 





 


Hemoglobin


 


 


 8.7 g/dL


(12.0-15.5) 





 


Hematocrit


 


 


 28.0 %


(36.0-47.0) 





 


Mean Corpuscular Volume   80 fL ()  


 


Mean Corpuscular Hemoglobin   25 pg (25-35)  


 


Mean Corpuscular Hemoglobin


Concent 


 


 31 g/dL


(31-37) 





 


Red Cell Distribution Width


 


 


 21.2 %


(11.5-14.5) 





 


Platelet Count


 


 


 341 x10^3/uL


(140-400) 





 


Sodium Level


 


 


 143 mmol/L


(136-145) 





 


Potassium Level


 


 


 3.1 mmol/L


(3.5-5.1) 





 


Chloride Level


 


 


 105 mmol/L


() 





 


Carbon Dioxide Level


 


 


 27 mmol/L


(21-32) 





 


Anion Gap   11 (6-14)  


 


Blood Urea Nitrogen


 


 


 13 mg/dL


(7-20) 





 


Creatinine


 


 


 1.3 mg/dL


(0.6-1.0) 





 


Estimated GFR


(Cockcroft-Gault) 


 


 47.3 


 





 


Glucose Level


 


 


 226 mg/dL


(70-99) 





 


Calcium Level


 


 


 7.9 mg/dL


(8.5-10.1) 





 


Magnesium Level


 


 


 1.9 mg/dL


(1.8-2.4) 





 


Triglycerides Level


 


 


 114 mg/dL


(0-150) 





 


Cholesterol Level


 


 


 142 mg/dL


(0-200) 





 


LDL Cholesterol, Calculated


 


 


 82 mg/dL


(0-100) 





 


VLDL Cholesterol, Calculated


 


 


 23 mg/dL


(0-40) 





 


Non-HDL Cholesterol Calculated


 


 


 105 mg/dL


(0-129) 





 


HDL Cholesterol


 


 


 37 mg/dL


(40-60) 





 


Cholesterol/HDL Ratio   3.8  


 


Test


 4/2/19


20:32 4/3/19


07:18 


 





 


Glucose (Fingerstick)


 120 mg/dL


(70-99) 84 mg/dL


(70-99) 


 











Laboratory Tests








Test


 4/2/19


11:55 4/2/19


12:05 4/2/19


15:56 4/2/19


20:32


 


Glucose (Fingerstick)


 224 mg/dL


(70-99) 


 144 mg/dL


(70-99) 120 mg/dL


(70-99)


 


White Blood Count


 


 7.3 x10^3/uL


(4.0-11.0) 


 





 


Red Blood Count


 


 3.53 x10^6/uL


(3.50-5.40) 


 





 


Hemoglobin


 


 8.7 g/dL


(12.0-15.5) 


 





 


Hematocrit


 


 28.0 %


(36.0-47.0) 


 





 


Mean Corpuscular Volume  80 fL ()   


 


Mean Corpuscular Hemoglobin  25 pg (25-35)   


 


Mean Corpuscular Hemoglobin


Concent 


 31 g/dL


(31-37) 


 





 


Red Cell Distribution Width


 


 21.2 %


(11.5-14.5) 


 





 


Platelet Count


 


 341 x10^3/uL


(140-400) 


 





 


Sodium Level


 


 143 mmol/L


(136-145) 


 





 


Potassium Level


 


 3.1 mmol/L


(3.5-5.1) 


 





 


Chloride Level


 


 105 mmol/L


() 


 





 


Carbon Dioxide Level


 


 27 mmol/L


(21-32) 


 





 


Anion Gap  11 (6-14)   


 


Blood Urea Nitrogen


 


 13 mg/dL


(7-20) 


 





 


Creatinine


 


 1.3 mg/dL


(0.6-1.0) 


 





 


Estimated GFR


(Cockcroft-Gault) 


 47.3 


 


 





 


Glucose Level


 


 226 mg/dL


(70-99) 


 





 


Calcium Level


 


 7.9 mg/dL


(8.5-10.1) 


 





 


Magnesium Level


 


 1.9 mg/dL


(1.8-2.4) 


 





 


Triglycerides Level


 


 114 mg/dL


(0-150) 


 





 


Cholesterol Level


 


 142 mg/dL


(0-200) 


 





 


LDL Cholesterol, Calculated


 


 82 mg/dL


(0-100) 


 





 


VLDL Cholesterol, Calculated


 


 23 mg/dL


(0-40) 


 





 


Non-HDL Cholesterol Calculated


 


 105 mg/dL


(0-129) 


 





 


HDL Cholesterol


 


 37 mg/dL


(40-60) 


 





 


Cholesterol/HDL Ratio  3.8   


 


Test


 4/3/19


07:18 


 


 





 


Glucose (Fingerstick)


 84 mg/dL


(70-99) 


 


 











Brief Hospital Course





Patient admitted to the medical floor for evaluation of this chest pain. She 

was seen by cardiology no further interventions were deemed appropriate at the 

present time. Patient has been seen by Dr. Kapadia for quite some time now 

she was able to be weaned off her steroids and we have recommended pursuing a 

consultation with [facility] for further evaluation of her underlying cerebral 

angiitis. The patient presented seizure-like activity day prior to dismissal 

this lasted less than a minute and her postictal state was short-lived less 

than 1 minute. I discussed with family members also therapy expectations and 

plans of care and discussed possibility of enrolling the patient in palliative 

care hospice treatment plan. Patient clearly voiced being tired of being both 

temporal in her demeanor seems very depressed except when I inform her that she 

may be leaving going back home. Patient's demeanor changes completely and she 

seems more optimistic of going home and being in her own environment. I 

discussed importance of quality of life and family will discuss hopefully get a 

palliative care consult once he returned home. He seems to have a good support 

system for the patient older concerns were addressed the best of my abilities. 

No changes were made to the patient's medications she may have had a viral 

infection given the temperature that was recorded throughout her hospital stay 

but did not recur. She did not look toxic and there was no evidence of 

pulmonary etiology nor urinary etiology for her temperature. She did not 

percent next stiffness nor meningeal signs.





She is medically optimized for discharge and she will be going home with home 

health and hopefully  will arrange for a  to visit 

with them in order to provide more support. Greater than 35 minutes were spent 

in the discharge process the patient in counseling coronation of care and 

arrangements for a safe discharge





Discharge Information


Condition at Discharge:  Improved


Follow Up:  Weeks


Disposition/Orders:  D/C to Home w/ HH


Scheduled


Aspirin (Aspirin) 81 Mg Tab.chew, 1 TAB PO DAILY for blood thinner, #30 Ref 3 (

Reported)


   Entered as Reported by: ZAN CRUZ on 10/29/16 1918


   Last Action: Continued on 4/1/19 1516 by KATELYNN ABDULLAHI MD


Atenolol (Atenolol) 25 Mg Tablet, 1 TAB PO DAILY for htn, #30 Ref 5 (Reported)


   Entered as Reported by: ZAN CRUZ on 10/29/16 1918


   Last Action: Converted on 4/1/19 1516 by KATELYNN ABDULLAHI MD


Cholecalciferol (Vitamin D3) (Vitamin D3) 1,000 Unit Tablet, 1 TAB PO 

DAILYBFRSUP for n/a, #30 Ref 5 (Reported)


   Entered as Reported by: ALMA TITUS on 1/20/19 1219


   Last Action: Continued on 4/1/19 1516 by KATELYNN ABDULLAHI MD


Cyanocobalamin/Folic Acid (Vitamin B12-Folic Acid Tablet) 1 Each Tablet, 1 EACH 

PO DAILYBFRSUP for def, (Reported)


   Entered as Reported by: ALMA TITUS on 1/20/19 1221


   Last Action: Converted on 4/1/19 1516 by KATELYNN ABDULLAHI MD


Diclofenac Sodium (Voltaren) 100 Gm Gel..gram., 1 GM TP PRN QID for knee pain, #

100 Ref 2 (Reported)


   Entered as Reported by: ALMA TITUS on 1/20/19 1218


   Last Action: Continued on 4/1/19 1516 by KATELYNN ABDULLAHI MD


Levetiracetam (Keppra) 500 Mg Tablet, 500 MG PO BID for seizure prevention, #60 

Ref 2


   Prescribed by: AUNG GRIMES on 1/24/19 1023


   Last Action: Continued on 4/1/19 1516 by KATELYNN ABDULLAHI MD


Pantoprazole Sodium (Protonix) 20 Mg Tablet.dr, 1 TAB PO DAILY for heartburn, #

30 (Reported)


   Entered as Reported by: SHEEBA KEITA on 3/20/19 2048


   Last Action: Converted on 4/1/19 1516 by KATELYNN ABDULLAHI MD


Simvastatin (Simvastatin) 40 Mg Tablet, 1 TAB PO QHS for elevated cholesterol, #

30 Ref 5 (Reported)


   Entered as Reported by: ZAN CRUZ on 10/29/16 1918


   Last Action: Converted on 4/1/19 1516 by KATELYNN ABDULLAHI MD





Scheduled PRN


Meclizine Hcl (Meclizine Hcl) 25 Mg Tablet, 1 TAB PO PRN TID PRN for DIZZINESS, 

#30


   Prescribed by: KINGS ROSAS on 10/29/16 2034


   Last Action: Converted on 4/1/19 1516 by MD KATE FARR HECTOR M MD Apr 3, 2019 09:45

## 2019-04-03 NOTE — PDOC
PROGRESS NOTES


Assessment


Problems


Medical Problems:


(1) Chest pain


Status: Acute  





(2) CHF (congestive heart failure)


Status: Acute  


She had a seizure yesterday afternoon, history of epilepsy, no additional 

seizures


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 

demented state and multiple cerebral infarcts. 


I suppose she may have had a new stroke last week but the right arm weakness is 

only minimal compared to the left.


Plan


Holding on aggressively suppressing the immune system.


No need to repeat imaging studies such as MRI of the brain or repeat her lumbar 

puncture.


Rehabilitation modalities.


Observation.


Discussed with patients daughter.


In the past I have explained the family that they are better off following with 

 neurologists or other tertiary center for this complex neurological disease, 

but as we are being palliative in our approach, there is no immediate need for 

transfer to 


Agree with discharge plans


Subjective


No complaints


Objective





Vital Signs








  Date Time  Temp Pulse Resp B/P (MAP) Pulse Ox O2 Delivery O2 Flow Rate FiO2


 


4/3/19 10:03  87  112/56    


 


4/3/19 07:00 98.6  16  95 Room Air  





 98.6       














Intake and Output 


 


 4/3/19





 06:59


 


Intake Total 200 ml


 


Balance 200 ml


 


 


 


Intake Oral 200 ml








PHYSICAL EXAM


Bright and alert, does not know date or location, can name and repeat well and 

follows commands


PERRL.


EOMI.


CN: no focal findings.


Muscle tone: normal.


Muscle strength: 3/5, maybe a little weaker in the right arm, but not 

significantly


DTR: 1+


Plantar reflex:  flexor


Gait: not examined in bed.


Sensory exam: no abnormal findings.


No cerebellar signs elicited.


Review of Relevant


I have reviewed the following items marky (where applicable) has been applied.


Labs





Laboratory Tests








Test


 4/1/19


16:05 4/1/19


17:01 4/1/19


20:51 4/2/19


07:47


 


Troponin I Quantitative


 0.368 ng/mL


(0.000-0.055) 


 


 





 


Glucose (Fingerstick)


 


 141 mg/dL


(70-99) 120 mg/dL


(70-99) 101 mg/dL


(70-99)


 


Test


 4/2/19


11:55 4/2/19


12:05 4/2/19


15:56 4/2/19


20:32


 


Glucose (Fingerstick)


 224 mg/dL


(70-99) 


 144 mg/dL


(70-99) 120 mg/dL


(70-99)


 


White Blood Count


 


 7.3 x10^3/uL


(4.0-11.0) 


 





 


Red Blood Count


 


 3.53 x10^6/uL


(3.50-5.40) 


 





 


Hemoglobin


 


 8.7 g/dL


(12.0-15.5) 


 





 


Hematocrit


 


 28.0 %


(36.0-47.0) 


 





 


Mean Corpuscular Volume  80 fL ()   


 


Mean Corpuscular Hemoglobin  25 pg (25-35)   


 


Mean Corpuscular Hemoglobin


Concent 


 31 g/dL


(31-37) 


 





 


Red Cell Distribution Width


 


 21.2 %


(11.5-14.5) 


 





 


Platelet Count


 


 341 x10^3/uL


(140-400) 


 





 


Sodium Level


 


 143 mmol/L


(136-145) 


 





 


Potassium Level


 


 3.1 mmol/L


(3.5-5.1) 


 





 


Chloride Level


 


 105 mmol/L


() 


 





 


Carbon Dioxide Level


 


 27 mmol/L


(21-32) 


 





 


Anion Gap  11 (6-14)   


 


Blood Urea Nitrogen


 


 13 mg/dL


(7-20) 


 





 


Creatinine


 


 1.3 mg/dL


(0.6-1.0) 


 





 


Estimated GFR


(Cockcroft-Gault) 


 47.3 


 


 





 


Glucose Level


 


 226 mg/dL


(70-99) 


 





 


Calcium Level


 


 7.9 mg/dL


(8.5-10.1) 


 





 


Magnesium Level


 


 1.9 mg/dL


(1.8-2.4) 


 





 


Triglycerides Level


 


 114 mg/dL


(0-150) 


 





 


Cholesterol Level


 


 142 mg/dL


(0-200) 


 





 


LDL Cholesterol, Calculated


 


 82 mg/dL


(0-100) 


 





 


VLDL Cholesterol, Calculated


 


 23 mg/dL


(0-40) 


 





 


Non-HDL Cholesterol Calculated


 


 105 mg/dL


(0-129) 


 





 


HDL Cholesterol


 


 37 mg/dL


(40-60) 


 





 


Cholesterol/HDL Ratio  3.8   


 


Test


 4/3/19


07:18 


 


 





 


Glucose (Fingerstick)


 84 mg/dL


(70-99) 


 


 











Laboratory Tests








Test


 4/2/19


11:55 4/2/19


12:05 4/2/19


15:56 4/2/19


20:32


 


Glucose (Fingerstick)


 224 mg/dL


(70-99) 


 144 mg/dL


(70-99) 120 mg/dL


(70-99)


 


White Blood Count


 


 7.3 x10^3/uL


(4.0-11.0) 


 





 


Red Blood Count


 


 3.53 x10^6/uL


(3.50-5.40) 


 





 


Hemoglobin


 


 8.7 g/dL


(12.0-15.5) 


 





 


Hematocrit


 


 28.0 %


(36.0-47.0) 


 





 


Mean Corpuscular Volume  80 fL ()   


 


Mean Corpuscular Hemoglobin  25 pg (25-35)   


 


Mean Corpuscular Hemoglobin


Concent 


 31 g/dL


(31-37) 


 





 


Red Cell Distribution Width


 


 21.2 %


(11.5-14.5) 


 





 


Platelet Count


 


 341 x10^3/uL


(140-400) 


 





 


Sodium Level


 


 143 mmol/L


(136-145) 


 





 


Potassium Level


 


 3.1 mmol/L


(3.5-5.1) 


 





 


Chloride Level


 


 105 mmol/L


() 


 





 


Carbon Dioxide Level


 


 27 mmol/L


(21-32) 


 





 


Anion Gap  11 (6-14)   


 


Blood Urea Nitrogen


 


 13 mg/dL


(7-20) 


 





 


Creatinine


 


 1.3 mg/dL


(0.6-1.0) 


 





 


Estimated GFR


(Cockcroft-Gault) 


 47.3 


 


 





 


Glucose Level


 


 226 mg/dL


(70-99) 


 





 


Calcium Level


 


 7.9 mg/dL


(8.5-10.1) 


 





 


Magnesium Level


 


 1.9 mg/dL


(1.8-2.4) 


 





 


Triglycerides Level


 


 114 mg/dL


(0-150) 


 





 


Cholesterol Level


 


 142 mg/dL


(0-200) 


 





 


LDL Cholesterol, Calculated


 


 82 mg/dL


(0-100) 


 





 


VLDL Cholesterol, Calculated


 


 23 mg/dL


(0-40) 


 





 


Non-HDL Cholesterol Calculated


 


 105 mg/dL


(0-129) 


 





 


HDL Cholesterol


 


 37 mg/dL


(40-60) 


 





 


Cholesterol/HDL Ratio  3.8   


 


Test


 4/3/19


07:18 


 


 





 


Glucose (Fingerstick)


 84 mg/dL


(70-99) 


 


 











Microbiology


3/31/19 Blood Culture - Preliminary, Resulted


          NO GROWTH AFTER 2 DAYS


Medications





Current Medications


Furosemide (Lasix) 40 mg 1X  ONCE IVP  Last administered on 3/31/19at 22:11;  

Start 3/31/19 at 21:30;  Stop 3/31/19 at 21:31;  Status DC


Ondansetron HCl (Zofran) 4 mg PRN Q8HRS  PRN IV NAUSEA/VOMITING 1ST CHOICE;  

Start 3/31/19 at 22:00;  Stop 4/1/19 at 21:59;  Status DC


Morphine Sulfate (Morphine Sulfate) 2 mg PRN Q2HR  PRN IV SEVERE PAIN;  Start 3/

31/19 at 22:00;  Stop 4/1/19 at 21:59;  Status DC


Albuterol/ Ipratropium (Duoneb) 3 ml RTQID NEB  Last administered on 4/1/19at 19

:17;  Start 4/1/19 at 08:00;  Stop 4/2/19 at 07:59;  Status DC


Acetaminophen (Tylenol Supp) 650 mg PRN Q6HRS  PRN ID MILD PAIN / TEMP Last 

administered on 4/1/19at 03:36;  Start 4/1/19 at 03:15


Aspirin (Children'S Aspirin) 81 mg DAILY PO  Last administered on 4/3/19at 10:03

;  Start 4/1/19 at 16:00


Vitamin D (Vitamin D3) 1,000 unit DAILYBFRSUP PO  Last administered on 4/1/19at 

16:23;  Start 4/1/19 at 17:00


Diclofenac Sodium (Voltaren) 1 danyell PRN QID  PRN TP PAIN;  Start 4/1/19 at 15:15


Levetiracetam (Keppra) 500 mg BID PO  Last administered on 4/3/19at 10:03;  

Start 4/1/19 at 21:00


Atenolol (Tenormin) 25 mg DAILY PO  Last administered on 4/3/19at 10:03;  Start 

4/1/19 at 16:00


Vitamin B Complex (Folbic Tablet) 1 tab DAILYBFRSUP PO  Last administered on 4/1 /19at 16:23;  Start 4/1/19 at 17:00


Meclizine HCl (Antivert) 25 mg PRN Q6HRS  PRN PO DIZZINESS;  Start 4/1/19 at 15:

30


Pantoprazole Sodium (Protonix) 40 mg DAILYAC PO  Last administered on 4/3/19at 

10:03;  Start 4/1/19 at 16:00


Simvastatin (Zocor) 40 mg QHS PO  Last administered on 4/2/19at 22:09;  Start 4/ 1/19 at 21:00


Enoxaparin Sodium (Lovenox 40mg Syringe) 40 mg Q24H SQ  Last administered on 4/2 /19at 18:34;  Start 4/1/19 at 16:00


Acetaminophen (Tylenol) 650 mg PRN Q6HRS  PRN PO MILD PAIN / TEMP Last 

administered on 4/1/19at 23:00;  Start 4/1/19 at 23:15


Potassium Chloride (Klor-Con) 40 meq 1X  ONCE PO ;  Start 4/2/19 at 13:30;  

Stop 4/2/19 at 13:31;  Status DC


Potassium Chloride (KCl Oral Soln) 40 meq 1X  ONCE PO ;  Start 4/2/19 at 15:00;

  Stop 4/2/19 at 15:01;  Status DC





Active Scripts


Active


Keppra (Levetiracetam) 500 Mg Tablet 500 Mg PO BID


Meclizine Hcl 25 Mg Tablet 1 Tab PO PRN TID PRN


Reported


Protonix (Pantoprazole Sodium) 20 Mg Tablet.dr 1 Tab PO DAILY


Vitamin B12-Folic Acid Tablet (Cyanocobalamin/Folic Acid) 1 Each Tablet 1 Each 

PO DAILYBFRSUP


Vitamin D3 (Cholecalciferol (Vitamin D3)) 1,000 Unit Tablet 1 Tab PO DAILYBFRSUP


Voltaren (Diclofenac Sodium) 100 Gm Gel..gram. 1 Gm TP PRN QID


Aspirin 81 Mg Tab.chew 1 Tab PO DAILY


Simvastatin 40 Mg Tablet 1 Tab PO QHS


Atenolol 25 Mg Tablet 1 Tab PO DAILY


Vitals/I & O





Vital Sign - Last 24 Hours








 4/2/19 4/2/19 4/2/19 4/2/19





 11:20 11:43 15:00 15:50


 


Pulse 93  68 75


 


Resp   16 22


 


B/P (MAP) 102/50  131/61 (84) 163/71 (101)


 


Pulse Ox   95 97


 


O2 Delivery  Room Air Room Air 





 4/2/19 4/2/19 4/2/19 4/3/19





 19:35 20:00 22:50 02:40


 


Temp 97.6  98.9 98.6





 97.6  98.9 98.6


 


Pulse 72  76 78


 


Resp 18  18 18


 


B/P (MAP) 115/58 (77)  108/52 (70) 110/53 (72)


 


Pulse Ox 96  94 96


 


O2 Delivery Room Air Room Air Room Air Room Air


 


    





    





 4/3/19 4/3/19  





 07:00 10:03  


 


Temp 98.6   





 98.6   


 


Pulse 82 87  


 


Resp 16   


 


B/P (MAP) 112/56 (74) 112/56  


 


Pulse Ox 95   


 


O2 Delivery Room Air   














Intake and Output   


 


 4/2/19 4/2/19 4/3/19





 14:59 22:59 06:59


 


Intake Total   200 ml


 


Balance   200 ml

















RUPA PIKE MD Apr 3, 2019 11:12

## 2019-09-29 ENCOUNTER — HOSPITAL ENCOUNTER (INPATIENT)
Dept: HOSPITAL 61 - ER | Age: 84
LOS: 9 days | Discharge: NURSING, REHAB OR LONG-TERM CARE | DRG: 917 | End: 2019-10-08
Attending: INTERNAL MEDICINE | Admitting: INTERNAL MEDICINE
Payer: MEDICARE

## 2019-09-29 VITALS — WEIGHT: 155.5 LBS | BODY MASS INDEX: 28.61 KG/M2 | HEIGHT: 62 IN

## 2019-09-29 DIAGNOSIS — J96.01: ICD-10-CM

## 2019-09-29 DIAGNOSIS — Z79.82: ICD-10-CM

## 2019-09-29 DIAGNOSIS — Z82.49: ICD-10-CM

## 2019-09-29 DIAGNOSIS — M17.0: ICD-10-CM

## 2019-09-29 DIAGNOSIS — F03.90: ICD-10-CM

## 2019-09-29 DIAGNOSIS — K21.9: ICD-10-CM

## 2019-09-29 DIAGNOSIS — Z83.3: ICD-10-CM

## 2019-09-29 DIAGNOSIS — Z95.5: ICD-10-CM

## 2019-09-29 DIAGNOSIS — I68.0: ICD-10-CM

## 2019-09-29 DIAGNOSIS — E87.6: ICD-10-CM

## 2019-09-29 DIAGNOSIS — E43: ICD-10-CM

## 2019-09-29 DIAGNOSIS — M85.80: ICD-10-CM

## 2019-09-29 DIAGNOSIS — K44.9: ICD-10-CM

## 2019-09-29 DIAGNOSIS — Z86.73: ICD-10-CM

## 2019-09-29 DIAGNOSIS — E87.2: ICD-10-CM

## 2019-09-29 DIAGNOSIS — Z86.711: ICD-10-CM

## 2019-09-29 DIAGNOSIS — E11.9: ICD-10-CM

## 2019-09-29 DIAGNOSIS — I25.2: ICD-10-CM

## 2019-09-29 DIAGNOSIS — Z90.711: ICD-10-CM

## 2019-09-29 DIAGNOSIS — Z80.0: ICD-10-CM

## 2019-09-29 DIAGNOSIS — I50.9: ICD-10-CM

## 2019-09-29 DIAGNOSIS — I11.0: ICD-10-CM

## 2019-09-29 DIAGNOSIS — D50.9: ICD-10-CM

## 2019-09-29 DIAGNOSIS — M51.36: ICD-10-CM

## 2019-09-29 DIAGNOSIS — E85.4: ICD-10-CM

## 2019-09-29 DIAGNOSIS — R56.9: ICD-10-CM

## 2019-09-29 DIAGNOSIS — I25.10: ICD-10-CM

## 2019-09-29 DIAGNOSIS — E78.5: ICD-10-CM

## 2019-09-29 DIAGNOSIS — N17.9: ICD-10-CM

## 2019-09-29 DIAGNOSIS — G92: ICD-10-CM

## 2019-09-29 DIAGNOSIS — T42.4X2A: Primary | ICD-10-CM

## 2019-09-29 LAB
AMPHETAMINE/METHAMPHETAMINE: (no result)
ANION GAP SERPL CALC-SCNC: 8 MMOL/L (ref 6–14)
ANISOCYTOSIS BLD QL SMEAR: (no result)
APTT PPP: YELLOW S
BACTERIA #/AREA URNS HPF: 0 /HPF
BARBITURATES UR-MCNC: (no result) UG/ML
BASOPHILS # BLD AUTO: 0 X10^3/UL (ref 0–0.2)
BASOPHILS NFR BLD: 1 % (ref 0–3)
BENZODIAZ UR-MCNC: (no result) UG/L
BILIRUB UR QL STRIP: NEGATIVE
BUN SERPL-MCNC: 16 MG/DL (ref 7–20)
CALCIUM SERPL-MCNC: 9.5 MG/DL (ref 8.5–10.1)
CANNABINOIDS UR-MCNC: (no result) UG/L
CHLORIDE SERPL-SCNC: 109 MMOL/L (ref 98–107)
CO2 SERPL-SCNC: 27 MMOL/L (ref 21–32)
COCAINE UR-MCNC: (no result) NG/ML
CREAT SERPL-MCNC: 1.1 MG/DL (ref 0.6–1)
EOSINOPHIL NFR BLD: 0.1 X10^3/UL (ref 0–0.7)
EOSINOPHIL NFR BLD: 1 % (ref 0–3)
ERYTHROCYTE [DISTWIDTH] IN BLOOD BY AUTOMATED COUNT: 21.6 % (ref 11.5–14.5)
FIBRINOGEN PPP-MCNC: CLEAR MG/DL
GFR SERPLBLD BASED ON 1.73 SQ M-ARVRAT: 57.3 ML/MIN
GLUCOSE SERPL-MCNC: 100 MG/DL (ref 70–99)
HCT VFR BLD CALC: 30.3 % (ref 36–47)
HGB BLD-MCNC: 9.4 G/DL (ref 12–15.5)
HYALINE CASTS #/AREA URNS LPF: (no result) /HPF
HYPOCHROMIA BLD QL SMEAR: (no result)
LYMPHOCYTES # BLD: 1.1 X10^3/UL (ref 1–4.8)
LYMPHOCYTES NFR BLD AUTO: 21 % (ref 24–48)
MCH RBC QN AUTO: 21 PG (ref 25–35)
MCHC RBC AUTO-ENTMCNC: 31 G/DL (ref 31–37)
MCV RBC AUTO: 68 FL (ref 79–100)
METHADONE SERPL-MCNC: (no result) NG/ML
MICROCYTES BLD QL SMEAR: (no result)
MONO #: 0.7 X10^3/UL (ref 0–1.1)
MONOCYTES NFR BLD: 13 % (ref 0–9)
NEUT #: 3.5 X10^3/UL (ref 1.8–7.7)
NEUTROPHILS NFR BLD AUTO: 65 % (ref 31–73)
NITRITE UR QL STRIP: NEGATIVE
OPIATES UR-MCNC: (no result) NG/ML
OVALOCYTES BLD QL SMEAR: PRESENT
PCP SERPL-MCNC: (no result) MG/DL
PH UR STRIP: 5.5 [PH]
PLATELET # BLD AUTO: 251 X10^3/UL (ref 140–400)
PLATELET # BLD EST: ADEQUATE 10*3/UL
POIKILOCYTOSIS BLD QL SMEAR: (no result)
POTASSIUM SERPL-SCNC: 4.4 MMOL/L (ref 3.5–5.1)
PROT UR STRIP-MCNC: NEGATIVE MG/DL
RBC # BLD AUTO: 4.45 X10^6/UL (ref 3.5–5.4)
RBC #/AREA URNS HPF: (no result) /HPF (ref 0–2)
SCHISTOCYTES BLD QL SMEAR: (no result)
SODIUM SERPL-SCNC: 144 MMOL/L (ref 136–145)
UROBILINOGEN UR-MCNC: 0.2 MG/DL
WBC # BLD AUTO: 5.5 X10^3/UL (ref 4–11)
WBC #/AREA URNS HPF: (no result) /HPF (ref 0–4)

## 2019-09-29 PROCEDURE — C9113 INJ PANTOPRAZOLE SODIUM, VIA: HCPCS

## 2019-09-29 PROCEDURE — 85025 COMPLETE CBC W/AUTO DIFF WBC: CPT

## 2019-09-29 PROCEDURE — 93005 ELECTROCARDIOGRAM TRACING: CPT

## 2019-09-29 PROCEDURE — 82962 GLUCOSE BLOOD TEST: CPT

## 2019-09-29 PROCEDURE — 86901 BLOOD TYPING SEROLOGIC RH(D): CPT

## 2019-09-29 PROCEDURE — 93970 EXTREMITY STUDY: CPT

## 2019-09-29 PROCEDURE — 90686 IIV4 VACC NO PRSV 0.5 ML IM: CPT

## 2019-09-29 PROCEDURE — 87040 BLOOD CULTURE FOR BACTERIA: CPT

## 2019-09-29 PROCEDURE — 80053 COMPREHEN METABOLIC PANEL: CPT

## 2019-09-29 PROCEDURE — 82805 BLOOD GASES W/O2 SATURATION: CPT

## 2019-09-29 PROCEDURE — 86920 COMPATIBILITY TEST SPIN: CPT

## 2019-09-29 PROCEDURE — 83550 IRON BINDING TEST: CPT

## 2019-09-29 PROCEDURE — 73620 X-RAY EXAM OF FOOT: CPT

## 2019-09-29 PROCEDURE — 86900 BLOOD TYPING SEROLOGIC ABO: CPT

## 2019-09-29 PROCEDURE — 36600 WITHDRAWAL OF ARTERIAL BLOOD: CPT

## 2019-09-29 PROCEDURE — 83540 ASSAY OF IRON: CPT

## 2019-09-29 PROCEDURE — 36415 COLL VENOUS BLD VENIPUNCTURE: CPT

## 2019-09-29 PROCEDURE — 73110 X-RAY EXAM OF WRIST: CPT

## 2019-09-29 PROCEDURE — 71045 X-RAY EXAM CHEST 1 VIEW: CPT

## 2019-09-29 PROCEDURE — 73502 X-RAY EXAM HIP UNI 2-3 VIEWS: CPT

## 2019-09-29 PROCEDURE — 70450 CT HEAD/BRAIN W/O DYE: CPT

## 2019-09-29 PROCEDURE — 90471 IMMUNIZATION ADMIN: CPT

## 2019-09-29 PROCEDURE — 85027 COMPLETE CBC AUTOMATED: CPT

## 2019-09-29 PROCEDURE — P9016 RBC LEUKOCYTES REDUCED: HCPCS

## 2019-09-29 PROCEDURE — 83880 ASSAY OF NATRIURETIC PEPTIDE: CPT

## 2019-09-29 PROCEDURE — 73590 X-RAY EXAM OF LOWER LEG: CPT

## 2019-09-29 PROCEDURE — 84484 ASSAY OF TROPONIN QUANT: CPT

## 2019-09-29 PROCEDURE — 87086 URINE CULTURE/COLONY COUNT: CPT

## 2019-09-29 PROCEDURE — 80048 BASIC METABOLIC PNL TOTAL CA: CPT

## 2019-09-29 PROCEDURE — 80307 DRUG TEST PRSMV CHEM ANLYZR: CPT

## 2019-09-29 PROCEDURE — 43235 EGD DIAGNOSTIC BRUSH WASH: CPT

## 2019-09-29 PROCEDURE — G0378 HOSPITAL OBSERVATION PER HR: HCPCS

## 2019-09-29 PROCEDURE — 83605 ASSAY OF LACTIC ACID: CPT

## 2019-09-29 PROCEDURE — 81001 URINALYSIS AUTO W/SCOPE: CPT

## 2019-09-29 PROCEDURE — 86850 RBC ANTIBODY SCREEN: CPT

## 2019-09-30 VITALS — SYSTOLIC BLOOD PRESSURE: 123 MMHG | DIASTOLIC BLOOD PRESSURE: 77 MMHG

## 2019-09-30 VITALS — DIASTOLIC BLOOD PRESSURE: 76 MMHG | SYSTOLIC BLOOD PRESSURE: 119 MMHG

## 2019-09-30 VITALS — SYSTOLIC BLOOD PRESSURE: 133 MMHG | DIASTOLIC BLOOD PRESSURE: 84 MMHG

## 2019-09-30 VITALS — SYSTOLIC BLOOD PRESSURE: 148 MMHG | DIASTOLIC BLOOD PRESSURE: 86 MMHG

## 2019-09-30 VITALS — DIASTOLIC BLOOD PRESSURE: 67 MMHG | SYSTOLIC BLOOD PRESSURE: 130 MMHG

## 2019-09-30 VITALS — DIASTOLIC BLOOD PRESSURE: 79 MMHG | SYSTOLIC BLOOD PRESSURE: 119 MMHG

## 2019-09-30 VITALS — DIASTOLIC BLOOD PRESSURE: 89 MMHG | SYSTOLIC BLOOD PRESSURE: 136 MMHG

## 2019-09-30 VITALS — SYSTOLIC BLOOD PRESSURE: 120 MMHG | DIASTOLIC BLOOD PRESSURE: 81 MMHG

## 2019-09-30 LAB
ALBUMIN SERPL-MCNC: 3.3 G/DL (ref 3.4–5)
ALBUMIN/GLOB SERPL: 0.9 {RATIO} (ref 1–1.7)
ALP SERPL-CCNC: 76 U/L (ref 46–116)
ALT SERPL-CCNC: 15 U/L (ref 14–59)
ANION GAP SERPL CALC-SCNC: 11 MMOL/L (ref 6–14)
AST SERPL-CCNC: 19 U/L (ref 15–37)
BASE EXCESS ABG: 1 MMOL/L (ref -3–3)
BASOPHILS # BLD AUTO: 0 X10^3/UL (ref 0–0.2)
BASOPHILS NFR BLD: 0 % (ref 0–3)
BILIRUB SERPL-MCNC: 0.3 MG/DL (ref 0.2–1)
BUN SERPL-MCNC: 16 MG/DL (ref 7–20)
BUN/CREAT SERPL: 16 (ref 6–20)
CALCIUM SERPL-MCNC: 9.2 MG/DL (ref 8.5–10.1)
CHLORIDE SERPL-SCNC: 106 MMOL/L (ref 98–107)
CO2 SERPL-SCNC: 25 MMOL/L (ref 21–32)
CREAT SERPL-MCNC: 1 MG/DL (ref 0.6–1)
EOSINOPHIL NFR BLD: 0 X10^3/UL (ref 0–0.7)
EOSINOPHIL NFR BLD: 1 % (ref 0–3)
ERYTHROCYTE [DISTWIDTH] IN BLOOD BY AUTOMATED COUNT: 21.3 % (ref 11.5–14.5)
GFR SERPLBLD BASED ON 1.73 SQ M-ARVRAT: 63.9 ML/MIN
GLOBULIN SER-MCNC: 3.8 G/DL (ref 2.2–3.8)
GLUCOSE SERPL-MCNC: 162 MG/DL (ref 70–99)
HCO3 BLDA-SCNC: 24 MMOL/L (ref 21–28)
HCT VFR BLD CALC: 31.2 % (ref 36–47)
HGB BLD-MCNC: 9.7 G/DL (ref 12–15.5)
INSPIRATION SETTING TIME VENT: 21
LYMPHOCYTES # BLD: 0.8 X10^3/UL (ref 1–4.8)
LYMPHOCYTES NFR BLD AUTO: 14 % (ref 24–48)
MCH RBC QN AUTO: 21 PG (ref 25–35)
MCHC RBC AUTO-ENTMCNC: 31 G/DL (ref 31–37)
MCV RBC AUTO: 67 FL (ref 79–100)
MONO #: 0.6 X10^3/UL (ref 0–1.1)
MONOCYTES NFR BLD: 11 % (ref 0–9)
NEUT #: 4.1 X10^3/UL (ref 1.8–7.7)
NEUTROPHILS NFR BLD AUTO: 75 % (ref 31–73)
PCO2 BLDA: 35 MMHG (ref 35–46)
PLATELET # BLD AUTO: 253 X10^3/UL (ref 140–400)
PO2 BLDA: 75 MMHG (ref 65–108)
POTASSIUM SERPL-SCNC: 3.7 MMOL/L (ref 3.5–5.1)
PROT SERPL-MCNC: 7.1 G/DL (ref 6.4–8.2)
RBC # BLD AUTO: 4.63 X10^6/UL (ref 3.5–5.4)
SAO2 % BLDA: 94 % (ref 92–99)
SODIUM SERPL-SCNC: 142 MMOL/L (ref 136–145)
WBC # BLD AUTO: 5.5 X10^3/UL (ref 4–11)

## 2019-09-30 RX ADMIN — BACITRACIN SCH MLS/HR: 5000 INJECTION, POWDER, FOR SOLUTION INTRAMUSCULAR at 15:14

## 2019-09-30 RX ADMIN — BACITRACIN SCH MLS/HR: 5000 INJECTION, POWDER, FOR SOLUTION INTRAMUSCULAR at 22:50

## 2019-09-30 RX ADMIN — FAMOTIDINE SCH MG: 10 INJECTION, SOLUTION INTRAVENOUS at 15:15

## 2019-09-30 RX ADMIN — DEXTROSE AND SODIUM CHLORIDE SCH MLS/HR: 5; .9 INJECTION, SOLUTION INTRAVENOUS at 14:20

## 2019-09-30 RX ADMIN — ENOXAPARIN SODIUM SCH MG: 40 INJECTION SUBCUTANEOUS at 15:15

## 2019-09-30 RX ADMIN — DEXTROSE AND SODIUM CHLORIDE SCH MLS/HR: 5; .9 INJECTION, SOLUTION INTRAVENOUS at 01:26

## 2019-09-30 RX ADMIN — FAMOTIDINE SCH MG: 10 INJECTION, SOLUTION INTRAVENOUS at 21:00
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
## 2019-09-30 NOTE — NUR
This RN received report from Martha in ICU at approx 1545. Pt arrived on unit at approx 
1650. Pt sleeping at this time. Will continue to monitor.

## 2019-09-30 NOTE — EKG
[facility]

              [address] [place] [address]

Test Date:    2019               Test Time:    21:59:57

Pat Name:     MARY OBREGON            Department:   

Patient ID:   [id number]           Room:         ED HOLD 1

Gender:       F                        Technician:   

:          [id number]               Requested By: NAHID CARPIO

Order Number: [id number]           Reading MD:   Ayush Rizvi MD

                                 Measurements

Intervals                              Axis          

Rate:         82                       P:            20

KY:           196                      QRS:          2

QRSD:         82                       T:            74

QT:           388                                    

QTc:          456                                    

                           Interpretive Statements

SINUS RHYTHM

VENTRICULAR PREMATURE COMPLEX(ES)



Electronically Signed On 10-9-2019 9:14:24 CDT by Ayush Rizvi MD
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
## 2019-09-30 NOTE — RAD
EXAM: CHEST ONE VIEW.

 

HISTORY: Shortness of breath.

 

COMPARISON: 03/31/2019.

 

FINDINGS: A frontal view of the chest is obtained.

 

The inspiration is small. There are mild basilar interstitial opacities. 

There is no pneumothorax or clear pleural effusion. The heart is mildly 

enlarged. A coronary stent is noted. There is a large hiatal hernia.

 

IMPRESSION: 

1. Basilar atelectasis versus mild pulmonary edema.

2. Mild cardiomegaly.

3. Large hiatal hernia.

 

Electronically signed by: TIFFANY Chaparro MD (9/30/2019 5:42 AM) 

[facility]

## 2019-09-30 NOTE — RAD
Examination: CT HEAD WO CONTRAST

 

History: Altered mental status

 

Comparison/Correlation: 3/31/2019 CT head without contrast

 

Findings: Axial images of the head were obtained without contrast. Atrophy

and chronic ischemic change of the white matter is noted. No intracranial 

hemorrhage, midline shift, or mass effect. Hyperostosis of the skull 

appears present. Globes and optic nerves are unremarkable. Cavernous 

carotid calcification noted.

 

 

Impression:

No intracranial hemorrhage.

 

 

PQRS Compliance Statement:

 

One or more of the following individualized dose reduction techniques were

utilized for this examination:  

1. Automated exposure control  

2. Adjustment of the mA and/or kV according to patient size  

3. Use of iterative reconstruction technique

 

Electronically signed by: North Padilla MD (9/30/2019 1:35 PM) [facility]

## 2019-09-30 NOTE — CONS
DATE OF CONSULTATION:  



PULMONARY CONSULTATION



ATTENDING PHYSICIAN:  Dr. Dao.



REASON FOR CONSULTATION:  Encephalopathy, drug overdose.



HISTORY OF PRESENT ILLNESS:  The patient is an 84-year-old female who has

history of diabetes and dementia.  She was brought into the hospital with

altered mental status.  Apparently, her caregiver gave her own medications

including 4 mg of Klonopin and unknown quantity of tramadol to the patient.  The

patient was noted to be lethargic, as a result, she was brought into the

hospital.  Her chest x-ray showed mild cephalization of vessels.  ABG showed pH

of 7.46, pCO2 of 35, and pO2 of 75 on room air.  She has spontaneous

respirations.  She opens her eyes to painful stimuli, but does not communicate. 

According to the family, she is usually able to communicate before this

happened.  Her toxicology screen was otherwise negative.



PAST MEDICAL HISTORY:  History of hernia repair, dementia, type 2 diabetes, CAD,

CVA, amyloid angiitis, and seizure.



PAST SURGICAL HISTORY:  Hysterectomy and cardiac stents.



FAMILY HISTORY:  Dyslipidemia and hypertension.



SOCIAL HISTORY:  Nonsmoker.



ALLERGIES:  None.



CURRENT MEDICATIONS:  Reviewed as listed in the MRAD including Lovenox for DVT

prophylaxis.



PHYSICAL EXAMINATION:

VITAL SIGNS:  Stable.  Her pulse ox is 99% on room air.

HEENT:  Her sclerae are nonicteric.

NECK:  Supple.

LUNGS:  With anterior rhonchi.

CARDIOVASCULAR:  Regular rate.

ABDOMEN:  Soft.

EXTREMITIES:  With no pitting edema.



LABORATORY DATA:  Reviewed.  ABGs with a pH of 7.46, pCO2 of 35, and pO2 of 75

on room air.  BUN 16, creatinine 1.0.  White cell count 5.5.



IMPRESSION:

1.  Acute toxic encephalopathy secondary to drug overdose.  The patient's

caregiver gave her 4 mg of Klonopin and unknown quantity of tramadol.  ABG shows

adequate ventilation and oxygenation.  It may take 24 hours before the

medications clear from her system secondary to longer half-life.

2.  No significant tobacco history.

3.  History of coronary artery disease with possible mild congestive heart

failure on chest x-ray.



RECOMMENDATIONS:

1.  Continue to monitor in the ICU and watch for improvement in mental status.

2.  BiPAP p.r.n.

3.  ABGs are adequate.

4.  DVT prophylaxis with Lovenox.

5.  Discussed with the patient's family at the bedside and RN.



Critical care time 30 minutes.  We will follow along with you.

 



______________________________

WOJCIECH WEINBERG MD



DR:  KRISTA/prashant  JOB#:  [id number] / [id number]

DD:  09/30/2019 13:39  DT:  09/30/2019 14:04

## 2019-10-01 VITALS — SYSTOLIC BLOOD PRESSURE: 174 MMHG | DIASTOLIC BLOOD PRESSURE: 75 MMHG

## 2019-10-01 VITALS — DIASTOLIC BLOOD PRESSURE: 77 MMHG | SYSTOLIC BLOOD PRESSURE: 176 MMHG

## 2019-10-01 VITALS — DIASTOLIC BLOOD PRESSURE: 84 MMHG | SYSTOLIC BLOOD PRESSURE: 150 MMHG

## 2019-10-01 VITALS — DIASTOLIC BLOOD PRESSURE: 76 MMHG | SYSTOLIC BLOOD PRESSURE: 155 MMHG

## 2019-10-01 VITALS — SYSTOLIC BLOOD PRESSURE: 152 MMHG | DIASTOLIC BLOOD PRESSURE: 62 MMHG

## 2019-10-01 VITALS — DIASTOLIC BLOOD PRESSURE: 91 MMHG | SYSTOLIC BLOOD PRESSURE: 142 MMHG

## 2019-10-01 LAB
AMORPH SED URNS QL MICRO: PRESENT /HPF
AMPHETAMINE/METHAMPHETAMINE: (no result)
ANION GAP SERPL CALC-SCNC: 14 MMOL/L (ref 6–14)
APTT PPP: YELLOW S
BACTERIA #/AREA URNS HPF: (no result) /HPF
BARBITURATES UR-MCNC: (no result) UG/ML
BASOPHILS # BLD AUTO: 0 X10^3/UL (ref 0–0.2)
BASOPHILS NFR BLD: 0 % (ref 0–3)
BENZODIAZ UR-MCNC: (no result) UG/L
BILIRUB UR QL STRIP: NEGATIVE
BUN SERPL-MCNC: 19 MG/DL (ref 7–20)
CALCIUM SERPL-MCNC: 9.6 MG/DL (ref 8.5–10.1)
CANNABINOIDS UR-MCNC: (no result) UG/L
CHLORIDE SERPL-SCNC: 108 MMOL/L (ref 98–107)
CO2 SERPL-SCNC: 22 MMOL/L (ref 21–32)
COCAINE UR-MCNC: (no result) NG/ML
CREAT SERPL-MCNC: 1.2 MG/DL (ref 0.6–1)
EOSINOPHIL NFR BLD: 0 % (ref 0–3)
EOSINOPHIL NFR BLD: 0 X10^3/UL (ref 0–0.7)
ERYTHROCYTE [DISTWIDTH] IN BLOOD BY AUTOMATED COUNT: 21.9 % (ref 11.5–14.5)
FIBRINOGEN PPP-MCNC: (no result) MG/DL
GFR SERPLBLD BASED ON 1.73 SQ M-ARVRAT: 51.8 ML/MIN
GLUCOSE SERPL-MCNC: 129 MG/DL (ref 70–99)
HCT VFR BLD CALC: 34.2 % (ref 36–47)
HGB BLD-MCNC: 10.4 G/DL (ref 12–15.5)
LYMPHOCYTES # BLD: 0.8 X10^3/UL (ref 1–4.8)
LYMPHOCYTES NFR BLD AUTO: 12 % (ref 24–48)
MCH RBC QN AUTO: 21 PG (ref 25–35)
MCHC RBC AUTO-ENTMCNC: 31 G/DL (ref 31–37)
MCV RBC AUTO: 69 FL (ref 79–100)
METHADONE SERPL-MCNC: (no result) NG/ML
MONO #: 0.9 X10^3/UL (ref 0–1.1)
MONOCYTES NFR BLD: 13 % (ref 0–9)
NEUT #: 4.9 X10^3/UL (ref 1.8–7.7)
NEUTROPHILS NFR BLD AUTO: 74 % (ref 31–73)
NITRITE UR QL STRIP: NEGATIVE
OPIATES UR-MCNC: (no result) NG/ML
PCP SERPL-MCNC: (no result) MG/DL
PH UR STRIP: 5.5 [PH]
PLATELET # BLD AUTO: 241 X10^3/UL (ref 140–400)
POTASSIUM SERPL-SCNC: 4 MMOL/L (ref 3.5–5.1)
PROT UR STRIP-MCNC: NEGATIVE MG/DL
RBC # BLD AUTO: 4.94 X10^6/UL (ref 3.5–5.4)
RBC #/AREA URNS HPF: (no result) /HPF (ref 0–2)
SODIUM SERPL-SCNC: 144 MMOL/L (ref 136–145)
SQUAMOUS #/AREA URNS LPF: (no result) /LPF
UROBILINOGEN UR-MCNC: 0.2 MG/DL
WBC # BLD AUTO: 6.6 X10^3/UL (ref 4–11)
WBC #/AREA URNS HPF: (no result) /HPF (ref 0–4)

## 2019-10-01 PROCEDURE — 30233N1 TRANSFUSION OF NONAUTOLOGOUS RED BLOOD CELLS INTO PERIPHERAL VEIN, PERCUTANEOUS APPROACH: ICD-10-PCS | Performed by: INTERNAL MEDICINE

## 2019-10-01 RX ADMIN — ACETAMINOPHEN PRN MG: 325 TABLET, FILM COATED ORAL at 17:48

## 2019-10-01 RX ADMIN — PIPERACILLIN SODIUM AND TAZOBACTAM SODIUM SCH MLS/HR: 2; .25 INJECTION, POWDER, LYOPHILIZED, FOR SOLUTION INTRAVENOUS at 18:37

## 2019-10-01 RX ADMIN — FAMOTIDINE SCH MG: 10 INJECTION, SOLUTION INTRAVENOUS at 09:05

## 2019-10-01 RX ADMIN — BACITRACIN SCH MLS/HR: 5000 INJECTION, POWDER, FOR SOLUTION INTRAMUSCULAR at 13:30

## 2019-10-01 RX ADMIN — PIPERACILLIN SODIUM AND TAZOBACTAM SODIUM SCH MLS/HR: 2; .25 INJECTION, POWDER, LYOPHILIZED, FOR SOLUTION INTRAVENOUS at 23:59

## 2019-10-01 RX ADMIN — FAMOTIDINE SCH MG: 10 INJECTION, SOLUTION INTRAVENOUS at 00:49

## 2019-10-01 RX ADMIN — PIPERACILLIN SODIUM AND TAZOBACTAM SODIUM SCH MLS/HR: 2; .25 INJECTION, POWDER, LYOPHILIZED, FOR SOLUTION INTRAVENOUS at 13:26

## 2019-10-01 RX ADMIN — ENOXAPARIN SODIUM SCH MG: 40 INJECTION SUBCUTANEOUS at 18:37

## 2019-10-01 NOTE — PDOC
PULMONARY PROGRESS NOTES


Subjective


more awake, trying to communicate


Vitals





Vital Signs








  Date Time  Temp Pulse Resp B/P (MAP) Pulse Ox O2 Delivery O2 Flow Rate FiO2


 


10/1/19 08:00      Room Air  


 


10/1/19 08:00 100.8 117 20 155/76 (102) 93   





 100.8       








General:  Alert, No acute distress


Lungs:  Clear


Cardiovascular:  S1, S2


Abdomen:  Soft


Neuro Exam:  Alert


Extremities:  No Edema


Labs





Laboratory Tests








Test


 9/29/19


21:50 9/29/19


23:25 9/30/19


06:59 9/30/19


09:10


 


White Blood Count


 5.5 x10^3/uL


(4.0-11.0) 


 


 





 


Red Blood Count


 4.45 x10^6/uL


(3.50-5.40) 


 


 





 


Hemoglobin


 9.4 g/dL


(12.0-15.5) 


 


 





 


Hematocrit


 30.3 %


(36.0-47.0) 


 


 





 


Mean Corpuscular Volume 68 fL ()    


 


Mean Corpuscular Hemoglobin 21 pg (25-35)    


 


Mean Corpuscular Hemoglobin


Concent 31 g/dL


(31-37) 


 


 





 


Red Cell Distribution Width


 21.6 %


(11.5-14.5) 


 


 





 


Platelet Count


 251 x10^3/uL


(140-400) 


 


 





 


Neutrophils (%) (Auto) 65 % (31-73)    


 


Lymphocytes (%) (Auto) 21 % (24-48)    


 


Monocytes (%) (Auto) 13 % (0-9)    


 


Eosinophils (%) (Auto) 1 % (0-3)    


 


Basophils (%) (Auto) 1 % (0-3)    


 


Neutrophils # (Auto)


 3.5 x10^3/uL


(1.8-7.7) 


 


 





 


Lymphocytes # (Auto)


 1.1 x10^3/uL


(1.0-4.8) 


 


 





 


Monocytes # (Auto)


 0.7 x10^3/uL


(0.0-1.1) 


 


 





 


Eosinophils # (Auto)


 0.1 x10^3/uL


(0.0-0.7) 


 


 





 


Basophils # (Auto)


 0.0 x10^3/uL


(0.0-0.2) 


 


 





 


Platelet Estimate


 Adequate


(ADEQUATE) 


 


 





 


Hypochromasia Mod    


 


Poikilocytosis Mod    


 


Anisocytosis Mod    


 


Microcytosis Mod    


 


Ovalocytes Present    


 


Schistocytes Few    


 


Sodium Level


 144 mmol/L


(136-145) 


 


 





 


Potassium Level


 4.4 mmol/L


(3.5-5.1) 


 


 





 


Chloride Level


 109 mmol/L


() 


 


 





 


Carbon Dioxide Level


 27 mmol/L


(21-32) 


 


 





 


Anion Gap 8 (6-14)    


 


Blood Urea Nitrogen


 16 mg/dL


(7-20) 


 


 





 


Creatinine


 1.1 mg/dL


(0.6-1.0) 


 


 





 


Estimated GFR


(Cockcroft-Gault) 57.3 


 


 


 





 


Glucose Level


 100 mg/dL


(70-99) 


 


 





 


Lactic Acid Level


 1.2 mmol/L


(0.4-2.0) 


 


 





 


Calcium Level


 9.5 mg/dL


(8.5-10.1) 


 


 





 


Troponin I Quantitative


 < 0.017 ng/mL


(0.000-0.055) 


 


 





 


NT-Pro-B-Type Natriuretic


Peptide 2903 pg/mL


(0-449) 


 


 





 


Urine Collection Type  U cath   


 


Urine Color  Yellow   


 


Urine Clarity  Clear   


 


Urine pH  5.5   


 


Urine Specific Gravity  1.015   


 


Urine Protein


 


 Negative mg/dL


(NEG-TRACE) 


 





 


Urine Glucose (UA)


 


 Negative mg/dL


(NEG) 


 





 


Urine Ketones (Stick)


 


 Negative mg/dL


(NEG) 


 





 


Urine Blood  Trace (NEG)   


 


Urine Nitrite  Negative (NEG)   


 


Urine Bilirubin  Negative (NEG)   


 


Urine Urobilinogen Dipstick


 


 0.2 mg/dL (0.2


mg/dL) 


 





 


Urine Leukocyte Esterase  Negative (NEG)   


 


Urine RBC  3-5 /HPF (0-2)   


 


Urine WBC  Occ /HPF (0-4)   


 


Urine Renal Epithelial Cells  Occ /LPF   


 


Urine Bacteria  0 /HPF (0-FEW)   


 


Urine Hyaline Casts


 


 Occasional


/HPF 


 





 


Urine Mucus  Mod /LPF   


 


Urine Opiates Screen  Neg (NEG)   


 


Urine Methadone Screen  Neg (NEG)   


 


Urine Barbiturates  Neg (NEG)   


 


Urine Phencyclidine Screen  Neg (NEG)   


 


Urine


Amphetamine/Methamphetamine 


 Neg (NEG) 


 


 





 


Urine Benzodiazepines Screen  Neg (NEG)   


 


Urine Cocaine Screen  Neg (NEG)   


 


Urine Cannabinoids Screen  Neg (NEG)   


 


Urine Ethyl Alcohol  Neg (NEG)   


 


Glucose (Fingerstick)


 


 


 142 mg/dL


(70-99) 





 


O2 Saturation    94 % (92-99) 


 


Arterial Blood pH


 


 


 


 7.46


(7.35-7.45)


 


Arterial Blood pCO2 at


Patient Temp 


 


 


 35 mmHg


(35-46)


 


Arterial Blood pO2 at Patient


Temp 


 


 


 75 mmHg


()


 


Arterial Blood HCO3


 


 


 


 24 mmol/L


(21-28)


 


Arterial Blood Base Excess


 


 


 


 1 mmol/L


(-3-3)


 


FiO2    21 


 


Test


 9/30/19


10:35 9/30/19


11:09 10/1/19


05:20 10/1/19


07:20


 


White Blood Count


 5.5 x10^3/uL


(4.0-11.0) 


 6.6 x10^3/uL


(4.0-11.0) 





 


Red Blood Count


 4.63 x10^6/uL


(3.50-5.40) 


 4.94 x10^6/uL


(3.50-5.40) 





 


Hemoglobin


 9.7 g/dL


(12.0-15.5) 


 10.4 g/dL


(12.0-15.5) 





 


Hematocrit


 31.2 %


(36.0-47.0) 


 34.2 %


(36.0-47.0) 





 


Mean Corpuscular Volume 67 fL ()   69 fL ()  


 


Mean Corpuscular Hemoglobin 21 pg (25-35)   21 pg (25-35)  


 


Mean Corpuscular Hemoglobin


Concent 31 g/dL


(31-37) 


 31 g/dL


(31-37) 





 


Red Cell Distribution Width


 21.3 %


(11.5-14.5) 


 21.9 %


(11.5-14.5) 





 


Platelet Count


 253 x10^3/uL


(140-400) 


 241 x10^3/uL


(140-400) 





 


Neutrophils (%) (Auto) 75 % (31-73)   74 % (31-73)  


 


Lymphocytes (%) (Auto) 14 % (24-48)   12 % (24-48)  


 


Monocytes (%) (Auto) 11 % (0-9)   13 % (0-9)  


 


Eosinophils (%) (Auto) 1 % (0-3)   0 % (0-3)  


 


Basophils (%) (Auto) 0 % (0-3)   0 % (0-3)  


 


Neutrophils # (Auto)


 4.1 x10^3/uL


(1.8-7.7) 


 4.9 x10^3/uL


(1.8-7.7) 





 


Lymphocytes # (Auto)


 0.8 x10^3/uL


(1.0-4.8) 


 0.8 x10^3/uL


(1.0-4.8) 





 


Monocytes # (Auto)


 0.6 x10^3/uL


(0.0-1.1) 


 0.9 x10^3/uL


(0.0-1.1) 





 


Eosinophils # (Auto)


 0.0 x10^3/uL


(0.0-0.7) 


 0.0 x10^3/uL


(0.0-0.7) 





 


Basophils # (Auto)


 0.0 x10^3/uL


(0.0-0.2) 


 0.0 x10^3/uL


(0.0-0.2) 





 


Sodium Level


 142 mmol/L


(136-145) 


 144 mmol/L


(136-145) 





 


Potassium Level


 3.7 mmol/L


(3.5-5.1) 


 4.0 mmol/L


(3.5-5.1) 





 


Chloride Level


 106 mmol/L


() 


 108 mmol/L


() 





 


Carbon Dioxide Level


 25 mmol/L


(21-32) 


 22 mmol/L


(21-32) 





 


Anion Gap 11 (6-14)   14 (6-14)  


 


Blood Urea Nitrogen


 16 mg/dL


(7-20) 


 19 mg/dL


(7-20) 





 


Creatinine


 1.0 mg/dL


(0.6-1.0) 


 1.2 mg/dL


(0.6-1.0) 





 


Estimated GFR


(Cockcroft-Gault) 63.9 


 


 51.8 


 





 


BUN/Creatinine Ratio 16 (6-20)    


 


Glucose Level


 162 mg/dL


(70-99) 


 129 mg/dL


(70-99) 





 


Calcium Level


 9.2 mg/dL


(8.5-10.1) 


 9.6 mg/dL


(8.5-10.1) 





 


Total Bilirubin


 0.3 mg/dL


(0.2-1.0) 


 


 





 


Aspartate Amino Transf


(AST/SGOT) 19 U/L (15-37) 


 


 


 





 


Alanine Aminotransferase


(ALT/SGPT) 15 U/L (14-59) 


 


 


 





 


Alkaline Phosphatase


 76 U/L


() 


 


 





 


Total Protein


 7.1 g/dL


(6.4-8.2) 


 


 





 


Albumin


 3.3 g/dL


(3.4-5.0) 


 


 





 


Albumin/Globulin Ratio 0.9 (1.0-1.7)    


 


Glucose (Fingerstick)


 


 142 mg/dL


(70-99) 


 





 


Urine Opiates Screen    Neg (NEG) 


 


Urine Methadone Screen    Neg (NEG) 


 


Urine Barbiturates    Neg (NEG) 


 


Urine Phencyclidine Screen    Neg (NEG) 


 


Urine


Amphetamine/Methamphetamine 


 


 


 Neg (NEG) 





 


Urine Benzodiazepines Screen    Neg (NEG) 


 


Urine Cocaine Screen    Neg (NEG) 


 


Urine Cannabinoids Screen    Neg (NEG) 


 


Urine Ethyl Alcohol    Neg (NEG) 








Laboratory Tests








Test


 9/30/19


11:09 10/1/19


05:20 10/1/19


07:20


 


Glucose (Fingerstick)


 142 mg/dL


(70-99) 


 





 


White Blood Count


 


 6.6 x10^3/uL


(4.0-11.0) 





 


Red Blood Count


 


 4.94 x10^6/uL


(3.50-5.40) 





 


Hemoglobin


 


 10.4 g/dL


(12.0-15.5) 





 


Hematocrit


 


 34.2 %


(36.0-47.0) 





 


Mean Corpuscular Volume  69 fL ()  


 


Mean Corpuscular Hemoglobin  21 pg (25-35)  


 


Mean Corpuscular Hemoglobin


Concent 


 31 g/dL


(31-37) 





 


Red Cell Distribution Width


 


 21.9 %


(11.5-14.5) 





 


Platelet Count


 


 241 x10^3/uL


(140-400) 





 


Neutrophils (%) (Auto)  74 % (31-73)  


 


Lymphocytes (%) (Auto)  12 % (24-48)  


 


Monocytes (%) (Auto)  13 % (0-9)  


 


Eosinophils (%) (Auto)  0 % (0-3)  


 


Basophils (%) (Auto)  0 % (0-3)  


 


Neutrophils # (Auto)


 


 4.9 x10^3/uL


(1.8-7.7) 





 


Lymphocytes # (Auto)


 


 0.8 x10^3/uL


(1.0-4.8) 





 


Monocytes # (Auto)


 


 0.9 x10^3/uL


(0.0-1.1) 





 


Eosinophils # (Auto)


 


 0.0 x10^3/uL


(0.0-0.7) 





 


Basophils # (Auto)


 


 0.0 x10^3/uL


(0.0-0.2) 





 


Sodium Level


 


 144 mmol/L


(136-145) 





 


Potassium Level


 


 4.0 mmol/L


(3.5-5.1) 





 


Chloride Level


 


 108 mmol/L


() 





 


Carbon Dioxide Level


 


 22 mmol/L


(21-32) 





 


Anion Gap  14 (6-14)  


 


Blood Urea Nitrogen


 


 19 mg/dL


(7-20) 





 


Creatinine


 


 1.2 mg/dL


(0.6-1.0) 





 


Estimated GFR


(Cockcroft-Gault) 


 51.8 


 





 


Glucose Level


 


 129 mg/dL


(70-99) 





 


Calcium Level


 


 9.6 mg/dL


(8.5-10.1) 





 


Urine Opiates Screen   Neg (NEG) 


 


Urine Methadone Screen   Neg (NEG) 


 


Urine Barbiturates   Neg (NEG) 


 


Urine Phencyclidine Screen   Neg (NEG) 


 


Urine


Amphetamine/Methamphetamine 


 


 Neg (NEG) 





 


Urine Benzodiazepines Screen   Neg (NEG) 


 


Urine Cocaine Screen   Neg (NEG) 


 


Urine Cannabinoids Screen   Neg (NEG) 


 


Urine Ethyl Alcohol   Neg (NEG) 








Medications





Active Scripts








 Medications  Dose


 Route/Sig


 Max Daily Dose Days Date Category


 


 Keppra


  (Levetiracetam)


 500 Mg Tablet  500 Mg


 PO BID


   1/24/19 Rx


 


 Vitamin D3


  (Cholecalciferol


  (Vitamin D3))


 1,000 Unit Tablet  1 Tab


 PO DAILYBFRSUP


   1/20/19 Reported


 


 Voltaren


  (Diclofenac


 Sodium) 100 Gm


 Gel..gram.  1 Gm


 TP PRN QID


   1/20/19 Reported


 


 Meclizine Hcl 25


 Mg Tablet  1 Tab


 PO PRN TID PRN


   10/29/16 Rx


 


 Aspirin 81 Mg


 Tab.chew  1 Tab


 PO DAILY


   10/29/16 Reported


 


 Simvastatin 40 Mg


 Tablet  1 Tab


 PO QHS


   10/29/16 Reported


 


 Atenolol 25 Mg


 Tablet  1 Tab


 PO DAILY


   10/29/16 Reported











Impression


.


1.  Acute toxic encephalopathy secondary to drug overdose.  The patient's


caregiver gave her 4 mg of Klonopin and unknown quantity of tramadol.  ABG shows


adequate ventilation and oxygenation. improving slowly


2.  No significant tobacco history.


3.  History of coronary artery disease with possible mild congestive heart


failure on chest x-ray.





Plan


.





1.  stable pulmonary status with improvement in mental status.


2.  prn oxygen


3.  ABGs are adequate.


4.  DVT prophylaxis with Lovenox.


5.  Discussed with  RN.


     will sign off











WOJCIECH WEINBEGR MD                  Oct 1, 2019 10:59

## 2019-10-01 NOTE — RAD
EXAM: Bilateral lower extremity venous Doppler sonogram.

 

HISTORY: Pain and swelling.

 

TECHNIQUE: Gray scale and color Doppler sonographic evaluation of the 

bilateral lower extremity veins with spectral waveform analysis was 

performed.

 

FINDINGS: There is normal color flow, normal compressibility and there are

normal spectral waveforms in the common femoral, superficial femoral, 

popliteal, posterior tibial and greater saphenous veins.. Calf veins are 

not well seen due to soft tissue edema.

 

IMPRESSION: No Doppler evidence of lower extremity deep venous thrombosis,

with limited evaluation of the calf veins due to soft tissue edema.

 

Electronically signed by: Tennille Martino MD (10/1/2019 4:45 PM) [place] [id number]

## 2019-10-01 NOTE — EKG
[facility]

              [address] [place] [address]

Test Date:    2019-10-01               Test Time:    07:45:57

Pat Name:     MARY OBREGON            Department:   

Patient ID:   [id number]           Room:         588 1

Gender:       F                        Technician:   

:          [id number]               Requested By: KATELYNN ABDULLAHI

Order Number: [id number]           Reading MD:   Ayush Rizvi MD

                                 Measurements

Intervals                              Axis          

Rate:         129                      P:            -20

NE:           174                      QRS:          14

QRSD:         86                       T:            90

QT:           274                                    

QTc:          403                                    

                           Interpretive Statements

SINUS TACHYCARDIA



NON-SPECIFIC ST/T CHANGES

Electronically Signed On 10-9-2019 9:57:35 CDT by Ayush Rizvi MD
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
## 2019-10-01 NOTE — RAD
EXAM: Left foot, 2 views; bilateral tibias and fibulas, 2 views.

 

HISTORY: Pain.

 

COMPARISON: None.

 

FINDINGS: 

 

Left foot: 2 views of the left foot are obtained. There is overlying 

artifact which limits evaluation of bony detail. There is accessory 

navicular. There are vascular calcifications. There is slight enthesopathy

at Achilles insertion. No displaced fracture is seen.

 

Tibias and fibulas: 2 views of the tibia is obtained. There is severe 

bilateral medial compartment subchondral sclerosis, marginal osteophytosis

and associated bony remodeling resulting in flattening and depression of 

the medial tibial plateaus. There is mild bilateral patellofemoral 

compartment spurring and there are suspected small bilateral joint 

effusions. There is mild bilateral lateral compartment spurring. There is 

no fracture, dislocation or subluxation. There is no periosteal reaction.

 

IMPRESSION: 

1. Severe bilateral medial patellofemoral compartment osteoarthritis of 

both knees with associated medial compartment bony remodeling. There are 

also suspected small knee joint effusions, not formally assessed on this 

exam.

2. No acute osseous finding. Evaluation of the left foot is limited due to

overlying artifact.

 

Electronically signed by: Tennille Martino MD (10/1/2019 4:22 PM) [place] [id number]

## 2019-10-01 NOTE — NUR
Pharmacy Vancomycin Dosing Note



S:Consulted to monitor and dose vancomycin started 10/01/19.



O:MARY OBREGON is a 84 year old F with empiric coverage for fever.



Height: 5 feet, 2 inches

Weight: 67.1 kg

Dosing Weight: Actual



Other Antibiotics: 

ZOSYN 2.25G IV Q6HRS



LABS:

Last BUN: 19 

Last Creatinine: 1.2 

Creatinine Clearance: 31 mL/min

Last WBC: 6.6 

Tmax (past 24 hours): 102.6 



Microbiology: 

BLOOD CX (9/30): NGTD

REPEAT BLOOD CX PENDING







A: Patient requires vancomycin as empiric coverage for fevers, goal trough 15-20 mcg/ml. 
Patient's SCr is 1.2 with an eCrCl of 31 ml/min. Will dose cautiously given renal function: 





P: 1. Initiate Vancomycin 1500 mg IV x 1 dose, then 1000 mg IV q24h

    2. Follow up Trough level on 10/03/19 at 1730 

    3. Pharmacy will continue to monitor, follow and adjust therapy as needed.

 

 LUPE RIDDLE [facility], 10/01/19 [id number]

## 2019-10-01 NOTE — RAD
EXAM: Left foot, 2 views; bilateral tibias and fibulas, 2 views.

 

HISTORY: Pain.

 

COMPARISON: None.

 

FINDINGS: 

 

Left foot: 2 views of the left foot are obtained. There is overlying 

artifact which limits evaluation of bony detail. There is accessory 

navicular. There are vascular calcifications. There is slight enthesopathy

at Achilles insertion. No displaced fracture is seen.

 

Tibias and fibulas: 2 views of the tibia is obtained. There is severe 

bilateral medial compartment subchondral sclerosis, marginal osteophytosis

and associated bony remodeling resulting in flattening and depression of 

the medial tibial plateaus. There is mild bilateral patellofemoral 

compartment spurring and there are suspected small bilateral joint 

effusions. There is mild bilateral lateral compartment spurring. There is 

no fracture, dislocation or subluxation. There is no periosteal reaction.

 

IMPRESSION: 

1. Severe bilateral medial patellofemoral compartment osteoarthritis of 

both knees with associated medial compartment bony remodeling. There are 

also suspected small knee joint effusions, not formally assessed on this 

exam.

2. No acute osseous finding. Evaluation of the left foot is limited due to

overlying artifact.

 

Electronically signed by: Tennille Martino MD (10/1/2019 4:22 PM) [place] [id number]

## 2019-10-02 VITALS — DIASTOLIC BLOOD PRESSURE: 43 MMHG | SYSTOLIC BLOOD PRESSURE: 103 MMHG

## 2019-10-02 VITALS — DIASTOLIC BLOOD PRESSURE: 61 MMHG | SYSTOLIC BLOOD PRESSURE: 139 MMHG

## 2019-10-02 VITALS — SYSTOLIC BLOOD PRESSURE: 135 MMHG | DIASTOLIC BLOOD PRESSURE: 52 MMHG

## 2019-10-02 VITALS — SYSTOLIC BLOOD PRESSURE: 135 MMHG | DIASTOLIC BLOOD PRESSURE: 59 MMHG

## 2019-10-02 VITALS — SYSTOLIC BLOOD PRESSURE: 112 MMHG | DIASTOLIC BLOOD PRESSURE: 49 MMHG

## 2019-10-02 VITALS — SYSTOLIC BLOOD PRESSURE: 129 MMHG | DIASTOLIC BLOOD PRESSURE: 69 MMHG

## 2019-10-02 LAB
ALBUMIN SERPL-MCNC: 2.7 G/DL (ref 3.4–5)
ALBUMIN/GLOB SERPL: 0.9 {RATIO} (ref 1–1.7)
ALP SERPL-CCNC: 56 U/L (ref 46–116)
ALT SERPL-CCNC: 8 U/L (ref 14–59)
AMPHETAMINE/METHAMPHETAMINE: (no result)
ANION GAP SERPL CALC-SCNC: 12 MMOL/L (ref 6–14)
AST SERPL-CCNC: 18 U/L (ref 15–37)
BARBITURATES UR-MCNC: (no result) UG/ML
BASOPHILS # BLD AUTO: 0 X10^3/UL (ref 0–0.2)
BASOPHILS NFR BLD: 0 % (ref 0–3)
BENZODIAZ UR-MCNC: (no result) UG/L
BILIRUB SERPL-MCNC: 0.6 MG/DL (ref 0.2–1)
BUN SERPL-MCNC: 19 MG/DL (ref 7–20)
BUN/CREAT SERPL: 13 (ref 6–20)
CALCIUM SERPL-MCNC: 8.3 MG/DL (ref 8.5–10.1)
CANNABINOIDS UR-MCNC: (no result) UG/L
CHLORIDE SERPL-SCNC: 109 MMOL/L (ref 98–107)
CO2 SERPL-SCNC: 22 MMOL/L (ref 21–32)
COCAINE UR-MCNC: (no result) NG/ML
CREAT SERPL-MCNC: 1.5 MG/DL (ref 0.6–1)
EOSINOPHIL NFR BLD: 0 % (ref 0–3)
EOSINOPHIL NFR BLD: 0 X10^3/UL (ref 0–0.7)
ERYTHROCYTE [DISTWIDTH] IN BLOOD BY AUTOMATED COUNT: 21.7 % (ref 11.5–14.5)
GFR SERPLBLD BASED ON 1.73 SQ M-ARVRAT: 40 ML/MIN
GLOBULIN SER-MCNC: 3 G/DL (ref 2.2–3.8)
GLUCOSE SERPL-MCNC: 139 MG/DL (ref 70–99)
HCT VFR BLD CALC: 25.3 % (ref 36–47)
HGB BLD-MCNC: 8 G/DL (ref 12–15.5)
LYMPHOCYTES # BLD: 0.7 X10^3/UL (ref 1–4.8)
LYMPHOCYTES NFR BLD AUTO: 9 % (ref 24–48)
MCH RBC QN AUTO: 21 PG (ref 25–35)
MCHC RBC AUTO-ENTMCNC: 32 G/DL (ref 31–37)
MCV RBC AUTO: 68 FL (ref 79–100)
METHADONE SERPL-MCNC: (no result) NG/ML
MONO #: 1.3 X10^3/UL (ref 0–1.1)
MONOCYTES NFR BLD: 16 % (ref 0–9)
NEUT #: 5.9 X10^3/UL (ref 1.8–7.7)
NEUTROPHILS NFR BLD AUTO: 74 % (ref 31–73)
OPIATES UR-MCNC: (no result) NG/ML
PCP SERPL-MCNC: (no result) MG/DL
PLATELET # BLD AUTO: 185 X10^3/UL (ref 140–400)
POTASSIUM SERPL-SCNC: 3.9 MMOL/L (ref 3.5–5.1)
PROT SERPL-MCNC: 5.7 G/DL (ref 6.4–8.2)
RBC # BLD AUTO: 3.72 X10^6/UL (ref 3.5–5.4)
SODIUM SERPL-SCNC: 143 MMOL/L (ref 136–145)
WBC # BLD AUTO: 7.9 X10^3/UL (ref 4–11)

## 2019-10-02 RX ADMIN — PIPERACILLIN SODIUM AND TAZOBACTAM SODIUM SCH MLS/HR: 2; .25 INJECTION, POWDER, LYOPHILIZED, FOR SOLUTION INTRAVENOUS at 18:31

## 2019-10-02 RX ADMIN — BACITRACIN SCH MLS/HR: 5000 INJECTION, POWDER, FOR SOLUTION INTRAMUSCULAR at 04:18

## 2019-10-02 RX ADMIN — PIPERACILLIN SODIUM AND TAZOBACTAM SODIUM SCH MLS/HR: 2; .25 INJECTION, POWDER, LYOPHILIZED, FOR SOLUTION INTRAVENOUS at 04:46

## 2019-10-02 RX ADMIN — ACETAMINOPHEN PRN MG: 325 TABLET, FILM COATED ORAL at 16:45

## 2019-10-02 RX ADMIN — Medication PRN EA: at 21:43

## 2019-10-02 RX ADMIN — PIPERACILLIN SODIUM AND TAZOBACTAM SODIUM SCH MLS/HR: 2; .25 INJECTION, POWDER, LYOPHILIZED, FOR SOLUTION INTRAVENOUS at 23:15

## 2019-10-02 RX ADMIN — PIPERACILLIN SODIUM AND TAZOBACTAM SODIUM SCH MLS/HR: 2; .25 INJECTION, POWDER, LYOPHILIZED, FOR SOLUTION INTRAVENOUS at 13:24

## 2019-10-02 RX ADMIN — BACITRACIN SCH MLS/HR: 5000 INJECTION, POWDER, FOR SOLUTION INTRAMUSCULAR at 18:31

## 2019-10-02 RX ADMIN — ACETAMINOPHEN PRN MG: 325 TABLET, FILM COATED ORAL at 23:15

## 2019-10-02 RX ADMIN — FAMOTIDINE SCH MG: 20 TABLET ORAL at 09:26

## 2019-10-02 RX ADMIN — ACETAMINOPHEN PRN MG: 325 TABLET, FILM COATED ORAL at 04:17

## 2019-10-02 RX ADMIN — ENOXAPARIN SODIUM SCH MG: 100 INJECTION SUBCUTANEOUS at 16:45

## 2019-10-02 RX ADMIN — Medication SCH CAP: at 21:42

## 2019-10-02 NOTE — CONS
DATE OF CONSULTATION:  10/02/2019



REFERRING PHYSICIAN:  Dr. Borrego.



REASON FOR CONSULTATION:  Fever.



HISTORY OF PRESENT ILLNESS:  The patient is an 84-year-old -American

female with dementia, who lives at home by herself.  According to her son, she

usually has a caregiver or family member with her at all times.  She is normally

alert and talkative.  She requires assistance to the chair as she does not walk.

 She was in her usual state of health when yesterday her son found her on the

floor unresponsive.  Apparently, her caregiver gave her 4 mg of Klonopin and an

unknown quantity of tramadol that she usually does not take.  She was hypoxic,

requiring supplemental oxygen.  She received a dose of Narcan and flumazenil

with improved in mentation.  Her urine toxicology was negative.  Since

admission, she has spiked a fever of 102.6.  Her WBC count remains normal. 

Urinalysis is unremarkable for infection.  Blood cultures are negative so far. 

Recent chest x-ray showed stable diffuse increased interstitial opacity due to

interstitial infiltrate or chronic interstitial change.  No consolidation.  She

is currently on vancomycin and Zosyn.



PAST MEDICAL HISTORY:  Dementia, diabetes, seizures, myocardial infarction,

coronary artery disease, hyperlipidemia, hypertension, and arthritis.



PAST SURGICAL HISTORY:  Cataract removal, coronary stents, appendectomy, partial

hysterectomy.



FAMILY HISTORY:  Stomach cancer and diabetes.



SOCIAL HISTORY:  The patient lives at home by herself with caregivers.



ALLERGIES:  No known drug allergies.



MEDICATIONS:  Vancomycin and Zosyn.  Other medications are available and have

been reviewed on the MAR.



REVIEW OF SYSTEMS:  Unobtainable as the patient is encephalopathic.



PHYSICAL EXAMINATION:

VITAL SIGNS:  Temperature 98.1, T-max 102.6, blood pressure 129/69, heart rate

93, respiratory rate 18, and pulse oximetry is 100%.

GENERAL:  The patient is resting quietly unresponsive to verbal.

HEENT:  Pupils equally round, reactive.  She resists oral exam.

NECK:  Supple.

LUNGS:  Mildly congested, nonlabored.

HEART:  S1, S2.  Murmur present.

ABDOMEN:  Soft, no guarding.  Bowel sounds present.

GENITOURINARY:  Indwelling Evans in place.

EXTREMITIES:  No gross edema or cyanosis.  Left wrist is swollen with grimacing

on palpation.

SKIN:  Warm to touch.

NEUROLOGIC:  Tries to open eyes.



LABORATORY DATA:  Today's WBC 7.9, hemoglobin 8.0, and platelets 185,000. 

Sodium 143, potassium 3.9, creatinine 1.5 from 1.0.  BUN 19, glucose 139, lactic

acid 1.9 from 2.6, total bilirubin 0.6, AST 18, ALT 8, and albumin 2.7. 

Troponin less than 0.017.  Urine toxicology negative.  Urinalysis unremarkable

for infection.  Blood cultures from 09/30/2019 and 10/01/2019 negative to date.



DIAGNOSTIC DATA:  Chest x-ray per HPI.  Lower extremity ultrasound showed no

evidence of DVT with limited evaluation of the calf veins due to soft tissue

edema.  Left foot and bilateral tibial/fibula x-ray reviewed.  No acute osseous

finding.  Head CT shows no intracranial hemorrhage.



IMPRESSION:

1.  Fever.

2.  Lactic acidosis.

3.  Acute encephalopathy.

4.  Left wrist swelling and pain.

5.  Acute kidney injury.

6.  Drug overdose.

7.  Dementia.



PLAN:  Discontinue the vancomycin in light of acute kidney injury.  Continue

empiric Zosyn.  Obtain x-ray of left wrist, rule out fracture.  We will continue

to follow.



Discussed with the family at bedside.



Thank you, Dr. Borrego, for asking us to participate in this patient's care. 

Should you have further questions or concerns, please call.

 



______________________________

SHIVAM VARELA MD DR:  RAMON/prashant  JOB#:  [id number] / [id number]

DD:  10/02/2019 13:17  DT:  10/02/2019 13:51

## 2019-10-02 NOTE — RAD
EXAM: Chest, single view.

 

HISTORY: Infiltrate. Fever.

 

COMPARISON: 9/29/2019.

 

FINDINGS: A frontal view of the chest is obtained. There is stable diffuse

interstitial prominence. There is stable prominent cardiac silhouette. No 

pleural effusion or pneumothorax is seen. There is incidental bilateral 

glenohumeral osteoarthritis.

 

IMPRESSION: 

1. Stable diffuse increased interstitial opacity due to interstitial 

infiltrate or chronic interstitial change. There is no consolidation.

2. Stable prominent cardiac silhouette.

 

Electronically signed by: Tennille Martino MD (10/2/2019 9:19 AM) [place] [id number]

## 2019-10-02 NOTE — PDOC
Infectious Disease Note


Vital Sign


Vital Signs





Vital Signs








  Date Time  Temp Pulse Resp B/P (MAP) Pulse Ox O2 Delivery O2 Flow Rate FiO2


 


10/2/19 11:00 98.1 93 18 129/69 (89) 100 Room Air  





 98.1       











Physical Exam


PHYSICAL EXAM


Neck: Supple, no JVD. [] 


Cardiovascular:Heart rate regular rhythm, no murmur []


Lungs & Thorax:  Bilateral breath sounds clear to auscultation, rash breath 

sounds bilaterally. []


Abdomen: Bowel sounds normal, soft, non distended. [] 


Skin: Warm, dry, no erythema, no rash. [] 


Back: No tenderness. [] 


Extremities: No tenderness, no edema. []





Labs


Lab





Laboratory Tests








Test


 10/1/19


18:00 10/1/19


18:30 10/1/19


21:40 10/2/19


04:30


 


Urine Collection Type Unknown    


 


Urine Color Yellow    


 


Urine Clarity Turbid    


 


Urine pH 5.5    


 


Urine Specific Gravity 1.025    


 


Urine Protein


 Negative mg/dL


(NEG-TRACE) 


 


 





 


Urine Glucose (UA)


 Negative mg/dL


(NEG) 


 


 





 


Urine Ketones (Stick)


 Trace mg/dL


(NEG) 


 


 





 


Urine Blood Moderate (NEG)    


 


Urine Nitrite Negative (NEG)    


 


Urine Bilirubin Negative (NEG)    


 


Urine Urobilinogen Dipstick


 0.2 mg/dL (0.2


mg/dL) 


 


 





 


Urine Leukocyte Esterase Small (NEG)    


 


Urine RBC Occ /HPF (0-2)    


 


Urine WBC 1-4 /HPF (0-4)    


 


Urine Squamous Epithelial


Cells Occ /LPF 


 


 


 





 


Urine Amorphous Sediment Present /HPF    


 


Urine Bacteria


 Few /HPF


(0-FEW) 


 


 





 


Urine Mucus Mod /LPF    


 


Lactic Acid Level


 


 2.6 mmol/L


(0.4-2.0) 1.9 mmol/L


(0.4-2.0) 





 


White Blood Count


 


 


 


 7.9 x10^3/uL


(4.0-11.0)


 


Red Blood Count


 


 


 


 3.72 x10^6/uL


(3.50-5.40)


 


Hemoglobin


 


 


 


 8.0 g/dL


(12.0-15.5)


 


Hematocrit


 


 


 


 25.3 %


(36.0-47.0)


 


Mean Corpuscular Volume    68 fL () 


 


Mean Corpuscular Hemoglobin    21 pg (25-35) 


 


Mean Corpuscular Hemoglobin


Concent 


 


 


 32 g/dL


(31-37)


 


Red Cell Distribution Width


 


 


 


 21.7 %


(11.5-14.5)


 


Platelet Count


 


 


 


 185 x10^3/uL


(140-400)


 


Neutrophils (%) (Auto)    74 % (31-73) 


 


Lymphocytes (%) (Auto)    9 % (24-48) 


 


Monocytes (%) (Auto)    16 % (0-9) 


 


Eosinophils (%) (Auto)    0 % (0-3) 


 


Basophils (%) (Auto)    0 % (0-3) 


 


Neutrophils # (Auto)


 


 


 


 5.9 x10^3/uL


(1.8-7.7)


 


Lymphocytes # (Auto)


 


 


 


 0.7 x10^3/uL


(1.0-4.8)


 


Monocytes # (Auto)


 


 


 


 1.3 x10^3/uL


(0.0-1.1)


 


Eosinophils # (Auto)


 


 


 


 0.0 x10^3/uL


(0.0-0.7)


 


Basophils # (Auto)


 


 


 


 0.0 x10^3/uL


(0.0-0.2)


 


Sodium Level


 


 


 


 143 mmol/L


(136-145)


 


Potassium Level


 


 


 


 3.9 mmol/L


(3.5-5.1)


 


Chloride Level


 


 


 


 109 mmol/L


()


 


Carbon Dioxide Level


 


 


 


 22 mmol/L


(21-32)


 


Anion Gap    12 (6-14) 


 


Blood Urea Nitrogen


 


 


 


 19 mg/dL


(7-20)


 


Creatinine


 


 


 


 1.5 mg/dL


(0.6-1.0)


 


Estimated GFR


(Cockcroft-Gault) 


 


 


 40.0 





 


BUN/Creatinine Ratio    13 (6-20) 


 


Glucose Level


 


 


 


 139 mg/dL


(70-99)


 


Calcium Level


 


 


 


 8.3 mg/dL


(8.5-10.1)


 


Total Bilirubin


 


 


 


 0.6 mg/dL


(0.2-1.0)


 


Aspartate Amino Transf


(AST/SGOT) 


 


 


 18 U/L (15-37) 





 


Alanine Aminotransferase


(ALT/SGPT) 


 


 


 8 U/L (14-59) 





 


Alkaline Phosphatase


 


 


 


 56 U/L


()


 


Total Protein


 


 


 


 5.7 g/dL


(6.4-8.2)


 


Albumin


 


 


 


 2.7 g/dL


(3.4-5.0)


 


Albumin/Globulin Ratio    0.9 (1.0-1.7) 


 


Test


 10/2/19


06:35 


 


 





 


Urine Opiates Screen Neg (NEG)    


 


Urine Methadone Screen Neg (NEG)    


 


Urine Barbiturates Neg (NEG)    


 


Urine Phencyclidine Screen Neg (NEG)    


 


Urine


Amphetamine/Methamphetamine Neg (NEG) 


 


 


 





 


Urine Benzodiazepines Screen Neg (NEG)    


 


Urine Cocaine Screen Neg (NEG)    


 


Urine Cannabinoids Screen Neg (NEG)    


 


Urine Ethyl Alcohol Neg (NEG)    








Micro





Microbiology


10/1/19 Blood Culture - Preliminary, Resulted


          NO GROWTH AFTER 1 DAY





Objective


Assessment


Fever 


Lactic acidosis 


Acute encephalopathy 


Left wrist swelling and pain


EL 


Drug overdose


Dementia





Plan


Plan of Care


Continue empiric Zosyn


d/c vancomycin in light of EL.


Xray left wrist








D/w family at beside





Thank you


[id number]


Possible UTI


Ortho eval





D/w family





Attending Co-Sign


Attending Co-Sign


The patient was seen and interviewed as well as examined at the bedside. The 

chart was reviewed. The case was discussed. Agree with the plan of care.











RONNY KILGORE         Oct 2, 2019 13:17


SHIVAM VARELA MD               Oct 2, 2019 15:59

## 2019-10-02 NOTE — PDOC
PROGRESS NOTES


History of Present Illness


History of Present Illness





VTE Prophylaxis Ordered


VTE Prophylaxis Devices:  No


VTE Pharmacological Prophylaxi:  Yes





Assessment/Plan


Assessment/Plan


 


IMPRESSION: 











1. Basilar atelectasis versus mild pulmonary edema.


2. Mild cardiomegaly.


3. Large hiatal hernia.


4. Altered mental status SEC # 5


5. Acute respiratory failure with hypoxia, SEC TO OVERDOSE OF KLONOPIN , NOW 

IMPROVED ON ROOM AIR


6. Intentional// accidental ?  drug overdose


7. TOXIC Encephalopathy


8. 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 


9. 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


11.Mild to moderate chronic small vessel ischemic changes and atrophy.on CT HEAD


12 DIABETES


13 HYPERTENSION HX


14 severe protein-caloric malnutrition


15.GERD


16.MICROCYTIC ANEMIA


17. fever, possible aspiration











ADMITTED 





admit 2 MN


police report filed


SS CONSULT


O2 SUPPORT PRN


Consult pulmonary


Consult Neurology


icu bed


consult risk management


ABG NOW


CONSULT POISON CONTROL


DVT PROPHYLAXIS


GI PROPHYLAXIS


IV FLUID SUPPORT LOW RATE


blood cult


iv antibiotics D/C VANC


Continue empiric Zosyn








10-01  CR UP TO 1.2 HGB STABLE, REPEAT UDS








37 MIN PT EXAM, CHART REVIEW, > 50% OF TIME SPENT WITH EXAM, CHART REVIEW, PT 

CARE COORDINATION





Vitals


Vitals





Vital Signs








  Date Time  Temp Pulse Resp B/P (MAP) Pulse Ox O2 Delivery O2 Flow Rate FiO2


 


10/2/19 07:00 99.6 121 18 135/59 (84) 96 Room Air  





 99.6       











Physical Exam


Physical Exam


Neck: Supple, no JVD. [] 


Cardiovascular:Heart rate regular rhythm, no murmur []


Lungs & Thorax:  Bilateral breath sounds clear to auscultation, rash breath 

sounds bilaterally. []


Abdomen: Bowel sounds normal, soft, non distended. [] 


Skin: Warm, dry, no erythema, no rash. [] 


Back: No tenderness. [] 


Extremities: No tenderness, no edema. []


General:  Cooperative, No acute distress


Heart:  Regular rate


Lungs:  Clear


Abdomen:  Normal bowel sounds, Soft, No tenderness, No hepatosplenomegaly


Extremities:  No clubbing, No cyanosis





Labs


LABS





EXAM: Chest, single view.


 


HISTORY: Infiltrate. Fever.


 


COMPARISON: 9/29/2019.


 


FINDINGS: A frontal view of the chest is obtained. There is stable diffuse


interstitial prominence. There is stable prominent cardiac silhouette. No 


pleural effusion or pneumothorax is seen. There is incidental bilateral 


glenohumeral osteoarthritis.


 


IMPRESSION: 


1. Stable diffuse increased interstitial opacity due to interstitial 


infiltrate or chronic interstitial change. There is no consolidation.


2. Stable prominent cardiac silhouette.


 


Electronically signed by: Tennille Martino MD (10/2/2019 9:19 AM) [facility]





Laboratory Tests








Test


 10/1/19


12:25 10/1/19


18:00 10/1/19


18:30 10/1/19


21:40


 


Lactic Acid Level


 1.4 mmol/L


(0.4-2.0) 


 2.6 mmol/L


(0.4-2.0) 1.9 mmol/L


(0.4-2.0)


 


Urine Collection Type  Unknown   


 


Urine Color  Yellow   


 


Urine Clarity  Turbid   


 


Urine pH  5.5   


 


Urine Specific Gravity  1.025   


 


Urine Protein


 


 Negative mg/dL


(NEG-TRACE) 


 





 


Urine Glucose (UA)


 


 Negative mg/dL


(NEG) 


 





 


Urine Ketones (Stick)


 


 Trace mg/dL


(NEG) 


 





 


Urine Blood  Moderate (NEG)   


 


Urine Nitrite  Negative (NEG)   


 


Urine Bilirubin  Negative (NEG)   


 


Urine Urobilinogen Dipstick


 


 0.2 mg/dL (0.2


mg/dL) 


 





 


Urine Leukocyte Esterase  Small (NEG)   


 


Urine RBC  Occ /HPF (0-2)   


 


Urine WBC  1-4 /HPF (0-4)   


 


Urine Squamous Epithelial


Cells 


 Occ /LPF 


 


 





 


Urine Amorphous Sediment  Present /HPF   


 


Urine Bacteria


 


 Few /HPF


(0-FEW) 


 





 


Urine Mucus  Mod /LPF   


 


Test


 10/2/19


04:30 10/2/19


06:35 


 





 


White Blood Count


 7.9 x10^3/uL


(4.0-11.0) 


 


 





 


Red Blood Count


 3.72 x10^6/uL


(3.50-5.40) 


 


 





 


Hemoglobin


 8.0 g/dL


(12.0-15.5) 


 


 





 


Hematocrit


 25.3 %


(36.0-47.0) 


 


 





 


Mean Corpuscular Volume 68 fL ()    


 


Mean Corpuscular Hemoglobin 21 pg (25-35)    


 


Mean Corpuscular Hemoglobin


Concent 32 g/dL


(31-37) 


 


 





 


Red Cell Distribution Width


 21.7 %


(11.5-14.5) 


 


 





 


Platelet Count


 185 x10^3/uL


(140-400) 


 


 





 


Neutrophils (%) (Auto) 74 % (31-73)    


 


Lymphocytes (%) (Auto) 9 % (24-48)    


 


Monocytes (%) (Auto) 16 % (0-9)    


 


Eosinophils (%) (Auto) 0 % (0-3)    


 


Basophils (%) (Auto) 0 % (0-3)    


 


Neutrophils # (Auto)


 5.9 x10^3/uL


(1.8-7.7) 


 


 





 


Lymphocytes # (Auto)


 0.7 x10^3/uL


(1.0-4.8) 


 


 





 


Monocytes # (Auto)


 1.3 x10^3/uL


(0.0-1.1) 


 


 





 


Eosinophils # (Auto)


 0.0 x10^3/uL


(0.0-0.7) 


 


 





 


Basophils # (Auto)


 0.0 x10^3/uL


(0.0-0.2) 


 


 





 


Sodium Level


 143 mmol/L


(136-145) 


 


 





 


Potassium Level


 3.9 mmol/L


(3.5-5.1) 


 


 





 


Chloride Level


 109 mmol/L


() 


 


 





 


Carbon Dioxide Level


 22 mmol/L


(21-32) 


 


 





 


Anion Gap 12 (6-14)    


 


Blood Urea Nitrogen


 19 mg/dL


(7-20) 


 


 





 


Creatinine


 1.5 mg/dL


(0.6-1.0) 


 


 





 


Estimated GFR


(Cockcroft-Gault) 40.0 


 


 


 





 


BUN/Creatinine Ratio 13 (6-20)    


 


Glucose Level


 139 mg/dL


(70-99) 


 


 





 


Calcium Level


 8.3 mg/dL


(8.5-10.1) 


 


 





 


Total Bilirubin


 0.6 mg/dL


(0.2-1.0) 


 


 





 


Aspartate Amino Transf


(AST/SGOT) 18 U/L (15-37) 


 


 


 





 


Alanine Aminotransferase


(ALT/SGPT) 8 U/L (14-59) 


 


 


 





 


Alkaline Phosphatase


 56 U/L


() 


 


 





 


Total Protein


 5.7 g/dL


(6.4-8.2) 


 


 





 


Albumin


 2.7 g/dL


(3.4-5.0) 


 


 





 


Albumin/Globulin Ratio 0.9 (1.0-1.7)    


 


Urine Opiates Screen  Neg (NEG)   


 


Urine Methadone Screen  Neg (NEG)   


 


Urine Barbiturates  Neg (NEG)   


 


Urine Phencyclidine Screen  Neg (NEG)   


 


Urine


Amphetamine/Methamphetamine 


 Neg (NEG) 


 


 





 


Urine Benzodiazepines Screen  Neg (NEG)   


 


Urine Cocaine Screen  Neg (NEG)   


 


Urine Cannabinoids Screen  Neg (NEG)   


 


Urine Ethyl Alcohol  Neg (NEG)   











Assessment and Plan


Assessmemt and Plan


Problems


Medical Problems:


(1) Acute respiratory failure with hypoxia


Status: Acute  











Comment


Review of Relevant


I have reviewed the following items marky (where applicable) has been applied.


Labs





Laboratory Tests








Test


 9/30/19


09:10 9/30/19


10:35 9/30/19


11:09 10/1/19


05:20


 


O2 Saturation 94 % (92-99)    


 


Arterial Blood pH


 7.46


(7.35-7.45) 


 


 





 


Arterial Blood pCO2 at


Patient Temp 35 mmHg


(35-46) 


 


 





 


Arterial Blood pO2 at Patient


Temp 75 mmHg


() 


 


 





 


Arterial Blood HCO3


 24 mmol/L


(21-28) 


 


 





 


Arterial Blood Base Excess


 1 mmol/L


(-3-3) 


 


 





 


FiO2 21    


 


White Blood Count


 


 5.5 x10^3/uL


(4.0-11.0) 


 6.6 x10^3/uL


(4.0-11.0)


 


Red Blood Count


 


 4.63 x10^6/uL


(3.50-5.40) 


 4.94 x10^6/uL


(3.50-5.40)


 


Hemoglobin


 


 9.7 g/dL


(12.0-15.5) 


 10.4 g/dL


(12.0-15.5)


 


Hematocrit


 


 31.2 %


(36.0-47.0) 


 34.2 %


(36.0-47.0)


 


Mean Corpuscular Volume  67 fL ()   69 fL () 


 


Mean Corpuscular Hemoglobin  21 pg (25-35)   21 pg (25-35) 


 


Mean Corpuscular Hemoglobin


Concent 


 31 g/dL


(31-37) 


 31 g/dL


(31-37)


 


Red Cell Distribution Width


 


 21.3 %


(11.5-14.5) 


 21.9 %


(11.5-14.5)


 


Platelet Count


 


 253 x10^3/uL


(140-400) 


 241 x10^3/uL


(140-400)


 


Neutrophils (%) (Auto)  75 % (31-73)   74 % (31-73) 


 


Lymphocytes (%) (Auto)  14 % (24-48)   12 % (24-48) 


 


Monocytes (%) (Auto)  11 % (0-9)   13 % (0-9) 


 


Eosinophils (%) (Auto)  1 % (0-3)   0 % (0-3) 


 


Basophils (%) (Auto)  0 % (0-3)   0 % (0-3) 


 


Neutrophils # (Auto)


 


 4.1 x10^3/uL


(1.8-7.7) 


 4.9 x10^3/uL


(1.8-7.7)


 


Lymphocytes # (Auto)


 


 0.8 x10^3/uL


(1.0-4.8) 


 0.8 x10^3/uL


(1.0-4.8)


 


Monocytes # (Auto)


 


 0.6 x10^3/uL


(0.0-1.1) 


 0.9 x10^3/uL


(0.0-1.1)


 


Eosinophils # (Auto)


 


 0.0 x10^3/uL


(0.0-0.7) 


 0.0 x10^3/uL


(0.0-0.7)


 


Basophils # (Auto)


 


 0.0 x10^3/uL


(0.0-0.2) 


 0.0 x10^3/uL


(0.0-0.2)


 


Sodium Level


 


 142 mmol/L


(136-145) 


 144 mmol/L


(136-145)


 


Potassium Level


 


 3.7 mmol/L


(3.5-5.1) 


 4.0 mmol/L


(3.5-5.1)


 


Chloride Level


 


 106 mmol/L


() 


 108 mmol/L


()


 


Carbon Dioxide Level


 


 25 mmol/L


(21-32) 


 22 mmol/L


(21-32)


 


Anion Gap  11 (6-14)   14 (6-14) 


 


Blood Urea Nitrogen


 


 16 mg/dL


(7-20) 


 19 mg/dL


(7-20)


 


Creatinine


 


 1.0 mg/dL


(0.6-1.0) 


 1.2 mg/dL


(0.6-1.0)


 


Estimated GFR


(Cockcroft-Gault) 


 63.9 


 


 51.8 





 


BUN/Creatinine Ratio  16 (6-20)   


 


Glucose Level


 


 162 mg/dL


(70-99) 


 129 mg/dL


(70-99)


 


Calcium Level


 


 9.2 mg/dL


(8.5-10.1) 


 9.6 mg/dL


(8.5-10.1)


 


Total Bilirubin


 


 0.3 mg/dL


(0.2-1.0) 


 





 


Aspartate Amino Transf


(AST/SGOT) 


 19 U/L (15-37) 


 


 





 


Alanine Aminotransferase


(ALT/SGPT) 


 15 U/L (14-59) 


 


 





 


Alkaline Phosphatase


 


 76 U/L


() 


 





 


Total Protein


 


 7.1 g/dL


(6.4-8.2) 


 





 


Albumin


 


 3.3 g/dL


(3.4-5.0) 


 





 


Albumin/Globulin Ratio  0.9 (1.0-1.7)   


 


Glucose (Fingerstick)


 


 


 142 mg/dL


(70-99) 





 


Test


 10/1/19


07:20 10/1/19


12:25 10/1/19


18:00 10/1/19


18:30


 


Urine Opiates Screen Neg (NEG)    


 


Urine Methadone Screen Neg (NEG)    


 


Urine Barbiturates Neg (NEG)    


 


Urine Phencyclidine Screen Neg (NEG)    


 


Urine


Amphetamine/Methamphetamine Neg (NEG) 


 


 


 





 


Urine Benzodiazepines Screen Neg (NEG)    


 


Urine Cocaine Screen Neg (NEG)    


 


Urine Cannabinoids Screen Neg (NEG)    


 


Urine Ethyl Alcohol Neg (NEG)    


 


Lactic Acid Level


 


 1.4 mmol/L


(0.4-2.0) 


 2.6 mmol/L


(0.4-2.0)


 


Urine Collection Type   Unknown  


 


Urine Color   Yellow  


 


Urine Clarity   Turbid  


 


Urine pH   5.5  


 


Urine Specific Gravity   1.025  


 


Urine Protein


 


 


 Negative mg/dL


(NEG-TRACE) 





 


Urine Glucose (UA)


 


 


 Negative mg/dL


(NEG) 





 


Urine Ketones (Stick)


 


 


 Trace mg/dL


(NEG) 





 


Urine Blood   Moderate (NEG)  


 


Urine Nitrite   Negative (NEG)  


 


Urine Bilirubin   Negative (NEG)  


 


Urine Urobilinogen Dipstick


 


 


 0.2 mg/dL (0.2


mg/dL) 





 


Urine Leukocyte Esterase   Small (NEG)  


 


Urine RBC   Occ /HPF (0-2)  


 


Urine WBC   1-4 /HPF (0-4)  


 


Urine Squamous Epithelial


Cells 


 


 Occ /LPF 


 





 


Urine Amorphous Sediment   Present /HPF  


 


Urine Bacteria


 


 


 Few /HPF


(0-FEW) 





 


Urine Mucus   Mod /LPF  


 


Test


 10/1/19


21:40 10/2/19


04:30 10/2/19


06:35 





 


Lactic Acid Level


 1.9 mmol/L


(0.4-2.0) 


 


 





 


White Blood Count


 


 7.9 x10^3/uL


(4.0-11.0) 


 





 


Red Blood Count


 


 3.72 x10^6/uL


(3.50-5.40) 


 





 


Hemoglobin


 


 8.0 g/dL


(12.0-15.5) 


 





 


Hematocrit


 


 25.3 %


(36.0-47.0) 


 





 


Mean Corpuscular Volume  68 fL ()   


 


Mean Corpuscular Hemoglobin  21 pg (25-35)   


 


Mean Corpuscular Hemoglobin


Concent 


 32 g/dL


(31-37) 


 





 


Red Cell Distribution Width


 


 21.7 %


(11.5-14.5) 


 





 


Platelet Count


 


 185 x10^3/uL


(140-400) 


 





 


Neutrophils (%) (Auto)  74 % (31-73)   


 


Lymphocytes (%) (Auto)  9 % (24-48)   


 


Monocytes (%) (Auto)  16 % (0-9)   


 


Eosinophils (%) (Auto)  0 % (0-3)   


 


Basophils (%) (Auto)  0 % (0-3)   


 


Neutrophils # (Auto)


 


 5.9 x10^3/uL


(1.8-7.7) 


 





 


Lymphocytes # (Auto)


 


 0.7 x10^3/uL


(1.0-4.8) 


 





 


Monocytes # (Auto)


 


 1.3 x10^3/uL


(0.0-1.1) 


 





 


Eosinophils # (Auto)


 


 0.0 x10^3/uL


(0.0-0.7) 


 





 


Basophils # (Auto)


 


 0.0 x10^3/uL


(0.0-0.2) 


 





 


Sodium Level


 


 143 mmol/L


(136-145) 


 





 


Potassium Level


 


 3.9 mmol/L


(3.5-5.1) 


 





 


Chloride Level


 


 109 mmol/L


() 


 





 


Carbon Dioxide Level


 


 22 mmol/L


(21-32) 


 





 


Anion Gap  12 (6-14)   


 


Blood Urea Nitrogen


 


 19 mg/dL


(7-20) 


 





 


Creatinine


 


 1.5 mg/dL


(0.6-1.0) 


 





 


Estimated GFR


(Cockcroft-Gault) 


 40.0 


 


 





 


BUN/Creatinine Ratio  13 (6-20)   


 


Glucose Level


 


 139 mg/dL


(70-99) 


 





 


Calcium Level


 


 8.3 mg/dL


(8.5-10.1) 


 





 


Total Bilirubin


 


 0.6 mg/dL


(0.2-1.0) 


 





 


Aspartate Amino Transf


(AST/SGOT) 


 18 U/L (15-37) 


 


 





 


Alanine Aminotransferase


(ALT/SGPT) 


 8 U/L (14-59) 


 


 





 


Alkaline Phosphatase


 


 56 U/L


() 


 





 


Total Protein


 


 5.7 g/dL


(6.4-8.2) 


 





 


Albumin


 


 2.7 g/dL


(3.4-5.0) 


 





 


Albumin/Globulin Ratio  0.9 (1.0-1.7)   


 


Urine Opiates Screen   Neg (NEG)  


 


Urine Methadone Screen   Neg (NEG)  


 


Urine Barbiturates   Neg (NEG)  


 


Urine Phencyclidine Screen   Neg (NEG)  


 


Urine


Amphetamine/Methamphetamine 


 


 Neg (NEG) 


 





 


Urine Benzodiazepines Screen   Neg (NEG)  


 


Urine Cocaine Screen   Neg (NEG)  


 


Urine Cannabinoids Screen   Neg (NEG)  


 


Urine Ethyl Alcohol   Neg (NEG)  








Laboratory Tests








Test


 10/1/19


12:25 10/1/19


18:00 10/1/19


18:30 10/1/19


21:40


 


Lactic Acid Level


 1.4 mmol/L


(0.4-2.0) 


 2.6 mmol/L


(0.4-2.0) 1.9 mmol/L


(0.4-2.0)


 


Urine Collection Type  Unknown   


 


Urine Color  Yellow   


 


Urine Clarity  Turbid   


 


Urine pH  5.5   


 


Urine Specific Gravity  1.025   


 


Urine Protein


 


 Negative mg/dL


(NEG-TRACE) 


 





 


Urine Glucose (UA)


 


 Negative mg/dL


(NEG) 


 





 


Urine Ketones (Stick)


 


 Trace mg/dL


(NEG) 


 





 


Urine Blood  Moderate (NEG)   


 


Urine Nitrite  Negative (NEG)   


 


Urine Bilirubin  Negative (NEG)   


 


Urine Urobilinogen Dipstick


 


 0.2 mg/dL (0.2


mg/dL) 


 





 


Urine Leukocyte Esterase  Small (NEG)   


 


Urine RBC  Occ /HPF (0-2)   


 


Urine WBC  1-4 /HPF (0-4)   


 


Urine Squamous Epithelial


Cells 


 Occ /LPF 


 


 





 


Urine Amorphous Sediment  Present /HPF   


 


Urine Bacteria


 


 Few /HPF


(0-FEW) 


 





 


Urine Mucus  Mod /LPF   


 


Test


 10/2/19


04:30 10/2/19


06:35 


 





 


White Blood Count


 7.9 x10^3/uL


(4.0-11.0) 


 


 





 


Red Blood Count


 3.72 x10^6/uL


(3.50-5.40) 


 


 





 


Hemoglobin


 8.0 g/dL


(12.0-15.5) 


 


 





 


Hematocrit


 25.3 %


(36.0-47.0) 


 


 





 


Mean Corpuscular Volume 68 fL ()    


 


Mean Corpuscular Hemoglobin 21 pg (25-35)    


 


Mean Corpuscular Hemoglobin


Concent 32 g/dL


(31-37) 


 


 





 


Red Cell Distribution Width


 21.7 %


(11.5-14.5) 


 


 





 


Platelet Count


 185 x10^3/uL


(140-400) 


 


 





 


Neutrophils (%) (Auto) 74 % (31-73)    


 


Lymphocytes (%) (Auto) 9 % (24-48)    


 


Monocytes (%) (Auto) 16 % (0-9)    


 


Eosinophils (%) (Auto) 0 % (0-3)    


 


Basophils (%) (Auto) 0 % (0-3)    


 


Neutrophils # (Auto)


 5.9 x10^3/uL


(1.8-7.7) 


 


 





 


Lymphocytes # (Auto)


 0.7 x10^3/uL


(1.0-4.8) 


 


 





 


Monocytes # (Auto)


 1.3 x10^3/uL


(0.0-1.1) 


 


 





 


Eosinophils # (Auto)


 0.0 x10^3/uL


(0.0-0.7) 


 


 





 


Basophils # (Auto)


 0.0 x10^3/uL


(0.0-0.2) 


 


 





 


Sodium Level


 143 mmol/L


(136-145) 


 


 





 


Potassium Level


 3.9 mmol/L


(3.5-5.1) 


 


 





 


Chloride Level


 109 mmol/L


() 


 


 





 


Carbon Dioxide Level


 22 mmol/L


(21-32) 


 


 





 


Anion Gap 12 (6-14)    


 


Blood Urea Nitrogen


 19 mg/dL


(7-20) 


 


 





 


Creatinine


 1.5 mg/dL


(0.6-1.0) 


 


 





 


Estimated GFR


(Cockcroft-Gault) 40.0 


 


 


 





 


BUN/Creatinine Ratio 13 (6-20)    


 


Glucose Level


 139 mg/dL


(70-99) 


 


 





 


Calcium Level


 8.3 mg/dL


(8.5-10.1) 


 


 





 


Total Bilirubin


 0.6 mg/dL


(0.2-1.0) 


 


 





 


Aspartate Amino Transf


(AST/SGOT) 18 U/L (15-37) 


 


 


 





 


Alanine Aminotransferase


(ALT/SGPT) 8 U/L (14-59) 


 


 


 





 


Alkaline Phosphatase


 56 U/L


() 


 


 





 


Total Protein


 5.7 g/dL


(6.4-8.2) 


 


 





 


Albumin


 2.7 g/dL


(3.4-5.0) 


 


 





 


Albumin/Globulin Ratio 0.9 (1.0-1.7)    


 


Urine Opiates Screen  Neg (NEG)   


 


Urine Methadone Screen  Neg (NEG)   


 


Urine Barbiturates  Neg (NEG)   


 


Urine Phencyclidine Screen  Neg (NEG)   


 


Urine


Amphetamine/Methamphetamine 


 Neg (NEG) 


 


 





 


Urine Benzodiazepines Screen  Neg (NEG)   


 


Urine Cocaine Screen  Neg (NEG)   


 


Urine Cannabinoids Screen  Neg (NEG)   


 


Urine Ethyl Alcohol  Neg (NEG)   








Microbiology


9/30/19 Blood Culture - Preliminary, Resulted


          NO GROWTH AFTER 1 DAY


Medications





Current Medications


Flumazenil (Romazicon) 0.5 mg STK-MED ONCE IV ;  Start 9/29/19 at 21:30;  Stop 

9/29/19 at 21:30;  Status DC


Flumazenil (Romazicon) 1 mg 1X  ONCE IV  Last administered on 9/29/19at 21:50;  

Start 9/29/19 at 22:15;  Stop 9/29/19 at 22:16;  Status DC


Furosemide (Lasix) 20 mg 1X  ONCE IVP  Last administered on 9/30/19at 00:48;  

Start 9/30/19 at 00:00;  Stop 9/30/19 at 00:01;  Status DC


Ondansetron HCl (Zofran) 4 mg PRN Q8HRS  PRN IV NAUSEA/VOMITING 1ST CHOICE;  

Start 9/30/19 at 01:00;  Stop 10/1/19 at 00:59;  Status DC


Dextrose/Sodium Chloride 1,000 ml @  75 mls/hr [id number] IV  Last administered on 

9/30/19at 01:26;  Start 9/30/19 at 01:00;  Stop 9/30/19 at 15:21;  Status DC


Enoxaparin Sodium (Lovenox 40mg Syringe) 40 mg Q24H SQ  Last administered on 

10/1/19at 18:37;  Start 9/30/19 at 15:00


Ondansetron HCl (Zofran) 4 mg PRN Q6HRS  PRN IV NAUSEA/VOMITING;  Start 9/30/19 

at 09:15


Prochlorperazine (Compazine) 25 mg PRN Q12HR  PRN TN NAUSEA/VOMITING;  Start 

9/30/19 at 09:15


Famotidine (Pepcid Vial) 20 mg BID IVP  Last administered on 10/1/19at 09:05;  

Start 9/30/19 at 11:00;  Stop 10/1/19 at 11:22;  Status DC


Sodium Chloride (Normal Saline Flush) 3 ml QSHIFT  PRN IV AFTER MEDS AND BLOOD 

DRAWS;  Start 9/30/19 at 09:15


Bisacodyl (Dulcolax Supp) 10 mg PRN DAILY  PRN TN CONSTIPATION;  Start 9/30/19 

at 09:15


Sodium Chloride 1,000 ml @  75 mls/hr [id number] IV  Last administered on 10/2/19at

 04:18;  Start 9/30/19 at 09:30


Influenza Virus Vaccine Quadrival (Afluria Quad 2019-20 (3yr Up) Syringe) 0.5 ml

 ONCE ONCE VAX IM  Last administered on 9/30/19at 15:33;  Start 9/30/19 at 

14:15;  Stop 9/30/19 at 14:16;  Status DC


Piperacillin Sod/ Tazobactam Sod 2.25 gm/Sodium Chloride 50 ml @  100 mls/hr 

Q6HRS IV  Last administered on 10/2/19at 04:46;  Start 10/1/19 at 12:00


Famotidine (Pepcid Vial) 20 mg DAILY IVP ;  Start 10/2/19 at 09:00;  Stop 

10/1/19 at 11:57;  Status DC


Famotidine (Pepcid) 20 mg DAILY PO ;  Start 10/2/19 at 09:00


Levetiracetam (Keppra) 500 mg BID PO  Last administered on 10/1/19at 21:04;  

Start 10/1/19 at 15:00


Acetaminophen (Tylenol) 650 mg PRN Q6HRS  PRN PO PAIN Last administered on 

10/2/19at 04:17;  Start 10/1/19 at 15:30


Vancomycin HCl 0.5 gm/Sodium Chloride 250 ml @  166.667 mls/hr 1X  ONCE IV ;  

Start 10/1/19 at 18:00;  Stop 10/1/19 at 19:29;  Status UNV


Vancomycin HCl 1.5 gm/Sodium Chloride 500 ml @  250 mls/hr 1X  ONCE IV  Last 

administered on 10/1/19at 19:16;  Start 10/1/19 at 19:00;  Stop 10/1/19 at 

20:59;  Status DC


Vancomycin HCl 1 gm/Sodium Chloride 250 ml @  250 mls/hr Q24H IV ;  Start 

10/2/19 at 18:00


Vancomycin HCl (Vancomycin Trough Level) 1 each 1X  ONCE MC ;  Start 10/3/19 at 

17:30;  Stop 10/3/19 at 17:31


Vancomycin HCl (Vanco Per Pharmacy) 1 each PRN DAILY  PRN MC SEE COMMENTS Last 

administered on 10/1/19at 18:08;  Start 10/1/19 at 18:15


Piperacillin Sod/ Tazobactam Sod (Zosyn Per Pharmacy) 1 each PRN DAILY  PRN MC 

SEE COMMENTS;  Start 10/1/19 at 18:15;  Status UNV





Active Scripts


Active


Keppra (Levetiracetam) 500 Mg Tablet 500 Mg PO BID


Meclizine Hcl 25 Mg Tablet 1 Tab PO PRN TID PRN


Reported


Vitamin D3 (Cholecalciferol (Vitamin D3)) 1,000 Unit Tablet 1 Tab PO DAILYBFRSUP


Voltaren (Diclofenac Sodium) 100 Gm Gel..gram. 1 Gm TP PRN QID


Aspirin 81 Mg Tab.chew 1 Tab PO DAILY


Simvastatin 40 Mg Tablet 1 Tab PO QHS


Atenolol 25 Mg Tablet 1 Tab PO DAILY


Vitals/I & O





Vital Sign - Last 24 Hours








 10/1/19 10/1/19 10/1/19 10/1/19





 11:59 16:00 19:00 20:00


 


Temp 100.5 102.6 101.1 





 100.5 102.6 101.1 


 


Pulse 120 113 133 


 


Resp 20 18 18 


 


B/P (MAP) 174/75 (108) 176/77 (110) 142/91 (108) 


 


Pulse Ox 99 100 97 


 


O2 Delivery Room Air Room Air Room Air Room Air


 


    





    





 10/1/19 10/2/19 10/2/19 





 23:00 03:00 07:00 


 


Temp 100.4 99.8 99.6 





 100.4 99.8 99.6 


 


Pulse 116 117 121 


 


Resp 18 18 18 


 


B/P (MAP) 152/62 (92) 139/61 (87) 135/59 (84) 


 


Pulse Ox 94 95 96 


 


O2 Delivery Room Air Room Air Room Air 














Intake and Output   


 


 10/1/19 10/1/19 10/2/19





 15:00 23:00 07:00


 


Intake Total  150 ml 1340 ml


 


Output Total   300 ml


 


Balance  150 ml 1040 ml

















KATELYNN ABDULLAHI MD           Oct 2, 2019 08:22

## 2019-10-02 NOTE — RAD
EXAM: Left wrist, 3 views.

 

HISTORY: Swelling and pain.

 

COMPARISON: None.

 

FINDINGS: 3 views of the left wrist are obtained. There is widening of the

scapholunate joint space likely due to a scapholunate ligament injury. 

There is suspected chondrocalcinosis involving this joint space. There is 

triscaphe joint space narrowing and subchondral sclerosis. There is no 

fracture. There is soft tissue swelling.

 

IMPRESSION: 

1. Suspected scapholunate ligament injury and chondrocalcinosis.

2. Mild triscaphe osteoarthritis.

 

Electronically signed by: eTnnille Martino MD (10/2/2019 2:33 PM) [place] [id number]

## 2019-10-02 NOTE — NUR
SW following pt. SW requested for PT/OT order to eval skilled needs as recommended by rehab 
screen. Will continue to follow.

## 2019-10-03 VITALS — SYSTOLIC BLOOD PRESSURE: 147 MMHG | DIASTOLIC BLOOD PRESSURE: 54 MMHG

## 2019-10-03 VITALS — DIASTOLIC BLOOD PRESSURE: 82 MMHG | SYSTOLIC BLOOD PRESSURE: 172 MMHG

## 2019-10-03 VITALS — SYSTOLIC BLOOD PRESSURE: 138 MMHG | DIASTOLIC BLOOD PRESSURE: 50 MMHG

## 2019-10-03 VITALS — SYSTOLIC BLOOD PRESSURE: 134 MMHG | DIASTOLIC BLOOD PRESSURE: 77 MMHG

## 2019-10-03 VITALS — SYSTOLIC BLOOD PRESSURE: 179 MMHG | DIASTOLIC BLOOD PRESSURE: 72 MMHG

## 2019-10-03 VITALS — SYSTOLIC BLOOD PRESSURE: 175 MMHG | DIASTOLIC BLOOD PRESSURE: 70 MMHG

## 2019-10-03 VITALS — DIASTOLIC BLOOD PRESSURE: 77 MMHG | SYSTOLIC BLOOD PRESSURE: 164 MMHG

## 2019-10-03 VITALS — SYSTOLIC BLOOD PRESSURE: 176 MMHG | DIASTOLIC BLOOD PRESSURE: 81 MMHG

## 2019-10-03 VITALS — DIASTOLIC BLOOD PRESSURE: 83 MMHG | SYSTOLIC BLOOD PRESSURE: 170 MMHG

## 2019-10-03 LAB
ALBUMIN SERPL-MCNC: 2.1 G/DL (ref 3.4–5)
ALBUMIN/GLOB SERPL: 0.6 {RATIO} (ref 1–1.7)
ALP SERPL-CCNC: 50 U/L (ref 46–116)
ALT SERPL-CCNC: 9 U/L (ref 14–59)
AMPHETAMINE/METHAMPHETAMINE: (no result)
ANION GAP SERPL CALC-SCNC: 11 MMOL/L (ref 6–14)
AST SERPL-CCNC: 20 U/L (ref 15–37)
BARBITURATES UR-MCNC: (no result) UG/ML
BASOPHILS # BLD AUTO: 0 X10^3/UL (ref 0–0.2)
BASOPHILS NFR BLD: 0 % (ref 0–3)
BENZODIAZ UR-MCNC: (no result) UG/L
BILIRUB SERPL-MCNC: 0.4 MG/DL (ref 0.2–1)
BUN SERPL-MCNC: 18 MG/DL (ref 7–20)
BUN/CREAT SERPL: 15 (ref 6–20)
CALCIUM SERPL-MCNC: 8.2 MG/DL (ref 8.5–10.1)
CANNABINOIDS UR-MCNC: (no result) UG/L
CHLORIDE SERPL-SCNC: 113 MMOL/L (ref 98–107)
CO2 SERPL-SCNC: 22 MMOL/L (ref 21–32)
COCAINE UR-MCNC: (no result) NG/ML
CREAT SERPL-MCNC: 1.2 MG/DL (ref 0.6–1)
EOSINOPHIL NFR BLD: 0.1 X10^3/UL (ref 0–0.7)
EOSINOPHIL NFR BLD: 2 % (ref 0–3)
ERYTHROCYTE [DISTWIDTH] IN BLOOD BY AUTOMATED COUNT: 21.9 % (ref 11.5–14.5)
GFR SERPLBLD BASED ON 1.73 SQ M-ARVRAT: 51.8 ML/MIN
GLOBULIN SER-MCNC: 3.4 G/DL (ref 2.2–3.8)
GLUCOSE SERPL-MCNC: 84 MG/DL (ref 70–99)
HCT VFR BLD CALC: 21.7 % (ref 36–47)
HGB BLD-MCNC: 6.7 G/DL (ref 12–15.5)
LYMPHOCYTES # BLD: 0.9 X10^3/UL (ref 1–4.8)
LYMPHOCYTES NFR BLD AUTO: 18 % (ref 24–48)
MCH RBC QN AUTO: 21 PG (ref 25–35)
MCHC RBC AUTO-ENTMCNC: 31 G/DL (ref 31–37)
MCV RBC AUTO: 68 FL (ref 79–100)
METHADONE SERPL-MCNC: (no result) NG/ML
MONO #: 0.6 X10^3/UL (ref 0–1.1)
MONOCYTES NFR BLD: 12 % (ref 0–9)
NEUT #: 3.2 X10^3/UL (ref 1.8–7.7)
NEUTROPHILS NFR BLD AUTO: 68 % (ref 31–73)
OPIATES UR-MCNC: (no result) NG/ML
PCP SERPL-MCNC: (no result) MG/DL
PLATELET # BLD AUTO: 172 X10^3/UL (ref 140–400)
POTASSIUM SERPL-SCNC: 3.4 MMOL/L (ref 3.5–5.1)
PROT SERPL-MCNC: 5.5 G/DL (ref 6.4–8.2)
RBC # BLD AUTO: 3.17 X10^6/UL (ref 3.5–5.4)
SODIUM SERPL-SCNC: 146 MMOL/L (ref 136–145)
WBC # BLD AUTO: 4.7 X10^3/UL (ref 4–11)

## 2019-10-03 PROCEDURE — 0DJ08ZZ INSPECTION OF UPPER INTESTINAL TRACT, VIA NATURAL OR ARTIFICIAL OPENING ENDOSCOPIC: ICD-10-PCS

## 2019-10-03 RX ADMIN — ACETAMINOPHEN PRN MG: 325 TABLET, FILM COATED ORAL at 08:06

## 2019-10-03 RX ADMIN — Medication SCH CAP: at 08:06

## 2019-10-03 RX ADMIN — PIPERACILLIN SODIUM AND TAZOBACTAM SODIUM SCH MLS/HR: 2; .25 INJECTION, POWDER, LYOPHILIZED, FOR SOLUTION INTRAVENOUS at 23:33

## 2019-10-03 RX ADMIN — Medication PRN EA: at 21:11

## 2019-10-03 RX ADMIN — Medication SCH CAP: at 21:05

## 2019-10-03 RX ADMIN — PANTOPRAZOLE SODIUM SCH MG: 40 INJECTION, POWDER, FOR SOLUTION INTRAVENOUS at 08:06

## 2019-10-03 RX ADMIN — BACITRACIN SCH MLS/HR: 5000 INJECTION, POWDER, FOR SOLUTION INTRAMUSCULAR at 17:30

## 2019-10-03 RX ADMIN — PIPERACILLIN SODIUM AND TAZOBACTAM SODIUM SCH MLS/HR: 2; .25 INJECTION, POWDER, LYOPHILIZED, FOR SOLUTION INTRAVENOUS at 11:33

## 2019-10-03 RX ADMIN — FAMOTIDINE SCH MG: 20 TABLET ORAL at 08:06

## 2019-10-03 RX ADMIN — PIPERACILLIN SODIUM AND TAZOBACTAM SODIUM SCH MLS/HR: 2; .25 INJECTION, POWDER, LYOPHILIZED, FOR SOLUTION INTRAVENOUS at 05:10

## 2019-10-03 RX ADMIN — PIPERACILLIN SODIUM AND TAZOBACTAM SODIUM SCH MLS/HR: 2; .25 INJECTION, POWDER, LYOPHILIZED, FOR SOLUTION INTRAVENOUS at 17:38

## 2019-10-03 RX ADMIN — ENOXAPARIN SODIUM SCH MG: 100 INJECTION SUBCUTANEOUS at 16:29

## 2019-10-03 RX ADMIN — BACITRACIN SCH MLS/HR: 5000 INJECTION, POWDER, FOR SOLUTION INTRAMUSCULAR at 05:10

## 2019-10-03 NOTE — PDOC4
PROCEDURE


Procedure


EGD





Indication: ARIELLA, h/o NSAID use, HH





Meds: per anesthesia





Findings:





E--Normal.  GEJ at 35cm.


G--Very unusual anatomy with intrathoracic stomach.  Ultimately able to traverse

stomach.  No lesions appreciated.


D--Normal to second portion.





Fortunato. well.





IMP: Large HH (intrathoracic stomach)





REC: will discuss whether or not to pursue colonoscopy with family.











CHOCO STORY MD           Oct 3, 2019 14:27

## 2019-10-03 NOTE — PDOC
Infectious Disease Note


Subjective


Subjective


Tried to eat yesterday, but vomited


Fever 100.3





Vital Sign


Vital Signs





Vital Signs








  Date Time  Temp Pulse Resp B/P (MAP) Pulse Ox O2 Delivery O2 Flow Rate FiO2


 


10/3/19 11:15 99.7 82 16 175/70 (105) 96 Room Air  





 99.7       











Physical Exam


PHYSICAL EXAM


GENERAL: Resting quietly, appears comfortable 


HEENT:  Pupils equally round, reactive.  She resists oral exam.


NECK:  Supple.


LUNGS:  Improved aeration, nonlabored.


HEART:  S1, S2.  Murmur present.


ABDOMEN:  Soft, no guarding.  Bowel sounds present.


GENITOURINARY:  Indwelling Evans in place.


EXTREMITIES:  No gross edema or cyanosis.  Left wrist is swollen with grimacing 

on palpation.


SKIN:  Warm to touch.





Labs


Lab





Laboratory Tests








Test


 10/3/19


05:15 10/3/19


05:40


 


Urine Opiates Screen Neg (NEG)  


 


Urine Methadone Screen Neg (NEG)  


 


Urine Barbiturates Neg (NEG)  


 


Urine Phencyclidine Screen Neg (NEG)  


 


Urine


Amphetamine/Methamphetamine Neg (NEG) 


 





 


Urine Benzodiazepines Screen Neg (NEG)  


 


Urine Cocaine Screen Neg (NEG)  


 


Urine Cannabinoids Screen Neg (NEG)  


 


Urine Ethyl Alcohol Neg (NEG)  


 


White Blood Count


 


 4.7 x10^3/uL


(4.0-11.0)


 


Red Blood Count


 


 3.17 x10^6/uL


(3.50-5.40)


 


Hemoglobin


 


 6.7 g/dL


(12.0-15.5)


 


Hematocrit


 


 21.7 %


(36.0-47.0)


 


Mean Corpuscular Volume  68 fL () 


 


Mean Corpuscular Hemoglobin  21 pg (25-35) 


 


Mean Corpuscular Hemoglobin


Concent 


 31 g/dL


(31-37)


 


Red Cell Distribution Width


 


 21.9 %


(11.5-14.5)


 


Platelet Count


 


 172 x10^3/uL


(140-400)


 


Neutrophils (%) (Auto)  68 % (31-73) 


 


Lymphocytes (%) (Auto)  18 % (24-48) 


 


Monocytes (%) (Auto)  12 % (0-9) 


 


Eosinophils (%) (Auto)  2 % (0-3) 


 


Basophils (%) (Auto)  0 % (0-3) 


 


Neutrophils # (Auto)


 


 3.2 x10^3/uL


(1.8-7.7)


 


Lymphocytes # (Auto)


 


 0.9 x10^3/uL


(1.0-4.8)


 


Monocytes # (Auto)


 


 0.6 x10^3/uL


(0.0-1.1)


 


Eosinophils # (Auto)


 


 0.1 x10^3/uL


(0.0-0.7)


 


Basophils # (Auto)


 


 0.0 x10^3/uL


(0.0-0.2)


 


Sodium Level


 


 146 mmol/L


(136-145)


 


Potassium Level


 


 3.4 mmol/L


(3.5-5.1)


 


Chloride Level


 


 113 mmol/L


()


 


Carbon Dioxide Level


 


 22 mmol/L


(21-32)


 


Anion Gap  11 (6-14) 


 


Blood Urea Nitrogen


 


 18 mg/dL


(7-20)


 


Creatinine


 


 1.2 mg/dL


(0.6-1.0)


 


Estimated GFR


(Cockcroft-Gault) 


 51.8 





 


BUN/Creatinine Ratio  15 (6-20) 


 


Glucose Level


 


 84 mg/dL


(70-99)


 


Calcium Level


 


 8.2 mg/dL


(8.5-10.1)


 


Iron Level


 


 6 ug/dL


()


 


Total Iron Binding Capacity


 


 213 ug/dL


(250-450)


 


Iron Saturation  3 % (15-34) 


 


Total Bilirubin


 


 0.4 mg/dL


(0.2-1.0)


 


Aspartate Amino Transf


(AST/SGOT) 


 20 U/L (15-37) 





 


Alanine Aminotransferase


(ALT/SGPT) 


 9 U/L (14-59) 





 


Alkaline Phosphatase


 


 50 U/L


()


 


Total Protein


 


 5.5 g/dL


(6.4-8.2)


 


Albumin


 


 2.1 g/dL


(3.4-5.0)


 


Albumin/Globulin Ratio  0.6 (1.0-1.7) 








Micro





Microbiology


10/1/19 Blood Culture - Preliminary, Resulted


          NO GROWTH AFTER 1 DAY











 URINE CULTURE RES 1  Final  


        No growth





Objective


Assessment


Fever - ? in part PRBCs - cults neg Possible Vanc


Lactic acidosis - better 


Acute encephalopathy 


Left wrist swelling and pain


EL 


Drug overdose


Dementia 


Anemia s/p PRBCs





Plan


Plan of Care


Continue empiric Zosyn


off vancomycin in light of EL.


appreciate ortho input 


GI following with plans for EGD


PRBCs underway 





D/w family at beside





Seen post EGD in scope room. Smiled and looks comfortable





D/w daughter





Attending Co-Sign


Attending Co-Sign


The patient was seen and interviewed as well as examined at the bedside. The 

chart was reviewed. The case was discussed. Agree with the plan of care.











RONNY KILGORE         Oct 3, 2019 11:45


SHIVAM VARELA MD               Oct 3, 2019 15:41

## 2019-10-03 NOTE — PDOC
PULMONARY PROGRESS NOTES


Subjective


more awake,  communicating


no soa, on RA


Vitals





Vital Signs








  Date Time  Temp Pulse Resp B/P (MAP) Pulse Ox O2 Delivery O2 Flow Rate FiO2


 


10/3/19 11:15 99.7 82 16 175/70 (105) 96 Room Air  





 99.7       








General:  Alert, No acute distress


Lungs:  Clear


Cardiovascular:  S1, S2


Abdomen:  Soft


Neuro Exam:  Alert


Extremities:  No Edema


Labs





Laboratory Tests








Test


 10/1/19


12:25 10/1/19


18:00 10/1/19


18:30 10/1/19


21:40


 


Lactic Acid Level


 1.4 mmol/L


(0.4-2.0) 


 2.6 mmol/L


(0.4-2.0) 1.9 mmol/L


(0.4-2.0)


 


Urine Collection Type  Unknown   


 


Urine Color  Yellow   


 


Urine Clarity  Turbid   


 


Urine pH  5.5   


 


Urine Specific Gravity  1.025   


 


Urine Protein


 


 Negative mg/dL


(NEG-TRACE) 


 





 


Urine Glucose (UA)


 


 Negative mg/dL


(NEG) 


 





 


Urine Ketones (Stick)


 


 Trace mg/dL


(NEG) 


 





 


Urine Blood  Moderate (NEG)   


 


Urine Nitrite  Negative (NEG)   


 


Urine Bilirubin  Negative (NEG)   


 


Urine Urobilinogen Dipstick


 


 0.2 mg/dL (0.2


mg/dL) 


 





 


Urine Leukocyte Esterase  Small (NEG)   


 


Urine RBC  Occ /HPF (0-2)   


 


Urine WBC  1-4 /HPF (0-4)   


 


Urine Squamous Epithelial


Cells 


 Occ /LPF 


 


 





 


Urine Amorphous Sediment  Present /HPF   


 


Urine Bacteria


 


 Few /HPF


(0-FEW) 


 





 


Urine Mucus  Mod /LPF   


 


Test


 10/2/19


04:30 10/2/19


06:35 10/3/19


05:15 10/3/19


05:40


 


White Blood Count


 7.9 x10^3/uL


(4.0-11.0) 


 


 4.7 x10^3/uL


(4.0-11.0)


 


Red Blood Count


 3.72 x10^6/uL


(3.50-5.40) 


 


 3.17 x10^6/uL


(3.50-5.40)


 


Hemoglobin


 8.0 g/dL


(12.0-15.5) 


 


 6.7 g/dL


(12.0-15.5)


 


Hematocrit


 25.3 %


(36.0-47.0) 


 


 21.7 %


(36.0-47.0)


 


Mean Corpuscular Volume 68 fL ()    68 fL () 


 


Mean Corpuscular Hemoglobin 21 pg (25-35)    21 pg (25-35) 


 


Mean Corpuscular Hemoglobin


Concent 32 g/dL


(31-37) 


 


 31 g/dL


(31-37)


 


Red Cell Distribution Width


 21.7 %


(11.5-14.5) 


 


 21.9 %


(11.5-14.5)


 


Platelet Count


 185 x10^3/uL


(140-400) 


 


 172 x10^3/uL


(140-400)


 


Neutrophils (%) (Auto) 74 % (31-73)    68 % (31-73) 


 


Lymphocytes (%) (Auto) 9 % (24-48)    18 % (24-48) 


 


Monocytes (%) (Auto) 16 % (0-9)    12 % (0-9) 


 


Eosinophils (%) (Auto) 0 % (0-3)    2 % (0-3) 


 


Basophils (%) (Auto) 0 % (0-3)    0 % (0-3) 


 


Neutrophils # (Auto)


 5.9 x10^3/uL


(1.8-7.7) 


 


 3.2 x10^3/uL


(1.8-7.7)


 


Lymphocytes # (Auto)


 0.7 x10^3/uL


(1.0-4.8) 


 


 0.9 x10^3/uL


(1.0-4.8)


 


Monocytes # (Auto)


 1.3 x10^3/uL


(0.0-1.1) 


 


 0.6 x10^3/uL


(0.0-1.1)


 


Eosinophils # (Auto)


 0.0 x10^3/uL


(0.0-0.7) 


 


 0.1 x10^3/uL


(0.0-0.7)


 


Basophils # (Auto)


 0.0 x10^3/uL


(0.0-0.2) 


 


 0.0 x10^3/uL


(0.0-0.2)


 


Sodium Level


 143 mmol/L


(136-145) 


 


 146 mmol/L


(136-145)


 


Potassium Level


 3.9 mmol/L


(3.5-5.1) 


 


 3.4 mmol/L


(3.5-5.1)


 


Chloride Level


 109 mmol/L


() 


 


 113 mmol/L


()


 


Carbon Dioxide Level


 22 mmol/L


(21-32) 


 


 22 mmol/L


(21-32)


 


Anion Gap 12 (6-14)    11 (6-14) 


 


Blood Urea Nitrogen


 19 mg/dL


(7-20) 


 


 18 mg/dL


(7-20)


 


Creatinine


 1.5 mg/dL


(0.6-1.0) 


 


 1.2 mg/dL


(0.6-1.0)


 


Estimated GFR


(Cockcroft-Gault) 40.0 


 


 


 51.8 





 


BUN/Creatinine Ratio 13 (6-20)    15 (6-20) 


 


Glucose Level


 139 mg/dL


(70-99) 


 


 84 mg/dL


(70-99)


 


Calcium Level


 8.3 mg/dL


(8.5-10.1) 


 


 8.2 mg/dL


(8.5-10.1)


 


Total Bilirubin


 0.6 mg/dL


(0.2-1.0) 


 


 0.4 mg/dL


(0.2-1.0)


 


Aspartate Amino Transf


(AST/SGOT) 18 U/L (15-37) 


 


 


 20 U/L (15-37) 





 


Alanine Aminotransferase


(ALT/SGPT) 8 U/L (14-59) 


 


 


 9 U/L (14-59) 





 


Alkaline Phosphatase


 56 U/L


() 


 


 50 U/L


()


 


Total Protein


 5.7 g/dL


(6.4-8.2) 


 


 5.5 g/dL


(6.4-8.2)


 


Albumin


 2.7 g/dL


(3.4-5.0) 


 


 2.1 g/dL


(3.4-5.0)


 


Albumin/Globulin Ratio 0.9 (1.0-1.7)    0.6 (1.0-1.7) 


 


Urine Opiates Screen  Neg (NEG)  Neg (NEG)  


 


Urine Methadone Screen  Neg (NEG)  Neg (NEG)  


 


Urine Barbiturates  Neg (NEG)  Neg (NEG)  


 


Urine Phencyclidine Screen  Neg (NEG)  Neg (NEG)  


 


Urine


Amphetamine/Methamphetamine 


 Neg (NEG) 


 Neg (NEG) 


 





 


Urine Benzodiazepines Screen  Neg (NEG)  Neg (NEG)  


 


Urine Cocaine Screen  Neg (NEG)  Neg (NEG)  


 


Urine Cannabinoids Screen  Neg (NEG)  Neg (NEG)  


 


Urine Ethyl Alcohol  Neg (NEG)  Neg (NEG)  


 


Iron Level


 


 


 


 6 ug/dL


()


 


Total Iron Binding Capacity


 


 


 


 213 ug/dL


(250-450)


 


Iron Saturation    3 % (15-34) 








Laboratory Tests








Test


 10/3/19


05:15 10/3/19


05:40


 


Urine Opiates Screen Neg (NEG)  


 


Urine Methadone Screen Neg (NEG)  


 


Urine Barbiturates Neg (NEG)  


 


Urine Phencyclidine Screen Neg (NEG)  


 


Urine


Amphetamine/Methamphetamine Neg (NEG) 


 





 


Urine Benzodiazepines Screen Neg (NEG)  


 


Urine Cocaine Screen Neg (NEG)  


 


Urine Cannabinoids Screen Neg (NEG)  


 


Urine Ethyl Alcohol Neg (NEG)  


 


White Blood Count


 


 4.7 x10^3/uL


(4.0-11.0)


 


Red Blood Count


 


 3.17 x10^6/uL


(3.50-5.40)


 


Hemoglobin


 


 6.7 g/dL


(12.0-15.5)


 


Hematocrit


 


 21.7 %


(36.0-47.0)


 


Mean Corpuscular Volume  68 fL () 


 


Mean Corpuscular Hemoglobin  21 pg (25-35) 


 


Mean Corpuscular Hemoglobin


Concent 


 31 g/dL


(31-37)


 


Red Cell Distribution Width


 


 21.9 %


(11.5-14.5)


 


Platelet Count


 


 172 x10^3/uL


(140-400)


 


Neutrophils (%) (Auto)  68 % (31-73) 


 


Lymphocytes (%) (Auto)  18 % (24-48) 


 


Monocytes (%) (Auto)  12 % (0-9) 


 


Eosinophils (%) (Auto)  2 % (0-3) 


 


Basophils (%) (Auto)  0 % (0-3) 


 


Neutrophils # (Auto)


 


 3.2 x10^3/uL


(1.8-7.7)


 


Lymphocytes # (Auto)


 


 0.9 x10^3/uL


(1.0-4.8)


 


Monocytes # (Auto)


 


 0.6 x10^3/uL


(0.0-1.1)


 


Eosinophils # (Auto)


 


 0.1 x10^3/uL


(0.0-0.7)


 


Basophils # (Auto)


 


 0.0 x10^3/uL


(0.0-0.2)


 


Sodium Level


 


 146 mmol/L


(136-145)


 


Potassium Level


 


 3.4 mmol/L


(3.5-5.1)


 


Chloride Level


 


 113 mmol/L


()


 


Carbon Dioxide Level


 


 22 mmol/L


(21-32)


 


Anion Gap  11 (6-14) 


 


Blood Urea Nitrogen


 


 18 mg/dL


(7-20)


 


Creatinine


 


 1.2 mg/dL


(0.6-1.0)


 


Estimated GFR


(Cockcroft-Gault) 


 51.8 





 


BUN/Creatinine Ratio  15 (6-20) 


 


Glucose Level


 


 84 mg/dL


(70-99)


 


Calcium Level


 


 8.2 mg/dL


(8.5-10.1)


 


Iron Level


 


 6 ug/dL


()


 


Total Iron Binding Capacity


 


 213 ug/dL


(250-450)


 


Iron Saturation  3 % (15-34) 


 


Total Bilirubin


 


 0.4 mg/dL


(0.2-1.0)


 


Aspartate Amino Transf


(AST/SGOT) 


 20 U/L (15-37) 





 


Alanine Aminotransferase


(ALT/SGPT) 


 9 U/L (14-59) 





 


Alkaline Phosphatase


 


 50 U/L


()


 


Total Protein


 


 5.5 g/dL


(6.4-8.2)


 


Albumin


 


 2.1 g/dL


(3.4-5.0)


 


Albumin/Globulin Ratio  0.6 (1.0-1.7) 








Medications





Active Scripts








 Medications  Dose


 Route/Sig


 Max Daily Dose Days Date Category


 


 Keppra


  (Levetiracetam)


 500 Mg Tablet  500 Mg


 PO BID


   1/24/19 Rx


 


 Vitamin D3


  (Cholecalciferol


  (Vitamin D3))


 1,000 Unit Tablet  1 Tab


 PO DAILYBFRSUP


   1/20/19 Reported


 


 Voltaren


  (Diclofenac


 Sodium) 100 Gm


 Gel..gram.  1 Gm


 TP PRN QID


   1/20/19 Reported


 


 Meclizine Hcl 25


 Mg Tablet  1 Tab


 PO PRN TID PRN


   10/29/16 Rx


 


 Aspirin 81 Mg


 Tab.chew  1 Tab


 PO DAILY


   10/29/16 Reported


 


 Simvastatin 40 Mg


 Tablet  1 Tab


 PO QHS


   10/29/16 Reported


 


 Atenolol 25 Mg


 Tablet  1 Tab


 PO DAILY


   10/29/16 Reported











Impression


.


1.  Acute toxic encephalopathy secondary to drug overdose.  The patient's


caregiver gave her 4 mg of Klonopin and unknown quantity of tramadol.  ABG shows


adequate ventilation and oxygenation. improved clinically.


2.  No significant tobacco history.


3.  History of coronary artery disease with possible mild congestive heart


failure on chest x-ray.





Plan


.





1.  stable pulmonary status with improvement in mental status.


2.  prn oxygen


3.  ABGs are adequate.


4.  DVT prophylaxis with Lovenox.


5.  Discussed with  RN.


     will sign off/ d/w WOJCIECH Engel MD                  Oct 3, 2019 11:37

## 2019-10-03 NOTE — RAD
EXAM: Pelvis and right hip, 3 views.

 

HISTORY: Pain.

 

COMPARISON: None.

 

FINDINGS: A frontal view of the pelvis and 2 views of the right hip are 

obtained. No displaced fracture is seen. There is minimal right and 

moderate left femoral head marginal osteophyte formation. 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 

greater than right hip joint space narrowing. There is degenerative change

of the lower lumbar levels.

 

IMPRESSION: 

1. Moderate left and minimal right hip osteoarthritis.

2. Degenerative change at the lower lumbar levels.

 

Electronically signed by: Tennille Martino MD (10/3/2019 12:52 PM) [facility]

## 2019-10-03 NOTE — PDOC2
CONSULT


Date of Consult


Date of Consult


DATE: 10/3/19 


TIME: 10:40





Reason for Consult


Reason for Consult:


Left wrist pain, right hip pain





Referring Physician


Referring Physician:


Heber





Identification/Chief Complaint


Chief Complaint


left wrist pain





Source


Source:  Caregiver, Chart review, Patient





History of Present Illness


Reason for Visit:


Patient tells me that her left wrist and right hip hurts. She does not offer 

much history or answer any questions. Information was primarily obtained from 

her nurse as well as her family that was at bedside today. There is an uncertain

history of a possible fall within the past week or so. The patient tells me she 

has pain laterally at her right hip and over the dorsum of her left wrist, worse

with any movement or use of the extremity. Her pain is a little bit better at 

rest. She does not offer any history regarding prior joint pain in these areas 

or prior injuries.





Past Medical History


Cardiovascular:  CAD, CHF, HTN, Hyperlipidemia


Pulmonary:  No pertinent hx


CENTRAL NERVOUS SYSTEM:  Dementia, Seizure


GI:  No pertinent hx


Heme/Onc:  No pertinent hx


Hepatobiliary:  No pertinent hx


Psych:  No pertinent hx


Musculoskeletal:  Osteoarthritis


Rheumatologic:  No pertinent hx


Infectious disease:  No pertinent hx


Renal/:  No pertinent hx


Endocrine:  Diabetes





Past Surgical History


Past Surgical History:  Appendectomy, Hysterectomy





Family History


Family History:  Hypertension





Social History


Social History:  Parent


No


ALCOHOL:  none


Drugs:  None


Lives:  with Family





Current Problem List


Problem List


Problems


Medical Problems:


(1) Acute respiratory failure with hypoxia


Status: Acute  











Current Medications


Current Medications





Current Medications


Flumazenil (Romazicon) 0.5 mg STK-MED ONCE IV ;  Start 9/29/19 at 21:30;  Stop 

9/29/19 at 21:30;  Status DC


Flumazenil (Romazicon) 1 mg 1X  ONCE IV  Last administered on 9/29/19at 21:50;  

Start 9/29/19 at 22:15;  Stop 9/29/19 at 22:16;  Status DC


Furosemide (Lasix) 20 mg 1X  ONCE IVP  Last administered on 9/30/19at 00:48;  St

art 9/30/19 at 00:00;  Stop 9/30/19 at 00:01;  Status DC


Ondansetron HCl (Zofran) 4 mg PRN Q8HRS  PRN IV NAUSEA/VOMITING 1ST CHOICE;  

Start 9/30/19 at 01:00;  Stop 10/1/19 at 00:59;  Status DC


Dextrose/Sodium Chloride 1,000 ml @  75 mls/hr [id number] IV  Last administered on 

9/30/19at 01:26;  Start 9/30/19 at 01:00;  Stop 9/30/19 at 15:21;  Status DC


Enoxaparin Sodium (Lovenox 40mg Syringe) 40 mg Q24H SQ  Last administered on 

10/1/19at 18:37;  Start 9/30/19 at 15:00;  Stop 10/2/19 at 12:31;  Status DC


Ondansetron HCl (Zofran) 4 mg PRN Q6HRS  PRN IV NAUSEA/VOMITING;  Start 9/30/19 

at 09:15


Prochlorperazine (Compazine) 25 mg PRN Q12HR  PRN OR NAUSEA/VOMITING;  Start 

9/30/19 at 09:15


Famotidine (Pepcid Vial) 20 mg BID IVP  Last administered on 10/1/19at 09:05;  

Start 9/30/19 at 11:00;  Stop 10/1/19 at 11:22;  Status DC


Sodium Chloride (Normal Saline Flush) 3 ml QSHIFT  PRN IV AFTER MEDS AND BLOOD 

DRAWS;  Start 9/30/19 at 09:15


Bisacodyl (Dulcolax Supp) 10 mg PRN DAILY  PRN OR CONSTIPATION;  Start 9/30/19 

at 09:15


Sodium Chloride 1,000 ml @  75 mls/hr [id number] IV  Last administered on 10/3/19at

05:10;  Start 9/30/19 at 09:30


Influenza Virus Vaccine Quadrival (Afluria Quad 2019-20 (3yr Up) Syringe) 0.5 ml

ONCE ONCE VAX IM  Last administered on 9/30/19at 15:33;  Start 9/30/19 at 14:15;

 Stop 9/30/19 at 14:16;  Status DC


Piperacillin Sod/ Tazobactam Sod 2.25 gm/Sodium Chloride 50 ml @  100 mls/hr 

Q6HRS IV  Last administered on 10/3/19at 05:10;  Start 10/1/19 at 12:00


Famotidine (Pepcid Vial) 20 mg DAILY IVP ;  Start 10/2/19 at 09:00;  Stop 

10/1/19 at 11:57;  Status DC


Famotidine (Pepcid) 20 mg DAILY PO  Last administered on 10/3/19at 08:06;  Start

10/2/19 at 09:00


Levetiracetam (Keppra) 500 mg BID PO  Last administered on 10/3/19at 08:06;  

Start 10/1/19 at 15:00


Acetaminophen (Tylenol) 650 mg PRN Q6HRS  PRN PO PAIN Last administered on 

10/3/19at 08:06;  Start 10/1/19 at 15:30


Vancomycin HCl 0.5 gm/Sodium Chloride 250 ml @  166.667 mls/hr 1X  ONCE IV ;  

Start 10/1/19 at 18:00;  Stop 10/1/19 at 19:29;  Status UNV


Vancomycin HCl 1.5 gm/Sodium Chloride 500 ml @  250 mls/hr 1X  ONCE IV  Last 

administered on 10/1/19at 19:16;  Start 10/1/19 at 19:00;  Stop 10/1/19 at 

20:59;  Status DC


Vancomycin HCl 1 gm/Sodium Chloride 250 ml @  250 mls/hr Q24H IV ;  Start 

10/2/19 at 18:00;  Stop 10/2/19 at 13:04;  Status DC


Vancomycin HCl (Vancomycin Trough Level) 1 each 1X  ONCE MC ;  Start 10/3/19 at 

17:30;  Stop 10/3/19 at 17:31;  Status Cancel


Vancomycin HCl (Vanco Per Pharmacy) 1 each PRN DAILY  PRN MC SEE COMMENTS Last 

administered on 10/1/19at 18:08;  Start 10/1/19 at 18:15;  Stop 10/2/19 at 

13:04;  Status DC


Piperacillin Sod/ Tazobactam Sod (Zosyn Per Pharmacy) 1 each PRN DAILY  PRN MC 

SEE COMMENTS;  Start 10/1/19 at 18:15;  Status UNV


Enoxaparin Sodium (Lovenox 30mg Syringe) 30 mg Q24H SQ  Last administered on 

10/2/19at 16:45;  Start 10/2/19 at 16:00


Lactobacillus Rhamnosus (Culturelle) 1 cap BID PO  Last administered on 

10/3/19at 08:06;  Start 10/2/19 at 21:00


Non-Formulary Medication 1 ea PRN Q6HRS  PRN TP PAIN Last administered on 10/2/1

9at 21:43;  Start 10/2/19 at 18:00


Pantoprazole Sodium (PROTONIX VIAL for IV PUSH) 40 mg DAILYAC IVP  Last 

administered on 10/3/19at 08:06;  Start 10/3/19 at 08:00


Potassium Chloride (Klor-Con) 40 meq 1X  ONCE PO ;  Start 10/3/19 at 09:30;  

Stop 10/3/19 at 09:31;  Status DC





Active Scripts


Active


Keppra (Levetiracetam) 500 Mg Tablet 500 Mg PO BID


Meclizine Hcl 25 Mg Tablet 1 Tab PO PRN TID PRN


Reported


Vitamin D3 (Cholecalciferol (Vitamin D3)) 1,000 Unit Tablet 1 Tab PO DAILYBFRSUP


Voltaren (Diclofenac Sodium) 100 Gm Gel..gram. 1 Gm TP PRN QID


Aspirin 81 Mg Tab.chew 1 Tab PO DAILY


Simvastatin 40 Mg Tablet 1 Tab PO QHS


Atenolol 25 Mg Tablet 1 Tab PO DAILY





Allergies


Allergies:  


Coded Allergies:  


     No Known Drug Allergies (Unverified , 10/29/16)





ROS


Review of System


Review of systems unreliable secondary to patient's mental status





Physical Exam


General:  Cooperative, No acute distress


HEENT:  Atraumatic, EOMI


Lungs:  Other (respirations aren't labored with symmetric chest rise)


Heart:  Regular rate


Abdomen:  Soft, No tenderness


Extremities:  No edema, Normal pulses


Neuro:  Strength at 5/5 X4 ext, Sensation intact


Psych/Mental Status:  Other (she is drowsy, she does respond to some questions)


MUSCULOSKELETAL:  Other (examination of her left upper extremity reveals no 

gross deformity. She is diffusely tender along the dorsum of her hand wrist and 

distal forearm. She has decreased active range of motion. No atrophy. No nail 

changes. No tenderness in her snuffbox or ulnar styloid. She has tenderness over

her right hip. She denies any pain with passive range of motion at her hip.)





Vitals


VITALS





Vital Signs








  Date Time  Temp Pulse Resp B/P (MAP) Pulse Ox O2 Delivery O2 Flow Rate FiO2


 


10/3/19 07:15 100.3 79 18 147/54 (85) 95 Room Air  





 100.3       











Labs


Labs





Laboratory Tests








Test


 10/1/19


12:25 10/1/19


18:00 10/1/19


18:30 10/1/19


21:40


 


Lactic Acid Level


 1.4 mmol/L


(0.4-2.0) 


 2.6 mmol/L


(0.4-2.0) 1.9 mmol/L


(0.4-2.0)


 


Urine Collection Type  Unknown   


 


Urine Color  Yellow   


 


Urine Clarity  Turbid   


 


Urine pH  5.5   


 


Urine Specific Gravity  1.025   


 


Urine Protein


 


 Negative mg/dL


(NEG-TRACE) 


 





 


Urine Glucose (UA)


 


 Negative mg/dL


(NEG) 


 





 


Urine Ketones (Stick)


 


 Trace mg/dL


(NEG) 


 





 


Urine Blood  Moderate (NEG)   


 


Urine Nitrite  Negative (NEG)   


 


Urine Bilirubin  Negative (NEG)   


 


Urine Urobilinogen Dipstick


 


 0.2 mg/dL (0.2


mg/dL) 


 





 


Urine Leukocyte Esterase  Small (NEG)   


 


Urine RBC  Occ /HPF (0-2)   


 


Urine WBC  1-4 /HPF (0-4)   


 


Urine Squamous Epithelial


Cells 


 Occ /LPF 


 


 





 


Urine Amorphous Sediment  Present /HPF   


 


Urine Bacteria


 


 Few /HPF


(0-FEW) 


 





 


Urine Mucus  Mod /LPF   


 


Test


 10/2/19


04:30 10/2/19


06:35 10/3/19


05:15 10/3/19


05:40


 


White Blood Count


 7.9 x10^3/uL


(4.0-11.0) 


 


 4.7 x10^3/uL


(4.0-11.0)


 


Red Blood Count


 3.72 x10^6/uL


(3.50-5.40) 


 


 3.17 x10^6/uL


(3.50-5.40)


 


Hemoglobin


 8.0 g/dL


(12.0-15.5) 


 


 6.7 g/dL


(12.0-15.5)


 


Hematocrit


 25.3 %


(36.0-47.0) 


 


 21.7 %


(36.0-47.0)


 


Mean Corpuscular Volume 68 fL ()    68 fL () 


 


Mean Corpuscular Hemoglobin 21 pg (25-35)    21 pg (25-35) 


 


Mean Corpuscular Hemoglobin


Concent 32 g/dL


(31-37) 


 


 31 g/dL


(31-37)


 


Red Cell Distribution Width


 21.7 %


(11.5-14.5) 


 


 21.9 %


(11.5-14.5)


 


Platelet Count


 185 x10^3/uL


(140-400) 


 


 172 x10^3/uL


(140-400)


 


Neutrophils (%) (Auto) 74 % (31-73)    68 % (31-73) 


 


Lymphocytes (%) (Auto) 9 % (24-48)    18 % (24-48) 


 


Monocytes (%) (Auto) 16 % (0-9)    12 % (0-9) 


 


Eosinophils (%) (Auto) 0 % (0-3)    2 % (0-3) 


 


Basophils (%) (Auto) 0 % (0-3)    0 % (0-3) 


 


Neutrophils # (Auto)


 5.9 x10^3/uL


(1.8-7.7) 


 


 3.2 x10^3/uL


(1.8-7.7)


 


Lymphocytes # (Auto)


 0.7 x10^3/uL


(1.0-4.8) 


 


 0.9 x10^3/uL


(1.0-4.8)


 


Monocytes # (Auto)


 1.3 x10^3/uL


(0.0-1.1) 


 


 0.6 x10^3/uL


(0.0-1.1)


 


Eosinophils # (Auto)


 0.0 x10^3/uL


(0.0-0.7) 


 


 0.1 x10^3/uL


(0.0-0.7)


 


Basophils # (Auto)


 0.0 x10^3/uL


(0.0-0.2) 


 


 0.0 x10^3/uL


(0.0-0.2)


 


Sodium Level


 143 mmol/L


(136-145) 


 


 146 mmol/L


(136-145)


 


Potassium Level


 3.9 mmol/L


(3.5-5.1) 


 


 3.4 mmol/L


(3.5-5.1)


 


Chloride Level


 109 mmol/L


() 


 


 113 mmol/L


()


 


Carbon Dioxide Level


 22 mmol/L


(21-32) 


 


 22 mmol/L


(21-32)


 


Anion Gap 12 (6-14)    11 (6-14) 


 


Blood Urea Nitrogen


 19 mg/dL


(7-20) 


 


 18 mg/dL


(7-20)


 


Creatinine


 1.5 mg/dL


(0.6-1.0) 


 


 1.2 mg/dL


(0.6-1.0)


 


Estimated GFR


(Cockcroft-Gault) 40.0 


 


 


 51.8 





 


BUN/Creatinine Ratio 13 (6-20)    15 (6-20) 


 


Glucose Level


 139 mg/dL


(70-99) 


 


 84 mg/dL


(70-99)


 


Calcium Level


 8.3 mg/dL


(8.5-10.1) 


 


 8.2 mg/dL


(8.5-10.1)


 


Total Bilirubin


 0.6 mg/dL


(0.2-1.0) 


 


 0.4 mg/dL


(0.2-1.0)


 


Aspartate Amino Transf


(AST/SGOT) 18 U/L (15-37) 


 


 


 20 U/L (15-37) 





 


Alanine Aminotransferase


(ALT/SGPT) 8 U/L (14-59) 


 


 


 9 U/L (14-59) 





 


Alkaline Phosphatase


 56 U/L


() 


 


 50 U/L


()


 


Total Protein


 5.7 g/dL


(6.4-8.2) 


 


 5.5 g/dL


(6.4-8.2)


 


Albumin


 2.7 g/dL


(3.4-5.0) 


 


 2.1 g/dL


(3.4-5.0)


 


Albumin/Globulin Ratio 0.9 (1.0-1.7)    0.6 (1.0-1.7) 


 


Urine Opiates Screen  Neg (NEG)  Neg (NEG)  


 


Urine Methadone Screen  Neg (NEG)  Neg (NEG)  


 


Urine Barbiturates  Neg (NEG)  Neg (NEG)  


 


Urine Phencyclidine Screen  Neg (NEG)  Neg (NEG)  


 


Urine


Amphetamine/Methamphetamine 


 Neg (NEG) 


 Neg (NEG) 


 





 


Urine Benzodiazepines Screen  Neg (NEG)  Neg (NEG)  


 


Urine Cocaine Screen  Neg (NEG)  Neg (NEG)  


 


Urine Cannabinoids Screen  Neg (NEG)  Neg (NEG)  


 


Urine Ethyl Alcohol  Neg (NEG)  Neg (NEG)  


 


Iron Level


 


 


 


 6 ug/dL


()


 


Total Iron Binding Capacity


 


 


 


 213 ug/dL


(250-450)


 


Iron Saturation    3 % (15-34) 








Laboratory Tests








Test


 10/3/19


05:15 10/3/19


05:40


 


Urine Opiates Screen Neg (NEG)  


 


Urine Methadone Screen Neg (NEG)  


 


Urine Barbiturates Neg (NEG)  


 


Urine Phencyclidine Screen Neg (NEG)  


 


Urine


Amphetamine/Methamphetamine Neg (NEG) 


 





 


Urine Benzodiazepines Screen Neg (NEG)  


 


Urine Cocaine Screen Neg (NEG)  


 


Urine Cannabinoids Screen Neg (NEG)  


 


Urine Ethyl Alcohol Neg (NEG)  


 


White Blood Count


 


 4.7 x10^3/uL


(4.0-11.0)


 


Red Blood Count


 


 3.17 x10^6/uL


(3.50-5.40)


 


Hemoglobin


 


 6.7 g/dL


(12.0-15.5)


 


Hematocrit


 


 21.7 %


(36.0-47.0)


 


Mean Corpuscular Volume  68 fL () 


 


Mean Corpuscular Hemoglobin  21 pg (25-35) 


 


Mean Corpuscular Hemoglobin


Concent 


 31 g/dL


(31-37)


 


Red Cell Distribution Width


 


 21.9 %


(11.5-14.5)


 


Platelet Count


 


 172 x10^3/uL


(140-400)


 


Neutrophils (%) (Auto)  68 % (31-73) 


 


Lymphocytes (%) (Auto)  18 % (24-48) 


 


Monocytes (%) (Auto)  12 % (0-9) 


 


Eosinophils (%) (Auto)  2 % (0-3) 


 


Basophils (%) (Auto)  0 % (0-3) 


 


Neutrophils # (Auto)


 


 3.2 x10^3/uL


(1.8-7.7)


 


Lymphocytes # (Auto)


 


 0.9 x10^3/uL


(1.0-4.8)


 


Monocytes # (Auto)


 


 0.6 x10^3/uL


(0.0-1.1)


 


Eosinophils # (Auto)


 


 0.1 x10^3/uL


(0.0-0.7)


 


Basophils # (Auto)


 


 0.0 x10^3/uL


(0.0-0.2)


 


Sodium Level


 


 146 mmol/L


(136-145)


 


Potassium Level


 


 3.4 mmol/L


(3.5-5.1)


 


Chloride Level


 


 113 mmol/L


()


 


Carbon Dioxide Level


 


 22 mmol/L


(21-32)


 


Anion Gap  11 (6-14) 


 


Blood Urea Nitrogen


 


 18 mg/dL


(7-20)


 


Creatinine


 


 1.2 mg/dL


(0.6-1.0)


 


Estimated GFR


(Cockcroft-Gault) 


 51.8 





 


BUN/Creatinine Ratio  15 (6-20) 


 


Glucose Level


 


 84 mg/dL


(70-99)


 


Calcium Level


 


 8.2 mg/dL


(8.5-10.1)


 


Iron Level


 


 6 ug/dL


()


 


Total Iron Binding Capacity


 


 213 ug/dL


(250-450)


 


Iron Saturation  3 % (15-34) 


 


Total Bilirubin


 


 0.4 mg/dL


(0.2-1.0)


 


Aspartate Amino Transf


(AST/SGOT) 


 20 U/L (15-37) 





 


Alanine Aminotransferase


(ALT/SGPT) 


 9 U/L (14-59) 





 


Alkaline Phosphatase


 


 50 U/L


()


 


Total Protein


 


 5.5 g/dL


(6.4-8.2)


 


Albumin


 


 2.1 g/dL


(3.4-5.0)


 


Albumin/Globulin Ratio  0.6 (1.0-1.7) 











Images


Images


Wrist x-rays were interpreted by myself. Reports reviewed. Scapholunate widening

is seen.





Assessment/Plan


Assessment/Plan


Is difficult to cyst say with certainty based on her inability to provide an 

accurate history whether or not this is a chronic or acute injury. This does 

look more chronic overall in my opinion. Either way, I think. If rest and 

immobilization in a splint is appropriate. Regarding her hip pain, we will get 

x-rays and review those with recommendations to follow











GEE PICKETT II, MD         Oct 3, 2019 10:44

## 2019-10-03 NOTE — PDOC2
GI CONSULT


Reason For Consult:


Low Hgb





HPI:


HPI:


83 y/o female admitted 9/29 w/ AMS and concern for overdose and possible fall.  

History from chart, nurse, son Joel (present in room - from [place]) and 

daughter Ann Marie (via phone - lives closeby).  We are asked to see this morning 

re: anemia.  Hgb was 9.4 when admitted, then 8 yesterday, and 6.7 this morning. 

Baseline earlier this year in 7-8 range, required transfusion in 3/2019.  No 

obvious bleeding including hematemesis, hematochezia, or melena.  Takes ASA 81mg

QD and B12 at home, also possibly diclofenac.  C/o right hip and left arm pain, 

also took some pills in vanilla pudding this morning, then vomited some yellow 

bile later.  Getting PO famotidine and IV pantoprazole.  Last stool 9/29.





Per Ann Marie, no reflux/heartburn, dysphagia, chronic n/v (says normally eats 

well), diarrhea, constipation, or weight loss.


Not sure about previous EGD or colonoscopy but doesn't recall any recent 

procedures.


GB sludge/stones and intrathoracic stomach on past imaging, hiatal hernia noted 

on CXR this admission.


No liver, pancreas, or PUD history.





PMH:


PMH:


CAD w/ stents, CHF, HTN, HLD, dementia, seizure, OA, DM


appendectomy, partial hysterectomy, lumbar puncture, cataract removal





FH:


Family History:  Cancer (mother was supposed to have cholecystectomy but cancer 

found in her abdomen during surgery), DM





Social History:


Smoke:  No


ALCOHOL:  none


Drugs:  None





ROS:


Difficult to obtain.





Vitals:


Vitals:





                                   Vital Signs








  Date Time  Temp Pulse Resp B/P (MAP) Pulse Ox O2 Delivery O2 Flow Rate FiO2


 


10/3/19 07:15 100.3 79 18 147/54 (85) 95 Room Air  





 100.3       











Labs:


Labs:





Laboratory Tests








Test


 10/3/19


05:15 10/3/19


05:40


 


Urine Opiates Screen Neg (NEG)  


 


Urine Methadone Screen Neg (NEG)  


 


Urine Barbiturates Neg (NEG)  


 


Urine Phencyclidine Screen Neg (NEG)  


 


Urine


Amphetamine/Methamphetamine Neg (NEG) 


 





 


Urine Benzodiazepines Screen Neg (NEG)  


 


Urine Cocaine Screen Neg (NEG)  


 


Urine Cannabinoids Screen Neg (NEG)  


 


Urine Ethyl Alcohol Neg (NEG)  


 


White Blood Count


 


 4.7 x10^3/uL


(4.0-11.0)


 


Red Blood Count


 


 3.17 x10^6/uL


(3.50-5.40)


 


Hemoglobin


 


 6.7 g/dL


(12.0-15.5)


 


Hematocrit


 


 21.7 %


(36.0-47.0)


 


Mean Corpuscular Volume  68 fL () 


 


Mean Corpuscular Hemoglobin  21 pg (25-35) 


 


Mean Corpuscular Hemoglobin


Concent 


 31 g/dL


(31-37)


 


Red Cell Distribution Width


 


 21.9 %


(11.5-14.5)


 


Platelet Count


 


 172 x10^3/uL


(140-400)


 


Neutrophils (%) (Auto)  68 % (31-73) 


 


Lymphocytes (%) (Auto)  18 % (24-48) 


 


Monocytes (%) (Auto)  12 % (0-9) 


 


Eosinophils (%) (Auto)  2 % (0-3) 


 


Basophils (%) (Auto)  0 % (0-3) 


 


Neutrophils # (Auto)


 


 3.2 x10^3/uL


(1.8-7.7)


 


Lymphocytes # (Auto)


 


 0.9 x10^3/uL


(1.0-4.8)


 


Monocytes # (Auto)


 


 0.6 x10^3/uL


(0.0-1.1)


 


Eosinophils # (Auto)


 


 0.1 x10^3/uL


(0.0-0.7)


 


Basophils # (Auto)


 


 0.0 x10^3/uL


(0.0-0.2)





  URINE CULTURE  Final  


        Final report





  URINE CULTURE RES 1  Final  


        No growth








  BLOOD CULTURE  Preliminary  


        NO GROWTH AFTER 2 DAYS





Allergies:


Coded Allergies:  


     No Known Drug Allergies (Unverified , 10/29/16)





Medications:





Current Medications








 Medications


  (Trade)  Dose


 Ordered  Sig/Td


 Route


 PRN Reason  Start Time


 Stop Time Status Last Admin


Dose Admin


 


 Famotidine


  (Pepcid)  20 mg  DAILY


 PO


   10/2/19 09:00


    10/3/19 08:06





 


 Enoxaparin Sodium


  (Lovenox 30mg


 Syringe)  30 mg  Q24H


 SQ


   10/2/19 16:00


    10/2/19 16:45





 


 Lactobacillus


 Rhamnosus


  (Culturelle)  1 cap  BID


 PO


   10/2/19 21:00


    10/3/19 08:06





 


 Non-Formulary


 Medication  1 ea  PRN Q6HRS  PRN


 TP


 PAIN  10/2/19 18:00


    10/2/19 21:43





 


 Pantoprazole


 Sodium


  (PROTONIX VIAL


 for IV PUSH)  40 mg  DAILYAC


 IVP


   10/3/19 08:00


    10/3/19 08:06














Imaging:


Imaging:


CXR 9/29


IMPRESSION: 


1. Basilar atelectasis versus mild pulmonary edema.


2. Mild cardiomegaly.


3. Large hiatal hernia.





Head CT 9/30


Impression:


No intracranial hemorrhage.


 


Left Foot and Bilat Tib/Fib 10/1


IMPRESSION: 


1. Severe bilateral medial patellofemoral compartment osteoarthritis of  both 

knees with associated medial compartment bony remodeling. There are also 

suspected small knee joint effusions, not formally assessed on this exam.


2. No acute osseous finding. Evaluation of the left foot is limited due to 

overlying artifact.





LE US 10/1


IMPRESSION: No Doppler evidence of lower extremity deep venous thrombosis, with 

limited evaluation of the calf veins due to soft tissue edema.





CXR 10/1


IMPRESSION: 


1. Stable diffuse increased interstitial opacity due to interstitial infiltrate 

or chronic interstitial change. There is no consolidation.


2. Stable prominent cardiac silhouette.





Left wrist 10/2


IMPRESSION: 


1. Suspected scapholunate ligament injury and chondrocalcinosis.


2. Mild triscaphe osteoarthritis.





Hip/Pelv 10/3


pending





PE:





GEN: NAD


HEENT: Atraumatic, PERRL


LUNGS: CTAB


HEART: RRR


ABD: quiet BS, S/ND/NT


EXTREMITY: No edema


SKIN: No rashes, no jaundice


NEURO/PSYCH: drowsy - does not participate in interview





A/P:


A/P:


AMS, h/o dementia


Fever, vomiting


Microcytic anemia - chronic, required transfusion in the past, Hgb worse this 

morning w/o obvious bleeding


Large hiatal hernia


CRC screen - none?


H/o CAD on ASA


?NSAID use





--


Family members who are present this morning would like to proceed with EGD this 

afternoon but also want to discuss with other sibling - will tentatively plan 

for EGD at 2:00 p.m.


Agree w/ IV PPI and transfusion.


Check iron profile.











MYLA HENDRICKS          Oct 3, 2019 08:26

## 2019-10-03 NOTE — PDOC
PROGRESS NOTES


Chief Complaint


Chief Complaint


Basilar atelectasis versus mild pulmonary edema.


Mild cardiomegaly.


Large hiatal hernia.


Altered mental status SEC # 5


Acute respiratory failure with hypoxia, SEC TO OVERDOSE OF KLONOPIN , NOW 

IMPROVED ON ROOM AIR


Intentional// accidental ?  drug overdose


TOXIC Encephalopathy


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


Mild to moderate chronic small vessel ischemic changes and atrophy.on CT HEAD


DIABETES


HYPERTENSION HX


Severe protein-caloric malnutrition


GERD


MICROCYTIC ANEMIA


Fever, possible aspiration





History of Present Illness


History of Present Illness


10-01  CR UP TO 1.2 HGB STABLE, REPEAT UDS





Drowsy today, family bedside. Hb 6.7, blood ordered. Has plans for EGD this 

afternoon





ADMITTED 


police report filed


SS CONSULT


O2 SUPPORT PRN


Consult pulmonary


Consult Neurology


consult risk management


DVT PROPHYLAXIS


GI PROPHYLAXIS


IV FLUID SUPPORT LOW RATE


blood cult


iv antibiotics D/C VANC


Continue empiric Zosyn





37 MIN PT EXAM, CHART REVIEW, > 50% OF TIME SPENT WITH EXAM, CHART REVIEW, PT 

CARE COORDINATION





Vitals


Vitals





Vital Signs








  Date Time  Temp Pulse Resp B/P (MAP) Pulse Ox O2 Delivery O2 Flow Rate FiO2


 


10/3/19 07:15 100.3 79 18 147/54 (85) 95 Room Air  





 100.3       











Physical Exam


Physical Exam


Neck: Supple, no JVD. [] 


Cardiovascular:Heart rate regular rhythm, no murmur []


Lungs & Thorax:  Bilateral breath sounds clear to auscultation, rash breath 

sounds bilaterally. []


Abdomen: Bowel sounds normal, soft, non distended. [] 


Skin: Warm, dry, no erythema, no rash. [] 


Back: No tenderness. [] 


Extremities: No tenderness, no edema. []


General:  Cooperative, No acute distress


Heart:  Regular rate


Lungs:  Clear


Abdomen:  Normal bowel sounds, Soft, No tenderness, No hepatosplenomegaly


Extremities:  No clubbing, No cyanosis





Labs


LABS





Laboratory Tests








Test


 10/3/19


05:15 10/3/19


05:40


 


Urine Opiates Screen Neg (NEG)  


 


Urine Methadone Screen Neg (NEG)  


 


Urine Barbiturates Neg (NEG)  


 


Urine Phencyclidine Screen Neg (NEG)  


 


Urine


Amphetamine/Methamphetamine Neg (NEG) 


 





 


Urine Benzodiazepines Screen Neg (NEG)  


 


Urine Cocaine Screen Neg (NEG)  


 


Urine Cannabinoids Screen Neg (NEG)  


 


Urine Ethyl Alcohol Neg (NEG)  


 


White Blood Count


 


 4.7 x10^3/uL


(4.0-11.0)


 


Red Blood Count


 


 3.17 x10^6/uL


(3.50-5.40)


 


Hemoglobin


 


 6.7 g/dL


(12.0-15.5)


 


Hematocrit


 


 21.7 %


(36.0-47.0)


 


Mean Corpuscular Volume  68 fL () 


 


Mean Corpuscular Hemoglobin  21 pg (25-35) 


 


Mean Corpuscular Hemoglobin


Concent 


 31 g/dL


(31-37)


 


Red Cell Distribution Width


 


 21.9 %


(11.5-14.5)


 


Platelet Count


 


 172 x10^3/uL


(140-400)


 


Neutrophils (%) (Auto)  68 % (31-73) 


 


Lymphocytes (%) (Auto)  18 % (24-48) 


 


Monocytes (%) (Auto)  12 % (0-9) 


 


Eosinophils (%) (Auto)  2 % (0-3) 


 


Basophils (%) (Auto)  0 % (0-3) 


 


Neutrophils # (Auto)


 


 3.2 x10^3/uL


(1.8-7.7)


 


Lymphocytes # (Auto)


 


 0.9 x10^3/uL


(1.0-4.8)


 


Monocytes # (Auto)


 


 0.6 x10^3/uL


(0.0-1.1)


 


Eosinophils # (Auto)


 


 0.1 x10^3/uL


(0.0-0.7)


 


Basophils # (Auto)


 


 0.0 x10^3/uL


(0.0-0.2)


 


Sodium Level


 


 146 mmol/L


(136-145)


 


Potassium Level


 


 3.4 mmol/L


(3.5-5.1)


 


Chloride Level


 


 113 mmol/L


()


 


Carbon Dioxide Level


 


 22 mmol/L


(21-32)


 


Anion Gap  11 (6-14) 


 


Blood Urea Nitrogen


 


 18 mg/dL


(7-20)


 


Creatinine


 


 1.2 mg/dL


(0.6-1.0)


 


Estimated GFR


(Cockcroft-Gault) 


 51.8 





 


BUN/Creatinine Ratio  15 (6-20) 


 


Glucose Level


 


 84 mg/dL


(70-99)


 


Calcium Level


 


 8.2 mg/dL


(8.5-10.1)


 


Iron Level


 


 6 ug/dL


()


 


Total Iron Binding Capacity


 


 213 ug/dL


(250-450)


 


Iron Saturation  3 % (15-34) 


 


Total Bilirubin


 


 0.4 mg/dL


(0.2-1.0)


 


Aspartate Amino Transf


(AST/SGOT) 


 20 U/L (15-37) 





 


Alanine Aminotransferase


(ALT/SGPT) 


 9 U/L (14-59) 





 


Alkaline Phosphatase


 


 50 U/L


()


 


Total Protein


 


 5.5 g/dL


(6.4-8.2)


 


Albumin


 


 2.1 g/dL


(3.4-5.0)


 


Albumin/Globulin Ratio  0.6 (1.0-1.7) 











Assessment and Plan


Assessmemt and Plan


Problems


Medical Problems:


(1) Acute respiratory failure with hypoxia


Status: Acute  











Comment


Review of Relevant


I have reviewed the following items marky (where applicable) has been applied.


Labs





Laboratory Tests








Test


 10/1/19


12:25 10/1/19


18:00 10/1/19


18:30 10/1/19


21:40


 


Lactic Acid Level


 1.4 mmol/L


(0.4-2.0) 


 2.6 mmol/L


(0.4-2.0) 1.9 mmol/L


(0.4-2.0)


 


Urine Collection Type  Unknown   


 


Urine Color  Yellow   


 


Urine Clarity  Turbid   


 


Urine pH  5.5   


 


Urine Specific Gravity  1.025   


 


Urine Protein


 


 Negative mg/dL


(NEG-TRACE) 


 





 


Urine Glucose (UA)


 


 Negative mg/dL


(NEG) 


 





 


Urine Ketones (Stick)


 


 Trace mg/dL


(NEG) 


 





 


Urine Blood  Moderate (NEG)   


 


Urine Nitrite  Negative (NEG)   


 


Urine Bilirubin  Negative (NEG)   


 


Urine Urobilinogen Dipstick


 


 0.2 mg/dL (0.2


mg/dL) 


 





 


Urine Leukocyte Esterase  Small (NEG)   


 


Urine RBC  Occ /HPF (0-2)   


 


Urine WBC  1-4 /HPF (0-4)   


 


Urine Squamous Epithelial


Cells 


 Occ /LPF 


 


 





 


Urine Amorphous Sediment  Present /HPF   


 


Urine Bacteria


 


 Few /HPF


(0-FEW) 


 





 


Urine Mucus  Mod /LPF   


 


Test


 10/2/19


04:30 10/2/19


06:35 10/3/19


05:15 10/3/19


05:40


 


White Blood Count


 7.9 x10^3/uL


(4.0-11.0) 


 


 4.7 x10^3/uL


(4.0-11.0)


 


Red Blood Count


 3.72 x10^6/uL


(3.50-5.40) 


 


 3.17 x10^6/uL


(3.50-5.40)


 


Hemoglobin


 8.0 g/dL


(12.0-15.5) 


 


 6.7 g/dL


(12.0-15.5)


 


Hematocrit


 25.3 %


(36.0-47.0) 


 


 21.7 %


(36.0-47.0)


 


Mean Corpuscular Volume 68 fL ()    68 fL () 


 


Mean Corpuscular Hemoglobin 21 pg (25-35)    21 pg (25-35) 


 


Mean Corpuscular Hemoglobin


Concent 32 g/dL


(31-37) 


 


 31 g/dL


(31-37)


 


Red Cell Distribution Width


 21.7 %


(11.5-14.5) 


 


 21.9 %


(11.5-14.5)


 


Platelet Count


 185 x10^3/uL


(140-400) 


 


 172 x10^3/uL


(140-400)


 


Neutrophils (%) (Auto) 74 % (31-73)    68 % (31-73) 


 


Lymphocytes (%) (Auto) 9 % (24-48)    18 % (24-48) 


 


Monocytes (%) (Auto) 16 % (0-9)    12 % (0-9) 


 


Eosinophils (%) (Auto) 0 % (0-3)    2 % (0-3) 


 


Basophils (%) (Auto) 0 % (0-3)    0 % (0-3) 


 


Neutrophils # (Auto)


 5.9 x10^3/uL


(1.8-7.7) 


 


 3.2 x10^3/uL


(1.8-7.7)


 


Lymphocytes # (Auto)


 0.7 x10^3/uL


(1.0-4.8) 


 


 0.9 x10^3/uL


(1.0-4.8)


 


Monocytes # (Auto)


 1.3 x10^3/uL


(0.0-1.1) 


 


 0.6 x10^3/uL


(0.0-1.1)


 


Eosinophils # (Auto)


 0.0 x10^3/uL


(0.0-0.7) 


 


 0.1 x10^3/uL


(0.0-0.7)


 


Basophils # (Auto)


 0.0 x10^3/uL


(0.0-0.2) 


 


 0.0 x10^3/uL


(0.0-0.2)


 


Sodium Level


 143 mmol/L


(136-145) 


 


 146 mmol/L


(136-145)


 


Potassium Level


 3.9 mmol/L


(3.5-5.1) 


 


 3.4 mmol/L


(3.5-5.1)


 


Chloride Level


 109 mmol/L


() 


 


 113 mmol/L


()


 


Carbon Dioxide Level


 22 mmol/L


(21-32) 


 


 22 mmol/L


(21-32)


 


Anion Gap 12 (6-14)    11 (6-14) 


 


Blood Urea Nitrogen


 19 mg/dL


(7-20) 


 


 18 mg/dL


(7-20)


 


Creatinine


 1.5 mg/dL


(0.6-1.0) 


 


 1.2 mg/dL


(0.6-1.0)


 


Estimated GFR


(Cockcroft-Gault) 40.0 


 


 


 51.8 





 


BUN/Creatinine Ratio 13 (6-20)    15 (6-20) 


 


Glucose Level


 139 mg/dL


(70-99) 


 


 84 mg/dL


(70-99)


 


Calcium Level


 8.3 mg/dL


(8.5-10.1) 


 


 8.2 mg/dL


(8.5-10.1)


 


Total Bilirubin


 0.6 mg/dL


(0.2-1.0) 


 


 0.4 mg/dL


(0.2-1.0)


 


Aspartate Amino Transf


(AST/SGOT) 18 U/L (15-37) 


 


 


 20 U/L (15-37) 





 


Alanine Aminotransferase


(ALT/SGPT) 8 U/L (14-59) 


 


 


 9 U/L (14-59) 





 


Alkaline Phosphatase


 56 U/L


() 


 


 50 U/L


()


 


Total Protein


 5.7 g/dL


(6.4-8.2) 


 


 5.5 g/dL


(6.4-8.2)


 


Albumin


 2.7 g/dL


(3.4-5.0) 


 


 2.1 g/dL


(3.4-5.0)


 


Albumin/Globulin Ratio 0.9 (1.0-1.7)    0.6 (1.0-1.7) 


 


Urine Opiates Screen  Neg (NEG)  Neg (NEG)  


 


Urine Methadone Screen  Neg (NEG)  Neg (NEG)  


 


Urine Barbiturates  Neg (NEG)  Neg (NEG)  


 


Urine Phencyclidine Screen  Neg (NEG)  Neg (NEG)  


 


Urine


Amphetamine/Methamphetamine 


 Neg (NEG) 


 Neg (NEG) 


 





 


Urine Benzodiazepines Screen  Neg (NEG)  Neg (NEG)  


 


Urine Cocaine Screen  Neg (NEG)  Neg (NEG)  


 


Urine Cannabinoids Screen  Neg (NEG)  Neg (NEG)  


 


Urine Ethyl Alcohol  Neg (NEG)  Neg (NEG)  


 


Iron Level


 


 


 


 6 ug/dL


()


 


Total Iron Binding Capacity


 


 


 


 213 ug/dL


(250-450)


 


Iron Saturation    3 % (15-34) 








Laboratory Tests








Test


 10/3/19


05:15 10/3/19


05:40


 


Urine Opiates Screen Neg (NEG)  


 


Urine Methadone Screen Neg (NEG)  


 


Urine Barbiturates Neg (NEG)  


 


Urine Phencyclidine Screen Neg (NEG)  


 


Urine


Amphetamine/Methamphetamine Neg (NEG) 


 





 


Urine Benzodiazepines Screen Neg (NEG)  


 


Urine Cocaine Screen Neg (NEG)  


 


Urine Cannabinoids Screen Neg (NEG)  


 


Urine Ethyl Alcohol Neg (NEG)  


 


White Blood Count


 


 4.7 x10^3/uL


(4.0-11.0)


 


Red Blood Count


 


 3.17 x10^6/uL


(3.50-5.40)


 


Hemoglobin


 


 6.7 g/dL


(12.0-15.5)


 


Hematocrit


 


 21.7 %


(36.0-47.0)


 


Mean Corpuscular Volume  68 fL () 


 


Mean Corpuscular Hemoglobin  21 pg (25-35) 


 


Mean Corpuscular Hemoglobin


Concent 


 31 g/dL


(31-37)


 


Red Cell Distribution Width


 


 21.9 %


(11.5-14.5)


 


Platelet Count


 


 172 x10^3/uL


(140-400)


 


Neutrophils (%) (Auto)  68 % (31-73) 


 


Lymphocytes (%) (Auto)  18 % (24-48) 


 


Monocytes (%) (Auto)  12 % (0-9) 


 


Eosinophils (%) (Auto)  2 % (0-3) 


 


Basophils (%) (Auto)  0 % (0-3) 


 


Neutrophils # (Auto)


 


 3.2 x10^3/uL


(1.8-7.7)


 


Lymphocytes # (Auto)


 


 0.9 x10^3/uL


(1.0-4.8)


 


Monocytes # (Auto)


 


 0.6 x10^3/uL


(0.0-1.1)


 


Eosinophils # (Auto)


 


 0.1 x10^3/uL


(0.0-0.7)


 


Basophils # (Auto)


 


 0.0 x10^3/uL


(0.0-0.2)


 


Sodium Level


 


 146 mmol/L


(136-145)


 


Potassium Level


 


 3.4 mmol/L


(3.5-5.1)


 


Chloride Level


 


 113 mmol/L


()


 


Carbon Dioxide Level


 


 22 mmol/L


(21-32)


 


Anion Gap  11 (6-14) 


 


Blood Urea Nitrogen


 


 18 mg/dL


(7-20)


 


Creatinine


 


 1.2 mg/dL


(0.6-1.0)


 


Estimated GFR


(Cockcroft-Gault) 


 51.8 





 


BUN/Creatinine Ratio  15 (6-20) 


 


Glucose Level


 


 84 mg/dL


(70-99)


 


Calcium Level


 


 8.2 mg/dL


(8.5-10.1)


 


Iron Level


 


 6 ug/dL


()


 


Total Iron Binding Capacity


 


 213 ug/dL


(250-450)


 


Iron Saturation  3 % (15-34) 


 


Total Bilirubin


 


 0.4 mg/dL


(0.2-1.0)


 


Aspartate Amino Transf


(AST/SGOT) 


 20 U/L (15-37) 





 


Alanine Aminotransferase


(ALT/SGPT) 


 9 U/L (14-59) 





 


Alkaline Phosphatase


 


 50 U/L


()


 


Total Protein


 


 5.5 g/dL


(6.4-8.2)


 


Albumin


 


 2.1 g/dL


(3.4-5.0)


 


Albumin/Globulin Ratio  0.6 (1.0-1.7) 








Microbiology


10/1/19 Blood Culture - Preliminary, Resulted


          NO GROWTH AFTER 1 DAY


9/30/19 Urine Culture - Final, Complete


          


9/30/19 Urine Culture Result 1 (EDGAR) - Final, Complete


Medications





Current Medications


Flumazenil (Romazicon) 0.5 mg STK-MED ONCE IV ;  Start 9/29/19 at 21:30;  Stop 

9/29/19 at 21:30;  Status DC


Flumazenil (Romazicon) 1 mg 1X  ONCE IV  Last administered on 9/29/19at 21:50;  

Start 9/29/19 at 22:15;  Stop 9/29/19 at 22:16;  Status DC


Furosemide (Lasix) 20 mg 1X  ONCE IVP  Last administered on 9/30/19at 00:48;  

Start 9/30/19 at 00:00;  Stop 9/30/19 at 00:01;  Status DC


Ondansetron HCl (Zofran) 4 mg PRN Q8HRS  PRN IV NAUSEA/VOMITING 1ST CHOICE;  

Start 9/30/19 at 01:00;  Stop 10/1/19 at 00:59;  Status DC


Dextrose/Sodium Chloride 1,000 ml @  75 mls/hr [id number] IV  Last administered on 

9/30/19at 01:26;  Start 9/30/19 at 01:00;  Stop 9/30/19 at 15:21;  Status DC


Enoxaparin Sodium (Lovenox 40mg Syringe) 40 mg Q24H SQ  Last administered on 

10/1/19at 18:37;  Start 9/30/19 at 15:00;  Stop 10/2/19 at 12:31;  Status DC


Ondansetron HCl (Zofran) 4 mg PRN Q6HRS  PRN IV NAUSEA/VOMITING;  Start 9/30/19 

at 09:15


Prochlorperazine (Compazine) 25 mg PRN Q12HR  PRN RI NAUSEA/VOMITING;  Start 

9/30/19 at 09:15


Famotidine (Pepcid Vial) 20 mg BID IVP  Last administered on 10/1/19at 09:05;  

Start 9/30/19 at 11:00;  Stop 10/1/19 at 11:22;  Status DC


Sodium Chloride (Normal Saline Flush) 3 ml QSHIFT  PRN IV AFTER MEDS AND BLOOD 

DRAWS;  Start 9/30/19 at 09:15


Bisacodyl (Dulcolax Supp) 10 mg PRN DAILY  PRN RI CONSTIPATION;  Start 9/30/19 

at 09:15


Sodium Chloride 1,000 ml @  75 mls/hr [id number] IV  Last administered on 10/3/19at

 05:10;  Start 9/30/19 at 09:30


Influenza Virus Vaccine Quadrival (Afluria Quad 2019-20 (3yr Up) Syringe) 0.5 ml

 ONCE ONCE VAX IM  Last administered on 9/30/19at 15:33;  Start 9/30/19 at 

14:15;  Stop 9/30/19 at 14:16;  Status DC


Piperacillin Sod/ Tazobactam Sod 2.25 gm/Sodium Chloride 50 ml @  100 mls/hr 

Q6HRS IV  Last administered on 10/3/19at 05:10;  Start 10/1/19 at 12:00


Famotidine (Pepcid Vial) 20 mg DAILY IVP ;  Start 10/2/19 at 09:00;  Stop 

10/1/19 at 11:57;  Status DC


Famotidine (Pepcid) 20 mg DAILY PO  Last administered on 10/3/19at 08:06;  Start

 10/2/19 at 09:00


Levetiracetam (Keppra) 500 mg BID PO  Last administered on 10/3/19at 08:06;  

Start 10/1/19 at 15:00


Acetaminophen (Tylenol) 650 mg PRN Q6HRS  PRN PO PAIN Last administered on 

10/3/19at 08:06;  Start 10/1/19 at 15:30


Vancomycin HCl 0.5 gm/Sodium Chloride 250 ml @  166.667 mls/hr 1X  ONCE IV ;  

Start 10/1/19 at 18:00;  Stop 10/1/19 at 19:29;  Status UNV


Vancomycin HCl 1.5 gm/Sodium Chloride 500 ml @  250 mls/hr 1X  ONCE IV  Last 

administered on 10/1/19at 19:16;  Start 10/1/19 at 19:00;  Stop 10/1/19 at 

20:59;  Status DC


Vancomycin HCl 1 gm/Sodium Chloride 250 ml @  250 mls/hr Q24H IV ;  Start 

10/2/19 at 18:00;  Stop 10/2/19 at 13:04;  Status DC


Vancomycin HCl (Vancomycin Trough Level) 1 each 1X  ONCE MC ;  Start 10/3/19 at 

17:30;  Stop 10/3/19 at 17:31;  Status Cancel


Vancomycin HCl (Vanco Per Pharmacy) 1 each PRN DAILY  PRN MC SEE COMMENTS Last 

administered on 10/1/19at 18:08;  Start 10/1/19 at 18:15;  Stop 10/2/19 at 

13:04;  Status DC


Piperacillin Sod/ Tazobactam Sod (Zosyn Per Pharmacy) 1 each PRN DAILY  PRN MC 

SEE COMMENTS;  Start 10/1/19 at 18:15;  Status UNV


Enoxaparin Sodium (Lovenox 30mg Syringe) 30 mg Q24H SQ  Last administered on 

10/2/19at 16:45;  Start 10/2/19 at 16:00


Lactobacillus Rhamnosus (Culturelle) 1 cap BID PO  Last administered on 

10/3/19at 08:06;  Start 10/2/19 at 21:00


Non-Formulary Medication 1 ea PRN Q6HRS  PRN TP PAIN Last administered on 

10/2/19at 21:43;  Start 10/2/19 at 18:00


Pantoprazole Sodium (PROTONIX VIAL for IV PUSH) 40 mg DAILYAC IVP  Last 

administered on 10/3/19at 08:06;  Start 10/3/19 at 08:00





Active Scripts


Active


Keppra (Levetiracetam) 500 Mg Tablet 500 Mg PO BID


Meclizine Hcl 25 Mg Tablet 1 Tab PO PRN TID PRN


Reported


Vitamin D3 (Cholecalciferol (Vitamin D3)) 1,000 Unit Tablet 1 Tab PO DAILYBFRSUP


Voltaren (Diclofenac Sodium) 100 Gm Gel..gram. 1 Gm TP PRN QID


Aspirin 81 Mg Tab.chew 1 Tab PO DAILY


Simvastatin 40 Mg Tablet 1 Tab PO QHS


Atenolol 25 Mg Tablet 1 Tab PO DAILY


Vitals/I & O





Vital Sign - Last 24 Hours








 10/2/19 10/2/19 10/2/19 10/2/19





 11:00 15:00 19:00 20:00


 


Temp 98.1 98.4 100.3 





 98.1 98.4 100.3 


 


Pulse 93 99 94 


 


Resp 18 18 18 


 


B/P (MAP) 129/69 (89) 135/52 (79) 103/43 (63) 


 


Pulse Ox 100 95 94 


 


O2 Delivery Room Air Room Air Room Air Room Air


 


    





    





 10/2/19 10/3/19 10/3/19 





 23:00 03:00 07:15 


 


Temp 99.4 99.2 100.3 





 99.4 99.2 100.3 


 


Pulse 90 88 79 


 


Resp 16 18 18 


 


B/P (MAP) 112/49 (70) 138/50 (79) 147/54 (85) 


 


Pulse Ox 94 96 95 


 


O2 Delivery Room Air Room Air Room Air 














Intake and Output   


 


 10/2/19 10/2/19 10/3/19





 15:00 23:00 07:00


 


Intake Total 50 ml 1220 ml 50 ml


 


Output Total  550 ml 300 ml


 


Balance 50 ml 670 ml -250 ml

















BENJAMÍN ADAM MD         Oct 3, 2019 08:58

## 2019-10-04 VITALS — DIASTOLIC BLOOD PRESSURE: 89 MMHG | SYSTOLIC BLOOD PRESSURE: 186 MMHG

## 2019-10-04 VITALS — SYSTOLIC BLOOD PRESSURE: 182 MMHG | DIASTOLIC BLOOD PRESSURE: 91 MMHG

## 2019-10-04 VITALS — DIASTOLIC BLOOD PRESSURE: 77 MMHG | SYSTOLIC BLOOD PRESSURE: 151 MMHG

## 2019-10-04 VITALS — DIASTOLIC BLOOD PRESSURE: 93 MMHG | SYSTOLIC BLOOD PRESSURE: 183 MMHG

## 2019-10-04 VITALS — SYSTOLIC BLOOD PRESSURE: 145 MMHG | DIASTOLIC BLOOD PRESSURE: 68 MMHG

## 2019-10-04 VITALS — DIASTOLIC BLOOD PRESSURE: 73 MMHG | SYSTOLIC BLOOD PRESSURE: 156 MMHG

## 2019-10-04 LAB
ANION GAP SERPL CALC-SCNC: 8 MMOL/L (ref 6–14)
BUN SERPL-MCNC: 14 MG/DL (ref 7–20)
CALCIUM SERPL-MCNC: 8.6 MG/DL (ref 8.5–10.1)
CHLORIDE SERPL-SCNC: 108 MMOL/L (ref 98–107)
CO2 SERPL-SCNC: 25 MMOL/L (ref 21–32)
CREAT SERPL-MCNC: 1 MG/DL (ref 0.6–1)
ERYTHROCYTE [DISTWIDTH] IN BLOOD BY AUTOMATED COUNT: 23.4 % (ref 11.5–14.5)
GFR SERPLBLD BASED ON 1.73 SQ M-ARVRAT: 63.9 ML/MIN
GLUCOSE SERPL-MCNC: 93 MG/DL (ref 70–99)
HCT VFR BLD CALC: 29 % (ref 36–47)
HGB BLD-MCNC: 9.2 G/DL (ref 12–15.5)
MCH RBC QN AUTO: 22 PG (ref 25–35)
MCHC RBC AUTO-ENTMCNC: 32 G/DL (ref 31–37)
MCV RBC AUTO: 71 FL (ref 79–100)
PLATELET # BLD AUTO: 186 X10^3/UL (ref 140–400)
POTASSIUM SERPL-SCNC: 3.3 MMOL/L (ref 3.5–5.1)
RBC # BLD AUTO: 4.09 X10^6/UL (ref 3.5–5.4)
SODIUM SERPL-SCNC: 141 MMOL/L (ref 136–145)
WBC # BLD AUTO: 5.8 X10^3/UL (ref 4–11)

## 2019-10-04 RX ADMIN — Medication SCH MG: at 10:36

## 2019-10-04 RX ADMIN — POLYETHYLENE GLYCOL 3350 SCH GM: 17 POWDER, FOR SOLUTION ORAL at 10:36

## 2019-10-04 RX ADMIN — PIPERACILLIN SODIUM AND TAZOBACTAM SODIUM SCH MLS/HR: 2; .25 INJECTION, POWDER, LYOPHILIZED, FOR SOLUTION INTRAVENOUS at 11:42

## 2019-10-04 RX ADMIN — AMOXICILLIN AND CLAVULANATE POTASSIUM SCH TAB: 500; 125 TABLET, FILM COATED ORAL at 21:00

## 2019-10-04 RX ADMIN — Medication SCH MG: at 21:00

## 2019-10-04 RX ADMIN — Medication SCH CAP: at 09:06

## 2019-10-04 RX ADMIN — PANTOPRAZOLE SODIUM SCH MG: 40 INJECTION, POWDER, FOR SOLUTION INTRAVENOUS at 08:00

## 2019-10-04 RX ADMIN — BACITRACIN SCH MLS/HR: 5000 INJECTION, POWDER, FOR SOLUTION INTRAMUSCULAR at 04:11

## 2019-10-04 RX ADMIN — ENOXAPARIN SODIUM SCH MG: 40 INJECTION SUBCUTANEOUS at 15:34

## 2019-10-04 RX ADMIN — BACITRACIN SCH MLS/HR: 5000 INJECTION, POWDER, FOR SOLUTION INTRAMUSCULAR at 18:40

## 2019-10-04 RX ADMIN — PIPERACILLIN SODIUM AND TAZOBACTAM SODIUM SCH MLS/HR: 2; .25 INJECTION, POWDER, LYOPHILIZED, FOR SOLUTION INTRAVENOUS at 06:06

## 2019-10-04 RX ADMIN — Medication SCH CAP: at 21:01

## 2019-10-04 NOTE — PDOC
ORTHO PROGRESS NOTES


Subjective


No change in her symptoms.


Vitals





Vital Signs








  Date Time  Temp Pulse Resp B/P (MAP) Pulse Ox O2 Delivery O2 Flow Rate FiO2


 


10/4/19 07:00 98.8 79 16 145/68 (93) 100 Room Air  





 98.8       


 


10/3/19 15:30       3 








Labs





Laboratory Tests








Test


 10/3/19


05:15 10/3/19


05:40


 


Urine Opiates Screen Neg (NEG)  


 


Urine Methadone Screen Neg (NEG)  


 


Urine Barbiturates Neg (NEG)  


 


Urine Phencyclidine Screen Neg (NEG)  


 


Urine


Amphetamine/Methamphetamine Neg (NEG) 


 





 


Urine Benzodiazepines Screen Neg (NEG)  


 


Urine Cocaine Screen Neg (NEG)  


 


Urine Cannabinoids Screen Neg (NEG)  


 


Urine Ethyl Alcohol Neg (NEG)  


 


White Blood Count


 


 4.7 x10^3/uL


(4.0-11.0)


 


Red Blood Count


 


 3.17 x10^6/uL


(3.50-5.40)


 


Hemoglobin


 


 6.7 g/dL


(12.0-15.5)


 


Hematocrit


 


 21.7 %


(36.0-47.0)


 


Mean Corpuscular Volume  68 fL () 


 


Mean Corpuscular Hemoglobin  21 pg (25-35) 


 


Mean Corpuscular Hemoglobin


Concent 


 31 g/dL


(31-37)


 


Red Cell Distribution Width


 


 21.9 %


(11.5-14.5)


 


Platelet Count


 


 172 x10^3/uL


(140-400)


 


Neutrophils (%) (Auto)  68 % (31-73) 


 


Lymphocytes (%) (Auto)  18 % (24-48) 


 


Monocytes (%) (Auto)  12 % (0-9) 


 


Eosinophils (%) (Auto)  2 % (0-3) 


 


Basophils (%) (Auto)  0 % (0-3) 


 


Neutrophils # (Auto)


 


 3.2 x10^3/uL


(1.8-7.7)


 


Lymphocytes # (Auto)


 


 0.9 x10^3/uL


(1.0-4.8)


 


Monocytes # (Auto)


 


 0.6 x10^3/uL


(0.0-1.1)


 


Eosinophils # (Auto)


 


 0.1 x10^3/uL


(0.0-0.7)


 


Basophils # (Auto)


 


 0.0 x10^3/uL


(0.0-0.2)


 


Sodium Level


 


 146 mmol/L


(136-145)


 


Potassium Level


 


 3.4 mmol/L


(3.5-5.1)


 


Chloride Level


 


 113 mmol/L


()


 


Carbon Dioxide Level


 


 22 mmol/L


(21-32)


 


Anion Gap  11 (6-14) 


 


Blood Urea Nitrogen


 


 18 mg/dL


(7-20)


 


Creatinine


 


 1.2 mg/dL


(0.6-1.0)


 


Estimated GFR


(Cockcroft-Gault) 


 51.8 





 


BUN/Creatinine Ratio  15 (6-20) 


 


Glucose Level


 


 84 mg/dL


(70-99)


 


Calcium Level


 


 8.2 mg/dL


(8.5-10.1)


 


Iron Level


 


 6 ug/dL


()


 


Total Iron Binding Capacity


 


 213 ug/dL


(250-450)


 


Iron Saturation  3 % (15-34) 


 


Total Bilirubin


 


 0.4 mg/dL


(0.2-1.0)


 


Aspartate Amino Transf


(AST/SGOT) 


 20 U/L (15-37) 





 


Alanine Aminotransferase


(ALT/SGPT) 


 9 U/L (14-59) 





 


Alkaline Phosphatase


 


 50 U/L


()


 


Total Protein


 


 5.5 g/dL


(6.4-8.2)


 


Albumin


 


 2.1 g/dL


(3.4-5.0)


 


Albumin/Globulin Ratio  0.6 (1.0-1.7) 








X-Rays


Hip and pelvis x-rays reviewed


Notes


She is awake, she does not answer any questions this morning but does respond to

my physical exam


Assessment and Plan


No plans on operative intervention. She can follow-up with me regarding her 

wrist pain and hip pain as an outpatient.











GEE PICKETT II, MD         Oct 4, 2019 08:44

## 2019-10-04 NOTE — NUR
SW following pt. PT/OT recommends [facility]. Per PT Christal, family would like an evaluation at 
[facility] phoned and faxed referral, phone: [phone], fax: 
[phone]. Pt acceptance and admission pending. Will continue to follow.

## 2019-10-04 NOTE — PDOC
PROGRESS NOTES


Chief Complaint


Chief Complaint





IMPRESSION 





Basilar atelectasis versus mild pulmonary edema.


Mild cardiomegaly.


Large hiatal hernia.


Altered mental status SEC # 5


Acute respiratory failure with hypoxia, SEC TO ? OVERDOSE OF KLONOPIN , HOWEVER 

UDS NEG FOR BENZODIAZEPINES  X 4   D/W FAMILY IN ROOM


Intentional// accidental ?  drug overdose


TOXIC Encephalopathy


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


Mild to moderate chronic small vessel ischemic changes and atrophy.on CT HEAD


DIABETES


HYPERTENSION HX


HYPOKALEMIA 


Severe protein-caloric malnutrition


GERD


MICROCYTIC ANEMIA


Fever, possible aspiration


Moderate left and minimal right hip osteoarthritis.


Degenerative change at the lower lumbar levels








Imaging:


EGD


E--Normal.  GEJ at 35cm.


G--Very unusual anatomy with intrathoracic stomach.  Ultimately able to traverse

stomach.  No lesions appreciated.


D--Normal to second portion.


IMP: Large HH (intrathoracic stomach)


discuss whether or not to pursue colonoscopy with family.


REPLETE K 








28 MIN PT EXAM, CHART REVIEW, > 50% OF TIME SPENT WITH EXAM, CHART REVIEW, PT 

CARE COORDINATION





History of Present Illness


History of Present Illness


10-01  CR UP TO 1.2 HGB STABLE, REPEAT UDS





Drowsy today, family bedside. Hb 6.7, blood ordered. Has plans for EGD this 

afternoon





ADMITTED 


police report filed


SS CONSULT


O2 SUPPORT PRN


Consult pulmonary


Consult Neurology


consult risk management


DVT PROPHYLAXIS


GI PROPHYLAXIS


IV FLUID SUPPORT LOW RATE


blood cult


iv antibiotics D/C VANC


Continue empiric Zosyn





37 MIN PT EXAM, CHART REVIEW, > 50% OF TIME SPENT WITH EXAM, CHART REVIEW, PT 

CARE COORDINATION





Vitals


Vitals





Vital Signs








  Date Time  Temp Pulse Resp B/P (MAP) Pulse Ox O2 Delivery O2 Flow Rate FiO2


 


10/4/19 08:00      Room Air  


 


10/4/19 07:00 98.8 79 16 145/68 (93) 100   





 98.8       


 


10/3/19 15:30       3 











Physical Exam


Physical Exam


GENERAL: Resting quietly, appears comfortable 


HEENT:  Pupils equally round, reactive.  She resists oral exam.


NECK:  Supple.


LUNGS:  Improved aeration, nonlabored.


HEART:  S1, S2.  Murmur present.


ABDOMEN:  Soft, no guarding.  Bowel sounds present.


GENITOURINARY:  Indwelling Evans in place.


EXTREMITIES:  No gross edema or cyanosis.  Left wrist is swollen with grimacing 

on palpation.


SKIN:  Warm to touch.


General:  Cooperative, No acute distress


Heart:  Regular rate


Lungs:  Clear


Abdomen:  Soft, No tenderness


Extremities:  No edema, Normal pulses





Labs


LABS





EXAM: Pelvis and right hip, 3 views.


 


HISTORY: Pain.


 


COMPARISON: None.


 


FINDINGS: A frontal view of the pelvis and 2 views of the right hip are 


obtained. No displaced fracture is seen. There is minimal right and 


moderate left femoral head marginal osteophyte formation. 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 


greater than right hip joint space narrowing. There is degenerative change


of the lower lumbar levels.


 


IMPRESSION: 


1. Moderate left and minimal right hip osteoarthritis.


2. Degenerative change at the lower lumbar levels.


 


Electronically signed by: Tennille Martino MD (10/3/2019 12:52 PM) [place] [id number]














DICTATED and SIGNED BY:     TENNILLE MARTINO MD


DATE:     10/03/19 1252





Laboratory Tests








Test


 10/4/19


09:15


 


White Blood Count


 5.8 x10^3/uL


(4.0-11.0)


 


Red Blood Count


 4.09 x10^6/uL


(3.50-5.40)


 


Hemoglobin


 9.2 g/dL


(12.0-15.5)


 


Hematocrit


 29.0 %


(36.0-47.0)


 


Mean Corpuscular Volume 71 fL () 


 


Mean Corpuscular Hemoglobin 22 pg (25-35) 


 


Mean Corpuscular Hemoglobin


Concent 32 g/dL


(31-37)


 


Red Cell Distribution Width


 23.4 %


(11.5-14.5)


 


Platelet Count


 186 x10^3/uL


(140-400)


 


Sodium Level


 141 mmol/L


(136-145)


 


Potassium Level


 3.3 mmol/L


(3.5-5.1)


 


Chloride Level


 108 mmol/L


()


 


Carbon Dioxide Level


 25 mmol/L


(21-32)


 


Anion Gap 8 (6-14) 


 


Blood Urea Nitrogen


 14 mg/dL


(7-20)


 


Creatinine


 1.0 mg/dL


(0.6-1.0)


 


Estimated GFR


(Cockcroft-Gault) 63.9 





 


Glucose Level


 93 mg/dL


(70-99)


 


Calcium Level


 8.6 mg/dL


(8.5-10.1)











Assessment and Plan


Assessmemt and Plan


Problems


Medical Problems:


(1) Acute respiratory failure with hypoxia


Status: Acute  











Comment


Review of Relevant


I have reviewed the following items marky (where applicable) has been applied.


Labs





Laboratory Tests








Test


 10/3/19


05:15 10/3/19


05:40 10/4/19


09:15


 


Urine Opiates Screen Neg (NEG)   


 


Urine Methadone Screen Neg (NEG)   


 


Urine Barbiturates Neg (NEG)   


 


Urine Phencyclidine Screen Neg (NEG)   


 


Urine


Amphetamine/Methamphetamine Neg (NEG) 


 


 





 


Urine Benzodiazepines Screen Neg (NEG)   


 


Urine Cocaine Screen Neg (NEG)   


 


Urine Cannabinoids Screen Neg (NEG)   


 


Urine Ethyl Alcohol Neg (NEG)   


 


White Blood Count


 


 4.7 x10^3/uL


(4.0-11.0) 5.8 x10^3/uL


(4.0-11.0)


 


Red Blood Count


 


 3.17 x10^6/uL


(3.50-5.40) 4.09 x10^6/uL


(3.50-5.40)


 


Hemoglobin


 


 6.7 g/dL


(12.0-15.5) 9.2 g/dL


(12.0-15.5)


 


Hematocrit


 


 21.7 %


(36.0-47.0) 29.0 %


(36.0-47.0)


 


Mean Corpuscular Volume  68 fL ()  71 fL () 


 


Mean Corpuscular Hemoglobin  21 pg (25-35)  22 pg (25-35) 


 


Mean Corpuscular Hemoglobin


Concent 


 31 g/dL


(31-37) 32 g/dL


(31-37)


 


Red Cell Distribution Width


 


 21.9 %


(11.5-14.5) 23.4 %


(11.5-14.5)


 


Platelet Count


 


 172 x10^3/uL


(140-400) 186 x10^3/uL


(140-400)


 


Neutrophils (%) (Auto)  68 % (31-73)  


 


Lymphocytes (%) (Auto)  18 % (24-48)  


 


Monocytes (%) (Auto)  12 % (0-9)  


 


Eosinophils (%) (Auto)  2 % (0-3)  


 


Basophils (%) (Auto)  0 % (0-3)  


 


Neutrophils # (Auto)


 


 3.2 x10^3/uL


(1.8-7.7) 





 


Lymphocytes # (Auto)


 


 0.9 x10^3/uL


(1.0-4.8) 





 


Monocytes # (Auto)


 


 0.6 x10^3/uL


(0.0-1.1) 





 


Eosinophils # (Auto)


 


 0.1 x10^3/uL


(0.0-0.7) 





 


Basophils # (Auto)


 


 0.0 x10^3/uL


(0.0-0.2) 





 


Sodium Level


 


 146 mmol/L


(136-145) 141 mmol/L


(136-145)


 


Potassium Level


 


 3.4 mmol/L


(3.5-5.1) 3.3 mmol/L


(3.5-5.1)


 


Chloride Level


 


 113 mmol/L


() 108 mmol/L


()


 


Carbon Dioxide Level


 


 22 mmol/L


(21-32) 25 mmol/L


(21-32)


 


Anion Gap  11 (6-14)  8 (6-14) 


 


Blood Urea Nitrogen


 


 18 mg/dL


(7-20) 14 mg/dL


(7-20)


 


Creatinine


 


 1.2 mg/dL


(0.6-1.0) 1.0 mg/dL


(0.6-1.0)


 


Estimated GFR


(Cockcroft-Gault) 


 51.8 


 63.9 





 


BUN/Creatinine Ratio  15 (6-20)  


 


Glucose Level


 


 84 mg/dL


(70-99) 93 mg/dL


(70-99)


 


Calcium Level


 


 8.2 mg/dL


(8.5-10.1) 8.6 mg/dL


(8.5-10.1)


 


Iron Level


 


 6 ug/dL


() 





 


Total Iron Binding Capacity


 


 213 ug/dL


(250-450) 





 


Iron Saturation  3 % (15-34)  


 


Total Bilirubin


 


 0.4 mg/dL


(0.2-1.0) 





 


Aspartate Amino Transf


(AST/SGOT) 


 20 U/L (15-37) 


 





 


Alanine Aminotransferase


(ALT/SGPT) 


 9 U/L (14-59) 


 





 


Alkaline Phosphatase


 


 50 U/L


() 





 


Total Protein


 


 5.5 g/dL


(6.4-8.2) 





 


Albumin


 


 2.1 g/dL


(3.4-5.0) 





 


Albumin/Globulin Ratio  0.6 (1.0-1.7)  








Laboratory Tests








Test


 10/4/19


09:15


 


White Blood Count


 5.8 x10^3/uL


(4.0-11.0)


 


Red Blood Count


 4.09 x10^6/uL


(3.50-5.40)


 


Hemoglobin


 9.2 g/dL


(12.0-15.5)


 


Hematocrit


 29.0 %


(36.0-47.0)


 


Mean Corpuscular Volume 71 fL () 


 


Mean Corpuscular Hemoglobin 22 pg (25-35) 


 


Mean Corpuscular Hemoglobin


Concent 32 g/dL


(31-37)


 


Red Cell Distribution Width


 23.4 %


(11.5-14.5)


 


Platelet Count


 186 x10^3/uL


(140-400)


 


Sodium Level


 141 mmol/L


(136-145)


 


Potassium Level


 3.3 mmol/L


(3.5-5.1)


 


Chloride Level


 108 mmol/L


()


 


Carbon Dioxide Level


 25 mmol/L


(21-32)


 


Anion Gap 8 (6-14) 


 


Blood Urea Nitrogen


 14 mg/dL


(7-20)


 


Creatinine


 1.0 mg/dL


(0.6-1.0)


 


Estimated GFR


(Cockcroft-Gault) 63.9 





 


Glucose Level


 93 mg/dL


(70-99)


 


Calcium Level


 8.6 mg/dL


(8.5-10.1)








Microbiology


10/1/19 Blood Culture - Preliminary, Resulted


          NO GROWTH AFTER 2 DAYS


10/1/19 Urine Culture - Final, Complete


          


10/1/19 Urine Culture Result 1 (EDGAR) - Final, Complete


Medications





Current Medications


Flumazenil (Romazicon) 0.5 mg STK-MED ONCE IV ;  Start 9/29/19 at 21:30;  Stop 

9/29/19 at 21:30;  Status DC


Flumazenil (Romazicon) 1 mg 1X  ONCE IV  Last administered on 9/29/19at 21:50;  

Start 9/29/19 at 22:15;  Stop 9/29/19 at 22:16;  Status DC


Furosemide (Lasix) 20 mg 1X  ONCE IVP  Last administered on 9/30/19at 00:48;  

Start 9/30/19 at 00:00;  Stop 9/30/19 at 00:01;  Status DC


Ondansetron HCl (Zofran) 4 mg PRN Q8HRS  PRN IV NAUSEA/VOMITING 1ST CHOICE;  

Start 9/30/19 at 01:00;  Stop 10/1/19 at 00:59;  Status DC


Dextrose/Sodium Chloride 1,000 ml @  75 mls/hr [id number] IV  Last administered on 

9/30/19at 01:26;  Start 9/30/19 at 01:00;  Stop 9/30/19 at 15:21;  Status DC


Enoxaparin Sodium (Lovenox 40mg Syringe) 40 mg Q24H SQ  Last administered on 

10/1/19at 18:37;  Start 9/30/19 at 15:00;  Stop 10/2/19 at 12:31;  Status DC


Ondansetron HCl (Zofran) 4 mg PRN Q6HRS  PRN IV NAUSEA/VOMITING Last 

administered on 10/3/19at 10:51;  Start 9/30/19 at 09:15


Prochlorperazine (Compazine) 25 mg PRN Q12HR  PRN MS NAUSEA/VOMITING;  Start 

9/30/19 at 09:15


Famotidine (Pepcid Vial) 20 mg BID IVP  Last administered on 10/1/19at 09:05;  

Start 9/30/19 at 11:00;  Stop 10/1/19 at 11:22;  Status DC


Sodium Chloride (Normal Saline Flush) 3 ml QSHIFT  PRN IV AFTER MEDS AND BLOOD 

DRAWS;  Start 9/30/19 at 09:15


Bisacodyl (Dulcolax Supp) 10 mg PRN DAILY  PRN MS CONSTIPATION;  Start 9/30/19 

at 09:15


Sodium Chloride 1,000 ml @  75 mls/hr [id number] IV  Last administered on 10/4/19at

04:11;  Start 9/30/19 at 09:30


Influenza Virus Vaccine Quadrival (Afluria Quad 2019-20 (3yr Up) Syringe) 0.5 ml

ONCE ONCE VAX IM  Last administered on 9/30/19at 15:33;  Start 9/30/19 at 14:15;

 Stop 9/30/19 at 14:16;  Status DC


Piperacillin Sod/ Tazobactam Sod 2.25 gm/Sodium Chloride 50 ml @  100 mls/hr 

Q6HRS IV  Last administered on 10/4/19at 06:06;  Start 10/1/19 at 12:00


Famotidine (Pepcid Vial) 20 mg DAILY IVP ;  Start 10/2/19 at 09:00;  Stop 

10/1/19 at 11:57;  Status DC


Famotidine (Pepcid) 20 mg DAILY PO  Last administered on 10/3/19at 08:06;  Start

10/2/19 at 09:00;  Stop 10/3/19 at 11:26;  Status DC


Levetiracetam (Keppra) 500 mg BID PO  Last administered on 10/4/19at 09:06;  

Start 10/1/19 at 15:00


Acetaminophen (Tylenol) 650 mg PRN Q6HRS  PRN PO PAIN Last administered on 

10/3/19at 08:06;  Start 10/1/19 at 15:30


Vancomycin HCl 0.5 gm/Sodium Chloride 250 ml @  166.667 mls/hr 1X  ONCE IV ;  

Start 10/1/19 at 18:00;  Stop 10/1/19 at 19:29;  Status UNV


Vancomycin HCl 1.5 gm/Sodium Chloride 500 ml @  250 mls/hr 1X  ONCE IV  Last 

administered on 10/1/19at 19:16;  Start 10/1/19 at 19:00;  Stop 10/1/19 at 

20:59;  Status DC


Vancomycin HCl 1 gm/Sodium Chloride 250 ml @  250 mls/hr Q24H IV ;  Start 

10/2/19 at 18:00;  Stop 10/2/19 at 13:04;  Status DC


Vancomycin HCl (Vancomycin Trough Level) 1 each 1X  ONCE MC ;  Start 10/3/19 at 

17:30;  Stop 10/3/19 at 17:31;  Status Cancel


Vancomycin HCl (Vanco Per Pharmacy) 1 each PRN DAILY  PRN MC SEE COMMENTS Last 

administered on 10/1/19at 18:08;  Start 10/1/19 at 18:15;  Stop 10/2/19 at 

13:04;  Status DC


Piperacillin Sod/ Tazobactam Sod (Zosyn Per Pharmacy) 1 each PRN DAILY  PRN MC 

SEE COMMENTS;  Start 10/1/19 at 18:15;  Status UNV


Enoxaparin Sodium (Lovenox 30mg Syringe) 30 mg Q24H SQ  Last administered on 

10/3/19at 16:29;  Start 10/2/19 at 16:00


Lactobacillus Rhamnosus (Culturelle) 1 cap BID PO  Last administered on 

10/4/19at 09:06;  Start 10/2/19 at 21:00


Non-Formulary Medication 1 ea PRN Q6HRS  PRN TP PAIN Last administered on 

10/3/19at 21:11;  Start 10/2/19 at 18:00


Pantoprazole Sodium (PROTONIX VIAL for IV PUSH) 40 mg DAILYAC IVP  Last 

administered on 10/4/19at 08:00;  Start 10/3/19 at 08:00;  Stop 10/4/19 at 

09:55;  Status DC


Potassium Chloride (Klor-Con) 40 meq 1X  ONCE PO ;  Start 10/3/19 at 09:30;  

Stop 10/3/19 at 09:31;  Status DC


Propofol 20 ml @ As Directed STK-MED ONCE IV ;  Start 10/3/19 at 13:37;  Stop 

10/3/19 at 13:38;  Status DC


Ringer's Solution 1,000 ml @  75 mls/hr [id number] IV  Last administered on 

10/3/19at 13:45;  Start 10/3/19 at 15:45;  Stop 10/4/19 at 02:31;  Status DC


Ferrous Sulfate (Feosol) 325 mg BID PO  Last administered on 10/4/19at 10:36;  

Start 10/4/19 at 10:00


Polyethylene Glycol (miraLAX PACKET) 17 gm DAILY PO  Last administered on 

10/4/19at 10:36;  Start 10/4/19 at 10:00


Pantoprazole Sodium (Protonix) 40 mg DAILYAC PO ;  Start 10/5/19 at 07:30





Active Scripts


Active


Keppra (Levetiracetam) 500 Mg Tablet 500 Mg PO BID


Meclizine Hcl 25 Mg Tablet 1 Tab PO PRN TID PRN


Reported


Vitamin D3 (Cholecalciferol (Vitamin D3)) 1,000 Unit Tablet 1 Tab PO DAILYBFRSUP


Voltaren (Diclofenac Sodium) 100 Gm Gel..gram. 1 Gm TP PRN QID


Aspirin 81 Mg Tab.chew 1 Tab PO DAILY


Simvastatin 40 Mg Tablet 1 Tab PO QHS


Atenolol 25 Mg Tablet 1 Tab PO DAILY


Vitals/I & O





Vital Sign - Last 24 Hours








 10/3/19 10/3/19 10/3/19 10/3/19





 11:15 12:29 12:47 12:49


 


Temp 99.7 99.5 99.5 99.2





 99.7 99.5 99.5 99.2


 


Pulse 82 85 85 86


 


Resp 16 16 16 16


 


B/P (MAP) 175/70 (105) 172/82 176/81 176/81


 


Pulse Ox 96   


 


O2 Delivery Room Air   


 


    





    





 10/3/19 10/3/19 10/3/19 10/3/19





 13:42 14:28 14:45 14:45


 


Temp 97.9   97.4





 97.9   97.4


 


Pulse 86 88 79 88


 


Resp 18 16 16 16


 


B/P (MAP)  146/73 191/80 179/72


 


Pulse Ox 96 98 100 


 


O2 Delivery  Room Air  


 


O2 Flow Rate  3  


 


    





    





 10/3/19 10/3/19 10/3/19 10/3/19





 15:00 15:15 15:30 16:46


 


Temp    98.9





    98.9


 


Pulse 86 82 82 78


 


Resp 16 20 18 16


 


B/P (MAP) 174/74 172/72 179/76 164/77 (106)


 


Pulse Ox 98 98 98 


 


O2 Delivery Room Air Room Air Room Air Room Air


 


O2 Flow Rate  3 3 


 


    





    





 10/3/19 10/3/19 10/3/19 10/4/19





 19:00 20:00 23:00 03:00


 


Temp 98.7  99.5 99.2





 98.7  99.5 99.2


 


Pulse 77  80 79


 


Resp 18  18 16


 


B/P (MAP) 170/83 (112)  134/77 (96) 156/73 (100)


 


Pulse Ox 98  99 98


 


O2 Delivery Room Air Room Air Room Air Room Air


 


    





    





 10/4/19 10/4/19  





 07:00 08:00  


 


Temp 98.8   





 98.8   


 


Pulse 79   


 


Resp 16   


 


B/P (MAP) 145/68 (93)   


 


Pulse Ox 100   


 


O2 Delivery Room Air Room Air  














Intake and Output   


 


 10/3/19 10/3/19 10/4/19





 15:00 23:00 07:00


 


Intake Total 490 ml 500 ml 


 


Output Total  950 ml 1100 ml


 


Balance 490 ml -450 ml -1100 ml

















KATELYNN ABDULLAHI MD           Oct 4, 2019 10:40

## 2019-10-04 NOTE — PDOC
Subjective:


Subjective:


Son helping her eat breakfast - says she feels better today - doesn't think 

she's ready for a colonoscopy yet.





Objective:


Vital Signs:





                                   Vital Signs








  Date Time  Temp Pulse Resp B/P (MAP) Pulse Ox O2 Delivery O2 Flow Rate FiO2


 


10/4/19 08:00      Room Air  


 


10/4/19 07:00 98.8 79 16 145/68 (93) 100   





 98.8       


 


10/3/19 15:30       3 








Imaging:


EGD


E--Normal.  GEJ at 35cm.


G--Very unusual anatomy with intrathoracic stomach.  Ultimately able to traverse

stomach.  No lesions appreciated.


D--Normal to second portion.


IMP: Large HH (intrathoracic stomach)


REC: will discuss whether or not to pursue colonoscopy with family.





PE:





GEN: NAD - eating potatoes and marcelo


LUNGS: CTAB


HEART: RRR


ABD: S/ND/NT


NEURO/PSYCH: more awake today





A/P:


Dementia


Vomiting - resolved


ARIELLA - EGD unrevealing for source


Intrathoracic stomach





--


Eating some today.


Transfused yesterday - recheck labs.


Discussed possibility of colonoscopy at some point - family will consider.


PO PPI, iron.











MYLA HENDRICKS          Oct 4, 2019 09:54

## 2019-10-04 NOTE — PDOC
Infectious Disease Note


Subjective


Subjective


Feeling better


Comfortable


No fevers last 24 hours


Denies SOA/N/V/D





Family feel this is her best day yet, doing better 


Participated in PT/OT





ROS


ROS


per HPI





Vital Sign


Vital Signs





Vital Signs








  Date Time  Temp Pulse Resp B/P (MAP) Pulse Ox O2 Delivery O2 Flow Rate FiO2


 


10/4/19 08:00      Room Air  


 


10/4/19 07:00 98.8 79 16 145/68 (93) 100   





 98.8       


 


10/3/19 15:30       3 











Physical Exam


PHYSICAL EXAM


GENERAL: Sitting in the chair, alert, smiling 


HEENT:  Pupils equally round, reactive.  


NECK:  Supple.


LUNGS: Clear.


HEART:  S1, S2.  Murmur present.


ABDOMEN:  Soft, no guarding.  Bowel sounds present.


GENITOURINARY:  Indwelling Evans in place.


EXTREMITIES:  No gross edema or cyanosis.  Left wrist tender


SKIN:  Warm to touch.


PIV ok





Labs


Lab





Laboratory Tests








Test


 10/4/19


09:15


 


White Blood Count


 5.8 x10^3/uL


(4.0-11.0)


 


Red Blood Count


 4.09 x10^6/uL


(3.50-5.40)


 


Hemoglobin


 9.2 g/dL


(12.0-15.5)


 


Hematocrit


 29.0 %


(36.0-47.0)


 


Mean Corpuscular Volume 71 fL () 


 


Mean Corpuscular Hemoglobin 22 pg (25-35) 


 


Mean Corpuscular Hemoglobin


Concent 32 g/dL


(31-37)


 


Red Cell Distribution Width


 23.4 %


(11.5-14.5)


 


Platelet Count


 186 x10^3/uL


(140-400)


 


Sodium Level


 141 mmol/L


(136-145)


 


Potassium Level


 3.3 mmol/L


(3.5-5.1)


 


Chloride Level


 108 mmol/L


()


 


Carbon Dioxide Level


 25 mmol/L


(21-32)


 


Anion Gap 8 (6-14) 


 


Blood Urea Nitrogen


 14 mg/dL


(7-20)


 


Creatinine


 1.0 mg/dL


(0.6-1.0)


 


Estimated GFR


(Cockcroft-Gault) 63.9 





 


Glucose Level


 93 mg/dL


(70-99)


 


Calcium Level


 8.6 mg/dL


(8.5-10.1)








Micro





Microbiology


10/1/19 Blood Culture - Preliminary, Resulted


          NO GROWTH AFTER 1 DAY











 URINE CULTURE RES 1  Final  


        No growth





Objective


Assessment


Fever - ? in part PRBCs - cults neg Possible Vanc


Lactic acidosis - better 


Acute encephalopathy - better 


Left wrist swelling and pain. ortho following 


EL - better 


Drug overdose


Dementia 


Anemia s/p PRBCs & EGD





Plan


Plan of Care


Continue empiric Zosyn - wean soon


Maintain aspiration precautions


PT/OT 








D/w family at beside





Really looks well in a chair all cults neg will d/c Zosyn and dose Augmentin for

now





Attending Co-Sign


Attending Co-Sign


The patient was seen and interviewed as well as examined at the bedside. The 

chart was reviewed. The case was discussed. Agree with the plan of care.











RONNY KILGORE         Oct 4, 2019 11:20


SHIVAM VARELA MD               Oct 4, 2019 17:14

## 2019-10-05 VITALS — SYSTOLIC BLOOD PRESSURE: 190 MMHG | DIASTOLIC BLOOD PRESSURE: 99 MMHG

## 2019-10-05 VITALS — SYSTOLIC BLOOD PRESSURE: 124 MMHG | DIASTOLIC BLOOD PRESSURE: 64 MMHG

## 2019-10-05 VITALS — SYSTOLIC BLOOD PRESSURE: 175 MMHG | DIASTOLIC BLOOD PRESSURE: 84 MMHG

## 2019-10-05 VITALS — SYSTOLIC BLOOD PRESSURE: 159 MMHG | DIASTOLIC BLOOD PRESSURE: 84 MMHG

## 2019-10-05 VITALS — SYSTOLIC BLOOD PRESSURE: 176 MMHG | DIASTOLIC BLOOD PRESSURE: 89 MMHG

## 2019-10-05 VITALS — DIASTOLIC BLOOD PRESSURE: 90 MMHG | SYSTOLIC BLOOD PRESSURE: 176 MMHG

## 2019-10-05 LAB
ANION GAP SERPL CALC-SCNC: 13 MMOL/L (ref 6–14)
BASOPHILS # BLD AUTO: 0 X10^3/UL (ref 0–0.2)
BASOPHILS NFR BLD: 0 % (ref 0–3)
BUN SERPL-MCNC: 12 MG/DL (ref 7–20)
CALCIUM SERPL-MCNC: 9 MG/DL (ref 8.5–10.1)
CHLORIDE SERPL-SCNC: 107 MMOL/L (ref 98–107)
CO2 SERPL-SCNC: 21 MMOL/L (ref 21–32)
CREAT SERPL-MCNC: 0.8 MG/DL (ref 0.6–1)
EOSINOPHIL NFR BLD: 0.1 X10^3/UL (ref 0–0.7)
EOSINOPHIL NFR BLD: 1 % (ref 0–3)
ERYTHROCYTE [DISTWIDTH] IN BLOOD BY AUTOMATED COUNT: 23.9 % (ref 11.5–14.5)
GFR SERPLBLD BASED ON 1.73 SQ M-ARVRAT: 82.7 ML/MIN
GLUCOSE SERPL-MCNC: 95 MG/DL (ref 70–99)
HCT VFR BLD CALC: 31.2 % (ref 36–47)
HGB BLD-MCNC: 10 G/DL (ref 12–15.5)
LYMPHOCYTES # BLD: 0.7 X10^3/UL (ref 1–4.8)
LYMPHOCYTES NFR BLD AUTO: 15 % (ref 24–48)
MCH RBC QN AUTO: 23 PG (ref 25–35)
MCHC RBC AUTO-ENTMCNC: 32 G/DL (ref 31–37)
MCV RBC AUTO: 71 FL (ref 79–100)
MONO #: 0.4 X10^3/UL (ref 0–1.1)
MONOCYTES NFR BLD: 9 % (ref 0–9)
NEUT #: 3.6 X10^3/UL (ref 1.8–7.7)
NEUTROPHILS NFR BLD AUTO: 75 % (ref 31–73)
PLATELET # BLD AUTO: 214 X10^3/UL (ref 140–400)
POTASSIUM SERPL-SCNC: 4.1 MMOL/L (ref 3.5–5.1)
RBC # BLD AUTO: 4.4 X10^6/UL (ref 3.5–5.4)
SODIUM SERPL-SCNC: 141 MMOL/L (ref 136–145)
WBC # BLD AUTO: 4.8 X10^3/UL (ref 4–11)

## 2019-10-05 RX ADMIN — BACITRACIN SCH MLS/HR: 5000 INJECTION, POWDER, FOR SOLUTION INTRAMUSCULAR at 21:20

## 2019-10-05 RX ADMIN — Medication SCH CAP: at 21:20

## 2019-10-05 RX ADMIN — Medication SCH MG: at 21:20

## 2019-10-05 RX ADMIN — POTASSIUM CHLORIDE SCH MEQ: 1500 TABLET, EXTENDED RELEASE ORAL at 08:45

## 2019-10-05 RX ADMIN — ENOXAPARIN SODIUM SCH MG: 40 INJECTION SUBCUTANEOUS at 15:50

## 2019-10-05 RX ADMIN — BACITRACIN SCH MLS/HR: 5000 INJECTION, POWDER, FOR SOLUTION INTRAMUSCULAR at 08:03

## 2019-10-05 RX ADMIN — POLYETHYLENE GLYCOL 3350 SCH GM: 17 POWDER, FOR SOLUTION ORAL at 08:46

## 2019-10-05 RX ADMIN — Medication SCH MG: at 08:46

## 2019-10-05 RX ADMIN — AMOXICILLIN AND CLAVULANATE POTASSIUM SCH TAB: 500; 125 TABLET, FILM COATED ORAL at 08:45

## 2019-10-05 RX ADMIN — AMOXICILLIN AND CLAVULANATE POTASSIUM SCH TAB: 500; 125 TABLET, FILM COATED ORAL at 21:20

## 2019-10-05 RX ADMIN — PANTOPRAZOLE SODIUM SCH MG: 40 TABLET, DELAYED RELEASE ORAL at 08:06

## 2019-10-05 RX ADMIN — Medication SCH CAP: at 08:45

## 2019-10-05 NOTE — NUR
This RN asked patient's visitors about medications found in room earlier in shift. Visitor 
Joel claims them. Provided education on inability to leave medications in patient's room. 
Visitor verbalizes understanding. Medications returned to Joel.

## 2019-10-05 NOTE — PDOC
PROGRESS NOTES


Chief Complaint


Chief Complaint


A/P:


Basilar atelectasis versus mild pulmonary edema.


Mild cardiomegaly.


Large hiatal hernia.


Altered mental status SEC # 5


Acute respiratory failure with hypoxia, SEC TO ? OVERDOSE OF KLONOPIN , HOWEVER 

UDS NEG FOR BENZODIAZEPINES  X 4   D/W FAMILY IN ROOM


Intentional// accidental ?  drug overdose


TOXIC Encephalopathy


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


Mild to moderate chronic small vessel ischemic changes and atrophy.on CT HEAD


DIABETES


HYPERTENSION HX


HYPOKALEMIA 


Severe protein-caloric malnutrition


GERD


MICROCYTIC ANEMIA


Fever, possible aspiration


Moderate left and minimal right hip osteoarthritis.


Degenerative change at the lower lumbar levels








Imaging:


EGD


E--Normal.  GEJ at 35cm.


G--Very unusual anatomy with intrathoracic stomach.  Ultimately able to traverse

stomach.  No lesions appreciated.


D--Normal to second portion.


IMP: Large HH (intrathoracic stomach)


discuss whether or not to pursue colonoscopy with family.


REPLETE K 








28 MIN PT EXAM, CHART REVIEW, > 50% OF TIME SPENT WITH EXAM, CHART REVIEW, PT 

CARE COORDINATION





History of Present Illness


History of Present Illness


10-01  CR UP TO 1.2 HGB STABLE, REPEAT UDS





Hb from 6.7 to 10 after transfusion. EGD showed hiatal hernia, otherwise normal.

Changed to PO protonix, iron, and PO augmentin today.


Eating some today.


Discussed possibility of colonoscopy at some point - family will consider.


They wish for referral to SNF.





ADMITTED 


police report filed


SS CONSULT


O2 SUPPORT PRN


Consult pulmonary


Consult Neurology


consult risk management


DVT PROPHYLAXIS


GI PROPHYLAXIS


IV FLUID SUPPORT LOW RATE


blood cult


iv antibiotics D/C VANC


Continue empiric Zosyn





37 MIN PT EXAM, CHART REVIEW, > 50% OF TIME SPENT WITH EXAM, CHART REVIEW, PT 

CARE COORDINATION





Vitals


Vitals





Vital Signs








  Date Time  Temp Pulse Resp B/P (MAP) Pulse Ox O2 Delivery O2 Flow Rate FiO2


 


10/5/19 07:00 98.6 83 16 124/64 (84) 98   





 98.6       


 


10/4/19 23:59      Room Air  











Physical Exam


Physical Exam


GENERAL: Sitting in the bed alert, smiling 


HEENT:  Pupils equally round, reactive.  


NECK:  Supple.


LUNGS: Clear.


HEART:  S1, S2.  Murmur present.


ABDOMEN:  Soft, no guarding.  Bowel sounds present.


GENITOURINARY:  Indwelling Evans in place.


EXTREMITIES:  No gross edema or cyanosis.  Left wrist tenderness improved


SKIN:  Warm to touch.


PIV ok


General:  Cooperative, No acute distress


Heart:  Regular rate


Lungs:  Clear


Abdomen:  Soft, No tenderness


Extremities:  No edema, Normal pulses





Labs


LABS





Laboratory Tests








Test


 10/4/19


09:15 10/5/19


04:20


 


White Blood Count


 5.8 x10^3/uL


(4.0-11.0) 4.8 x10^3/uL


(4.0-11.0)


 


Red Blood Count


 4.09 x10^6/uL


(3.50-5.40) 4.40 x10^6/uL


(3.50-5.40)


 


Hemoglobin


 9.2 g/dL


(12.0-15.5) 10.0 g/dL


(12.0-15.5)


 


Hematocrit


 29.0 %


(36.0-47.0) 31.2 %


(36.0-47.0)


 


Mean Corpuscular Volume 71 fL ()  71 fL () 


 


Mean Corpuscular Hemoglobin 22 pg (25-35)  23 pg (25-35) 


 


Mean Corpuscular Hemoglobin


Concent 32 g/dL


(31-37) 32 g/dL


(31-37)


 


Red Cell Distribution Width


 23.4 %


(11.5-14.5) 23.9 %


(11.5-14.5)


 


Platelet Count


 186 x10^3/uL


(140-400) 214 x10^3/uL


(140-400)


 


Sodium Level


 141 mmol/L


(136-145) 141 mmol/L


(136-145)


 


Potassium Level


 3.3 mmol/L


(3.5-5.1) 4.1 mmol/L


(3.5-5.1)


 


Chloride Level


 108 mmol/L


() 107 mmol/L


()


 


Carbon Dioxide Level


 25 mmol/L


(21-32) 21 mmol/L


(21-32)


 


Anion Gap 8 (6-14)  13 (6-14) 


 


Blood Urea Nitrogen


 14 mg/dL


(7-20) 12 mg/dL


(7-20)


 


Creatinine


 1.0 mg/dL


(0.6-1.0) 0.8 mg/dL


(0.6-1.0)


 


Estimated GFR


(Cockcroft-Gault) 63.9 


 82.7 





 


Glucose Level


 93 mg/dL


(70-99) 95 mg/dL


(70-99)


 


Calcium Level


 8.6 mg/dL


(8.5-10.1) 9.0 mg/dL


(8.5-10.1)


 


Neutrophils (%) (Auto)  75 % (31-73) 


 


Lymphocytes (%) (Auto)  15 % (24-48) 


 


Monocytes (%) (Auto)  9 % (0-9) 


 


Eosinophils (%) (Auto)  1 % (0-3) 


 


Basophils (%) (Auto)  0 % (0-3) 


 


Neutrophils # (Auto)


 


 3.6 x10^3/uL


(1.8-7.7)


 


Lymphocytes # (Auto)


 


 0.7 x10^3/uL


(1.0-4.8)


 


Monocytes # (Auto)


 


 0.4 x10^3/uL


(0.0-1.1)


 


Eosinophils # (Auto)


 


 0.1 x10^3/uL


(0.0-0.7)


 


Basophils # (Auto)


 


 0.0 x10^3/uL


(0.0-0.2)











Assessment and Plan


Assessmemt and Plan


Problems


Medical Problems:


(1) Acute respiratory failure with hypoxia


Status: Acute  











Comment


Review of Relevant


I have reviewed the following items marky (where applicable) has been applied.


Labs





Laboratory Tests








Test


 10/4/19


09:15 10/5/19


04:20


 


White Blood Count


 5.8 x10^3/uL


(4.0-11.0) 4.8 x10^3/uL


(4.0-11.0)


 


Red Blood Count


 4.09 x10^6/uL


(3.50-5.40) 4.40 x10^6/uL


(3.50-5.40)


 


Hemoglobin


 9.2 g/dL


(12.0-15.5) 10.0 g/dL


(12.0-15.5)


 


Hematocrit


 29.0 %


(36.0-47.0) 31.2 %


(36.0-47.0)


 


Mean Corpuscular Volume 71 fL ()  71 fL () 


 


Mean Corpuscular Hemoglobin 22 pg (25-35)  23 pg (25-35) 


 


Mean Corpuscular Hemoglobin


Concent 32 g/dL


(31-37) 32 g/dL


(31-37)


 


Red Cell Distribution Width


 23.4 %


(11.5-14.5) 23.9 %


(11.5-14.5)


 


Platelet Count


 186 x10^3/uL


(140-400) 214 x10^3/uL


(140-400)


 


Sodium Level


 141 mmol/L


(136-145) 141 mmol/L


(136-145)


 


Potassium Level


 3.3 mmol/L


(3.5-5.1) 4.1 mmol/L


(3.5-5.1)


 


Chloride Level


 108 mmol/L


() 107 mmol/L


()


 


Carbon Dioxide Level


 25 mmol/L


(21-32) 21 mmol/L


(21-32)


 


Anion Gap 8 (6-14)  13 (6-14) 


 


Blood Urea Nitrogen


 14 mg/dL


(7-20) 12 mg/dL


(7-20)


 


Creatinine


 1.0 mg/dL


(0.6-1.0) 0.8 mg/dL


(0.6-1.0)


 


Estimated GFR


(Cockcroft-Gault) 63.9 


 82.7 





 


Glucose Level


 93 mg/dL


(70-99) 95 mg/dL


(70-99)


 


Calcium Level


 8.6 mg/dL


(8.5-10.1) 9.0 mg/dL


(8.5-10.1)


 


Neutrophils (%) (Auto)  75 % (31-73) 


 


Lymphocytes (%) (Auto)  15 % (24-48) 


 


Monocytes (%) (Auto)  9 % (0-9) 


 


Eosinophils (%) (Auto)  1 % (0-3) 


 


Basophils (%) (Auto)  0 % (0-3) 


 


Neutrophils # (Auto)


 


 3.6 x10^3/uL


(1.8-7.7)


 


Lymphocytes # (Auto)


 


 0.7 x10^3/uL


(1.0-4.8)


 


Monocytes # (Auto)


 


 0.4 x10^3/uL


(0.0-1.1)


 


Eosinophils # (Auto)


 


 0.1 x10^3/uL


(0.0-0.7)


 


Basophils # (Auto)


 


 0.0 x10^3/uL


(0.0-0.2)








Laboratory Tests








Test


 10/4/19


09:15 10/5/19


04:20


 


White Blood Count


 5.8 x10^3/uL


(4.0-11.0) 4.8 x10^3/uL


(4.0-11.0)


 


Red Blood Count


 4.09 x10^6/uL


(3.50-5.40) 4.40 x10^6/uL


(3.50-5.40)


 


Hemoglobin


 9.2 g/dL


(12.0-15.5) 10.0 g/dL


(12.0-15.5)


 


Hematocrit


 29.0 %


(36.0-47.0) 31.2 %


(36.0-47.0)


 


Mean Corpuscular Volume 71 fL ()  71 fL () 


 


Mean Corpuscular Hemoglobin 22 pg (25-35)  23 pg (25-35) 


 


Mean Corpuscular Hemoglobin


Concent 32 g/dL


(31-37) 32 g/dL


(31-37)


 


Red Cell Distribution Width


 23.4 %


(11.5-14.5) 23.9 %


(11.5-14.5)


 


Platelet Count


 186 x10^3/uL


(140-400) 214 x10^3/uL


(140-400)


 


Sodium Level


 141 mmol/L


(136-145) 141 mmol/L


(136-145)


 


Potassium Level


 3.3 mmol/L


(3.5-5.1) 4.1 mmol/L


(3.5-5.1)


 


Chloride Level


 108 mmol/L


() 107 mmol/L


()


 


Carbon Dioxide Level


 25 mmol/L


(21-32) 21 mmol/L


(21-32)


 


Anion Gap 8 (6-14)  13 (6-14) 


 


Blood Urea Nitrogen


 14 mg/dL


(7-20) 12 mg/dL


(7-20)


 


Creatinine


 1.0 mg/dL


(0.6-1.0) 0.8 mg/dL


(0.6-1.0)


 


Estimated GFR


(Cockcroft-Gault) 63.9 


 82.7 





 


Glucose Level


 93 mg/dL


(70-99) 95 mg/dL


(70-99)


 


Calcium Level


 8.6 mg/dL


(8.5-10.1) 9.0 mg/dL


(8.5-10.1)


 


Neutrophils (%) (Auto)  75 % (31-73) 


 


Lymphocytes (%) (Auto)  15 % (24-48) 


 


Monocytes (%) (Auto)  9 % (0-9) 


 


Eosinophils (%) (Auto)  1 % (0-3) 


 


Basophils (%) (Auto)  0 % (0-3) 


 


Neutrophils # (Auto)


 


 3.6 x10^3/uL


(1.8-7.7)


 


Lymphocytes # (Auto)


 


 0.7 x10^3/uL


(1.0-4.8)


 


Monocytes # (Auto)


 


 0.4 x10^3/uL


(0.0-1.1)


 


Eosinophils # (Auto)


 


 0.1 x10^3/uL


(0.0-0.7)


 


Basophils # (Auto)


 


 0.0 x10^3/uL


(0.0-0.2)








Microbiology


10/1/19 Blood Culture - Preliminary, Resulted


          NO GROWTH AFTER 3 DAYS


10/1/19 Urine Culture - Final, Complete


          


10/1/19 Urine Culture Result 1 (EDGAR) - Final, Complete


Medications





Current Medications


Flumazenil (Romazicon) 0.5 mg STK-MED ONCE IV ;  Start 9/29/19 at 21:30;  Stop 

9/29/19 at 21:30;  Status DC


Flumazenil (Romazicon) 1 mg 1X  ONCE IV  Last administered on 9/29/19at 21:50;  

Start 9/29/19 at 22:15;  Stop 9/29/19 at 22:16;  Status DC


Furosemide (Lasix) 20 mg 1X  ONCE IVP  Last administered on 9/30/19at 00:48;  

Start 9/30/19 at 00:00;  Stop 9/30/19 at 00:01;  Status DC


Ondansetron HCl (Zofran) 4 mg PRN Q8HRS  PRN IV NAUSEA/VOMITING 1ST CHOICE;  

Start 9/30/19 at 01:00;  Stop 10/1/19 at 00:59;  Status DC


Dextrose/Sodium Chloride 1,000 ml @  75 mls/hr [id number] IV  Last administered on 

9/30/19at 01:26;  Start 9/30/19 at 01:00;  Stop 9/30/19 at 15:21;  Status DC


Enoxaparin Sodium (Lovenox 40mg Syringe) 40 mg Q24H SQ  Last administered on 

10/1/19at 18:37;  Start 9/30/19 at 15:00;  Stop 10/2/19 at 12:31;  Status DC


Ondansetron HCl (Zofran) 4 mg PRN Q6HRS  PRN IV NAUSEA/VOMITING Last 

administered on 10/3/19at 10:51;  Start 9/30/19 at 09:15


Prochlorperazine (Compazine) 25 mg PRN Q12HR  PRN MD NAUSEA/VOMITING;  Start 

9/30/19 at 09:15


Famotidine (Pepcid Vial) 20 mg BID IVP  Last administered on 10/1/19at 09:05;  

Start 9/30/19 at 11:00;  Stop 10/1/19 at 11:22;  Status DC


Sodium Chloride (Normal Saline Flush) 3 ml QSHIFT  PRN IV AFTER MEDS AND BLOOD 

DRAWS;  Start 9/30/19 at 09:15


Bisacodyl (Dulcolax Supp) 10 mg PRN DAILY  PRN MD CONSTIPATION;  Start 9/30/19 

at 09:15


Sodium Chloride 1,000 ml @  75 mls/hr [id number] IV  Last administered on 10/5/19at

08:03;  Start 9/30/19 at 09:30


Influenza Virus Vaccine Quadrival (Afluria Quad 2019-20 (3yr Up) Syringe) 0.5 ml

ONCE ONCE VAX IM  Last administered on 9/30/19at 15:33;  Start 9/30/19 at 14:15;

 Stop 9/30/19 at 14:16;  Status DC


Piperacillin Sod/ Tazobactam Sod 2.25 gm/Sodium Chloride 50 ml @  100 mls/hr 

Q6HRS IV  Last administered on 10/4/19at 11:42;  Start 10/1/19 at 12:00;  Stop 

10/4/19 at 17:14;  Status DC


Famotidine (Pepcid Vial) 20 mg DAILY IVP ;  Start 10/2/19 at 09:00;  Stop 

10/1/19 at 11:57;  Status DC


Famotidine (Pepcid) 20 mg DAILY PO  Last administered on 10/3/19at 08:06;  Start

10/2/19 at 09:00;  Stop 10/3/19 at 11:26;  Status DC


Levetiracetam (Keppra) 500 mg BID PO  Last administered on 10/5/19at 08:46;  

Start 10/1/19 at 15:00


Acetaminophen (Tylenol) 650 mg PRN Q6HRS  PRN PO PAIN Last administered on 

10/3/19at 08:06;  Start 10/1/19 at 15:30


Vancomycin HCl 0.5 gm/Sodium Chloride 250 ml @  166.667 mls/hr 1X  ONCE IV ;  

Start 10/1/19 at 18:00;  Stop 10/1/19 at 19:29;  Status UNV


Vancomycin HCl 1.5 gm/Sodium Chloride 500 ml @  250 mls/hr 1X  ONCE IV  Last 

administered on 10/1/19at 19:16;  Start 10/1/19 at 19:00;  Stop 10/1/19 at 

20:59;  Status DC


Vancomycin HCl 1 gm/Sodium Chloride 250 ml @  250 mls/hr Q24H IV ;  Start 

10/2/19 at 18:00;  Stop 10/2/19 at 13:04;  Status DC


Vancomycin HCl (Vancomycin Trough Level) 1 each 1X  ONCE MC ;  Start 10/3/19 at 

17:30;  Stop 10/3/19 at 17:31;  Status Cancel


Vancomycin HCl (Vanco Per Pharmacy) 1 each PRN DAILY  PRN MC SEE COMMENTS Last 

administered on 10/1/19at 18:08;  Start 10/1/19 at 18:15;  Stop 10/2/19 at 

13:04;  Status DC


Piperacillin Sod/ Tazobactam Sod (Zosyn Per Pharmacy) 1 each PRN DAILY  PRN MC 

SEE COMMENTS;  Start 10/1/19 at 18:15;  Status UNV


Enoxaparin Sodium (Lovenox 30mg Syringe) 30 mg Q24H SQ  Last administered on 

10/3/19at 16:29;  Start 10/2/19 at 16:00;  Stop 10/4/19 at 11:41;  Status DC


Lactobacillus Rhamnosus (Culturelle) 1 cap BID PO  Last administered on 

10/5/19at 08:45;  Start 10/2/19 at 21:00


Non-Formulary Medication 1 ea PRN Q6HRS  PRN TP PAIN Last administered on 

10/3/19at 21:11;  Start 10/2/19 at 18:00


Pantoprazole Sodium (PROTONIX VIAL for IV PUSH) 40 mg DAILYAC IVP  Last 

administered on 10/4/19at 08:00;  Start 10/3/19 at 08:00;  Stop 10/4/19 at 

09:55;  Status DC


Potassium Chloride (Klor-Con) 40 meq 1X  ONCE PO ;  Start 10/3/19 at 09:30;  

Stop 10/3/19 at 09:31;  Status DC


Propofol 20 ml @ As Directed STK-MED ONCE IV ;  Start 10/3/19 at 13:37;  Stop 

10/3/19 at 13:38;  Status DC


Ringer's Solution 1,000 ml @  75 mls/hr [id number] IV  Last administered on 

10/3/19at 13:45;  Start 10/3/19 at 15:45;  Stop 10/4/19 at 02:31;  Status DC


Ferrous Sulfate (Feosol) 325 mg BID PO  Last administered on 10/5/19at 08:46;  

Start 10/4/19 at 10:00


Polyethylene Glycol (miraLAX PACKET) 17 gm DAILY PO  Last administered on 

10/5/19at 08:46;  Start 10/4/19 at 10:00


Pantoprazole Sodium (Protonix) 40 mg DAILYAC PO  Last administered on 10/5/19at 

08:06;  Start 10/5/19 at 07:30


Potassium Chloride (Klor-Con) 40 meq 1X  ONCE PO  Last administered on 10/4/19at

11:42;  Start 10/4/19 at 11:00;  Stop 10/4/19 at 11:01;  Status DC


Potassium Chloride (Klor-Con) 20 meq [id number] PO  Last administered on 

10/5/19at 08:45;  Start 10/5/19 at 08:00


Enoxaparin Sodium (Lovenox 40mg Syringe) 40 mg Q24H SQ  Last administered on 

10/4/19at 15:34;  Start 10/4/19 at 16:00


Amoxicillin/ Clavulanate Potassium (Augmentin 500/ 125mg) 1 tab BID PO  Last 

administered on 10/5/19at 08:45;  Start 10/4/19 at 21:00





Active Scripts


Active


Keppra (Levetiracetam) 500 Mg Tablet 500 Mg PO BID


Meclizine Hcl 25 Mg Tablet 1 Tab PO PRN TID PRN


Reported


Vitamin D3 (Cholecalciferol (Vitamin D3)) 1,000 Unit Tablet 1 Tab PO DAILYBFRSUP


Voltaren (Diclofenac Sodium) 100 Gm Gel..gram. 1 Gm TP PRN QID


Aspirin 81 Mg Tab.chew 1 Tab PO DAILY


Simvastatin 40 Mg Tablet 1 Tab PO QHS


Atenolol 25 Mg Tablet 1 Tab PO DAILY


Vitals/I & O





Vital Sign - Last 24 Hours








 10/4/19 10/4/19 10/4/19 10/4/19





 11:00 15:00 19:00 20:00


 


Temp 98.0 97.9 98.9 





 98.0 97.9 98.9 


 


Pulse 84 80 80 


 


Resp 18 16 18 


 


B/P (MAP) 151/77 (101) 186/89 (121) 183/93 (123) 


 


Pulse Ox 98 94 94 


 


O2 Delivery Room Air Room Air  Room Air


 


    





    





 10/4/19 10/5/19 10/5/19 





 23:59 03:00 07:00 


 


Temp 99.1 98.8 98.6 





 99.1 98.8 98.6 


 


Pulse 91 88 83 


 


Resp 16 16 16 


 


B/P (MAP) 182/91 (121) 176/89 (118) 124/64 (84) 


 


Pulse Ox 98 98 98 


 


O2 Delivery Room Air   














Intake and Output   


 


 10/4/19 10/4/19 10/5/19





 15:00 23:00 07:00


 


Intake Total 50 ml  


 


Output Total 250 ml 250 ml 1175 ml


 


Balance -200 ml -250 ml -1175 ml

















BENJAMÍN ADAM MD         Oct 5, 2019 09:10

## 2019-10-05 NOTE — NUR
This RN found a pill bottle in the patient's windowsill. The bottle states that the 
medication is potassium Chloride ER 10 MEQ perscribed to [facility], Upon further 
inspection this RN found that the pills inside the bottle are of different size and shape. 
there were 3 small clear yellow capsules, 2 white round with "USL 10" tablets, 2 small 
yellow round tables with "M35", 2 large white oval tablets with "438", and 7 small white 
oval tables with "U 135". The medication has been placed into a pharmacy security bag and 
placed in the patient's medication bin in the medication room. This RN spoke with Pharmacy 
to ask about appropriate plan of action, they agree with keeping the medication in the 
medication room, if no visitor will send to pharmacy.

## 2019-10-05 NOTE — PDOC
G I PROGRESS NOTE


Subjective


Awake, confused.  About to have lunch.


Physical Exam


Lungs clear.


RRR


Abdomen soft, not distended.


Review of Relevant


I have reviewed the following items marky (where applicable) has been applied.


Labs





Laboratory Tests








Test


 10/4/19


09:15 10/5/19


04:20


 


White Blood Count


 5.8 x10^3/uL


(4.0-11.0) 4.8 x10^3/uL


(4.0-11.0)


 


Red Blood Count


 4.09 x10^6/uL


(3.50-5.40) 4.40 x10^6/uL


(3.50-5.40)


 


Hemoglobin


 9.2 g/dL


(12.0-15.5) 10.0 g/dL


(12.0-15.5)


 


Hematocrit


 29.0 %


(36.0-47.0) 31.2 %


(36.0-47.0)


 


Mean Corpuscular Volume 71 fL ()  71 fL () 


 


Mean Corpuscular Hemoglobin 22 pg (25-35)  23 pg (25-35) 


 


Mean Corpuscular Hemoglobin


Concent 32 g/dL


(31-37) 32 g/dL


(31-37)


 


Red Cell Distribution Width


 23.4 %


(11.5-14.5) 23.9 %


(11.5-14.5)


 


Platelet Count


 186 x10^3/uL


(140-400) 214 x10^3/uL


(140-400)


 


Sodium Level


 141 mmol/L


(136-145) 141 mmol/L


(136-145)


 


Potassium Level


 3.3 mmol/L


(3.5-5.1) 4.1 mmol/L


(3.5-5.1)


 


Chloride Level


 108 mmol/L


() 107 mmol/L


()


 


Carbon Dioxide Level


 25 mmol/L


(21-32) 21 mmol/L


(21-32)


 


Anion Gap 8 (6-14)  13 (6-14) 


 


Blood Urea Nitrogen


 14 mg/dL


(7-20) 12 mg/dL


(7-20)


 


Creatinine


 1.0 mg/dL


(0.6-1.0) 0.8 mg/dL


(0.6-1.0)


 


Estimated GFR


(Cockcroft-Gault) 63.9 


 82.7 





 


Glucose Level


 93 mg/dL


(70-99) 95 mg/dL


(70-99)


 


Calcium Level


 8.6 mg/dL


(8.5-10.1) 9.0 mg/dL


(8.5-10.1)


 


Neutrophils (%) (Auto)  75 % (31-73) 


 


Lymphocytes (%) (Auto)  15 % (24-48) 


 


Monocytes (%) (Auto)  9 % (0-9) 


 


Eosinophils (%) (Auto)  1 % (0-3) 


 


Basophils (%) (Auto)  0 % (0-3) 


 


Neutrophils # (Auto)


 


 3.6 x10^3/uL


(1.8-7.7)


 


Lymphocytes # (Auto)


 


 0.7 x10^3/uL


(1.0-4.8)


 


Monocytes # (Auto)


 


 0.4 x10^3/uL


(0.0-1.1)


 


Eosinophils # (Auto)


 


 0.1 x10^3/uL


(0.0-0.7)


 


Basophils # (Auto)


 


 0.0 x10^3/uL


(0.0-0.2)








Laboratory Tests








Test


 10/5/19


04:20


 


White Blood Count


 4.8 x10^3/uL


(4.0-11.0)


 


Red Blood Count


 4.40 x10^6/uL


(3.50-5.40)


 


Hemoglobin


 10.0 g/dL


(12.0-15.5)


 


Hematocrit


 31.2 %


(36.0-47.0)


 


Mean Corpuscular Volume 71 fL () 


 


Mean Corpuscular Hemoglobin 23 pg (25-35) 


 


Mean Corpuscular Hemoglobin


Concent 32 g/dL


(31-37)


 


Red Cell Distribution Width


 23.9 %


(11.5-14.5)


 


Platelet Count


 214 x10^3/uL


(140-400)


 


Neutrophils (%) (Auto) 75 % (31-73) 


 


Lymphocytes (%) (Auto) 15 % (24-48) 


 


Monocytes (%) (Auto) 9 % (0-9) 


 


Eosinophils (%) (Auto) 1 % (0-3) 


 


Basophils (%) (Auto) 0 % (0-3) 


 


Neutrophils # (Auto)


 3.6 x10^3/uL


(1.8-7.7)


 


Lymphocytes # (Auto)


 0.7 x10^3/uL


(1.0-4.8)


 


Monocytes # (Auto)


 0.4 x10^3/uL


(0.0-1.1)


 


Eosinophils # (Auto)


 0.1 x10^3/uL


(0.0-0.7)


 


Basophils # (Auto)


 0.0 x10^3/uL


(0.0-0.2)


 


Sodium Level


 141 mmol/L


(136-145)


 


Potassium Level


 4.1 mmol/L


(3.5-5.1)


 


Chloride Level


 107 mmol/L


()


 


Carbon Dioxide Level


 21 mmol/L


(21-32)


 


Anion Gap 13 (6-14) 


 


Blood Urea Nitrogen


 12 mg/dL


(7-20)


 


Creatinine


 0.8 mg/dL


(0.6-1.0)


 


Estimated GFR


(Cockcroft-Gault) 82.7 





 


Glucose Level


 95 mg/dL


(70-99)


 


Calcium Level


 9.0 mg/dL


(8.5-10.1)








Microbiology


10/1/19 Blood Culture - Preliminary, Resulted


          NO GROWTH AFTER 3 DAYS


10/1/19 Urine Culture - Final, Complete


          


10/1/19 Urine Culture Result 1 (EDGAR) - Final, Complete


Vitals/I & O





Vital Sign - Last 24 Hours








 10/4/19 10/4/19 10/4/19 10/4/19





 15:00 19:00 20:00 23:59


 


Temp 97.9 98.9  99.1





 97.9 98.9  99.1


 


Pulse 80 80  91


 


Resp 16 18  16


 


B/P (MAP) 186/89 (121) 183/93 (123)  182/91 (121)


 


Pulse Ox 94 94  98


 


O2 Delivery Room Air  Room Air Room Air


 


    





    





 10/5/19 10/5/19 10/5/19 





 03:00 07:00 11:00 


 


Temp 98.8 98.6 99.0 





 98.8 98.6 99.0 


 


Pulse 88 83 103 


 


Resp 16 16 18 


 


B/P (MAP) 176/89 (118) 124/64 (84) 176/90 (118) 


 


Pulse Ox 98 98 97 


 


O2 Delivery   Room Air 














Intake and Output   


 


 10/4/19 10/4/19 10/5/19





 15:00 23:00 07:00


 


Intake Total 50 ml  


 


Output Total 250 ml 250 ml 1175 ml


 


Balance -200 ml -250 ml -1175 ml








Problem List


Problems


Medical Problems:


(1) Acute respiratory failure with hypoxia


Status: Acute  





Assessment


ARIELLA


Hiatal hernia


Plan of Care:  Continue current Tx, Mgmt


Plan of Care Note


Family has decided to forego further GI w/u; certainly reasonable given 

situation.











CHOCO STORY MD           Oct 5, 2019 14:08

## 2019-10-06 VITALS — DIASTOLIC BLOOD PRESSURE: 104 MMHG | SYSTOLIC BLOOD PRESSURE: 131 MMHG

## 2019-10-06 VITALS — DIASTOLIC BLOOD PRESSURE: 89 MMHG | SYSTOLIC BLOOD PRESSURE: 154 MMHG

## 2019-10-06 VITALS — SYSTOLIC BLOOD PRESSURE: 134 MMHG | DIASTOLIC BLOOD PRESSURE: 67 MMHG

## 2019-10-06 VITALS — SYSTOLIC BLOOD PRESSURE: 147 MMHG | DIASTOLIC BLOOD PRESSURE: 82 MMHG

## 2019-10-06 VITALS — DIASTOLIC BLOOD PRESSURE: 57 MMHG | SYSTOLIC BLOOD PRESSURE: 127 MMHG

## 2019-10-06 VITALS — SYSTOLIC BLOOD PRESSURE: 141 MMHG | DIASTOLIC BLOOD PRESSURE: 76 MMHG

## 2019-10-06 RX ADMIN — BACITRACIN SCH MLS/HR: 5000 INJECTION, POWDER, FOR SOLUTION INTRAMUSCULAR at 13:20

## 2019-10-06 RX ADMIN — AMOXICILLIN AND CLAVULANATE POTASSIUM SCH TAB: 500; 125 TABLET, FILM COATED ORAL at 08:16

## 2019-10-06 RX ADMIN — Medication SCH CAP: at 20:42

## 2019-10-06 RX ADMIN — Medication SCH MG: at 20:43

## 2019-10-06 RX ADMIN — ACETAMINOPHEN PRN MG: 325 TABLET, FILM COATED ORAL at 08:19

## 2019-10-06 RX ADMIN — ACETAMINOPHEN PRN MG: 325 TABLET, FILM COATED ORAL at 20:43

## 2019-10-06 RX ADMIN — POTASSIUM CHLORIDE SCH MEQ: 1500 TABLET, EXTENDED RELEASE ORAL at 08:17

## 2019-10-06 RX ADMIN — AMOXICILLIN AND CLAVULANATE POTASSIUM SCH TAB: 500; 125 TABLET, FILM COATED ORAL at 20:42

## 2019-10-06 RX ADMIN — ENOXAPARIN SODIUM SCH MG: 40 INJECTION SUBCUTANEOUS at 17:38

## 2019-10-06 RX ADMIN — PANTOPRAZOLE SODIUM SCH MG: 40 TABLET, DELAYED RELEASE ORAL at 08:16

## 2019-10-06 RX ADMIN — Medication SCH MG: at 08:17

## 2019-10-06 RX ADMIN — POLYETHYLENE GLYCOL 3350 SCH GM: 17 POWDER, FOR SOLUTION ORAL at 08:18

## 2019-10-06 RX ADMIN — Medication SCH CAP: at 08:16

## 2019-10-06 NOTE — PDOC
PROGRESS NOTES


Chief Complaint


Chief Complaint


A/P:


Basilar atelectasis versus mild pulmonary edema.


Mild cardiomegaly.


Large hiatal hernia.


Altered mental status SEC # 5


Acute respiratory failure with hypoxia, SEC TO ? OVERDOSE OF KLONOPIN , HOWEVER 

UDS NEG FOR BENZODIAZEPINES  X 4   D/W FAMILY IN ROOM


Intentional// accidental ?  drug overdose


TOXIC Encephalopathy


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


Mild to moderate chronic small vessel ischemic changes and atrophy.on CT HEAD


DIABETES


HYPERTENSION HX


HYPOKALEMIA 


Severe protein-caloric malnutrition


GERD


MICROCYTIC ANEMIA


Fever, possible aspiration


Moderate left and minimal right hip osteoarthritis.


Degenerative change at the lower lumbar levels








Imaging:


EGD


E--Normal.  GEJ at 35cm.


G--Very unusual anatomy with intrathoracic stomach.  Ultimately able to traverse

stomach.  No lesions appreciated.


D--Normal to second portion.


IMP: Large HH (intrathoracic stomach)


discuss whether or not to pursue colonoscopy with family.


REPLETE K 








28 MIN PT EXAM, CHART REVIEW, > 50% OF TIME SPENT WITH EXAM, CHART REVIEW, PT 

CARE COORDINATION





History of Present Illness


History of Present Illness


10-01  CR UP TO 1.2 HGB STABLE, REPEAT UDS





Hb from 6.7 to 10 after transfusion. EGD showed hiatal hernia, otherwise normal.

Changed to PO protonix, iron, and PO augmentin today.


Eating some today.


Discussed possibility of colonoscopy at some point - family will consider.


They wish for referral to SNF.





ADMITTED 


police report filed


SS CONSULT


O2 SUPPORT PRN


Consult pulmonary


Consult Neurology


consult risk management


DVT PROPHYLAXIS


GI PROPHYLAXIS


IV FLUID SUPPORT LOW RATE


blood cult


iv antibiotics D/C VANC


Continue empiric Zosyn





37 MIN PT EXAM, CHART REVIEW, > 50% OF TIME SPENT WITH EXAM, CHART REVIEW, PT 

CARE COORDINATION





Vitals


Vitals





Vital Signs








  Date Time  Temp Pulse Resp B/P (MAP) Pulse Ox O2 Delivery O2 Flow Rate FiO2


 


10/6/19 03:19 99.5 99 20 147/82 (103) 100 Room Air  





 99.5       











Physical Exam


Physical Exam


GENERAL: Sitting in the bed alert, smiling 


HEENT:  Pupils equally round, reactive.  


NECK:  Supple.


LUNGS: Clear.


HEART:  S1, S2.  Murmur present.


ABDOMEN:  Soft, no guarding.  Bowel sounds present.


GENITOURINARY:  Indwelling Evans in place.


EXTREMITIES:  No gross edema or cyanosis.  Left wrist tenderness improved


SKIN:  Warm to touch.


PIV ok


General:  Cooperative, No acute distress


Heart:  Regular rate


Lungs:  Clear


Abdomen:  Soft, No tenderness


Extremities:  No edema, Normal pulses





Assessment and Plan


Assessmemt and Plan


Problems


Medical Problems:


(1) Acute respiratory failure with hypoxia


Status: Acute  











Comment


Review of Relevant


I have reviewed the following items marky (where applicable) has been applied.


Labs





Laboratory Tests








Test


 10/4/19


09:15 10/5/19


04:20


 


White Blood Count


 5.8 x10^3/uL


(4.0-11.0) 4.8 x10^3/uL


(4.0-11.0)


 


Red Blood Count


 4.09 x10^6/uL


(3.50-5.40) 4.40 x10^6/uL


(3.50-5.40)


 


Hemoglobin


 9.2 g/dL


(12.0-15.5) 10.0 g/dL


(12.0-15.5)


 


Hematocrit


 29.0 %


(36.0-47.0) 31.2 %


(36.0-47.0)


 


Mean Corpuscular Volume 71 fL ()  71 fL () 


 


Mean Corpuscular Hemoglobin 22 pg (25-35)  23 pg (25-35) 


 


Mean Corpuscular Hemoglobin


Concent 32 g/dL


(31-37) 32 g/dL


(31-37)


 


Red Cell Distribution Width


 23.4 %


(11.5-14.5) 23.9 %


(11.5-14.5)


 


Platelet Count


 186 x10^3/uL


(140-400) 214 x10^3/uL


(140-400)


 


Sodium Level


 141 mmol/L


(136-145) 141 mmol/L


(136-145)


 


Potassium Level


 3.3 mmol/L


(3.5-5.1) 4.1 mmol/L


(3.5-5.1)


 


Chloride Level


 108 mmol/L


() 107 mmol/L


()


 


Carbon Dioxide Level


 25 mmol/L


(21-32) 21 mmol/L


(21-32)


 


Anion Gap 8 (6-14)  13 (6-14) 


 


Blood Urea Nitrogen


 14 mg/dL


(7-20) 12 mg/dL


(7-20)


 


Creatinine


 1.0 mg/dL


(0.6-1.0) 0.8 mg/dL


(0.6-1.0)


 


Estimated GFR


(Cockcroft-Gault) 63.9 


 82.7 





 


Glucose Level


 93 mg/dL


(70-99) 95 mg/dL


(70-99)


 


Calcium Level


 8.6 mg/dL


(8.5-10.1) 9.0 mg/dL


(8.5-10.1)


 


Neutrophils (%) (Auto)  75 % (31-73) 


 


Lymphocytes (%) (Auto)  15 % (24-48) 


 


Monocytes (%) (Auto)  9 % (0-9) 


 


Eosinophils (%) (Auto)  1 % (0-3) 


 


Basophils (%) (Auto)  0 % (0-3) 


 


Neutrophils # (Auto)


 


 3.6 x10^3/uL


(1.8-7.7)


 


Lymphocytes # (Auto)


 


 0.7 x10^3/uL


(1.0-4.8)


 


Monocytes # (Auto)


 


 0.4 x10^3/uL


(0.0-1.1)


 


Eosinophils # (Auto)


 


 0.1 x10^3/uL


(0.0-0.7)


 


Basophils # (Auto)


 


 0.0 x10^3/uL


(0.0-0.2)








Microbiology


10/1/19 Blood Culture - Preliminary, Resulted


          NO GROWTH AFTER 4 DAYS


10/1/19 Urine Culture - Final, Complete


          


10/1/19 Urine Culture Result 1 (EDGAR) - Final, Complete


Medications





Current Medications


Flumazenil (Romazicon) 0.5 mg STK-MED ONCE IV ;  Start 9/29/19 at 21:30;  Stop 

9/29/19 at 21:30;  Status DC


Flumazenil (Romazicon) 1 mg 1X  ONCE IV  Last administered on 9/29/19at 21:50;  

Start 9/29/19 at 22:15;  Stop 9/29/19 at 22:16;  Status DC


Furosemide (Lasix) 20 mg 1X  ONCE IVP  Last administered on 9/30/19at 00:48;  

Start 9/30/19 at 00:00;  Stop 9/30/19 at 00:01;  Status DC


Ondansetron HCl (Zofran) 4 mg PRN Q8HRS  PRN IV NAUSEA/VOMITING 1ST CHOICE;  

Start 9/30/19 at 01:00;  Stop 10/1/19 at 00:59;  Status DC


Dextrose/Sodium Chloride 1,000 ml @  75 mls/hr [id number] IV  Last administered on 

9/30/19at 01:26;  Start 9/30/19 at 01:00;  Stop 9/30/19 at 15:21;  Status DC


Enoxaparin Sodium (Lovenox 40mg Syringe) 40 mg Q24H SQ  Last administered on 

10/1/19at 18:37;  Start 9/30/19 at 15:00;  Stop 10/2/19 at 12:31;  Status DC


Ondansetron HCl (Zofran) 4 mg PRN Q6HRS  PRN IV NAUSEA/VOMITING Last 

administered on 10/3/19at 10:51;  Start 9/30/19 at 09:15


Prochlorperazine (Compazine) 25 mg PRN Q12HR  PRN AR NAUSEA/VOMITING;  Start 

9/30/19 at 09:15


Famotidine (Pepcid Vial) 20 mg BID IVP  Last administered on 10/1/19at 09:05;  

Start 9/30/19 at 11:00;  Stop 10/1/19 at 11:22;  Status DC


Sodium Chloride (Normal Saline Flush) 3 ml QSHIFT  PRN IV AFTER MEDS AND BLOOD 

DRAWS;  Start 9/30/19 at 09:15


Bisacodyl (Dulcolax Supp) 10 mg PRN DAILY  PRN AR CONSTIPATION;  Start 9/30/19 

at 09:15


Sodium Chloride 1,000 ml @  75 mls/hr [id number] IV  Last administered on 10/5/19at

21:20;  Start 9/30/19 at 09:30


Influenza Virus Vaccine Quadrival (Afluria Quad 2019-20 (3yr Up) Syringe) 0.5 ml

ONCE ONCE VAX IM  Last administered on 9/30/19at 15:33;  Start 9/30/19 at 14:15;

 Stop 9/30/19 at 14:16;  Status DC


Piperacillin Sod/ Tazobactam Sod 2.25 gm/Sodium Chloride 50 ml @  100 mls/hr 

Q6HRS IV  Last administered on 10/4/19at 11:42;  Start 10/1/19 at 12:00;  Stop 

10/4/19 at 17:14;  Status DC


Famotidine (Pepcid Vial) 20 mg DAILY IVP ;  Start 10/2/19 at 09:00;  Stop 

10/1/19 at 11:57;  Status DC


Famotidine (Pepcid) 20 mg DAILY PO  Last administered on 10/3/19at 08:06;  Start

10/2/19 at 09:00;  Stop 10/3/19 at 11:26;  Status DC


Levetiracetam (Keppra) 500 mg BID PO  Last administered on 10/6/19at 08:17;  

Start 10/1/19 at 15:00


Acetaminophen (Tylenol) 650 mg PRN Q6HRS  PRN PO PAIN Last administered on 

10/6/19at 08:19;  Start 10/1/19 at 15:30


Vancomycin HCl 0.5 gm/Sodium Chloride 250 ml @  166.667 mls/hr 1X  ONCE IV ;  

Start 10/1/19 at 18:00;  Stop 10/1/19 at 19:29;  Status UNV


Vancomycin HCl 1.5 gm/Sodium Chloride 500 ml @  250 mls/hr 1X  ONCE IV  Last 

administered on 10/1/19at 19:16;  Start 10/1/19 at 19:00;  Stop 10/1/19 at 

20:59;  Status DC


Vancomycin HCl 1 gm/Sodium Chloride 250 ml @  250 mls/hr Q24H IV ;  Start 

10/2/19 at 18:00;  Stop 10/2/19 at 13:04;  Status DC


Vancomycin HCl (Vancomycin Trough Level) 1 each 1X  ONCE MC ;  Start 10/3/19 at 

17:30;  Stop 10/3/19 at 17:31;  Status Cancel


Vancomycin HCl (Vanco Per Pharmacy) 1 each PRN DAILY  PRN MC SEE COMMENTS Last 

administered on 10/1/19at 18:08;  Start 10/1/19 at 18:15;  Stop 10/2/19 at 

13:04;  Status DC


Piperacillin Sod/ Tazobactam Sod (Zosyn Per Pharmacy) 1 each PRN DAILY  PRN MC 

SEE COMMENTS;  Start 10/1/19 at 18:15;  Status UNV


Enoxaparin Sodium (Lovenox 30mg Syringe) 30 mg Q24H SQ  Last administered on 

10/3/19at 16:29;  Start 10/2/19 at 16:00;  Stop 10/4/19 at 11:41;  Status DC


Lactobacillus Rhamnosus (Culturelle) 1 cap BID PO  Last administered on 

10/6/19at 08:16;  Start 10/2/19 at 21:00


Non-Formulary Medication 1 ea PRN Q6HRS  PRN TP PAIN Last administered on 

10/3/19at 21:11;  Start 10/2/19 at 18:00


Pantoprazole Sodium (PROTONIX VIAL for IV PUSH) 40 mg DAILYAC IVP  Last 

administered on 10/4/19at 08:00;  Start 10/3/19 at 08:00;  Stop 10/4/19 at 

09:55;  Status DC


Potassium Chloride (Klor-Con) 40 meq 1X  ONCE PO ;  Start 10/3/19 at 09:30;  

Stop 10/3/19 at 09:31;  Status DC


Propofol 20 ml @ As Directed STK-MED ONCE IV ;  Start 10/3/19 at 13:37;  Stop 1

0/3/19 at 13:38;  Status DC


Ringer's Solution 1,000 ml @  75 mls/hr [id number] IV  Last administered on 

10/3/19at 13:45;  Start 10/3/19 at 15:45;  Stop 10/4/19 at 02:31;  Status DC


Ferrous Sulfate (Feosol) 325 mg BID PO  Last administered on 10/6/19at 08:17;  

Start 10/4/19 at 10:00


Polyethylene Glycol (miraLAX PACKET) 17 gm DAILY PO  Last administered on 

10/6/19at 08:18;  Start 10/4/19 at 10:00


Pantoprazole Sodium (Protonix) 40 mg DAILYAC PO  Last administered on 10/6/19at 

08:16;  Start 10/5/19 at 07:30


Potassium Chloride (Klor-Con) 40 meq 1X  ONCE PO  Last administered on 10/4/19at

11:42;  Start 10/4/19 at 11:00;  Stop 10/4/19 at 11:01;  Status DC


Potassium Chloride (Klor-Con) 20 meq [id number] PO  Last administered on 

10/6/19at 08:17;  Start 10/5/19 at 08:00


Enoxaparin Sodium (Lovenox 40mg Syringe) 40 mg Q24H SQ  Last administered on 

10/5/19at 15:50;  Start 10/4/19 at 16:00


Amoxicillin/ Clavulanate Potassium (Augmentin 500/ 125mg) 1 tab BID PO  Last 

administered on 10/6/19at 08:16;  Start 10/4/19 at 21:00;  Stop 10/8/19 at 23:59





Active Scripts


Active


Keppra (Levetiracetam) 500 Mg Tablet 500 Mg PO BID


Meclizine Hcl 25 Mg Tablet 1 Tab PO PRN TID PRN


Reported


Vitamin D3 (Cholecalciferol (Vitamin D3)) 1,000 Unit Tablet 1 Tab PO DAILYBFRSUP


Voltaren (Diclofenac Sodium) 100 Gm Gel..gram. 1 Gm TP PRN QID


Aspirin 81 Mg Tab.chew 1 Tab PO DAILY


Simvastatin 40 Mg Tablet 1 Tab PO QHS


Atenolol 25 Mg Tablet 1 Tab PO DAILY


Vitals/I & O





Vital Sign - Last 24 Hours








 10/5/19 10/5/19 10/5/19 10/5/19





 11:00 15:00 19:00 23:06


 


Temp 99.0 98.7 97.9 99.1





 99.0 98.7 97.9 99.1


 


Pulse 103 97 103 103


 


Resp 18 18 20 20


 


B/P (MAP) 176/90 (118) 175/84 (114) 190/99 (129) 159/84 (109)


 


Pulse Ox 97 100 97 99


 


O2 Delivery Room Air Room Air Room Air Room Air


 


    





    





 10/6/19   





 03:19   


 


Temp 99.5   





 99.5   


 


Pulse 99   


 


Resp 20   


 


B/P (MAP) 147/82 (103)   


 


Pulse Ox 100   


 


O2 Delivery Room Air   














Intake and Output   


 


 10/5/19 10/5/19 10/6/19





 15:00 23:00 07:00


 


Intake Total 600 ml 120 ml 


 


Output Total 1100 ml 600 ml 500 ml


 


Balance -500 ml -480 ml -500 ml

















KATELYNN ABDULLAHI MD           Oct 6, 2019 09:01

## 2019-10-07 VITALS — SYSTOLIC BLOOD PRESSURE: 160 MMHG | DIASTOLIC BLOOD PRESSURE: 72 MMHG

## 2019-10-07 VITALS — SYSTOLIC BLOOD PRESSURE: 173 MMHG | DIASTOLIC BLOOD PRESSURE: 96 MMHG

## 2019-10-07 VITALS — SYSTOLIC BLOOD PRESSURE: 170 MMHG | DIASTOLIC BLOOD PRESSURE: 71 MMHG

## 2019-10-07 VITALS — DIASTOLIC BLOOD PRESSURE: 85 MMHG | SYSTOLIC BLOOD PRESSURE: 151 MMHG

## 2019-10-07 VITALS — DIASTOLIC BLOOD PRESSURE: 78 MMHG | SYSTOLIC BLOOD PRESSURE: 182 MMHG

## 2019-10-07 VITALS — DIASTOLIC BLOOD PRESSURE: 77 MMHG | SYSTOLIC BLOOD PRESSURE: 162 MMHG

## 2019-10-07 LAB
ALBUMIN SERPL-MCNC: 2.4 G/DL (ref 3.4–5)
ALBUMIN/GLOB SERPL: 0.8 {RATIO} (ref 1–1.7)
ALP SERPL-CCNC: 54 U/L (ref 46–116)
ALT SERPL-CCNC: 8 U/L (ref 14–59)
AMPHETAMINE/METHAMPHETAMINE: (no result)
ANION GAP SERPL CALC-SCNC: 8 MMOL/L (ref 6–14)
AST SERPL-CCNC: 14 U/L (ref 15–37)
BARBITURATES UR-MCNC: (no result) UG/ML
BASOPHILS # BLD AUTO: 0 X10^3/UL (ref 0–0.2)
BASOPHILS NFR BLD: 0 % (ref 0–3)
BENZODIAZ UR-MCNC: (no result) UG/L
BILIRUB SERPL-MCNC: 0.3 MG/DL (ref 0.2–1)
BUN SERPL-MCNC: 13 MG/DL (ref 7–20)
BUN/CREAT SERPL: 13 (ref 6–20)
CALCIUM SERPL-MCNC: 8.4 MG/DL (ref 8.5–10.1)
CANNABINOIDS UR-MCNC: (no result) UG/L
CHLORIDE SERPL-SCNC: 107 MMOL/L (ref 98–107)
CO2 SERPL-SCNC: 25 MMOL/L (ref 21–32)
COCAINE UR-MCNC: (no result) NG/ML
CREAT SERPL-MCNC: 1 MG/DL (ref 0.6–1)
EOSINOPHIL NFR BLD: 0 X10^3/UL (ref 0–0.7)
EOSINOPHIL NFR BLD: 1 % (ref 0–3)
ERYTHROCYTE [DISTWIDTH] IN BLOOD BY AUTOMATED COUNT: 23.9 % (ref 11.5–14.5)
GFR SERPLBLD BASED ON 1.73 SQ M-ARVRAT: 63.9 ML/MIN
GLOBULIN SER-MCNC: 3.2 G/DL (ref 2.2–3.8)
GLUCOSE SERPL-MCNC: 117 MG/DL (ref 70–99)
HCT VFR BLD CALC: 29.4 % (ref 36–47)
HGB BLD-MCNC: 9.4 G/DL (ref 12–15.5)
LYMPHOCYTES # BLD: 0.7 X10^3/UL (ref 1–4.8)
LYMPHOCYTES NFR BLD AUTO: 13 % (ref 24–48)
MCH RBC QN AUTO: 23 PG (ref 25–35)
MCHC RBC AUTO-ENTMCNC: 32 G/DL (ref 31–37)
MCV RBC AUTO: 70 FL (ref 79–100)
METHADONE SERPL-MCNC: (no result) NG/ML
MONO #: 0.5 X10^3/UL (ref 0–1.1)
MONOCYTES NFR BLD: 10 % (ref 0–9)
NEUT #: 4.1 X10^3/UL (ref 1.8–7.7)
NEUTROPHILS NFR BLD AUTO: 76 % (ref 31–73)
OPIATES UR-MCNC: (no result) NG/ML
PCP SERPL-MCNC: (no result) MG/DL
PLATELET # BLD AUTO: 235 X10^3/UL (ref 140–400)
POTASSIUM SERPL-SCNC: 3.9 MMOL/L (ref 3.5–5.1)
PROT SERPL-MCNC: 5.6 G/DL (ref 6.4–8.2)
RBC # BLD AUTO: 4.19 X10^6/UL (ref 3.5–5.4)
SODIUM SERPL-SCNC: 140 MMOL/L (ref 136–145)
WBC # BLD AUTO: 5.4 X10^3/UL (ref 4–11)

## 2019-10-07 RX ADMIN — ACETAMINOPHEN PRN MG: 325 TABLET, FILM COATED ORAL at 12:27

## 2019-10-07 RX ADMIN — Medication SCH MG: at 09:08

## 2019-10-07 RX ADMIN — Medication SCH CAP: at 21:04

## 2019-10-07 RX ADMIN — POTASSIUM CHLORIDE SCH MEQ: 1500 TABLET, EXTENDED RELEASE ORAL at 09:08

## 2019-10-07 RX ADMIN — BACITRACIN SCH MLS/HR: 5000 INJECTION, POWDER, FOR SOLUTION INTRAMUSCULAR at 01:30

## 2019-10-07 RX ADMIN — Medication SCH MG: at 21:04

## 2019-10-07 RX ADMIN — ENOXAPARIN SODIUM SCH MG: 40 INJECTION SUBCUTANEOUS at 14:53

## 2019-10-07 RX ADMIN — POLYETHYLENE GLYCOL 3350 SCH GM: 17 POWDER, FOR SOLUTION ORAL at 09:08

## 2019-10-07 RX ADMIN — Medication SCH CAP: at 09:08

## 2019-10-07 RX ADMIN — AMOXICILLIN AND CLAVULANATE POTASSIUM SCH TAB: 500; 125 TABLET, FILM COATED ORAL at 21:04

## 2019-10-07 RX ADMIN — PANTOPRAZOLE SODIUM SCH MG: 40 TABLET, DELAYED RELEASE ORAL at 09:08

## 2019-10-07 RX ADMIN — BACITRACIN SCH MLS/HR: 5000 INJECTION, POWDER, FOR SOLUTION INTRAMUSCULAR at 14:54

## 2019-10-07 RX ADMIN — AMOXICILLIN AND CLAVULANATE POTASSIUM SCH TAB: 500; 125 TABLET, FILM COATED ORAL at 09:07

## 2019-10-07 NOTE — PDOC
PROGRESS NOTES


Chief Complaint


Chief Complaint


A/P:


Basilar atelectasis versus mild pulmonary edema.


Mild cardiomegaly.


Large hiatal hernia.


Altered mental status SEC # 5


Acute respiratory failure with hypoxia, SEC TO ? OVERDOSE OF KLONOPIN , HOWEVER 

UDS NEG FOR BENZODIAZEPINES  X 5   D/W FAMILY IN ROOM


Intentional// accidental ?  drug overdose


TOXIC Encephalopathy


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


Mild to moderate chronic small vessel ischemic changes and atrophy.on CT HEAD


DIABETES


HYPERTENSION HX


HYPOKALEMIA 


Severe protein-caloric malnutrition


GERD


MICROCYTIC ANEMIA


Fever, possible aspiration


Moderate left and minimal right hip osteoarthritis.


Degenerative change at the lower lumbar levels








Imaging:


EGD


E--Normal.  GEJ at 35cm.


G--Very unusual anatomy with intrathoracic stomach.  Ultimately able to traverse

stomach.  No lesions appreciated.


D--Normal to second portion.


IMP: Large HH (intrathoracic stomach)


discuss whether or not to pursue colonoscopy with family.


REPLETE K 


snf placement planned








28 MIN PT EXAM, CHART REVIEW, > 50% OF TIME SPENT WITH EXAM, CHART REVIEW, PT 

CARE COORDINATION





History of Present Illness


History of Present Illness


10-01  CR UP TO 1.2 HGB STABLE, REPEAT UDS





Hb from 6.7 to 10 after transfusion. EGD showed hiatal hernia, otherwise normal.

Changed to PO protonix, iron, and PO augmentin today.


Eating some today.


Discussed possibility of colonoscopy at some point - family will consider.


They wish for referral to SNF.





ADMITTED 


police report filed


SS CONSULT


O2 SUPPORT PRN


Consult pulmonary


Consult Neurology


consult risk management


DVT PROPHYLAXIS


GI PROPHYLAXIS


IV FLUID SUPPORT LOW RATE


blood cult


iv antibiotics D/C VANC


Continue empiric Zosyn





37 MIN PT EXAM, CHART REVIEW, > 50% OF TIME SPENT WITH EXAM, CHART REVIEW, PT 

CARE COORDINATION





Vitals


Vitals





Vital Signs








  Date Time  Temp Pulse Resp B/P (MAP) Pulse Ox O2 Delivery O2 Flow Rate FiO2


 


10/7/19 07:00 97.8 92 16 182/78 (112) 99 Nasal Cannula  





 97.8       











Physical Exam


Physical Exam


GENERAL: Sitting in the bed alert, smiling 


HEENT:  Pupils equally round, reactive.  


NECK:  Supple.


LUNGS: Clear.


HEART:  S1, S2.  Murmur present.


ABDOMEN:  Soft, no guarding.  Bowel sounds present.


GENITOURINARY:  Indwelling Evans in place.


EXTREMITIES:  No gross edema or cyanosis.  Left wrist tenderness improved


SKIN:  Warm to touch.


PIV ok


General:  Cooperative, No acute distress


Heart:  Regular rate


Lungs:  Clear


Abdomen:  Soft, No tenderness


Extremities:  No edema, Normal pulses





Labs


LABS





Laboratory Tests








Test


 10/7/19


01:05


 


Urine Opiates Screen Neg (NEG) 


 


Urine Methadone Screen Neg (NEG) 


 


Urine Barbiturates Neg (NEG) 


 


Urine Phencyclidine Screen Neg (NEG) 


 


Urine


Amphetamine/Methamphetamine Neg (NEG) 





 


Urine Benzodiazepines Screen Neg (NEG) 


 


Urine Cocaine Screen Neg (NEG) 


 


Urine Cannabinoids Screen Neg (NEG) 


 


Urine Ethyl Alcohol Neg (NEG) 











Assessment and Plan


Assessmemt and Plan


Problems


Medical Problems:


(1) Acute respiratory failure with hypoxia


Status: Acute  











Comment


Review of Relevant


I have reviewed the following items marky (where applicable) has been applied.


Labs





Laboratory Tests








Test


 10/7/19


01:05


 


Urine Opiates Screen Neg (NEG) 


 


Urine Methadone Screen Neg (NEG) 


 


Urine Barbiturates Neg (NEG) 


 


Urine Phencyclidine Screen Neg (NEG) 


 


Urine


Amphetamine/Methamphetamine Neg (NEG) 





 


Urine Benzodiazepines Screen Neg (NEG) 


 


Urine Cocaine Screen Neg (NEG) 


 


Urine Cannabinoids Screen Neg (NEG) 


 


Urine Ethyl Alcohol Neg (NEG) 








Laboratory Tests








Test


 10/7/19


01:05


 


Urine Opiates Screen Neg (NEG) 


 


Urine Methadone Screen Neg (NEG) 


 


Urine Barbiturates Neg (NEG) 


 


Urine Phencyclidine Screen Neg (NEG) 


 


Urine


Amphetamine/Methamphetamine Neg (NEG) 





 


Urine Benzodiazepines Screen Neg (NEG) 


 


Urine Cocaine Screen Neg (NEG) 


 


Urine Cannabinoids Screen Neg (NEG) 


 


Urine Ethyl Alcohol Neg (NEG) 








Microbiology


10/1/19 Blood Culture - Final, Complete


          NO GROWTH AFTER 5 DAYS


10/1/19 Urine Culture - Final, Complete


          


10/1/19 Urine Culture Result 1 (EDGAR) - Final, Complete


Medications





Current Medications


Flumazenil (Romazicon) 0.5 mg STK-MED ONCE IV ;  Start 9/29/19 at 21:30;  Stop 

9/29/19 at 21:30;  Status DC


Flumazenil (Romazicon) 1 mg 1X  ONCE IV  Last administered on 9/29/19at 21:50;  

Start 9/29/19 at 22:15;  Stop 9/29/19 at 22:16;  Status DC


Furosemide (Lasix) 20 mg 1X  ONCE IVP  Last administered on 9/30/19at 00:48;  

Start 9/30/19 at 00:00;  Stop 9/30/19 at 00:01;  Status DC


Ondansetron HCl (Zofran) 4 mg PRN Q8HRS  PRN IV NAUSEA/VOMITING 1ST CHOICE;  

Start 9/30/19 at 01:00;  Stop 10/1/19 at 00:59;  Status DC


Dextrose/Sodium Chloride 1,000 ml @  75 mls/hr [id number] IV  Last administered on 

9/30/19at 01:26;  Start 9/30/19 at 01:00;  Stop 9/30/19 at 15:21;  Status DC


Enoxaparin Sodium (Lovenox 40mg Syringe) 40 mg Q24H SQ  Last administered on 

10/1/19at 18:37;  Start 9/30/19 at 15:00;  Stop 10/2/19 at 12:31;  Status DC


Ondansetron HCl (Zofran) 4 mg PRN Q6HRS  PRN IV NAUSEA/VOMITING Last 

administered on 10/3/19at 10:51;  Start 9/30/19 at 09:15


Prochlorperazine (Compazine) 25 mg PRN Q12HR  PRN IN NAUSEA/VOMITING;  Start 

9/30/19 at 09:15


Famotidine (Pepcid Vial) 20 mg BID IVP  Last administered on 10/1/19at 09:05;  

Start 9/30/19 at 11:00;  Stop 10/1/19 at 11:22;  Status DC


Sodium Chloride (Normal Saline Flush) 3 ml QSHIFT  PRN IV AFTER MEDS AND BLOOD 

DRAWS;  Start 9/30/19 at 09:15


Bisacodyl (Dulcolax Supp) 10 mg PRN DAILY  PRN IN CONSTIPATION;  Start 9/30/19 a

t 09:15


Sodium Chloride 1,000 ml @  75 mls/hr [id number] IV  Last administered on 10/7/19at

01:30;  Start 9/30/19 at 09:30


Influenza Virus Vaccine Quadrival (Afluria Quad 2019-20 (3yr Up) Syringe) 0.5 ml

ONCE ONCE VAX IM  Last administered on 9/30/19at 15:33;  Start 9/30/19 at 14:15;

 Stop 9/30/19 at 14:16;  Status DC


Piperacillin Sod/ Tazobactam Sod 2.25 gm/Sodium Chloride 50 ml @  100 mls/hr 

Q6HRS IV  Last administered on 10/4/19at 11:42;  Start 10/1/19 at 12:00;  Stop 

10/4/19 at 17:14;  Status DC


Famotidine (Pepcid Vial) 20 mg DAILY IVP ;  Start 10/2/19 at 09:00;  Stop 

10/1/19 at 11:57;  Status DC


Famotidine (Pepcid) 20 mg DAILY PO  Last administered on 10/3/19at 08:06;  Start

10/2/19 at 09:00;  Stop 10/3/19 at 11:26;  Status DC


Levetiracetam (Keppra) 500 mg BID PO  Last administered on 10/7/19at 09:08;  

Start 10/1/19 at 15:00


Acetaminophen (Tylenol) 650 mg PRN Q6HRS  PRN PO PAIN Last administered on 

10/6/19at 20:43;  Start 10/1/19 at 15:30


Vancomycin HCl 0.5 gm/Sodium Chloride 250 ml @  166.667 mls/hr 1X  ONCE IV ;  

Start 10/1/19 at 18:00;  Stop 10/1/19 at 19:29;  Status UNV


Vancomycin HCl 1.5 gm/Sodium Chloride 500 ml @  250 mls/hr 1X  ONCE IV  Last 

administered on 10/1/19at 19:16;  Start 10/1/19 at 19:00;  Stop 10/1/19 at 

20:59;  Status DC


Vancomycin HCl 1 gm/Sodium Chloride 250 ml @  250 mls/hr Q24H IV ;  Start 

10/2/19 at 18:00;  Stop 10/2/19 at 13:04;  Status DC


Vancomycin HCl (Vancomycin Trough Level) 1 each 1X  ONCE MC ;  Start 10/3/19 at 

17:30;  Stop 10/3/19 at 17:31;  Status Cancel


Vancomycin HCl (Vanco Per Pharmacy) 1 each PRN DAILY  PRN MC SEE COMMENTS Last 

administered on 10/1/19at 18:08;  Start 10/1/19 at 18:15;  Stop 10/2/19 at 

13:04;  Status DC


Piperacillin Sod/ Tazobactam Sod (Zosyn Per Pharmacy) 1 each PRN DAILY  PRN MC 

SEE COMMENTS;  Start 10/1/19 at 18:15;  Status UNV


Enoxaparin Sodium (Lovenox 30mg Syringe) 30 mg Q24H SQ  Last administered on 

10/3/19at 16:29;  Start 10/2/19 at 16:00;  Stop 10/4/19 at 11:41;  Status DC


Lactobacillus Rhamnosus (Culturelle) 1 cap BID PO  Last administered on 10/7

/19at 09:08;  Start 10/2/19 at 21:00


Non-Formulary Medication 1 ea PRN Q6HRS  PRN TP PAIN Last administered on 

10/3/19at 21:11;  Start 10/2/19 at 18:00


Pantoprazole Sodium (PROTONIX VIAL for IV PUSH) 40 mg DAILYAC IVP  Last 

administered on 10/4/19at 08:00;  Start 10/3/19 at 08:00;  Stop 10/4/19 at 

09:55;  Status DC


Potassium Chloride (Klor-Con) 40 meq 1X  ONCE PO ;  Start 10/3/19 at 09:30;  

Stop 10/3/19 at 09:31;  Status DC


Propofol 20 ml @ As Directed STK-MED ONCE IV ;  Start 10/3/19 at 13:37;  Stop 

10/3/19 at 13:38;  Status DC


Ringer's Solution 1,000 ml @  75 mls/hr [id number] IV  Last administered on 

10/3/19at 13:45;  Start 10/3/19 at 15:45;  Stop 10/4/19 at 02:31;  Status DC


Ferrous Sulfate (Feosol) 325 mg BID PO  Last administered on 10/7/19at 09:08;  

Start 10/4/19 at 10:00


Polyethylene Glycol (miraLAX PACKET) 17 gm DAILY PO  Last administered on 

10/7/19at 09:08;  Start 10/4/19 at 10:00


Pantoprazole Sodium (Protonix) 40 mg DAILYAC PO  Last administered on 10/7/19at 

09:08;  Start 10/5/19 at 07:30


Potassium Chloride (Klor-Con) 40 meq 1X  ONCE PO  Last administered on 10/4/19at

11:42;  Start 10/4/19 at 11:00;  Stop 10/4/19 at 11:01;  Status DC


Potassium Chloride (Klor-Con) 20 meq DAILYWBKFT PO  Last administered on 

10/7/19at 09:08;  Start 10/5/19 at 08:00


Enoxaparin Sodium (Lovenox 40mg Syringe) 40 mg Q24H SQ  Last administered on 

10/6/19at 17:38;  Start 10/4/19 at 16:00


Amoxicillin/ Clavulanate Potassium (Augmentin 500/ 125mg) 1 tab BID PO  Last 

administered on 10/7/19at 09:07;  Start 10/4/19 at 21:00;  Stop 10/8/19 at 23:59





Active Scripts


Active


Keppra (Levetiracetam) 500 Mg Tablet 500 Mg PO BID


Meclizine Hcl 25 Mg Tablet 1 Tab PO PRN TID PRN


Reported


Vitamin D3 (Cholecalciferol (Vitamin D3)) 1,000 Unit Tablet 1 Tab PO DAILYBFRSUP


Voltaren (Diclofenac Sodium) 100 Gm Gel..gram. 1 Gm TP PRN QID


Aspirin 81 Mg Tab.chew 1 Tab PO DAILY


Simvastatin 40 Mg Tablet 1 Tab PO QHS


Atenolol 25 Mg Tablet 1 Tab PO DAILY


Vitals/I & O





Vital Sign - Last 24 Hours








 10/6/19 10/6/19 10/6/19 10/6/19





 14:33 19:00 20:00 23:05


 


Temp 98.0 100.3  98.3





 98.0 100.3  98.3


 


Pulse 79 70  121


 


Resp 14 20  20


 


B/P (MAP) 134/67 (89) 131/104 (113)  141/76 (97)


 


Pulse Ox 97 91  98


 


O2 Delivery Room Air Room Air Room Air Room Air


 


    





    





 10/7/19 10/7/19  





 03:11 07:00  


 


Temp 99.1 97.8  





 99.1 97.8  


 


Pulse 108 92  


 


Resp 20 16  


 


B/P (MAP) 151/85 (107) 182/78 (112)  


 


Pulse Ox 98 99  


 


O2 Delivery Room Air Nasal Cannula  














Intake and Output   


 


 10/6/19 10/6/19 10/7/19





 15:00 23:00 07:00


 


Intake Total  30 ml 


 


Output Total 425 ml 675 ml 600 ml


 


Balance -425 ml -645 ml -600 ml

















KATELYNN ABDULLAHI MD           Oct 7, 2019 11:13

## 2019-10-07 NOTE — NUR
ALICIA faxed updates clinicals and PT notes to [facility]. Acceptance still pending. Discussed with RN 
and Physician.

## 2019-10-07 NOTE — PDOC
Subjective:


Subjective:


Family without concerns.





Objective:


Objective:


5/5 neg tox screens.


Vital Signs:





                                   Vital Signs








  Date Time  Temp Pulse Resp B/P (MAP) Pulse Ox O2 Delivery O2 Flow Rate FiO2


 


10/7/19 07:00 97.8 92 16 182/78 (112) 99 Nasal Cannula  





 97.8       








Labs:





Laboratory Tests








Test


 10/7/19


01:05


 


Urine Opiates Screen Neg 


 


Urine Methadone Screen Neg 


 


Urine Barbiturates Neg 


 


Urine Phencyclidine Screen Neg 


 


Urine


Amphetamine/Methamphetamine Neg 





 


Urine Benzodiazepines Screen Neg 


 


Urine Cocaine Screen Neg 


 


Urine Cannabinoids Screen Neg 


 


Urine Ethyl Alcohol Neg 











PE:





GEN: NAD


NEURO/PSYCH: sleeping, not awakened





A/P:


Dementia


[facility]IE[facility] - EGD unrevealing for source, did note intrathoracic stomach





--


Hgb improving.


Continue iron.


Family defers colonoscopy.


DC per primary.











MYLA HENDRICKS          Oct 7, 2019 11:18

## 2019-10-07 NOTE — NUR
SW following pt. Spoke with Ivonne at [facility] and they declined to accept pt due intentional 
OD. SW requested [facility] to re-look at pt's clinical and clarified regarding overdose.

Plan

1. SW Will await to hear back from [facility] again. 

2. Pt did not work with PT/OT this weekend and might need new evaluation. 

3. Discussed with Daughter, Ann Marie. Will continue to follow.

## 2019-10-08 VITALS — DIASTOLIC BLOOD PRESSURE: 69 MMHG | SYSTOLIC BLOOD PRESSURE: 153 MMHG

## 2019-10-08 VITALS — SYSTOLIC BLOOD PRESSURE: 150 MMHG | DIASTOLIC BLOOD PRESSURE: 68 MMHG

## 2019-10-08 VITALS — SYSTOLIC BLOOD PRESSURE: 159 MMHG | DIASTOLIC BLOOD PRESSURE: 64 MMHG

## 2019-10-08 VITALS — SYSTOLIC BLOOD PRESSURE: 168 MMHG | DIASTOLIC BLOOD PRESSURE: 65 MMHG

## 2019-10-08 RX ADMIN — PANTOPRAZOLE SODIUM SCH MG: 40 TABLET, DELAYED RELEASE ORAL at 07:30

## 2019-10-08 RX ADMIN — Medication SCH MG: at 09:00

## 2019-10-08 RX ADMIN — POLYETHYLENE GLYCOL 3350 SCH GM: 17 POWDER, FOR SOLUTION ORAL at 09:00

## 2019-10-08 RX ADMIN — POTASSIUM CHLORIDE SCH MEQ: 1500 TABLET, EXTENDED RELEASE ORAL at 08:00

## 2019-10-08 RX ADMIN — ENOXAPARIN SODIUM SCH MG: 40 INJECTION SUBCUTANEOUS at 16:00

## 2019-10-08 RX ADMIN — AMOXICILLIN AND CLAVULANATE POTASSIUM SCH TAB: 500; 125 TABLET, FILM COATED ORAL at 09:00

## 2019-10-08 RX ADMIN — Medication SCH CAP: at 09:00

## 2019-10-08 RX ADMIN — BACITRACIN SCH MLS/HR: 5000 INJECTION, POWDER, FOR SOLUTION INTRAMUSCULAR at 07:52

## 2019-10-08 NOTE — PDOC
Subjective:


Subjective:


"Everyone around here is vomiting."





Objective:


Objective:


D/w nurse - pt ate 70% of breakfast, possible DC today, refusing meds.


Vital Signs:





                                   Vital Signs








  Date Time  Temp Pulse Resp B/P (MAP) Pulse Ox O2 Delivery O2 Flow Rate FiO2


 


10/8/19 10:56 97.8 89 18 159/64 (95) 95 Room Air  





 97.8       


 


10/8/19 08:00       3.0 








Labs:





Laboratory Tests








Test


 10/7/19


13:40


 


White Blood Count 5.4 x10^3/uL 


 


Red Blood Count 4.19 x10^6/uL 


 


Hemoglobin 9.4 g/dL 


 


Hematocrit 29.4 % 


 


Mean Corpuscular Volume 70 fL 


 


Mean Corpuscular Hemoglobin 23 pg 


 


Mean Corpuscular Hemoglobin


Concent 32 g/dL 





 


Red Cell Distribution Width 23.9 % 


 


Platelet Count 235 x10^3/uL 


 


Neutrophils (%) (Auto) 76 % 


 


Lymphocytes (%) (Auto) 13 % 


 


Monocytes (%) (Auto) 10 % 


 


Eosinophils (%) (Auto) 1 % 


 


Basophils (%) (Auto) 0 % 


 


Neutrophils # (Auto) 4.1 x10^3/uL 


 


Lymphocytes # (Auto) 0.7 x10^3/uL 


 


Monocytes # (Auto) 0.5 x10^3/uL 


 


Eosinophils # (Auto) 0.0 x10^3/uL 


 


Basophils # (Auto) 0.0 x10^3/uL 


 


Sodium Level 140 mmol/L 


 


Potassium Level 3.9 mmol/L 


 


Chloride Level 107 mmol/L 


 


Carbon Dioxide Level 25 mmol/L 


 


Anion Gap 8 


 


Blood Urea Nitrogen 13 mg/dL 


 


Creatinine 1.0 mg/dL 


 


Estimated GFR


(Cockcroft-Gault) 63.9 





 


BUN/Creatinine Ratio 13 


 


Glucose Level 117 mg/dL 


 


Calcium Level 8.4 mg/dL 


 


Total Bilirubin 0.3 mg/dL 


 


Aspartate Amino Transf


(AST/SGOT) 14 U/L 





 


Alanine Aminotransferase


(ALT/SGPT) 8 U/L 





 


Alkaline Phosphatase 54 U/L 


 


Total Protein 5.6 g/dL 


 


Albumin 2.4 g/dL 


 


Albumin/Globulin Ratio 0.8 











PE:





GEN: NAD


LUNGS: room air


ABD: S/ND/NT


NEURO/PSYCH: confused





A/P:


Dementia


ARIELLA - EGD w/ intrathoracic stomach, family declines colonoscopy





--


Continue PPI and iron if she'll take them.


DC per primary.











MYLA HENDRICKS          Oct 8, 2019 11:14

## 2019-10-08 NOTE — NUR
pt refused meds this morning.  meds crushed in applesauce.  when spoonful presented to pt, 
she pushed RN's hand away stating "i don't want anymore pills".  meds nonadmin on the eMar.

## 2019-10-08 NOTE — PDOC3
Discharge Summary


Date of Admission:  Sep 30, 2019


Date of Discharge:  Oct 8, 2019


Follow-Up:  1-2 days


Admitting Diagnosis comment:





DISCHARGE DX ======== D/C TO HEALTH CARE RESORT








Basilar atelectasis versus mild pulmonary edema.


Mild cardiomegaly.


Large hiatal hernia.


Altered mental status SEC # 5


Acute respiratory failure with hypoxia RESOLVED , SEC TO ? OVERDOSE OF KLONOPIN 

, HOWEVER UDS NEG FOR BENZODIAZEPINES  X 5   D/W FAMILY IN ROOM


Intentional// accidental ?  drug overdose


TOXIC Encephalopathy


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


Mild to moderate chronic small vessel ischemic changes and atrophy.on CT HEAD


DIABETES


HYPERTENSION HX


HYPOKALEMIA 


Severe protein-caloric malnutrition


GERD


MICROCYTIC ANEMIA


Fever, possible aspiration


Moderate left and minimal right hip osteoarthritis.


Degenerative change at the lower lumbar levels








Imaging:


EGD


E--Normal.  GEJ at 35cm.


G--Very unusual anatomy with intrathoracic stomach.  Ultimately able to traverse

stomach.  No lesions appreciated.


D--Normal to second portion.


IMP: Large HH (intrathoracic stomach)


discuss whether or not to pursue colonoscopy with family.


REPLETE K 


snf placement planned








33  MIN PT EXAM, CHART REVIEW,D/C PLANNING  > 50% OF TIME SPENT WITH EXAM, CHART

REVIEW, PT CARE COORDINATION





History of Present Illness


History of Present Illness


10-01  CR UP TO 1.2 HGB STABLE, REPEAT UDS





Hb from 6.7 to 10 after transfusion. EGD showed hiatal hernia, otherwise normal.

Changed to PO protonix, iron, and PO augmentin today.


Eating some today.


Discussed possibility of colonoscopy at some point - HOLD FOR NOW 


They wish for referral to SNF. HCR





ADMITTED 


police report filed


SS CONSULT


O2 SUPPORT PRN


Consult pulmonary


Consult Neurology


consult risk management


DVT PROPHYLAXIS


GI PROPHYLAXIS


IV FLUID SUPPORT LOW RATE


blood cult


iv antibiotics D/C VANC








37 MIN PT EXAM, CHART REVIEW D/C PLANNING , > 50% OF TIME SPENT WITH EXAM, CHART

REVIEW, PT CARE COORDINATION





Vitals


FINAL DIAGNOSIS


Problems


Medical Problems:


(1) Acute respiratory failure with hypoxia


Status: Acute  








Brief Hospital Course


Ms. Deal  is a 84 old [sex] who presented with [ ]


CONDITION AT DISCHARGE:  Improved


Discharge Medications





Current Medications


Flumazenil (Romazicon) 0.5 mg STK-MED ONCE IV ;  Start 9/29/19 at 21:30;  Stop 

9/29/19 at 21:30;  Status DC


Flumazenil (Romazicon) 1 mg 1X  ONCE IV  Last administered on 9/29/19at 21:50;  

Start 9/29/19 at 22:15;  Stop 9/29/19 at 22:16;  Status DC


Furosemide (Lasix) 20 mg 1X  ONCE IVP  Last administered on 9/30/19at 00:48;  

Start 9/30/19 at 00:00;  Stop 9/30/19 at 00:01;  Status DC


Ondansetron HCl (Zofran) 4 mg PRN Q8HRS  PRN IV NAUSEA/VOMITING 1ST CHOICE;  

Start 9/30/19 at 01:00;  Stop 10/1/19 at 00:59;  Status DC


Dextrose/Sodium Chloride 1,000 ml @  75 mls/hr [id number] IV  Last administered on 

9/30/19at 01:26;  Start 9/30/19 at 01:00;  Stop 9/30/19 at 15:21;  Status DC


Enoxaparin Sodium (Lovenox 40mg Syringe) 40 mg Q24H SQ  Last administered on 

10/1/19at 18:37;  Start 9/30/19 at 15:00;  Stop 10/2/19 at 12:31;  Status DC


Ondansetron HCl (Zofran) 4 mg PRN Q6HRS  PRN IV NAUSEA/VOMITING Last administ

ered on 10/3/19at 10:51;  Start 9/30/19 at 09:15


Prochlorperazine (Compazine) 25 mg PRN Q12HR  PRN RI NAUSEA/VOMITING;  Start 9/ 30/19 at 09:15


Famotidine (Pepcid Vial) 20 mg BID IVP  Last administered on 10/1/19at 09:05;  

Start 9/30/19 at 11:00;  Stop 10/1/19 at 11:22;  Status DC


Sodium Chloride (Normal Saline Flush) 3 ml QSHIFT  PRN IV AFTER MEDS AND BLOOD 

DRAWS;  Start 9/30/19 at 09:15


Bisacodyl (Dulcolax Supp) 10 mg PRN DAILY  PRN RI CONSTIPATION;  Start 9/30/19 

at 09:15


Sodium Chloride 1,000 ml @  75 mls/hr [id number] IV  Last administered on 10/8/19at

07:52;  Start 9/30/19 at 09:30


Influenza Virus Vaccine Quadrival (Afluria Quad 2019-20 (3yr Up) Syringe) 0.5 ml

ONCE ONCE VAX IM  Last administered on 9/30/19at 15:33;  Start 9/30/19 at 14:15;

 Stop 9/30/19 at 14:16;  Status DC


Piperacillin Sod/ Tazobactam Sod 2.25 gm/Sodium Chloride 50 ml @  100 mls/hr 

Q6HRS IV  Last administered on 10/4/19at 11:42;  Start 10/1/19 at 12:00;  Stop 

10/4/19 at 17:14;  Status DC


Famotidine (Pepcid Vial) 20 mg DAILY IVP ;  Start 10/2/19 at 09:00;  Stop 

10/1/19 at 11:57;  Status DC


Famotidine (Pepcid) 20 mg DAILY PO  Last administered on 10/3/19at 08:06;  Start

10/2/19 at 09:00;  Stop 10/3/19 at 11:26;  Status DC


Levetiracetam (Keppra) 500 mg BID PO  Last administered on 10/7/19at 21:04;  

Start 10/1/19 at 15:00


Acetaminophen (Tylenol) 650 mg PRN Q6HRS  PRN PO PAIN Last administered on 

10/7/19at 12:27;  Start 10/1/19 at 15:30


Vancomycin HCl 0.5 gm/Sodium Chloride 250 ml @  166.667 mls/hr 1X  ONCE IV ;  

Start 10/1/19 at 18:00;  Stop 10/1/19 at 19:29;  Status UNV


Vancomycin HCl 1.5 gm/Sodium Chloride 500 ml @  250 mls/hr 1X  ONCE IV  Last 

administered on 10/1/19at 19:16;  Start 10/1/19 at 19:00;  Stop 10/1/19 at 

20:59;  Status DC


Vancomycin HCl 1 gm/Sodium Chloride 250 ml @  250 mls/hr Q24H IV ;  Start 

10/2/19 at 18:00;  Stop 10/2/19 at 13:04;  Status DC


Vancomycin HCl (Vancomycin Trough Level) 1 each 1X  ONCE MC ;  Start 10/3/19 at 

17:30;  Stop 10/3/19 at 17:31;  Status Cancel


Vancomycin HCl (Vanco Per Pharmacy) 1 each PRN DAILY  PRN MC SEE COMMENTS Last 

administered on 10/1/19at 18:08;  Start 10/1/19 at 18:15;  Stop 10/2/19 at 

13:04;  Status DC


Piperacillin Sod/ Tazobactam Sod (Zosyn Per Pharmacy) 1 each PRN DAILY  PRN MC 

SEE COMMENTS;  Start 10/1/19 at 18:15;  Status UNV


Enoxaparin Sodium (Lovenox 30mg Syringe) 30 mg Q24H SQ  Last administered on 

10/3/19at 16:29;  Start 10/2/19 at 16:00;  Stop 10/4/19 at 11:41;  Status DC


Lactobacillus Rhamnosus (Culturelle) 1 cap BID PO  Last administered on 

10/7/19at 21:04;  Start 10/2/19 at 21:00


Non-Formulary Medication 1 ea PRN Q6HRS  PRN TP PAIN Last administered on 

10/3/19at 21:11;  Start 10/2/19 at 18:00


Pantoprazole Sodium (PROTONIX VIAL for IV PUSH) 40 mg DAILYAC IVP  Last 

administered on 10/4/19at 08:00;  Start 10/3/19 at 08:00;  Stop 10/4/19 at 

09:55;  Status DC


Potassium Chloride (Klor-Con) 40 meq 1X  ONCE PO ;  Start 10/3/19 at 09:30;  

Stop 10/3/19 at 09:31;  Status DC


Propofol 20 ml @ As Directed STK-MED ONCE IV ;  Start 10/3/19 at 13:37;  Stop 

10/3/19 at 13:38;  Status DC


Ringer's Solution 1,000 ml @  75 mls/hr [id number] IV  Last administered on 

10/3/19at 13:45;  Start 10/3/19 at 15:45;  Stop 10/4/19 at 02:31;  Status DC


Ferrous Sulfate (Feosol) 325 mg BID PO  Last administered on 10/7/19at 21:04;  

Start 10/4/19 at 10:00


Polyethylene Glycol (miraLAX PACKET) 17 gm DAILY PO  Last administered on 

10/7/19at 09:08;  Start 10/4/19 at 10:00


Pantoprazole Sodium (Protonix) 40 mg DAILYAC PO  Last administered on 10/7/19at 

09:08;  Start 10/5/19 at 07:30


Potassium Chloride (Klor-Con) 40 meq 1X  ONCE PO  Last administered on 10/4/19at

11:42;  Start 10/4/19 at 11:00;  Stop 10/4/19 at 11:01;  Status DC


Potassium Chloride (Klor-Con) 20 meq DAILYWBKFT PO  Last administered on 

10/7/19at 09:08;  Start 10/5/19 at 08:00


Enoxaparin Sodium (Lovenox 40mg Syringe) 40 mg Q24H SQ  Last administered on 

10/7/19at 14:53;  Start 10/4/19 at 16:00


Amoxicillin/ Clavulanate Potassium (Augmentin 500/ 125mg) 1 tab BID PO  Last 

administered on 10/7/19at 21:04;  Start 10/4/19 at 21:00;  Stop 10/8/19 at 23:59





Active Scripts


Active


Keppra (Levetiracetam) 500 Mg Tablet 500 Mg PO BID


Meclizine Hcl 25 Mg Tablet 1 Tab PO PRN TID PRN


Reported


Vitamin D3 (Cholecalciferol (Vitamin D3)) 1,000 Unit Tablet 1 Tab PO DAILYBFRSUP


Voltaren (Diclofenac Sodium) 100 Gm Gel..gram. 1 Gm TP PRN QID


Aspirin 81 Mg Tab.chew 1 Tab PO DAILY


Simvastatin 40 Mg Tablet 1 Tab PO QHS


Atenolol 25 Mg Tablet 1 Tab PO DAILY


Vital Signs





Vital Signs








  Date Time  Temp Pulse Resp B/P (MAP) Pulse Ox O2 Delivery O2 Flow Rate FiO2


 


10/8/19 10:56 97.8 89 18 159/64 (95) 95 Room Air  





 97.8       


 


10/8/19 08:00       3.0 








Labs





Laboratory Tests








Test


 10/7/19


01:05 10/7/19


13:40


 


Urine Opiates Screen Neg (NEG)  


 


Urine Methadone Screen Neg (NEG)  


 


Urine Barbiturates Neg (NEG)  


 


Urine Phencyclidine Screen Neg (NEG)  


 


Urine


Amphetamine/Methamphetamine Neg (NEG) 


 





 


Urine Benzodiazepines Screen Neg (NEG)  


 


Urine Cocaine Screen Neg (NEG)  


 


Urine Cannabinoids Screen Neg (NEG)  


 


Urine Ethyl Alcohol Neg (NEG)  


 


White Blood Count


 


 5.4 x10^3/uL


(4.0-11.0)


 


Red Blood Count


 


 4.19 x10^6/uL


(3.50-5.40)


 


Hemoglobin


 


 9.4 g/dL


(12.0-15.5)


 


Hematocrit


 


 29.4 %


(36.0-47.0)


 


Mean Corpuscular Volume  70 fL () 


 


Mean Corpuscular Hemoglobin  23 pg (25-35) 


 


Mean Corpuscular Hemoglobin


Concent 


 32 g/dL


(31-37)


 


Red Cell Distribution Width


 


 23.9 %


(11.5-14.5)


 


Platelet Count


 


 235 x10^3/uL


(140-400)


 


Neutrophils (%) (Auto)  76 % (31-73) 


 


Lymphocytes (%) (Auto)  13 % (24-48) 


 


Monocytes (%) (Auto)  10 % (0-9) 


 


Eosinophils (%) (Auto)  1 % (0-3) 


 


Basophils (%) (Auto)  0 % (0-3) 


 


Neutrophils # (Auto)


 


 4.1 x10^3/uL


(1.8-7.7)


 


Lymphocytes # (Auto)


 


 0.7 x10^3/uL


(1.0-4.8)


 


Monocytes # (Auto)


 


 0.5 x10^3/uL


(0.0-1.1)


 


Eosinophils # (Auto)


 


 0.0 x10^3/uL


(0.0-0.7)


 


Basophils # (Auto)


 


 0.0 x10^3/uL


(0.0-0.2)


 


Sodium Level


 


 140 mmol/L


(136-145)


 


Potassium Level


 


 3.9 mmol/L


(3.5-5.1)


 


Chloride Level


 


 107 mmol/L


()


 


Carbon Dioxide Level


 


 25 mmol/L


(21-32)


 


Anion Gap  8 (6-14) 


 


Blood Urea Nitrogen


 


 13 mg/dL


(7-20)


 


Creatinine


 


 1.0 mg/dL


(0.6-1.0)


 


Estimated GFR


(Cockcroft-Gault) 


 63.9 





 


BUN/Creatinine Ratio  13 (6-20) 


 


Glucose Level


 


 117 mg/dL


(70-99)


 


Calcium Level


 


 8.4 mg/dL


(8.5-10.1)


 


Total Bilirubin


 


 0.3 mg/dL


(0.2-1.0)


 


Aspartate Amino Transf


(AST/SGOT) 


 14 U/L (15-37) 





 


Alanine Aminotransferase


(ALT/SGPT) 


 8 U/L (14-59) 





 


Alkaline Phosphatase


 


 54 U/L


()


 


Total Protein


 


 5.6 g/dL


(6.4-8.2)


 


Albumin


 


 2.4 g/dL


(3.4-5.0)


 


Albumin/Globulin Ratio  0.8 (1.0-1.7) 








Laboratory Tests








Test


 10/7/19


13:40


 


White Blood Count


 5.4 x10^3/uL


(4.0-11.0)


 


Red Blood Count


 4.19 x10^6/uL


(3.50-5.40)


 


Hemoglobin


 9.4 g/dL


(12.0-15.5)


 


Hematocrit


 29.4 %


(36.0-47.0)


 


Mean Corpuscular Volume 70 fL () 


 


Mean Corpuscular Hemoglobin 23 pg (25-35) 


 


Mean Corpuscular Hemoglobin


Concent 32 g/dL


(31-37)


 


Red Cell Distribution Width


 23.9 %


(11.5-14.5)


 


Platelet Count


 235 x10^3/uL


(140-400)


 


Neutrophils (%) (Auto) 76 % (31-73) 


 


Lymphocytes (%) (Auto) 13 % (24-48) 


 


Monocytes (%) (Auto) 10 % (0-9) 


 


Eosinophils (%) (Auto) 1 % (0-3) 


 


Basophils (%) (Auto) 0 % (0-3) 


 


Neutrophils # (Auto)


 4.1 x10^3/uL


(1.8-7.7)


 


Lymphocytes # (Auto)


 0.7 x10^3/uL


(1.0-4.8)


 


Monocytes # (Auto)


 0.5 x10^3/uL


(0.0-1.1)


 


Eosinophils # (Auto)


 0.0 x10^3/uL


(0.0-0.7)


 


Basophils # (Auto)


 0.0 x10^3/uL


(0.0-0.2)


 


Sodium Level


 140 mmol/L


(136-145)


 


Potassium Level


 3.9 mmol/L


(3.5-5.1)


 


Chloride Level


 107 mmol/L


()


 


Carbon Dioxide Level


 25 mmol/L


(21-32)


 


Anion Gap 8 (6-14) 


 


Blood Urea Nitrogen


 13 mg/dL


(7-20)


 


Creatinine


 1.0 mg/dL


(0.6-1.0)


 


Estimated GFR


(Cockcroft-Gault) 63.9 





 


BUN/Creatinine Ratio 13 (6-20) 


 


Glucose Level


 117 mg/dL


(70-99)


 


Calcium Level


 8.4 mg/dL


(8.5-10.1)


 


Total Bilirubin


 0.3 mg/dL


(0.2-1.0)


 


Aspartate Amino Transf


(AST/SGOT) 14 U/L (15-37) 





 


Alanine Aminotransferase


(ALT/SGPT) 8 U/L (14-59) 





 


Alkaline Phosphatase


 54 U/L


()


 


Total Protein


 5.6 g/dL


(6.4-8.2)


 


Albumin


 2.4 g/dL


(3.4-5.0)


 


Albumin/Globulin Ratio 0.8 (1.0-1.7) 








Allergies





                                    Allergies








Coded Allergies Type Severity Reaction Last Updated Verified


 


  No Known Drug Allergies    10/29/16 No








Disposition/Orders:  Other (D/C TO SNF)











KATELYNN ABDULLAHI MD           Oct 8, 2019 13:14

## 2019-10-08 NOTE — PDOC
PROGRESS NOTES


Chief Complaint


Chief Complaint


DISCHARGE DX ========








Basilar atelectasis versus mild pulmonary edema.


Mild cardiomegaly.


Large hiatal hernia.


Altered mental status SEC # 5


Acute respiratory failure with hypoxia RESOLVED , SEC TO ? OVERDOSE OF KLONOPIN 

, HOWEVER UDS NEG FOR BENZODIAZEPINES  X 5   D/W FAMILY IN ROOM


Intentional// accidental ?  drug overdose


TOXIC Encephalopathy


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


Mild to moderate chronic small vessel ischemic changes and atrophy.on CT HEAD


DIABETES


HYPERTENSION HX


HYPOKALEMIA 


Severe protein-caloric malnutrition


GERD


MICROCYTIC ANEMIA


Fever, possible aspiration


Moderate left and minimal right hip osteoarthritis.


Degenerative change at the lower lumbar levels








Imaging:


EGD


E--Normal.  GEJ at 35cm.


G--Very unusual anatomy with intrathoracic stomach.  Ultimately able to traverse

stomach.  No lesions appreciated.


D--Normal to second portion.


IMP: Large HH (intrathoracic stomach)


discuss whether or not to pursue colonoscopy with family.


REPLETE K 


snf placement planned








33  MIN PT EXAM, CHART REVIEW,D/C PLANNING  > 50% OF TIME SPENT WITH EXAM, CHART

REVIEW, PT CARE COORDINATION





History of Present Illness


History of Present Illness


10-01  CR UP TO 1.2 HGB STABLE, REPEAT UDS





Hb from 6.7 to 10 after transfusion. EGD showed hiatal hernia, otherwise normal.

Changed to PO protonix, iron, and PO augmentin today.


Eating some today.


Discussed possibility of colonoscopy at some point - family will consider.


They wish for referral to SNF.





ADMITTED 


police report filed


SS CONSULT


O2 SUPPORT PRN


Consult pulmonary


Consult Neurology


consult risk management


DVT PROPHYLAXIS


GI PROPHYLAXIS


IV FLUID SUPPORT LOW RATE


blood cult


iv antibiotics D/C VANC


Continue empiric Zosyn





37 MIN PT EXAM, CHART REVIEW, > 50% OF TIME SPENT WITH EXAM, CHART REVIEW, PT 

CARE COORDINATION





Vitals


Vitals





Vital Signs








  Date Time  Temp Pulse Resp B/P (MAP) Pulse Ox O2 Delivery O2 Flow Rate FiO2


 


10/8/19 08:00      Room Air 3.0 


 


10/8/19 07:00 97.8 88 16 153/69 (97) 95   





 97.8       











Physical Exam


Physical Exam


GENERAL: Sitting in the bed alert, smiling 


HEENT:  Pupils equally round, reactive.  


NECK:  Supple.


LUNGS: Clear.


HEART:  S1, S2.  Murmur present.


ABDOMEN:  Soft, no guarding.  Bowel sounds present.


GENITOURINARY:  Indwelling Evans in place.


EXTREMITIES:  No gross edema or cyanosis.  Left wrist tenderness improved


SKIN:  Warm to touch.


PIV ok


General:  Alert, Cooperative, No acute distress


Heart:  Regular rate, No murmurs


Lungs:  Clear


Abdomen:  Soft, No tenderness


Extremities:  No cyanosis, No edema, Normal pulses


Skin:  No significant lesion





Labs


LABS





Laboratory Tests








Test


 10/7/19


13:40


 


White Blood Count


 5.4 x10^3/uL


(4.0-11.0)


 


Red Blood Count


 4.19 x10^6/uL


(3.50-5.40)


 


Hemoglobin


 9.4 g/dL


(12.0-15.5)


 


Hematocrit


 29.4 %


(36.0-47.0)


 


Mean Corpuscular Volume 70 fL () 


 


Mean Corpuscular Hemoglobin 23 pg (25-35) 


 


Mean Corpuscular Hemoglobin


Concent 32 g/dL


(31-37)


 


Red Cell Distribution Width


 23.9 %


(11.5-14.5)


 


Platelet Count


 235 x10^3/uL


(140-400)


 


Neutrophils (%) (Auto) 76 % (31-73) 


 


Lymphocytes (%) (Auto) 13 % (24-48) 


 


Monocytes (%) (Auto) 10 % (0-9) 


 


Eosinophils (%) (Auto) 1 % (0-3) 


 


Basophils (%) (Auto) 0 % (0-3) 


 


Neutrophils # (Auto)


 4.1 x10^3/uL


(1.8-7.7)


 


Lymphocytes # (Auto)


 0.7 x10^3/uL


(1.0-4.8)


 


Monocytes # (Auto)


 0.5 x10^3/uL


(0.0-1.1)


 


Eosinophils # (Auto)


 0.0 x10^3/uL


(0.0-0.7)


 


Basophils # (Auto)


 0.0 x10^3/uL


(0.0-0.2)


 


Sodium Level


 140 mmol/L


(136-145)


 


Potassium Level


 3.9 mmol/L


(3.5-5.1)


 


Chloride Level


 107 mmol/L


()


 


Carbon Dioxide Level


 25 mmol/L


(21-32)


 


Anion Gap 8 (6-14) 


 


Blood Urea Nitrogen


 13 mg/dL


(7-20)


 


Creatinine


 1.0 mg/dL


(0.6-1.0)


 


Estimated GFR


(Cockcroft-Gault) 63.9 





 


BUN/Creatinine Ratio 13 (6-20) 


 


Glucose Level


 117 mg/dL


(70-99)


 


Calcium Level


 8.4 mg/dL


(8.5-10.1)


 


Total Bilirubin


 0.3 mg/dL


(0.2-1.0)


 


Aspartate Amino Transf


(AST/SGOT) 14 U/L (15-37) 





 


Alanine Aminotransferase


(ALT/SGPT) 8 U/L (14-59) 





 


Alkaline Phosphatase


 54 U/L


()


 


Total Protein


 5.6 g/dL


(6.4-8.2)


 


Albumin


 2.4 g/dL


(3.4-5.0)


 


Albumin/Globulin Ratio 0.8 (1.0-1.7) 











Assessment and Plan


Assessmemt and Plan


Problems


Medical Problems:


(1) Acute respiratory failure with hypoxia


Status: Acute  











Comment


Review of Relevant


I have reviewed the following items marky (where applicable) has been applied.


Labs





Laboratory Tests








Test


 10/7/19


01:05 10/7/19


13:40


 


Urine Opiates Screen Neg (NEG)  


 


Urine Methadone Screen Neg (NEG)  


 


Urine Barbiturates Neg (NEG)  


 


Urine Phencyclidine Screen Neg (NEG)  


 


Urine


Amphetamine/Methamphetamine Neg (NEG) 


 





 


Urine Benzodiazepines Screen Neg (NEG)  


 


Urine Cocaine Screen Neg (NEG)  


 


Urine Cannabinoids Screen Neg (NEG)  


 


Urine Ethyl Alcohol Neg (NEG)  


 


White Blood Count


 


 5.4 x10^3/uL


(4.0-11.0)


 


Red Blood Count


 


 4.19 x10^6/uL


(3.50-5.40)


 


Hemoglobin


 


 9.4 g/dL


(12.0-15.5)


 


Hematocrit


 


 29.4 %


(36.0-47.0)


 


Mean Corpuscular Volume  70 fL () 


 


Mean Corpuscular Hemoglobin  23 pg (25-35) 


 


Mean Corpuscular Hemoglobin


Concent 


 32 g/dL


(31-37)


 


Red Cell Distribution Width


 


 23.9 %


(11.5-14.5)


 


Platelet Count


 


 235 x10^3/uL


(140-400)


 


Neutrophils (%) (Auto)  76 % (31-73) 


 


Lymphocytes (%) (Auto)  13 % (24-48) 


 


Monocytes (%) (Auto)  10 % (0-9) 


 


Eosinophils (%) (Auto)  1 % (0-3) 


 


Basophils (%) (Auto)  0 % (0-3) 


 


Neutrophils # (Auto)


 


 4.1 x10^3/uL


(1.8-7.7)


 


Lymphocytes # (Auto)


 


 0.7 x10^3/uL


(1.0-4.8)


 


Monocytes # (Auto)


 


 0.5 x10^3/uL


(0.0-1.1)


 


Eosinophils # (Auto)


 


 0.0 x10^3/uL


(0.0-0.7)


 


Basophils # (Auto)


 


 0.0 x10^3/uL


(0.0-0.2)


 


Sodium Level


 


 140 mmol/L


(136-145)


 


Potassium Level


 


 3.9 mmol/L


(3.5-5.1)


 


Chloride Level


 


 107 mmol/L


()


 


Carbon Dioxide Level


 


 25 mmol/L


(21-32)


 


Anion Gap  8 (6-14) 


 


Blood Urea Nitrogen


 


 13 mg/dL


(7-20)


 


Creatinine


 


 1.0 mg/dL


(0.6-1.0)


 


Estimated GFR


(Cockcroft-Gault) 


 63.9 





 


BUN/Creatinine Ratio  13 (6-20) 


 


Glucose Level


 


 117 mg/dL


(70-99)


 


Calcium Level


 


 8.4 mg/dL


(8.5-10.1)


 


Total Bilirubin


 


 0.3 mg/dL


(0.2-1.0)


 


Aspartate Amino Transf


(AST/SGOT) 


 14 U/L (15-37) 





 


Alanine Aminotransferase


(ALT/SGPT) 


 8 U/L (14-59) 





 


Alkaline Phosphatase


 


 54 U/L


()


 


Total Protein


 


 5.6 g/dL


(6.4-8.2)


 


Albumin


 


 2.4 g/dL


(3.4-5.0)


 


Albumin/Globulin Ratio  0.8 (1.0-1.7) 








Laboratory Tests








Test


 10/7/19


13:40


 


White Blood Count


 5.4 x10^3/uL


(4.0-11.0)


 


Red Blood Count


 4.19 x10^6/uL


(3.50-5.40)


 


Hemoglobin


 9.4 g/dL


(12.0-15.5)


 


Hematocrit


 29.4 %


(36.0-47.0)


 


Mean Corpuscular Volume 70 fL () 


 


Mean Corpuscular Hemoglobin 23 pg (25-35) 


 


Mean Corpuscular Hemoglobin


Concent 32 g/dL


(31-37)


 


Red Cell Distribution Width


 23.9 %


(11.5-14.5)


 


Platelet Count


 235 x10^3/uL


(140-400)


 


Neutrophils (%) (Auto) 76 % (31-73) 


 


Lymphocytes (%) (Auto) 13 % (24-48) 


 


Monocytes (%) (Auto) 10 % (0-9) 


 


Eosinophils (%) (Auto) 1 % (0-3) 


 


Basophils (%) (Auto) 0 % (0-3) 


 


Neutrophils # (Auto)


 4.1 x10^3/uL


(1.8-7.7)


 


Lymphocytes # (Auto)


 0.7 x10^3/uL


(1.0-4.8)


 


Monocytes # (Auto)


 0.5 x10^3/uL


(0.0-1.1)


 


Eosinophils # (Auto)


 0.0 x10^3/uL


(0.0-0.7)


 


Basophils # (Auto)


 0.0 x10^3/uL


(0.0-0.2)


 


Sodium Level


 140 mmol/L


(136-145)


 


Potassium Level


 3.9 mmol/L


(3.5-5.1)


 


Chloride Level


 107 mmol/L


()


 


Carbon Dioxide Level


 25 mmol/L


(21-32)


 


Anion Gap 8 (6-14) 


 


Blood Urea Nitrogen


 13 mg/dL


(7-20)


 


Creatinine


 1.0 mg/dL


(0.6-1.0)


 


Estimated GFR


(Cockcroft-Gault) 63.9 





 


BUN/Creatinine Ratio 13 (6-20) 


 


Glucose Level


 117 mg/dL


(70-99)


 


Calcium Level


 8.4 mg/dL


(8.5-10.1)


 


Total Bilirubin


 0.3 mg/dL


(0.2-1.0)


 


Aspartate Amino Transf


(AST/SGOT) 14 U/L (15-37) 





 


Alanine Aminotransferase


(ALT/SGPT) 8 U/L (14-59) 





 


Alkaline Phosphatase


 54 U/L


()


 


Total Protein


 5.6 g/dL


(6.4-8.2)


 


Albumin


 2.4 g/dL


(3.4-5.0)


 


Albumin/Globulin Ratio 0.8 (1.0-1.7) 








Microbiology


10/1/19 Blood Culture - Final, Complete


          NO GROWTH AFTER 5 DAYS


10/1/19 Urine Culture - Final, Complete


          


10/1/19 Urine Culture Result 1 (EDGAR) - Final, Complete


Medications





Current Medications


Flumazenil (Romazicon) 0.5 mg STK-MED ONCE IV ;  Start 9/29/19 at 21:30;  Stop 

9/29/19 at 21:30;  Status DC


Flumazenil (Romazicon) 1 mg 1X  ONCE IV  Last administered on 9/29/19at 21:50;  

Start 9/29/19 at 22:15;  Stop 9/29/19 at 22:16;  Status DC


Furosemide (Lasix) 20 mg 1X  ONCE IVP  Last administered on 9/30/19at 00:48;  

Start 9/30/19 at 00:00;  Stop 9/30/19 at 00:01;  Status DC


Ondansetron HCl (Zofran) 4 mg PRN Q8HRS  PRN IV NAUSEA/VOMITING 1ST CHOICE;  

Start 9/30/19 at 01:00;  Stop 10/1/19 at 00:59;  Status DC


Dextrose/Sodium Chloride 1,000 ml @  75 mls/hr [id number] IV  Last administered on 

9/30/19at 01:26;  Start 9/30/19 at 01:00;  Stop 9/30/19 at 15:21;  Status DC


Enoxaparin Sodium (Lovenox 40mg Syringe) 40 mg Q24H SQ  Last administered on 

10/1/19at 18:37;  Start 9/30/19 at 15:00;  Stop 10/2/19 at 12:31;  Status DC


Ondansetron HCl (Zofran) 4 mg PRN Q6HRS  PRN IV NAUSEA/VOMITING Last 

administered on 10/3/19at 10:51;  Start 9/30/19 at 09:15


Prochlorperazine (Compazine) 25 mg PRN Q12HR  PRN CA NAUSEA/VOMITING;  Start 

9/30/19 at 09:15


Famotidine (Pepcid Vial) 20 mg BID IVP  Last administered on 10/1/19at 09:05;  

Start 9/30/19 at 11:00;  Stop 10/1/19 at 11:22;  Status DC


Sodium Chloride (Normal Saline Flush) 3 ml QSHIFT  PRN IV AFTER MEDS AND BLOOD 

DRAWS;  Start 9/30/19 at 09:15


Bisacodyl (Dulcolax Supp) 10 mg PRN DAILY  PRN CA CONSTIPATION;  Start 9/30/19 

at 09:15


Sodium Chloride 1,000 ml @  75 mls/hr [id number] IV  Last administered on 10/8/19at

07:52;  Start 9/30/19 at 09:30


Influenza Virus Vaccine Quadrival (Afluria Quad 2019-20 (3yr Up) Syringe) 0.5 ml

ONCE ONCE VAX IM  Last administered on 9/30/19at 15:33;  Start 9/30/19 at 14:15;

 Stop 9/30/19 at 14:16;  Status DC


Piperacillin Sod/ Tazobactam Sod 2.25 gm/Sodium Chloride 50 ml @  100 mls/hr 

Q6HRS IV  Last administered on 10/4/19at 11:42;  Start 10/1/19 at 12:00;  Stop 

10/4/19 at 17:14;  Status DC


Famotidine (Pepcid Vial) 20 mg DAILY IVP ;  Start 10/2/19 at 09:00;  Stop 

10/1/19 at 11:57;  Status DC


Famotidine (Pepcid) 20 mg DAILY PO  Last administered on 10/3/19at 08:06;  Start

10/2/19 at 09:00;  Stop 10/3/19 at 11:26;  Status DC


Levetiracetam (Keppra) 500 mg BID PO  Last administered on 10/7/19at 21:04;  St

art 10/1/19 at 15:00


Acetaminophen (Tylenol) 650 mg PRN Q6HRS  PRN PO PAIN Last administered on 

10/7/19at 12:27;  Start 10/1/19 at 15:30


Vancomycin HCl 0.5 gm/Sodium Chloride 250 ml @  166.667 mls/hr 1X  ONCE IV ;  

Start 10/1/19 at 18:00;  Stop 10/1/19 at 19:29;  Status UNV


Vancomycin HCl 1.5 gm/Sodium Chloride 500 ml @  250 mls/hr 1X  ONCE IV  Last 

administered on 10/1/19at 19:16;  Start 10/1/19 at 19:00;  Stop 10/1/19 at 

20:59;  Status DC


Vancomycin HCl 1 gm/Sodium Chloride 250 ml @  250 mls/hr Q24H IV ;  Start 

10/2/19 at 18:00;  Stop 10/2/19 at 13:04;  Status DC


Vancomycin HCl (Vancomycin Trough Level) 1 each 1X  ONCE MC ;  Start 10/3/19 at 

17:30;  Stop 10/3/19 at 17:31;  Status Cancel


Vancomycin HCl (Vanco Per Pharmacy) 1 each PRN DAILY  PRN MC SEE COMMENTS Last 

administered on 10/1/19at 18:08;  Start 10/1/19 at 18:15;  Stop 10/2/19 at 

13:04;  Status DC


Piperacillin Sod/ Tazobactam Sod (Zosyn Per Pharmacy) 1 each PRN DAILY  PRN MC 

SEE COMMENTS;  Start 10/1/19 at 18:15;  Status UNV


Enoxaparin Sodium (Lovenox 30mg Syringe) 30 mg Q24H SQ  Last administered on 

10/3/19at 16:29;  Start 10/2/19 at 16:00;  Stop 10/4/19 at 11:41;  Status DC


Lactobacillus Rhamnosus (Culturelle) 1 cap BID PO  Last administered on 

10/7/19at 21:04;  Start 10/2/19 at 21:00


Non-Formulary Medication 1 ea PRN Q6HRS  PRN TP PAIN Last administered on 

10/3/19at 21:11;  Start 10/2/19 at 18:00


Pantoprazole Sodium (PROTONIX VIAL for IV PUSH) 40 mg DAILYAC IVP  Last 

administered on 10/4/19at 08:00;  Start 10/3/19 at 08:00;  Stop 10/4/19 at 

09:55;  Status DC


Potassium Chloride (Klor-Con) 40 meq 1X  ONCE PO ;  Start 10/3/19 at 09:30;  

Stop 10/3/19 at 09:31;  Status DC


Propofol 20 ml @ As Directed STK-MED ONCE IV ;  Start 10/3/19 at 13:37;  Stop 

10/3/19 at 13:38;  Status DC


Ringer's Solution 1,000 ml @  75 mls/hr [id number] IV  Last administered on 

10/3/19at 13:45;  Start 10/3/19 at 15:45;  Stop 10/4/19 at 02:31;  Status DC


Ferrous Sulfate (Feosol) 325 mg BID PO  Last administered on 10/7/19at 21:04;  

Start 10/4/19 at 10:00


Polyethylene Glycol (miraLAX PACKET) 17 gm DAILY PO  Last administered on 

10/7/19at 09:08;  Start 10/4/19 at 10:00


Pantoprazole Sodium (Protonix) 40 mg DAILYAC PO  Last administered on 10/7/19at 

09:08;  Start 10/5/19 at 07:30


Potassium Chloride (Klor-Con) 40 meq 1X  ONCE PO  Last administered on 10/4/19at

11:42;  Start 10/4/19 at 11:00;  Stop 10/4/19 at 11:01;  Status DC


Potassium Chloride (Klor-Con) 20 meq DAILYWBKFT PO  Last administered on 

10/7/19at 09:08;  Start 10/5/19 at 08:00


Enoxaparin Sodium (Lovenox 40mg Syringe) 40 mg Q24H SQ  Last administered on 

10/7/19at 14:53;  Start 10/4/19 at 16:00


Amoxicillin/ Clavulanate Potassium (Augmentin 500/ 125mg) 1 tab BID PO  Last 

administered on 10/7/19at 21:04;  Start 10/4/19 at 21:00;  Stop 10/8/19 at 23:59





Active Scripts


Active


Keppra (Levetiracetam) 500 Mg Tablet 500 Mg PO BID


Meclizine Hcl 25 Mg Tablet 1 Tab PO PRN TID PRN


Reported


Vitamin D3 (Cholecalciferol (Vitamin D3)) 1,000 Unit Tablet 1 Tab PO DAILYBFRSUP


Voltaren (Diclofenac Sodium) 100 Gm Gel..gram. 1 Gm TP PRN QID


Aspirin 81 Mg Tab.chew 1 Tab PO DAILY


Simvastatin 40 Mg Tablet 1 Tab PO QHS


Atenolol 25 Mg Tablet 1 Tab PO DAILY


Vitals/I & O





Vital Sign - Last 24 Hours








 10/7/19 10/7/19 10/7/19 10/7/19





 11:00 15:00 19:00 20:00


 


Temp 97.8 97.8 98.4 





 97.8 97.8 98.4 


 


Pulse 88 88 86 


 


Resp 16 16 18 


 


B/P (MAP) 162/77 (105) 160/72 (101) 170/71 (104) 


 


Pulse Ox 97 98 92 


 


O2 Delivery Room Air Room Air Room Air Room Air


 


    





    





 10/7/19 10/8/19 10/8/19 10/8/19





 23:00 03:00 07:00 08:00


 


Temp 98.4 99.5 97.8 





 98.4 99.5 97.8 


 


Pulse 87 90 88 


 


Resp 18 18 16 


 


B/P (MAP) 173/96 (121) 168/65 (99) 153/69 (97) 


 


Pulse Ox 95 98 95 


 


O2 Delivery Room Air Room Air Room Air Room Air


 


O2 Flow Rate    3.0














Intake and Output   


 


 10/7/19 10/7/19 10/8/19





 14:59 22:59 06:59


 


Intake Total 100 ml 50 ml 200 ml


 


Output Total 900 ml 300 ml 300 ml


 


Balance -800 ml -250 ml -100 ml

















KATELYNN ABDULLAHI MD           Oct 8, 2019 10:51

## 2019-10-08 NOTE — NUR
ALICIA following pt. Insurance has approved SNU. ALICIA phoned and faxed orders to [facility]. Pt will 
transport via facility arranged w/v van at 1700. Pt's daughter, Ann Marie notified and 
agreeable with dc plan. 

[facility] worker, Winsome, phone: [phone]  also came in to visit pt today regarding report 
that was filed on admission. SW discussed Urine test was negative x 5 since admission. [facility] 
worker will make arrangements to contact family and she is awaiting on medical records from 
hospital. Discussed with RN.

## 2019-10-08 NOTE — NUR
ALICIA following pt. [facility] has accepted pt pending insurance authLuis M Coronado will submit for auth 
this morning. Will continue to follow.

## 2019-10-08 NOTE — SNU/HH DC
DISCHARGE ORDERS


DISCHARGE INFORMATION:


FINAL DIAGNOSIS


Problems


Medical Problems:


(1) Acute respiratory failure with hypoxia


Status: Acute  








CONDITION ON DISCHARGE:  Stable





CODE STATUS:


Code Status:  DNR/DNI





SKILLED NURSING:


SNF STAY <30 DAYS:  Yes





HOSPICE:


HOSPICE:  No


HOSPICE EVAL & TREAT:  No





POST DISCHARGE ORDERS:


ACTIVITY ORDERS:  Activity as tolerated


WEIGHT BEARING STATUS:  As tolerated


DIET AFTER DISCHARGE:  Cardiac





CHECKS AFTER DISCHARGE:


CHECKS AFTER DISCHARGE:  Check blood press - daily, Check blood sugar, ac/hs, 

Check your Temp as needed





FOLLOW-UP:


LAB ORDERS FOR FOLLOW-UP:  BMP weekly





TREATMENT/EQUIPMENT ORDERS:


ADAPTIVE EQUIPMENT NEEDED:  None


Physical Therapy For:  Evalulation/Treatment


Occupational Therapy For:  Evaluation/Treatment


Speech Language Pathology For:  Evaluation/Treatment





DISCHARGE MEDICATIONS:


Home Meds


Active Scripts


Levetiracetam (KEPPRA) 500 Mg Tablet, 500 MG PO BID for seizure prevention, #60 

TAB 2 Refills


   Prov:AUNG GRIMES MD         1/24/19


Meclizine Hcl (MECLIZINE HCL) 25 Mg Tablet, 1 TAB PO PRN TID PRN for DIZZINESS, 

#30 TAB


   Prov:KINGS ROSAS MD         10/29/16


Reported Medications


Cholecalciferol (Vitamin D3) (VITAMIN D3) 1,000 Unit Tablet, 1 TAB PO 

DAILYBFRSUP for n/a, #30 TAB 5 Refills


   1/20/19


Diclofenac Sodium (VOLTAREN) 100 Gm Gel..gram., 1 GM TP PRN QID for knee pain, 

#100 GM 2 Refills


   1/20/19


Aspirin (ASPIRIN) 81 Mg Tab.chew, 1 TAB PO DAILY for blood thinner, #30 TAB 3 

Refills


   10/29/16


Simvastatin (SIMVASTATIN) 40 Mg Tablet, 1 TAB PO QHS for elevated cholesterol, 

#30 TAB 5 Refills


   10/29/16


Atenolol (ATENOLOL) 25 Mg Tablet, 1 TAB PO DAILY for htn, #30 TAB 5 Refills


   10/29/16











KATELYNN ABDULLAHI MD           Oct 8, 2019 13:19

## 2019-10-08 NOTE — NUR
report given to GINA Singh at [facility].  meds, vitals, labs reviewed.  IV removed, cath intact. 
daughter accompanied pt to [facility].

## 2019-10-12 ENCOUNTER — HOSPITAL ENCOUNTER (INPATIENT)
Dept: HOSPITAL 61 - ER | Age: 84
LOS: 10 days | Discharge: NURSING, REHAB OR LONG-TERM CARE | DRG: 91 | End: 2019-10-22
Attending: FAMILY MEDICINE | Admitting: FAMILY MEDICINE
Payer: MEDICARE

## 2019-10-12 VITALS — BODY MASS INDEX: 24.6 KG/M2 | HEIGHT: 64 IN | WEIGHT: 144.12 LBS

## 2019-10-12 VITALS — DIASTOLIC BLOOD PRESSURE: 84 MMHG | SYSTOLIC BLOOD PRESSURE: 175 MMHG

## 2019-10-12 DIAGNOSIS — I25.2: ICD-10-CM

## 2019-10-12 DIAGNOSIS — I50.9: ICD-10-CM

## 2019-10-12 DIAGNOSIS — G92: Primary | ICD-10-CM

## 2019-10-12 DIAGNOSIS — K21.9: ICD-10-CM

## 2019-10-12 DIAGNOSIS — I25.10: ICD-10-CM

## 2019-10-12 DIAGNOSIS — E43: ICD-10-CM

## 2019-10-12 DIAGNOSIS — N39.0: ICD-10-CM

## 2019-10-12 DIAGNOSIS — E78.00: ICD-10-CM

## 2019-10-12 DIAGNOSIS — B96.20: ICD-10-CM

## 2019-10-12 DIAGNOSIS — I11.0: ICD-10-CM

## 2019-10-12 DIAGNOSIS — J96.01: ICD-10-CM

## 2019-10-12 DIAGNOSIS — R13.10: ICD-10-CM

## 2019-10-12 DIAGNOSIS — M16.11: ICD-10-CM

## 2019-10-12 DIAGNOSIS — Z95.5: ICD-10-CM

## 2019-10-12 DIAGNOSIS — E87.6: ICD-10-CM

## 2019-10-12 DIAGNOSIS — Z79.899: ICD-10-CM

## 2019-10-12 DIAGNOSIS — J69.0: ICD-10-CM

## 2019-10-12 DIAGNOSIS — M19.90: ICD-10-CM

## 2019-10-12 DIAGNOSIS — F03.90: ICD-10-CM

## 2019-10-12 DIAGNOSIS — Z51.5: ICD-10-CM

## 2019-10-12 DIAGNOSIS — Z82.49: ICD-10-CM

## 2019-10-12 DIAGNOSIS — Z79.82: ICD-10-CM

## 2019-10-12 DIAGNOSIS — E11.9: ICD-10-CM

## 2019-10-12 DIAGNOSIS — D50.9: ICD-10-CM

## 2019-10-12 DIAGNOSIS — Z90.710: ICD-10-CM

## 2019-10-12 DIAGNOSIS — E78.5: ICD-10-CM

## 2019-10-12 DIAGNOSIS — Z86.73: ICD-10-CM

## 2019-10-12 LAB
ACANTHOCYTES BLD QL SMEAR: (no result)
ALBUMIN SERPL-MCNC: 3 G/DL (ref 3.4–5)
ALBUMIN/GLOB SERPL: 0.8 {RATIO} (ref 1–1.7)
ALP SERPL-CCNC: 67 U/L (ref 46–116)
ALT SERPL-CCNC: < 6 U/L (ref 14–59)
AMPHETAMINE/METHAMPHETAMINE: (no result)
ANION GAP SERPL CALC-SCNC: 12 MMOL/L (ref 6–14)
ANISOCYTOSIS BLD QL SMEAR: (no result)
APTT PPP: YELLOW S
AST SERPL-CCNC: 15 U/L (ref 15–37)
BACTERIA #/AREA URNS HPF: 0 /HPF
BARBITURATES UR-MCNC: (no result) UG/ML
BASOPHILS # BLD AUTO: 0 X10^3/UL (ref 0–0.2)
BASOPHILS NFR BLD: 1 % (ref 0–3)
BENZODIAZ UR-MCNC: (no result) UG/L
BILIRUB SERPL-MCNC: 0.4 MG/DL (ref 0.2–1)
BILIRUB UR QL STRIP: NEGATIVE
BIZZARE CELLS: (no result)
BUN SERPL-MCNC: 13 MG/DL (ref 7–20)
BUN/CREAT SERPL: 14 (ref 6–20)
CALCIUM SERPL-MCNC: 9.1 MG/DL (ref 8.5–10.1)
CANNABINOIDS UR-MCNC: (no result) UG/L
CHLORIDE SERPL-SCNC: 104 MMOL/L (ref 98–107)
CO2 SERPL-SCNC: 25 MMOL/L (ref 21–32)
COCAINE UR-MCNC: (no result) NG/ML
CREAT SERPL-MCNC: 0.9 MG/DL (ref 0.6–1)
EOSINOPHIL NFR BLD: 0 % (ref 0–3)
EOSINOPHIL NFR BLD: 0 X10^3/UL (ref 0–0.7)
ERYTHROCYTE [DISTWIDTH] IN BLOOD BY AUTOMATED COUNT: 25.7 % (ref 11.5–14.5)
FIBRINOGEN PPP-MCNC: CLEAR MG/DL
GFR SERPLBLD BASED ON 1.73 SQ M-ARVRAT: 72.2 ML/MIN
GLOBULIN SER-MCNC: 3.9 G/DL (ref 2.2–3.8)
GLUCOSE SERPL-MCNC: 131 MG/DL (ref 70–99)
HCT VFR BLD CALC: 30.2 % (ref 36–47)
HGB BLD-MCNC: 9.7 G/DL (ref 12–15.5)
HYPOCHROMIA BLD QL SMEAR: (no result)
LYMPHOCYTES # BLD: 0.8 X10^3/UL (ref 1–4.8)
LYMPHOCYTES NFR BLD AUTO: 10 % (ref 24–48)
MCH RBC QN AUTO: 23 PG (ref 25–35)
MCHC RBC AUTO-ENTMCNC: 32 G/DL (ref 31–37)
MCV RBC AUTO: 72 FL (ref 79–100)
METHADONE SERPL-MCNC: (no result) NG/ML
MICROCYTES BLD QL SMEAR: (no result)
MONO #: 0.5 X10^3/UL (ref 0–1.1)
MONOCYTES NFR BLD: 7 % (ref 0–9)
NEUT #: 6.1 X10^3/UL (ref 1.8–7.7)
NEUTROPHILS NFR BLD AUTO: 82 % (ref 31–73)
NITRITE UR QL STRIP: NEGATIVE
OPIATES UR-MCNC: (no result) NG/ML
OVALOCYTES BLD QL SMEAR: (no result)
PCP SERPL-MCNC: (no result) MG/DL
PH UR STRIP: 5.5 [PH]
PLATELET # BLD AUTO: 296 X10^3/UL (ref 140–400)
PLATELET # BLD EST: ADEQUATE 10*3/UL
POIKILOCYTOSIS BLD QL SMEAR: (no result)
POTASSIUM SERPL-SCNC: 3.8 MMOL/L (ref 3.5–5.1)
PROT SERPL-MCNC: 6.9 G/DL (ref 6.4–8.2)
PROT UR STRIP-MCNC: NEGATIVE MG/DL
RBC # BLD AUTO: 4.2 X10^6/UL (ref 3.5–5.4)
RBC #/AREA URNS HPF: (no result) /HPF (ref 0–2)
SCHISTOCYTES BLD QL SMEAR: (no result)
SODIUM SERPL-SCNC: 141 MMOL/L (ref 136–145)
SQUAMOUS #/AREA URNS LPF: (no result) /LPF
UROBILINOGEN UR-MCNC: 1 MG/DL
WBC # BLD AUTO: 7.4 X10^3/UL (ref 4–11)
WBC #/AREA URNS HPF: (no result) /HPF (ref 0–4)
YEAST #/AREA URNS HPF: PRESENT /HPF

## 2019-10-12 PROCEDURE — 81001 URINALYSIS AUTO W/SCOPE: CPT

## 2019-10-12 PROCEDURE — 87186 SC STD MICRODIL/AGAR DIL: CPT

## 2019-10-12 PROCEDURE — 87086 URINE CULTURE/COLONY COUNT: CPT

## 2019-10-12 PROCEDURE — 82962 GLUCOSE BLOOD TEST: CPT

## 2019-10-12 PROCEDURE — 74176 CT ABD & PELVIS W/O CONTRAST: CPT

## 2019-10-12 PROCEDURE — 80307 DRUG TEST PRSMV CHEM ANLYZR: CPT

## 2019-10-12 PROCEDURE — 85025 COMPLETE CBC W/AUTO DIFF WBC: CPT

## 2019-10-12 PROCEDURE — 80053 COMPREHEN METABOLIC PANEL: CPT

## 2019-10-12 PROCEDURE — 71045 X-RAY EXAM CHEST 1 VIEW: CPT

## 2019-10-12 PROCEDURE — 51701 INSERT BLADDER CATHETER: CPT

## 2019-10-12 PROCEDURE — 82140 ASSAY OF AMMONIA: CPT

## 2019-10-12 PROCEDURE — 87804 INFLUENZA ASSAY W/OPTIC: CPT

## 2019-10-12 PROCEDURE — 36415 COLL VENOUS BLD VENIPUNCTURE: CPT

## 2019-10-12 PROCEDURE — 87040 BLOOD CULTURE FOR BACTERIA: CPT

## 2019-10-12 PROCEDURE — 96374 THER/PROPH/DIAG INJ IV PUSH: CPT

## 2019-10-12 PROCEDURE — 83605 ASSAY OF LACTIC ACID: CPT

## 2019-10-12 PROCEDURE — 94640 AIRWAY INHALATION TREATMENT: CPT

## 2019-10-12 PROCEDURE — 93005 ELECTROCARDIOGRAM TRACING: CPT

## 2019-10-12 PROCEDURE — 70450 CT HEAD/BRAIN W/O DYE: CPT

## 2019-10-12 PROCEDURE — 85651 RBC SED RATE NONAUTOMATED: CPT

## 2019-10-12 PROCEDURE — 71250 CT THORAX DX C-: CPT

## 2019-10-12 PROCEDURE — G0378 HOSPITAL OBSERVATION PER HR: HCPCS

## 2019-10-12 PROCEDURE — 80048 BASIC METABOLIC PNL TOTAL CA: CPT

## 2019-10-12 NOTE — RAD
CT HEAD WO CONTRAST

 

Indication:    Decreased level of consciousness

 

Exposure: One or more of the following individualized dose reduction 

techniques were utilized for this examination:  1. Automated exposure 

control  2. Adjustment of the mA and/or kV according to patient size  3. 

Use of iterative reconstruction technique.

 

Technique: Standard imaging without intravenous contrast.

 

Comparison: 9/30/2019

 

FINDINGS:

No evidence of acute intracranial hemorrhage, mass effect, midline shift 

or abnormal extra-axial fluid collection. Low-density in the white matter 

bilaterally, a nonspecific finding, but which is commonly due to chronic 

small vessel ischemic disease in a patient of this age. Generalized 

atrophy. Intracranial arterial calcifications. Orbits are partially 

visualized and appear grossly symmetric. No large scalp hematoma. 

Calvarial hyperostosis appears similar. No evidence of depressed skull 

fracture although a specific area of tenderness or trauma is not known. 

Visualized sinuses are clear. Mastoids and auditory canals appear grossly 

clear.

 

IMPRESSION: Chronic findings, no evidence of acute intracranial 

hemorrhage.

 

Electronically signed by: Aguila Singh MD (10/12/2019 5:42 PM) [facility]

## 2019-10-12 NOTE — PHYS DOC
Past Medical History


Past Medical History:  Dementia, Diabetes-Type II


Additional Past Medical Histor:  ENCEPHALOPATHY, HERNIA


Past Surgical History:  Hysterectomy


Additional Past Surgical Histo:  cardiac stent x 3


Alcohol Use:  None


Drug Use:  None





Adult General


Chief Complaint


Chief Complaint:  ALTERED MENTAL STATUS





HPI


HPI





84-year-old female presents emergency Department with complaints of altered 

mental status. Patient has been nonverbal however generally will have some disc

ussion and follow commands. She denies any pain on examination. Son is at 

bedside. She is currently afebrile examination. She has history of 

encephalopathy, heart failure, CVA, diabetes, hypertension. Patient unable to 

provide full review of systems given her underlying dementia.





Review of Systems


Review of Systems





No fever


Altered Mental Status


Decreased oral intake





Other ROS unobtainable 2/2 patient current mental status





Allergies


Allergies





Allergies








Coded Allergies Type Severity Reaction Last Updated Verified


 


  No Known Drug Allergies    10/29/16 No











Physical Exam


Physical Exam





Constitutional: Well developed, well nourished, no acute distress, non-toxic 

appearance. []


HENT: Normocephalic, atraumatic, bilateral external ears normal, oropharynx 

moist, no oral exudates, nose normal. []


Eyes: pupils pinpoint, EOMI, conjunctiva normal, no discharge. [] 


Cardiovascular:Heart rate regular rhythm, no murmur []


Lungs & Thorax:  Bilateral breath sounds clear to auscultation []


Abdomen: Bowel sounds normal, soft, no tenderness, no masses, no pulsatile 

masses. [] 


Skin: Warm, dry, no erythema, no rash. [] 


Extremities: No tenderness, no cyanosis, no edema. [] 


Neurologic: Alert and oriented X 1, normal motor function, normal sensory 

function,


Psychologic: Affect normal, judgement normal, mood normal. []





Current Patient Data


Vital Signs





                                   Vital Signs








  Date Time  Temp Pulse Resp B/P (MAP) Pulse Ox O2 Delivery O2 Flow Rate FiO2


 


10/12/19 16:45 98.8 90 16 187/88 (121) 98 Room Air  





 98.8       








Lab Values





                                Laboratory Tests








Test


 10/12/19


17:07 10/12/19


17:48


 


White Blood Count


 7.4 x10^3/uL


(4.0-11.0) 





 


Red Blood Count


 4.20 x10^6/uL


(3.50-5.40) 





 


Hemoglobin


 9.7 g/dL


(12.0-15.5)  L 





 


Hematocrit


 30.2 %


(36.0-47.0)  L 





 


Mean Corpuscular Volume


 72 fL ()


L 





 


Mean Corpuscular Hemoglobin


 23 pg (25-35)


L 





 


Mean Corpuscular Hemoglobin


Concent 32 g/dL


(31-37) 





 


Red Cell Distribution Width


 25.7 %


(11.5-14.5)  H 





 


Platelet Count


 296 x10^3/uL


(140-400) 





 


Neutrophils (%) (Auto) 82 % (31-73)  H 


 


Lymphocytes (%) (Auto) 10 % (24-48)  L 


 


Monocytes (%) (Auto) 7 % (0-9)   


 


Eosinophils (%) (Auto) 0 % (0-3)   


 


Basophils (%) (Auto) 1 % (0-3)   


 


Neutrophils # (Auto)


 6.1 x10^3/uL


(1.8-7.7) 





 


Lymphocytes # (Auto)


 0.8 x10^3/uL


(1.0-4.8)  L 





 


Monocytes # (Auto)


 0.5 x10^3/uL


(0.0-1.1) 





 


Eosinophils # (Auto)


 0.0 x10^3/uL


(0.0-0.7) 





 


Basophils # (Auto)


 0.0 x10^3/uL


(0.0-0.2) 





 


Platelet Estimate


 Adequate


(ADEQUATE) 





 


Hypochromasia Mod   


 


Poikilocytosis Marked   


 


Anisocytosis Marked   


 


Microcytosis Mod   


 


Ovalocytes Mod   


 


Acanthocytes (Spur Cells) Occ   


 


Schistocytes Few   


 


RBC Morphology Bizarre Forms Few   


 


Sodium Level


 141 mmol/L


(136-145) 





 


Potassium Level


 3.8 mmol/L


(3.5-5.1) 





 


Chloride Level


 104 mmol/L


() 





 


Carbon Dioxide Level


 25 mmol/L


(21-32) 





 


Anion Gap 12 (6-14)   


 


Blood Urea Nitrogen


 13 mg/dL


(7-20) 





 


Creatinine


 0.9 mg/dL


(0.6-1.0) 





 


Estimated GFR


(Cockcroft-Gault) 72.2  


 





 


BUN/Creatinine Ratio 14 (6-20)   


 


Glucose Level


 131 mg/dL


(70-99)  H 





 


Lactic Acid Level


 1.0 mmol/L


(0.4-2.0) 





 


Calcium Level


 9.1 mg/dL


(8.5-10.1) 





 


Total Bilirubin


 0.4 mg/dL


(0.2-1.0) 





 


Aspartate Amino Transferase


(AST) 15 U/L (15-37)


 





 


Alanine Aminotransferase (ALT)


 < 6 U/L


(14-59)  L 





 


Alkaline Phosphatase


 67 U/L


() 





 


Ammonia


 < 10 mcmol/L


(11-34)  L 





 


Total Protein


 6.9 g/dL


(6.4-8.2) 





 


Albumin


 3.0 g/dL


(3.4-5.0)  L 





 


Albumin/Globulin Ratio


 0.8 (1.0-1.7)


L 





 


Urine Collection Type  U cath  


 


Urine Color  Yellow  


 


Urine Clarity  Clear  


 


Urine pH  5.5  


 


Urine Specific Gravity  1.020  


 


Urine Protein


 


 Negative mg/dL


(NEG-TRACE)


 


Urine Glucose (UA)


 


 Negative mg/dL


(NEG)


 


Urine Ketones (Stick)


 


 Trace mg/dL


(NEG)


 


Urine Blood  Small (NEG)  


 


Urine Nitrite


 


 Negative (NEG)





 


Urine Bilirubin


 


 Negative (NEG)





 


Urine Urobilinogen Dipstick


 


 1.0 mg/dL (0.2


mg/dL)


 


Urine Leukocyte Esterase  Small (NEG)  


 


Urine RBC


 


 3-5 /HPF (0-2)





 


Urine WBC


 


 5-10 /HPF


(0-4)


 


Urine Squamous Epithelial


Cells 


 Few /LPF  





 


Urine Bacteria


 


 0 /HPF (0-FEW)





 


Urine Mucus  Slight /LPF  


 


Urine Yeast  Present /HPF  


 


Urine Opiates Screen  Neg (NEG)  


 


Urine Methadone Screen  Neg (NEG)  


 


Urine Barbiturates  Neg (NEG)  


 


Urine Phencyclidine Screen  Neg (NEG)  


 


Urine


Amphetamine/Methamphetamine 


 Neg (NEG)  





 


Urine Benzodiazepines Screen  Neg (NEG)  


 


Urine Cocaine Screen  Neg (NEG)  


 


Urine Cannabinoids Screen  Neg (NEG)  


 


Urine Ethyl Alcohol  Neg (NEG)  





                                Laboratory Tests


10/12/19 17:07








                                Laboratory Tests


10/12/19 17:07











EKG


EKG


EKG reviewed, normal sinus rhythm, heart rate 93, no evidence of acute ST 

elevation, left axis deviation.[]


Interpretation Time:


Interpretation 1640





Radiology/Procedures


Radiology/Procedures


[facility]


                    [address]  [place], [place] [id number] [phone]


                                        


                                 IMAGING REPORT





                                     Signed





PATIENT: MARY OBREGON  ACCOUNT: [id number]     MRN#: [id number]


: 1935           LOCATION: ER              AGE: 84


SEX: F                    EXAM DT: 10/12/19         ACCESSION#: [id number]


STATUS: REG ER            ORD. PHYSICIAN: HAYDEN GARCIA MD


REASON: ams


PROCEDURE: CT HEAD WO CONTRAST





CT HEAD WO CONTRAST


 


Indication:    Decreased level of consciousness


 


Exposure: One or more of the following individualized dose reduction 


techniques were utilized for this examination:  1. Automated exposure 


control  2. Adjustment of the mA and/or kV according to patient size  3. 


Use of iterative reconstruction technique.


 


Technique: Standard imaging without intravenous contrast.


 


Comparison: 2019


 


FINDINGS:


No evidence of acute intracranial hemorrhage, mass effect, midline shift 


or abnormal extra-axial fluid collection. Low-density in the white matter 


bilaterally, a nonspecific finding, but which is commonly due to chronic 


small vessel ischemic disease in a patient of this age. Generalized 


atrophy. Intracranial arterial calcifications. Orbits are partially 


visualized and appear grossly symmetric. No large scalp hematoma. 


Calvarial hyperostosis appears similar. No evidence of depressed skull 


fracture although a specific area of tenderness or trauma is not known. 


Visualized sinuses are clear. Mastoids and auditory canals appear grossly 


clear.


 


IMPRESSION: Chronic findings, no evidence of acute intracranial 


hemorrhage.


 


Electronically signed by: Aguila Singh MD (10/12/2019 5:42 PM) [facility]














DICTATED and SIGNED BY:     AGUILA SINGH MD


DATE:     10/12/19 1742


[]





Course & Med Decision Making


Course & Med Decision Making


Pertinent Labs and Imaging studies reviewed. (See chart for details)





[]84-year-old female presents emergency Department with complaints of altered 

mental status. Patient has been nonverbal however generally will have some 

discussion and follow commands. She denies any pain on examination. Son is at 

bedside. She is currently afebrile examination. She has history of 

encephalopathy, heart failure, CVA, diabetes, hypertension. Patient unable to 

provide full review of systems given her underlying dementia.





Labs reviewed, sodium 141, HemoCue 9.7, white count within normal limits, a

mmonia level less than 10, glucose 131.


CT of head reveals no areas of acute intracranial process, chest x-ray revealed 

no evidence of acute consolidation





Discussed case with Dr. Tiwari will assume care 1800





Dragon Disclaimer


Dragon Disclaimer


This electronic medical record was generated, in whole or in part, using a voice

 recognition dictation system.





Departure


Departure


Referrals:  


UNKNOWN PCP NAME (PCP)











HAYDEN GARCIA MD              Oct 12, 2019 16:54

## 2019-10-12 NOTE — RAD
PORTABLE CHEST 1V

 

History: Altered mental status

 

Comparison: 10/1/2019

 

Findings:

Single view of the chest is submitted. 

There is fairly large hiatal hernia. Cardiac silhouette is similar. There 

is atherosclerotic calcification near aortic arch, somewhat tortuous 

thoracic aorta. There is no lobar consolidation, pleural fluid, 

pneumothorax. There is degenerative change of the shoulders bilaterally.

 

Impression: 

 

1.  There is again hiatal hernia. No acute radiographic abnormality is 

identified.

 

Electronically signed by: Russ Willoughby MD (10/12/2019 9:21 PM) [facility]

## 2019-10-13 VITALS — DIASTOLIC BLOOD PRESSURE: 79 MMHG | SYSTOLIC BLOOD PRESSURE: 139 MMHG

## 2019-10-13 VITALS — SYSTOLIC BLOOD PRESSURE: 119 MMHG | DIASTOLIC BLOOD PRESSURE: 51 MMHG

## 2019-10-13 VITALS — DIASTOLIC BLOOD PRESSURE: 83 MMHG | SYSTOLIC BLOOD PRESSURE: 141 MMHG

## 2019-10-13 VITALS — SYSTOLIC BLOOD PRESSURE: 157 MMHG | DIASTOLIC BLOOD PRESSURE: 74 MMHG

## 2019-10-13 VITALS — DIASTOLIC BLOOD PRESSURE: 60 MMHG | SYSTOLIC BLOOD PRESSURE: 131 MMHG

## 2019-10-13 VITALS — SYSTOLIC BLOOD PRESSURE: 150 MMHG | DIASTOLIC BLOOD PRESSURE: 107 MMHG

## 2019-10-13 LAB
ANION GAP SERPL CALC-SCNC: 10 MMOL/L (ref 6–14)
BASOPHILS # BLD AUTO: 0 X10^3/UL (ref 0–0.2)
BASOPHILS NFR BLD: 0 % (ref 0–3)
BUN SERPL-MCNC: 12 MG/DL (ref 7–20)
CALCIUM SERPL-MCNC: 8.8 MG/DL (ref 8.5–10.1)
CHLORIDE SERPL-SCNC: 107 MMOL/L (ref 98–107)
CO2 SERPL-SCNC: 25 MMOL/L (ref 21–32)
CREAT SERPL-MCNC: 0.9 MG/DL (ref 0.6–1)
EOSINOPHIL NFR BLD: 0.1 X10^3/UL (ref 0–0.7)
EOSINOPHIL NFR BLD: 2 % (ref 0–3)
ERYTHROCYTE [DISTWIDTH] IN BLOOD BY AUTOMATED COUNT: 25.5 % (ref 11.5–14.5)
GFR SERPLBLD BASED ON 1.73 SQ M-ARVRAT: 72.2 ML/MIN
GLUCOSE SERPL-MCNC: 100 MG/DL (ref 70–99)
HCT VFR BLD CALC: 27.4 % (ref 36–47)
HGB BLD-MCNC: 8.6 G/DL (ref 12–15.5)
LYMPHOCYTES # BLD: 1 X10^3/UL (ref 1–4.8)
LYMPHOCYTES NFR BLD AUTO: 16 % (ref 24–48)
MCH RBC QN AUTO: 23 PG (ref 25–35)
MCHC RBC AUTO-ENTMCNC: 32 G/DL (ref 31–37)
MCV RBC AUTO: 73 FL (ref 79–100)
MONO #: 0.6 X10^3/UL (ref 0–1.1)
MONOCYTES NFR BLD: 10 % (ref 0–9)
NEUT #: 4.4 X10^3/UL (ref 1.8–7.7)
NEUTROPHILS NFR BLD AUTO: 71 % (ref 31–73)
PLATELET # BLD AUTO: 260 X10^3/UL (ref 140–400)
POTASSIUM SERPL-SCNC: 3.6 MMOL/L (ref 3.5–5.1)
RBC # BLD AUTO: 3.79 X10^6/UL (ref 3.5–5.4)
SODIUM SERPL-SCNC: 142 MMOL/L (ref 136–145)
WBC # BLD AUTO: 6.2 X10^3/UL (ref 4–11)

## 2019-10-13 RX ADMIN — ATENOLOL SCH MG: 25 TABLET ORAL at 12:09

## 2019-10-13 RX ADMIN — SIMVASTATIN SCH MG: 40 TABLET, FILM COATED ORAL at 20:32

## 2019-10-13 RX ADMIN — Medication SCH CAP: at 20:34

## 2019-10-13 RX ADMIN — BACITRACIN SCH MLS/HR: 5000 INJECTION, POWDER, FOR SOLUTION INTRAMUSCULAR at 15:33

## 2019-10-13 RX ADMIN — BACITRACIN SCH MLS/HR: 5000 INJECTION, POWDER, FOR SOLUTION INTRAMUSCULAR at 03:58

## 2019-10-13 RX ADMIN — AMOXICILLIN AND CLAVULANATE POTASSIUM SCH TAB: 500; 125 TABLET, FILM COATED ORAL at 12:09

## 2019-10-13 RX ADMIN — BACITRACIN SCH MLS/HR: 5000 INJECTION, POWDER, FOR SOLUTION INTRAMUSCULAR at 08:55

## 2019-10-13 RX ADMIN — ASPIRIN 81 MG SCH MG: 81 TABLET ORAL at 12:10

## 2019-10-13 RX ADMIN — AMOXICILLIN AND CLAVULANATE POTASSIUM SCH TAB: 500; 125 TABLET, FILM COATED ORAL at 20:33

## 2019-10-13 RX ADMIN — VITAMIN D, TAB 1000IU (100/BT) SCH UNIT: 25 TAB at 16:50

## 2019-10-13 NOTE — PDOC1
History and Physical


Date of Admission


Date of Admission


DATE: 10/13/19 


TIME: 10:40





Identification/Chief Complaint


Chief Complaint


presented  emergency Department with complaints of altered mental status.  

follow commands. family suspects nursing home staff overmedicated her due to her

 behaviors





SHE IS VERY ALERT THIS MORNING, Seems at baseline on my exam, eating lunch in 

chair








family not in room, at [facility] according to patient





Past Medical History


Past Medical History





IMPRESSION








Mild cardiomegaly.


Large hiatal hernia.


Altered mental status , improved this AM, 10/13


Acute respiratory failure with hypoxia RESOLVED , SEC TO ? OVERDOSE OF KLONOPIN 

, HOWEVER UDS NEG FOR BENZODIAZEPINES  X 5   last admit


Intentional// accidental ?  drug overdose LAST ADMIT 


TOXIC Encephalopathy, RESOLVED


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


Mild to moderate chronic small vessel ischemic changes and atrophy.on CT HEAD


DIABETES


HYPERTENSION HX


HYPOKALEMIA 


Severe protein-caloric malnutrition


GERD


MICROCYTIC ANEMIA


Fever, possible aspiration last admit


Moderate left and minimal right hip osteoarthritis.


Degenerative change at the lower lumbar levels


DEBILITY


Cognitive decline , seems expected for age


Cardiovascular:  CAD, CHF, HTN, Hyperlipidemia


Pulmonary:  No pertinent hx


CENTRAL NERVOUS SYSTEM:  Dementia, Seizure


GI:  No pertinent hx


Heme/Onc:  No pertinent hx


Hepatobiliary:  No pertinent hx


Psych:  No pertinent hx


Musculoskeletal:  Osteoarthritis


Rheumatologic:  No pertinent hx


Infectious disease:  No pertinent hx


Renal/:  No pertinent hx


Endocrine:  Diabetes





Past Surgical History


Past Surgical History:  Appendectomy, Hysterectomy





Family History


Family History:  Hypertension


Family History:  Parent





Social History


Smoke:  No


ALCOHOL:  none


Drugs:  None





Current Problem List


Problem List


Problems


Medical Problems:


(1) Altered mental status


Status: Acute  











Current Medications


Current Medications





Current Medications


Ondansetron HCl (Zofran) 4 mg 1X  ONCE IV  Last administered on 10/12/19at 

19:17;  Start 10/12/19 at 19:00;  Stop 10/12/19 at 19:01;  Status DC


Ondansetron HCl (Zofran) 4 mg PRN Q8HRS  PRN IV NAUSEA/VOMITING;  Start 10/12/19

at 19:45;  Stop 10/13/19 at 19:44


Sodium Chloride 1,000 ml @  75 mls/hr [id number] IV  Last administered on 

10/13/19at 03:58;  Start 10/12/19 at 19:35;  Stop 10/13/19 at 19:34


Acetaminophen (Tylenol) 650 mg PRN Q4HRS  PRN PO FEVER;  Start 10/12/19 at 

19:45;  Stop 10/13/19 at 19:44





Active Scripts


Active


Keppra (Levetiracetam) 500 Mg Tablet 500 Mg PO BID


Meclizine Hcl 25 Mg Tablet 1 Tab PO PRN TID PRN


Reported


Vitamin D3 (Cholecalciferol (Vitamin D3)) 1,000 Unit Tablet 1 Tab PO DAILYBFRSUP


Voltaren (Diclofenac Sodium) 100 Gm Gel..gram. 1 Gm TP PRN QID


Aspirin 81 Mg Tab.chew 1 Tab PO DAILY


Simvastatin 40 Mg Tablet 1 Tab PO QHS


Atenolol 25 Mg Tablet 1 Tab PO DAILY





Allergies


Allergies:  


Coded Allergies:  


     No Known Drug Allergies (Unverified , 10/29/16)





ROS


Review of System





Review of Systems


Review of Systems





No fever


Altered Mental Status


Decreased oral intake





Other ROS unobtainable 2/2 patient current mental status


General:  YES: Fatigue, Malaise; 


   No: Chills, Night Sweats, Appetite, Other


PSYCHOLOGICAL ROS:  YES: Anxiety, Behavioral Disorder, Concentration difficultie

, Disorientation, Memory difficulties; 


   No: Decreased libido, Depression, Hallucinations, Hostility, Irritablity, 

Mood Swings, Obsessive thoughts, Physical abuse, Sexual abuse, Sleep 

disturbances, Suicidal ideation, Other


Eyes:  Yes Decreased vision


ALLERGY AND IMMUNOLOGY:  No: Hives, Insect Bite Sensitivity, Itchy/Watery Eyes, 

Nasal Congestion, Post Nasal Drip, Seasonal Allergies, Other


Hematological and Lymphatic:  No: Bleeding Problems, Blood Clots, Blood 

Transfusions, Brusing, Night Sweats, Pallor, Swollen Lymph Nodes, Other


Respiratory:  No: Cough, Hemoptysis, Orthopnea, Pleuritic Pain, Shortness of 

breath, SOB with excertion, Sputum Changes, Stridor, Tachypnea, Wheezing, Other


Cardiovascular:  No Chest Pain, No Palpitations, No Orthopnea, No Paroxysmal N

oc. Dyspnea, No Edema, No Lt Headedness, No Other


Gastrointestinal:  No Nausea, No Vomiting, No Abdominal Pain, No Diarrhea, No 

Constipation, No Melena, No Hematochezia, No Other


Musculoskeletal:  Yes Joint Stiffness


Neurological:  Yes Gait Disturbance


Skin:  Yes Dry Skin





Physical Exam


Physical Exam


HENT: Normocephalic, atraumatic, bilateral external ears normal, oropharynx 

moist, no oral exudates, nose normal. []


Eyes: pupils pinpoint, EOMI, conjunctiva normal, no discharge. [] 


Cardiovascular:Heart rate regular rhythm, no murmur []


Lungs & Thorax:  Bilateral breath sounds clear to auscultation []


Abdomen: Bowel sounds normal, soft, no tenderness, no masses, no pulsatile 

masses. [] 


Skin: Warm, dry, no erythema, no rash. [] 


Extremities: No tenderness, no cyanosis, no edema. [] 


Neurologic: seems at baseline


General:  Alert, Cooperative, No acute distress


HEENT:  Atraumatic, EOMI, Mucous membr. moist/pink


Lungs:  Clear to auscultation


Heart:  RRR


Breasts:  Not examined


Abdomen:  Normal bowel sounds, Soft


Rectal Exam:  not examined


PELVIC:  Examination not indicated


Extremities:  No cyanosis


Skin:  No breakdown


Neuro:  Normal speech, Cranial nerves 3-12 NL


Psych/Mental Status:  Mood NL





Vitals


Vitals





Vital Signs








  Date Time  Temp Pulse Resp B/P (MAP) Pulse Ox O2 Delivery O2 Flow Rate FiO2


 


10/13/19 08:00      Room Air  


 


10/13/19 07:42 97.9 81 18 119/51 (73) 97   





 97.9       











Labs


Labs





Laboratory Tests








Test


 10/12/19


17:07 10/12/19


17:48 10/12/19


21:32 10/13/19


04:35


 


White Blood Count


 7.4 x10^3/uL


(4.0-11.0) 


 


 6.2 x10^3/uL


(4.0-11.0)


 


Red Blood Count


 4.20 x10^6/uL


(3.50-5.40) 


 


 3.79 x10^6/uL


(3.50-5.40)


 


Hemoglobin


 9.7 g/dL


(12.0-15.5) 


 


 8.6 g/dL


(12.0-15.5)


 


Hematocrit


 30.2 %


(36.0-47.0) 


 


 27.4 %


(36.0-47.0)


 


Mean Corpuscular Volume 72 fL ()    73 fL () 


 


Mean Corpuscular Hemoglobin 23 pg (25-35)    23 pg (25-35) 


 


Mean Corpuscular Hemoglobin


Concent 32 g/dL


(31-37) 


 


 32 g/dL


(31-37)


 


Red Cell Distribution Width


 25.7 %


(11.5-14.5) 


 


 25.5 %


(11.5-14.5)


 


Platelet Count


 296 x10^3/uL


(140-400) 


 


 260 x10^3/uL


(140-400)


 


Neutrophils (%) (Auto) 82 % (31-73)    71 % (31-73) 


 


Lymphocytes (%) (Auto) 10 % (24-48)    16 % (24-48) 


 


Monocytes (%) (Auto) 7 % (0-9)    10 % (0-9) 


 


Eosinophils (%) (Auto) 0 % (0-3)    2 % (0-3) 


 


Basophils (%) (Auto) 1 % (0-3)    0 % (0-3) 


 


Neutrophils # (Auto)


 6.1 x10^3/uL


(1.8-7.7) 


 


 4.4 x10^3/uL


(1.8-7.7)


 


Lymphocytes # (Auto)


 0.8 x10^3/uL


(1.0-4.8) 


 


 1.0 x10^3/uL


(1.0-4.8)


 


Monocytes # (Auto)


 0.5 x10^3/uL


(0.0-1.1) 


 


 0.6 x10^3/uL


(0.0-1.1)


 


Eosinophils # (Auto)


 0.0 x10^3/uL


(0.0-0.7) 


 


 0.1 x10^3/uL


(0.0-0.7)


 


Basophils # (Auto)


 0.0 x10^3/uL


(0.0-0.2) 


 


 0.0 x10^3/uL


(0.0-0.2)


 


Platelet Estimate


 Adequate


(ADEQUATE) 


 


 





 


Hypochromasia Mod    


 


Poikilocytosis Marked    


 


Anisocytosis Marked    


 


Microcytosis Mod    


 


Ovalocytes Mod    


 


Acanthocytes Occ    


 


Schistocytes Few    


 


RBC Morphology Bizarre Forms Few    


 


Sodium Level


 141 mmol/L


(136-145) 


 


 142 mmol/L


(136-145)


 


Potassium Level


 3.8 mmol/L


(3.5-5.1) 


 


 3.6 mmol/L


(3.5-5.1)


 


Chloride Level


 104 mmol/L


() 


 


 107 mmol/L


()


 


Carbon Dioxide Level


 25 mmol/L


(21-32) 


 


 25 mmol/L


(21-32)


 


Anion Gap 12 (6-14)    10 (6-14) 


 


Blood Urea Nitrogen


 13 mg/dL


(7-20) 


 


 12 mg/dL


(7-20)


 


Creatinine


 0.9 mg/dL


(0.6-1.0) 


 


 0.9 mg/dL


(0.6-1.0)


 


Estimated GFR


(Cockcroft-Gault) 72.2 


 


 


 72.2 





 


BUN/Creatinine Ratio 14 (6-20)    


 


Glucose Level


 131 mg/dL


(70-99) 


 


 100 mg/dL


(70-99)


 


Lactic Acid Level


 1.0 mmol/L


(0.4-2.0) 


 


 





 


Calcium Level


 9.1 mg/dL


(8.5-10.1) 


 


 8.8 mg/dL


(8.5-10.1)


 


Total Bilirubin


 0.4 mg/dL


(0.2-1.0) 


 


 





 


Aspartate Amino Transf


(AST/SGOT) 15 U/L (15-37) 


 


 


 





 


Alanine Aminotransferase


(ALT/SGPT) < 6 U/L


(14-59) 


 


 





 


Alkaline Phosphatase


 67 U/L


() 


 


 





 


Ammonia


 < 10 mcmol/L


(11-34) 


 


 





 


Total Protein


 6.9 g/dL


(6.4-8.2) 


 


 





 


Albumin


 3.0 g/dL


(3.4-5.0) 


 


 





 


Albumin/Globulin Ratio 0.8 (1.0-1.7)    


 


Urine Collection Type  U cath   


 


Urine Color  Yellow   


 


Urine Clarity  Clear   


 


Urine pH  5.5   


 


Urine Specific Gravity  1.020   


 


Urine Protein


 


 Negative mg/dL


(NEG-TRACE) 


 





 


Urine Glucose (UA)


 


 Negative mg/dL


(NEG) 


 





 


Urine Ketones (Stick)


 


 Trace mg/dL


(NEG) 


 





 


Urine Blood  Small (NEG)   


 


Urine Nitrite  Negative (NEG)   


 


Urine Bilirubin  Negative (NEG)   


 


Urine Urobilinogen Dipstick


 


 1.0 mg/dL (0.2


mg/dL) 


 





 


Urine Leukocyte Esterase  Small (NEG)   


 


Urine RBC  3-5 /HPF (0-2)   


 


Urine WBC


 


 5-10 /HPF


(0-4) 


 





 


Urine Squamous Epithelial


Cells 


 Few /LPF 


 


 





 


Urine Bacteria  0 /HPF (0-FEW)   


 


Urine Mucus  Slight /LPF   


 


Urine Yeast  Present /HPF   


 


Urine Opiates Screen  Neg (NEG)   


 


Urine Methadone Screen  Neg (NEG)   


 


Urine Barbiturates  Neg (NEG)   


 


Urine Phencyclidine Screen  Neg (NEG)   


 


Urine


Amphetamine/Methamphetamine 


 Neg (NEG) 


 


 





 


Urine Benzodiazepines Screen  Neg (NEG)   


 


Urine Cocaine Screen  Neg (NEG)   


 


Urine Cannabinoids Screen  Neg (NEG)   


 


Urine Ethyl Alcohol  Neg (NEG)   


 


Glucose (Fingerstick)


 


 


 122 mg/dL


(70-99) 











Laboratory Tests








Test


 10/12/19


17:07 10/12/19


17:48 10/12/19


21:32 10/13/19


04:35


 


White Blood Count


 7.4 x10^3/uL


(4.0-11.0) 


 


 6.2 x10^3/uL


(4.0-11.0)


 


Red Blood Count


 4.20 x10^6/uL


(3.50-5.40) 


 


 3.79 x10^6/uL


(3.50-5.40)


 


Hemoglobin


 9.7 g/dL


(12.0-15.5) 


 


 8.6 g/dL


(12.0-15.5)


 


Hematocrit


 30.2 %


(36.0-47.0) 


 


 27.4 %


(36.0-47.0)


 


Mean Corpuscular Volume 72 fL ()    73 fL () 


 


Mean Corpuscular Hemoglobin 23 pg (25-35)    23 pg (25-35) 


 


Mean Corpuscular Hemoglobin


Concent 32 g/dL


(31-37) 


 


 32 g/dL


(31-37)


 


Red Cell Distribution Width


 25.7 %


(11.5-14.5) 


 


 25.5 %


(11.5-14.5)


 


Platelet Count


 296 x10^3/uL


(140-400) 


 


 260 x10^3/uL


(140-400)


 


Neutrophils (%) (Auto) 82 % (31-73)    71 % (31-73) 


 


Lymphocytes (%) (Auto) 10 % (24-48)    16 % (24-48) 


 


Monocytes (%) (Auto) 7 % (0-9)    10 % (0-9) 


 


Eosinophils (%) (Auto) 0 % (0-3)    2 % (0-3) 


 


Basophils (%) (Auto) 1 % (0-3)    0 % (0-3) 


 


Neutrophils # (Auto)


 6.1 x10^3/uL


(1.8-7.7) 


 


 4.4 x10^3/uL


(1.8-7.7)


 


Lymphocytes # (Auto)


 0.8 x10^3/uL


(1.0-4.8) 


 


 1.0 x10^3/uL


(1.0-4.8)


 


Monocytes # (Auto)


 0.5 x10^3/uL


(0.0-1.1) 


 


 0.6 x10^3/uL


(0.0-1.1)


 


Eosinophils # (Auto)


 0.0 x10^3/uL


(0.0-0.7) 


 


 0.1 x10^3/uL


(0.0-0.7)


 


Basophils # (Auto)


 0.0 x10^3/uL


(0.0-0.2) 


 


 0.0 x10^3/uL


(0.0-0.2)


 


Platelet Estimate


 Adequate


(ADEQUATE) 


 


 





 


Hypochromasia Mod    


 


Poikilocytosis Marked    


 


Anisocytosis Marked    


 


Microcytosis Mod    


 


Ovalocytes Mod    


 


Acanthocytes Occ    


 


Schistocytes Few    


 


RBC Morphology Bizarre Forms Few    


 


Sodium Level


 141 mmol/L


(136-145) 


 


 142 mmol/L


(136-145)


 


Potassium Level


 3.8 mmol/L


(3.5-5.1) 


 


 3.6 mmol/L


(3.5-5.1)


 


Chloride Level


 104 mmol/L


() 


 


 107 mmol/L


()


 


Carbon Dioxide Level


 25 mmol/L


(21-32) 


 


 25 mmol/L


(21-32)


 


Anion Gap 12 (6-14)    10 (6-14) 


 


Blood Urea Nitrogen


 13 mg/dL


(7-20) 


 


 12 mg/dL


(7-20)


 


Creatinine


 0.9 mg/dL


(0.6-1.0) 


 


 0.9 mg/dL


(0.6-1.0)


 


Estimated GFR


(Cockcroft-Gault) 72.2 


 


 


 72.2 





 


BUN/Creatinine Ratio 14 (6-20)    


 


Glucose Level


 131 mg/dL


(70-99) 


 


 100 mg/dL


(70-99)


 


Lactic Acid Level


 1.0 mmol/L


(0.4-2.0) 


 


 





 


Calcium Level


 9.1 mg/dL


(8.5-10.1) 


 


 8.8 mg/dL


(8.5-10.1)


 


Total Bilirubin


 0.4 mg/dL


(0.2-1.0) 


 


 





 


Aspartate Amino Transf


(AST/SGOT) 15 U/L (15-37) 


 


 


 





 


Alanine Aminotransferase


(ALT/SGPT) < 6 U/L


(14-59) 


 


 





 


Alkaline Phosphatase


 67 U/L


() 


 


 





 


Ammonia


 < 10 mcmol/L


(11-34) 


 


 





 


Total Protein


 6.9 g/dL


(6.4-8.2) 


 


 





 


Albumin


 3.0 g/dL


(3.4-5.0) 


 


 





 


Albumin/Globulin Ratio 0.8 (1.0-1.7)    


 


Urine Collection Type  U cath   


 


Urine Color  Yellow   


 


Urine Clarity  Clear   


 


Urine pH  5.5   


 


Urine Specific Gravity  1.020   


 


Urine Protein


 


 Negative mg/dL


(NEG-TRACE) 


 





 


Urine Glucose (UA)


 


 Negative mg/dL


(NEG) 


 





 


Urine Ketones (Stick)


 


 Trace mg/dL


(NEG) 


 





 


Urine Blood  Small (NEG)   


 


Urine Nitrite  Negative (NEG)   


 


Urine Bilirubin  Negative (NEG)   


 


Urine Urobilinogen Dipstick


 


 1.0 mg/dL (0.2


mg/dL) 


 





 


Urine Leukocyte Esterase  Small (NEG)   


 


Urine RBC  3-5 /HPF (0-2)   


 


Urine WBC


 


 5-10 /HPF


(0-4) 


 





 


Urine Squamous Epithelial


Cells 


 Few /LPF 


 


 





 


Urine Bacteria  0 /HPF (0-FEW)   


 


Urine Mucus  Slight /LPF   


 


Urine Yeast  Present /HPF   


 


Urine Opiates Screen  Neg (NEG)   


 


Urine Methadone Screen  Neg (NEG)   


 


Urine Barbiturates  Neg (NEG)   


 


Urine Phencyclidine Screen  Neg (NEG)   


 


Urine


Amphetamine/Methamphetamine 


 Neg (NEG) 


 


 





 


Urine Benzodiazepines Screen  Neg (NEG)   


 


Urine Cocaine Screen  Neg (NEG)   


 


Urine Cannabinoids Screen  Neg (NEG)   


 


Urine Ethyl Alcohol  Neg (NEG)   


 


Glucose (Fingerstick)


 


 


 122 mg/dL


(70-99) 














Images


Images





CT HEAD WO CONTRAST


 


Indication:    Decreased level of consciousness


 


Exposure: One or more of the following individualized dose reduction 


techniques were utilized for this examination:  1. Automated exposure 


control  2. Adjustment of the mA and/or kV according to patient size  3. 


Use of iterative reconstruction technique.


 


Technique: Standard imaging without intravenous contrast.


 


Comparison: 9/30/2019


 


FINDINGS:


No evidence of acute intracranial hemorrhage, mass effect, midline shift 


or abnormal extra-axial fluid collection. Low-density in the white matter 


bilaterally, a nonspecific finding, but which is commonly due to chronic 


small vessel ischemic disease in a patient of this age. Generalized 


atrophy. Intracranial arterial calcifications. Orbits are partially 


visualized and appear grossly symmetric. No large scalp hematoma. 


Calvarial hyperostosis appears similar. No evidence of depressed skull 


fracture although a specific area of tenderness or trauma is not known. 


Visualized sinuses are clear. Mastoids and auditory canals appear grossly 


clear.


 


IMPRESSION: Chronic findings, no evidence of acute intracranial 


hemorrhage.


 


Electronically signed by: Choco Singh MD (10/12/2019 5:42 PM) [facility]














DICTATED and SIGNED BY:     CHOCO SINGH MD


DATE:     10/12/19 1742





VTE Prophylaxis Ordered


VTE Prophylaxis Devices:  Yes


VTE Pharmacological Prophylaxi:  Yes





Assessment/Plan


Assessment/Plan





impression 


 


Altered mental status 


Acute respiratory failure with hypoxia RESOLVED , SEC TO ? OVERDOSE OF KLONOPIN 

, HOWEVER UDS NEG FOR BENZODIAZEPINES  X 5 on last admit   D/W FAMILY IN ROOM


Intentional// accidental ?  drug overdose last admit 


TOXIC Encephalopathy


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


Mild to moderate chronic small vessel ischemic changes and atrophy.on CT HEAD


DIABETES


HYPERTENSION HX


HYPOKALEMIA , resolved


Severe protein-caloric malnutrition


GERD


MICROCYTIC ANEMIA


Fever, possible aspiration


Moderate left and minimal right hip osteoarthritis.


Degenerative change at the lower lumbar levels








plan





admit 


neurochecks q 4 hrs, may be at baseline


HOLD SEDATING MED


TELE BED


hold meclizine, may be too sedating


PT/OT/ST














66 MIN PT EXAM, CHART REVIEW, > 50% OF TIME SPENT WITH EXAM, CHART REVIEW, pt 

care coordination











KATELYNN ABDULLAHI MD          Oct 13, 2019 10:42

## 2019-10-13 NOTE — EKG
[facility]

              [address] [place] [address]

Test Date:    2019-10-12               Test Time:    16:36:57

Pat Name:     MARY OBREGON            Department:   

Patient ID:   [id number]           Room:         671 1

Gender:       F                        Technician:   

:          [id number]               Requested By: HAYDEN GARCIA

Order Number: [id number]           Reading MD:   Ayush Rizvi MD

                                 Measurements

Intervals                              Axis          

Rate:         93                       P:            0

KY:           188                      QRS:          -3

QRSD:         94                       T:            81

QT:           400                                    

QTc:          500                                    

                           Interpretive Statements

SINUS RHYTHM



Electronically Signed On 10- 8:58:56 CDT by Ayush Rizvi MD

## 2019-10-14 VITALS — DIASTOLIC BLOOD PRESSURE: 63 MMHG | SYSTOLIC BLOOD PRESSURE: 112 MMHG

## 2019-10-14 VITALS — DIASTOLIC BLOOD PRESSURE: 77 MMHG | SYSTOLIC BLOOD PRESSURE: 146 MMHG

## 2019-10-14 VITALS — DIASTOLIC BLOOD PRESSURE: 65 MMHG | SYSTOLIC BLOOD PRESSURE: 138 MMHG

## 2019-10-14 VITALS — DIASTOLIC BLOOD PRESSURE: 68 MMHG | SYSTOLIC BLOOD PRESSURE: 117 MMHG

## 2019-10-14 VITALS — SYSTOLIC BLOOD PRESSURE: 108 MMHG | DIASTOLIC BLOOD PRESSURE: 58 MMHG

## 2019-10-14 LAB
ANION GAP SERPL CALC-SCNC: 10 MMOL/L (ref 6–14)
BASOPHILS # BLD AUTO: 0 X10^3/UL (ref 0–0.2)
BASOPHILS NFR BLD: 0 % (ref 0–3)
BUN SERPL-MCNC: 16 MG/DL (ref 7–20)
CALCIUM SERPL-MCNC: 8.2 MG/DL (ref 8.5–10.1)
CHLORIDE SERPL-SCNC: 112 MMOL/L (ref 98–107)
CO2 SERPL-SCNC: 22 MMOL/L (ref 21–32)
CREAT SERPL-MCNC: 1 MG/DL (ref 0.6–1)
EOSINOPHIL NFR BLD: 0 X10^3/UL (ref 0–0.7)
EOSINOPHIL NFR BLD: 1 % (ref 0–3)
ERYTHROCYTE [DISTWIDTH] IN BLOOD BY AUTOMATED COUNT: 25.9 % (ref 11.5–14.5)
GFR SERPLBLD BASED ON 1.73 SQ M-ARVRAT: 63.9 ML/MIN
GLUCOSE SERPL-MCNC: 92 MG/DL (ref 70–99)
HCT VFR BLD CALC: 29 % (ref 36–47)
HGB BLD-MCNC: 9.2 G/DL (ref 12–15.5)
LYMPHOCYTES # BLD: 0.9 X10^3/UL (ref 1–4.8)
LYMPHOCYTES NFR BLD AUTO: 13 % (ref 24–48)
MCH RBC QN AUTO: 23 PG (ref 25–35)
MCHC RBC AUTO-ENTMCNC: 32 G/DL (ref 31–37)
MCV RBC AUTO: 73 FL (ref 79–100)
MONO #: 0.4 X10^3/UL (ref 0–1.1)
MONOCYTES NFR BLD: 6 % (ref 0–9)
NEUT #: 5.8 X10^3/UL (ref 1.8–7.7)
NEUTROPHILS NFR BLD AUTO: 81 % (ref 31–73)
PLATELET # BLD AUTO: 261 X10^3/UL (ref 140–400)
POTASSIUM SERPL-SCNC: 3.8 MMOL/L (ref 3.5–5.1)
RBC # BLD AUTO: 3.98 X10^6/UL (ref 3.5–5.4)
SODIUM SERPL-SCNC: 144 MMOL/L (ref 136–145)
WBC # BLD AUTO: 7.1 X10^3/UL (ref 4–11)

## 2019-10-14 RX ADMIN — ASPIRIN 81 MG SCH MG: 81 TABLET ORAL at 09:03

## 2019-10-14 RX ADMIN — Medication SCH CAP: at 19:40

## 2019-10-14 RX ADMIN — Medication SCH CAP: at 09:03

## 2019-10-14 RX ADMIN — AMOXICILLIN AND CLAVULANATE POTASSIUM SCH TAB: 500; 125 TABLET, FILM COATED ORAL at 19:40

## 2019-10-14 RX ADMIN — AMOXICILLIN AND CLAVULANATE POTASSIUM SCH TAB: 500; 125 TABLET, FILM COATED ORAL at 09:03

## 2019-10-14 RX ADMIN — ATENOLOL SCH MG: 25 TABLET ORAL at 09:03

## 2019-10-14 RX ADMIN — VITAMIN D, TAB 1000IU (100/BT) SCH UNIT: 25 TAB at 17:53

## 2019-10-14 RX ADMIN — SIMVASTATIN SCH MG: 40 TABLET, FILM COATED ORAL at 19:40

## 2019-10-14 NOTE — NUR
Called [facility] at 1600 regarding the patient's immunization status. This nurse was 
informed that she declined pneumonia and flu vaccination.

## 2019-10-14 NOTE — PDOC
PROGRESS NOTES


History of Present Illness


History of Present Illness





VTE Prophylaxis Ordered


VTE Prophylaxis Devices:  Yes


VTE Pharmacological Prophylaxi:  Yes





Assessment/Plan


Assessment/Plan





impression 


 


Altered mental status 


Acute respiratory failure with hypoxia RESOLVED , SEC TO ? OVERDOSE OF KLONOPIN 

, HOWEVER UDS NEG FOR BENZODIAZEPINES  X 5 on last admit   D/W FAMILY IN ROOM


Intentional// accidental ?  drug overdose last admit 


TOXIC Encephalopathy


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


Mild to moderate chronic small vessel ischemic changes and atrophy.on CT HEAD


DIABETES


HYPERTENSION HX


HYPOKALEMIA , resolved


Severe protein-caloric malnutrition


GERD


MICROCYTIC ANEMIA


Fever, possible aspiration onlast stay, resolved


Moderate left and minimal right hip osteoarthritis.


Degenerative change at the lower lumbar levels


weakness, max assist on transfers








plan





admit 


neurochecks q 4 hrs, may be at baseline


HOLD SEDATING MED


TELE BED


hold meclizine, may be too sedating


PT/OT/ST














27 MIN PT EXAM, CHART REVIEW, > 50% OF TIME SPENT WITH EXAM, CHART REVIEW, pt 

care coordination





Vitals


Vitals





Vital Signs








  Date Time  Temp Pulse Resp B/P (MAP) Pulse Ox O2 Delivery O2 Flow Rate FiO2


 


10/14/19 09:03  99  138/65    


 


10/14/19 07:00 99.6  20  94 Room Air  





 99.6       











Physical Exam


General:  Alert, Cooperative, No acute distress


Lungs:  Clear


Abdomen:  Normal bowel sounds, Soft


Extremities:  No cyanosis


Skin:  No breakdown





Labs


LABS





Laboratory Tests








Test


 10/13/19


10:42 10/13/19


16:50 10/13/19


20:32 10/14/19


06:30


 


Glucose (Fingerstick)


 119 mg/dL


(70-99) 164 mg/dL


(70-99) 81 mg/dL


(70-99) 





 


White Blood Count


 


 


 


 7.1 x10^3/uL


(4.0-11.0)


 


Red Blood Count


 


 


 


 3.98 x10^6/uL


(3.50-5.40)


 


Hemoglobin


 


 


 


 9.2 g/dL


(12.0-15.5)


 


Hematocrit


 


 


 


 29.0 %


(36.0-47.0)


 


Mean Corpuscular Volume    73 fL () 


 


Mean Corpuscular Hemoglobin    23 pg (25-35) 


 


Mean Corpuscular Hemoglobin


Concent 


 


 


 32 g/dL


(31-37)


 


Red Cell Distribution Width


 


 


 


 25.9 %


(11.5-14.5)


 


Platelet Count


 


 


 


 261 x10^3/uL


(140-400)


 


Neutrophils (%) (Auto)    81 % (31-73) 


 


Lymphocytes (%) (Auto)    13 % (24-48) 


 


Monocytes (%) (Auto)    6 % (0-9) 


 


Eosinophils (%) (Auto)    1 % (0-3) 


 


Basophils (%) (Auto)    0 % (0-3) 


 


Neutrophils # (Auto)


 


 


 


 5.8 x10^3/uL


(1.8-7.7)


 


Lymphocytes # (Auto)


 


 


 


 0.9 x10^3/uL


(1.0-4.8)


 


Monocytes # (Auto)


 


 


 


 0.4 x10^3/uL


(0.0-1.1)


 


Eosinophils # (Auto)


 


 


 


 0.0 x10^3/uL


(0.0-0.7)


 


Basophils # (Auto)


 


 


 


 0.0 x10^3/uL


(0.0-0.2)


 


Sodium Level


 


 


 


 144 mmol/L


(136-145)


 


Potassium Level


 


 


 


 3.8 mmol/L


(3.5-5.1)


 


Chloride Level


 


 


 


 112 mmol/L


()


 


Carbon Dioxide Level


 


 


 


 22 mmol/L


(21-32)


 


Anion Gap    10 (6-14) 


 


Blood Urea Nitrogen


 


 


 


 16 mg/dL


(7-20)


 


Creatinine


 


 


 


 1.0 mg/dL


(0.6-1.0)


 


Estimated GFR


(Cockcroft-Gault) 


 


 


 63.9 





 


Glucose Level


 


 


 


 92 mg/dL


(70-99)


 


Calcium Level


 


 


 


 8.2 mg/dL


(8.5-10.1)


 


Test


 10/14/19


07:45 


 


 





 


Glucose (Fingerstick)


 96 mg/dL


(70-99) 


 


 














Assessment and Plan


Assessmemt and Plan


Problems


Medical Problems:


(1) Altered mental status


Status: Acute  





* Cooperative


Activity Tolerance/ Vitals 


* Pt alert throughout evaluation. Pt on room air


   Pt had no complaints of shortness of air


Sitting Balance 


* 2 balances w/ both UEs


Standing Balance 


* 1 Pt performs 25-50%


Transfer Assistance Required 


* Max Assistance


* Two-Person Assistance


Transfer Type 


* Sit to Stand


Transfer Assistive Device 


* Roller Walker


Transfer Comments 


* standing ~10 seconds x2. pt unable to come to full upright posture. c/o 


   bilateral knee pain. 


Sitting Exercises 


* Long Arc Quads


* Marching


Sitting Exercises Comments 


* x10 AAROM 


Problem List (body system elements) 


* Impaired fnctnl mobility


* Level of Consciousness


* Strength


* Balance


* Age


* Coordination


* Pain


Pt/caregiver agrees with plan of care/goals 


* Yes


Patient condition at conclusion of therapy 


* Pt in chair


* Call light in reach


* PtIn no apparent distress


* Pt denies further needs


* Visitor with patient


Communicated Patient Care With (Name, Title) 


* caregiver requesting snack for pt and RN approved of nutrition shake


Goal 1 - Bed Mobility Assistance Required 


* Min Assistance


Goal 1 Assessment 


* Appropriate - Continue


Goal 2 - Transfers Assistance Required 


* Mod Assistance


Goal 2 - Transfer Type 


* Sit to Stand


Goal 2 Assessment 


* Appropriate - Continue


Goal 3 - Ambulation Assistance Required 


* Mod Assistance


Goal 3 - Ambulation Distance 


* 10'


Goal 3 - Ambulation Device 


* Roller Walker


Goal 3 Assessment 


* Appropriate - Continue


Treatment Plan 


* Therapeutic Exercise


* Bed Mobility Training


* Transfer training


* Gait Training


* Dynamic Balance Training


Frequency of Treatment Expected 


* 7 visits/week


Duration of Treatment Expected 


* 2 weeks


Discharge Recommendations 


* Skilled Nursing Unit


Discharge Recommendation Comments 


* continue to assess discharge needs





Comment


Review of Relevant


I have reviewed the following items marky (where applicable) has been applied.


Labs





Laboratory Tests








Test


 10/12/19


17:07 10/12/19


17:48 10/12/19


21:32 10/13/19


04:35


 


White Blood Count


 7.4 x10^3/uL


(4.0-11.0) 


 


 6.2 x10^3/uL


(4.0-11.0)


 


Red Blood Count


 4.20 x10^6/uL


(3.50-5.40) 


 


 3.79 x10^6/uL


(3.50-5.40)


 


Hemoglobin


 9.7 g/dL


(12.0-15.5) 


 


 8.6 g/dL


(12.0-15.5)


 


Hematocrit


 30.2 %


(36.0-47.0) 


 


 27.4 %


(36.0-47.0)


 


Mean Corpuscular Volume 72 fL ()    73 fL () 


 


Mean Corpuscular Hemoglobin 23 pg (25-35)    23 pg (25-35) 


 


Mean Corpuscular Hemoglobin


Concent 32 g/dL


(31-37) 


 


 32 g/dL


(31-37)


 


Red Cell Distribution Width


 25.7 %


(11.5-14.5) 


 


 25.5 %


(11.5-14.5)


 


Platelet Count


 296 x10^3/uL


(140-400) 


 


 260 x10^3/uL


(140-400)


 


Neutrophils (%) (Auto) 82 % (31-73)    71 % (31-73) 


 


Lymphocytes (%) (Auto) 10 % (24-48)    16 % (24-48) 


 


Monocytes (%) (Auto) 7 % (0-9)    10 % (0-9) 


 


Eosinophils (%) (Auto) 0 % (0-3)    2 % (0-3) 


 


Basophils (%) (Auto) 1 % (0-3)    0 % (0-3) 


 


Neutrophils # (Auto)


 6.1 x10^3/uL


(1.8-7.7) 


 


 4.4 x10^3/uL


(1.8-7.7)


 


Lymphocytes # (Auto)


 0.8 x10^3/uL


(1.0-4.8) 


 


 1.0 x10^3/uL


(1.0-4.8)


 


Monocytes # (Auto)


 0.5 x10^3/uL


(0.0-1.1) 


 


 0.6 x10^3/uL


(0.0-1.1)


 


Eosinophils # (Auto)


 0.0 x10^3/uL


(0.0-0.7) 


 


 0.1 x10^3/uL


(0.0-0.7)


 


Basophils # (Auto)


 0.0 x10^3/uL


(0.0-0.2) 


 


 0.0 x10^3/uL


(0.0-0.2)


 


Platelet Estimate


 Adequate


(ADEQUATE) 


 


 





 


Hypochromasia Mod    


 


Poikilocytosis Marked    


 


Anisocytosis Marked    


 


Microcytosis Mod    


 


Ovalocytes Mod    


 


Acanthocytes Occ    


 


Schistocytes Few    


 


RBC Morphology Bizarre Forms Few    


 


Sodium Level


 141 mmol/L


(136-145) 


 


 142 mmol/L


(136-145)


 


Potassium Level


 3.8 mmol/L


(3.5-5.1) 


 


 3.6 mmol/L


(3.5-5.1)


 


Chloride Level


 104 mmol/L


() 


 


 107 mmol/L


()


 


Carbon Dioxide Level


 25 mmol/L


(21-32) 


 


 25 mmol/L


(21-32)


 


Anion Gap 12 (6-14)    10 (6-14) 


 


Blood Urea Nitrogen


 13 mg/dL


(7-20) 


 


 12 mg/dL


(7-20)


 


Creatinine


 0.9 mg/dL


(0.6-1.0) 


 


 0.9 mg/dL


(0.6-1.0)


 


Estimated GFR


(Cockcroft-Gault) 72.2 


 


 


 72.2 





 


BUN/Creatinine Ratio 14 (6-20)    


 


Glucose Level


 131 mg/dL


(70-99) 


 


 100 mg/dL


(70-99)


 


Lactic Acid Level


 1.0 mmol/L


(0.4-2.0) 


 


 





 


Calcium Level


 9.1 mg/dL


(8.5-10.1) 


 


 8.8 mg/dL


(8.5-10.1)


 


Total Bilirubin


 0.4 mg/dL


(0.2-1.0) 


 


 





 


Aspartate Amino Transf


(AST/SGOT) 15 U/L (15-37) 


 


 


 





 


Alanine Aminotransferase


(ALT/SGPT) < 6 U/L


(14-59) 


 


 





 


Alkaline Phosphatase


 67 U/L


() 


 


 





 


Ammonia


 < 10 mcmol/L


(11-34) 


 


 





 


Total Protein


 6.9 g/dL


(6.4-8.2) 


 


 





 


Albumin


 3.0 g/dL


(3.4-5.0) 


 


 





 


Albumin/Globulin Ratio 0.8 (1.0-1.7)    


 


Urine Collection Type  U cath   


 


Urine Color  Yellow   


 


Urine Clarity  Clear   


 


Urine pH  5.5   


 


Urine Specific Gravity  1.020   


 


Urine Protein


 


 Negative mg/dL


(NEG-TRACE) 


 





 


Urine Glucose (UA)


 


 Negative mg/dL


(NEG) 


 





 


Urine Ketones (Stick)


 


 Trace mg/dL


(NEG) 


 





 


Urine Blood  Small (NEG)   


 


Urine Nitrite  Negative (NEG)   


 


Urine Bilirubin  Negative (NEG)   


 


Urine Urobilinogen Dipstick


 


 1.0 mg/dL (0.2


mg/dL) 


 





 


Urine Leukocyte Esterase  Small (NEG)   


 


Urine RBC  3-5 /HPF (0-2)   


 


Urine WBC


 


 5-10 /HPF


(0-4) 


 





 


Urine Squamous Epithelial


Cells 


 Few /LPF 


 


 





 


Urine Bacteria  0 /HPF (0-FEW)   


 


Urine Mucus  Slight /LPF   


 


Urine Yeast  Present /HPF   


 


Urine Opiates Screen  Neg (NEG)   


 


Urine Methadone Screen  Neg (NEG)   


 


Urine Barbiturates  Neg (NEG)   


 


Urine Phencyclidine Screen  Neg (NEG)   


 


Urine


Amphetamine/Methamphetamine 


 Neg (NEG) 


 


 





 


Urine Benzodiazepines Screen  Neg (NEG)   


 


Urine Cocaine Screen  Neg (NEG)   


 


Urine Cannabinoids Screen  Neg (NEG)   


 


Urine Ethyl Alcohol  Neg (NEG)   


 


Glucose (Fingerstick)


 


 


 122 mg/dL


(70-99) 





 


Test


 10/13/19


07:21 10/13/19


10:42 10/13/19


16:50 10/13/19


20:32


 


Glucose (Fingerstick)


 102 mg/dL


(70-99) 119 mg/dL


(70-99) 164 mg/dL


(70-99) 81 mg/dL


(70-99)


 


Test


 10/14/19


06:30 10/14/19


07:45 


 





 


White Blood Count


 7.1 x10^3/uL


(4.0-11.0) 


 


 





 


Red Blood Count


 3.98 x10^6/uL


(3.50-5.40) 


 


 





 


Hemoglobin


 9.2 g/dL


(12.0-15.5) 


 


 





 


Hematocrit


 29.0 %


(36.0-47.0) 


 


 





 


Mean Corpuscular Volume 73 fL ()    


 


Mean Corpuscular Hemoglobin 23 pg (25-35)    


 


Mean Corpuscular Hemoglobin


Concent 32 g/dL


(31-37) 


 


 





 


Red Cell Distribution Width


 25.9 %


(11.5-14.5) 


 


 





 


Platelet Count


 261 x10^3/uL


(140-400) 


 


 





 


Neutrophils (%) (Auto) 81 % (31-73)    


 


Lymphocytes (%) (Auto) 13 % (24-48)    


 


Monocytes (%) (Auto) 6 % (0-9)    


 


Eosinophils (%) (Auto) 1 % (0-3)    


 


Basophils (%) (Auto) 0 % (0-3)    


 


Neutrophils # (Auto)


 5.8 x10^3/uL


(1.8-7.7) 


 


 





 


Lymphocytes # (Auto)


 0.9 x10^3/uL


(1.0-4.8) 


 


 





 


Monocytes # (Auto)


 0.4 x10^3/uL


(0.0-1.1) 


 


 





 


Eosinophils # (Auto)


 0.0 x10^3/uL


(0.0-0.7) 


 


 





 


Basophils # (Auto)


 0.0 x10^3/uL


(0.0-0.2) 


 


 





 


Sodium Level


 144 mmol/L


(136-145) 


 


 





 


Potassium Level


 3.8 mmol/L


(3.5-5.1) 


 


 





 


Chloride Level


 112 mmol/L


() 


 


 





 


Carbon Dioxide Level


 22 mmol/L


(21-32) 


 


 





 


Anion Gap 10 (6-14)    


 


Blood Urea Nitrogen


 16 mg/dL


(7-20) 


 


 





 


Creatinine


 1.0 mg/dL


(0.6-1.0) 


 


 





 


Estimated GFR


(Cockcroft-Gault) 63.9 


 


 


 





 


Glucose Level


 92 mg/dL


(70-99) 


 


 





 


Calcium Level


 8.2 mg/dL


(8.5-10.1) 


 


 





 


Glucose (Fingerstick)


 


 96 mg/dL


(70-99) 


 











Laboratory Tests








Test


 10/13/19


10:42 10/13/19


16:50 10/13/19


20:32 10/14/19


06:30


 


Glucose (Fingerstick)


 119 mg/dL


(70-99) 164 mg/dL


(70-99) 81 mg/dL


(70-99) 





 


White Blood Count


 


 


 


 7.1 x10^3/uL


(4.0-11.0)


 


Red Blood Count


 


 


 


 3.98 x10^6/uL


(3.50-5.40)


 


Hemoglobin


 


 


 


 9.2 g/dL


(12.0-15.5)


 


Hematocrit


 


 


 


 29.0 %


(36.0-47.0)


 


Mean Corpuscular Volume    73 fL () 


 


Mean Corpuscular Hemoglobin    23 pg (25-35) 


 


Mean Corpuscular Hemoglobin


Concent 


 


 


 32 g/dL


(31-37)


 


Red Cell Distribution Width


 


 


 


 25.9 %


(11.5-14.5)


 


Platelet Count


 


 


 


 261 x10^3/uL


(140-400)


 


Neutrophils (%) (Auto)    81 % (31-73) 


 


Lymphocytes (%) (Auto)    13 % (24-48) 


 


Monocytes (%) (Auto)    6 % (0-9) 


 


Eosinophils (%) (Auto)    1 % (0-3) 


 


Basophils (%) (Auto)    0 % (0-3) 


 


Neutrophils # (Auto)


 


 


 


 5.8 x10^3/uL


(1.8-7.7)


 


Lymphocytes # (Auto)


 


 


 


 0.9 x10^3/uL


(1.0-4.8)


 


Monocytes # (Auto)


 


 


 


 0.4 x10^3/uL


(0.0-1.1)


 


Eosinophils # (Auto)


 


 


 


 0.0 x10^3/uL


(0.0-0.7)


 


Basophils # (Auto)


 


 


 


 0.0 x10^3/uL


(0.0-0.2)


 


Sodium Level


 


 


 


 144 mmol/L


(136-145)


 


Potassium Level


 


 


 


 3.8 mmol/L


(3.5-5.1)


 


Chloride Level


 


 


 


 112 mmol/L


()


 


Carbon Dioxide Level


 


 


 


 22 mmol/L


(21-32)


 


Anion Gap    10 (6-14) 


 


Blood Urea Nitrogen


 


 


 


 16 mg/dL


(7-20)


 


Creatinine


 


 


 


 1.0 mg/dL


(0.6-1.0)


 


Estimated GFR


(Cockcroft-Gault) 


 


 


 63.9 





 


Glucose Level


 


 


 


 92 mg/dL


(70-99)


 


Calcium Level


 


 


 


 8.2 mg/dL


(8.5-10.1)


 


Test


 10/14/19


07:45 


 


 





 


Glucose (Fingerstick)


 96 mg/dL


(70-99) 


 


 











Microbiology


10/13/19 Blood Culture - Preliminary, Resulted


           NO GROWTH AFTER 1 DAY


Medications





Current Medications


Ondansetron HCl (Zofran) 4 mg 1X  ONCE IV  Last administered on 10/12/19at 

19:17;  Start 10/12/19 at 19:00;  Stop 10/12/19 at 19:01;  Status DC


Ondansetron HCl (Zofran) 4 mg PRN Q8HRS  PRN IV NAUSEA/VOMITING;  Start 10/12/19

at 19:45;  Stop 10/13/19 at 19:44;  Status DC


Sodium Chloride 1,000 ml @  75 mls/hr [id number] IV  Last administered on 

10/13/19at 15:33;  Start 10/12/19 at 19:35;  Stop 10/13/19 at 19:34;  Status DC


Acetaminophen (Tylenol) 650 mg PRN Q4HRS  PRN PO FEVER Last administered on 

10/13/19at 12:09;  Start 10/12/19 at 19:45;  Stop 10/13/19 at 19:44;  Status DC


Aspirin (Children'S Aspirin) 81 mg DAILY PO  Last administered on 10/14/19at 

09:03;  Start 10/13/19 at 12:00


Atenolol (Tenormin) 25 mg DAILY PO  Last administered on 10/14/19at 09:03;  

Start 10/13/19 at 12:00


Vitamin D (Vitamin D3) 1,000 unit DAILYBFRSUP PO ;  Start 10/13/19 at 17:00


Diclofenac Sodium (Voltaren) 1 danyell PRN QID  PRN TP PAIN;  Start 10/13/19 at 

11:00


Levetiracetam (Keppra) 500 mg BID PO  Last administered on 10/14/19at 09:03;  

Start 10/13/19 at 12:00


Simvastatin (Zocor) 40 mg QHS PO  Last administered on 10/13/19at 20:32;  Start 

10/13/19 at 21:00


Amoxicillin/ Clavulanate Potassium (Augmentin 500/ 125mg) 0.5 tab BID PO  Last 

administered on 10/14/19at 09:03;  Start 10/13/19 at 12:00


Lactobacillus Rhamnosus (Culturelle) 1 cap BID PO  Last administered on 

10/14/19at 09:03;  Start 10/13/19 at 21:00


Acetaminophen (Tylenol) 500 mg PRN Q6HRS  PRN PO MILD PAIN / TEMP;  Start 

10/13/19 at 20:15


Ondansetron HCl (Zofran) 4 mg PRN Q6HRS  PRN IVP NAUSEA/VOMITING;  Start 

10/13/19 at 20:15


Labetalol HCl (Normodyne Iv Push) 10 mg PRN Q2HR  PRN IVP HYPERTENSION;  Start 

10/13/19 at 20:15





Active Scripts


Active


Keppra (Levetiracetam) 500 Mg Tablet 500 Mg PO BID


Meclizine Hcl 25 Mg Tablet 1 Tab PO PRN TID PRN


Reported


Vitamin D3 (Cholecalciferol (Vitamin D3)) 1,000 Unit Tablet 1 Tab PO DAILYBFRSUP


Voltaren (Diclofenac Sodium) 100 Gm Gel..gram. 1 Gm TP PRN QID


Aspirin 81 Mg Tab.chew 1 Tab PO DAILY


Simvastatin 40 Mg Tablet 1 Tab PO QHS


Atenolol 25 Mg Tablet 1 Tab PO DAILY


Vitals/I & O





Vital Sign - Last 24 Hours








 10/13/19 10/13/19 10/13/19 10/13/19





 11:49 12:09 15:45 19:42


 


Temp 98.5  98.0 97.5





 98.5  98.0 97.5


 


Pulse 103 103 96 113


 


Resp 20  18 16


 


B/P (MAP) 131/60 (83) 131/60 139/79 (99) 150/107 (121)


 


Pulse Ox 95  98 91


 


O2 Delivery Room Air  Room Air Room Air


 


    





    





 10/13/19 10/13/19 10/14/19 10/14/19





 20:00 23:59 03:31 07:00


 


Temp  98.9  99.6





  98.9  99.6


 


Pulse  104 102 99


 


Resp  16 16 20


 


B/P (MAP)  141/83 (102)  138/65 (89)


 


Pulse Ox  94  94


 


O2 Delivery Room Air Room Air Room Air Room Air


 


    





    





 10/14/19   





 09:03   


 


Pulse 99   


 


B/P (MAP) 138/65   














Intake and Output   


 


 10/13/19 10/13/19 10/14/19





 14:59 22:59 06:59


 


Intake Total 100 ml 50 ml 1000 ml


 


Output Total  1 ml 1 ml


 


Balance 100 ml 49 ml 999 ml

















KATELYNN ABDULLAHI MD          Oct 14, 2019 09:39

## 2019-10-15 VITALS — SYSTOLIC BLOOD PRESSURE: 121 MMHG | DIASTOLIC BLOOD PRESSURE: 50 MMHG

## 2019-10-15 VITALS — DIASTOLIC BLOOD PRESSURE: 57 MMHG | SYSTOLIC BLOOD PRESSURE: 117 MMHG

## 2019-10-15 VITALS — DIASTOLIC BLOOD PRESSURE: 79 MMHG | SYSTOLIC BLOOD PRESSURE: 145 MMHG

## 2019-10-15 VITALS — SYSTOLIC BLOOD PRESSURE: 111 MMHG | DIASTOLIC BLOOD PRESSURE: 66 MMHG

## 2019-10-15 VITALS — DIASTOLIC BLOOD PRESSURE: 57 MMHG | SYSTOLIC BLOOD PRESSURE: 120 MMHG

## 2019-10-15 VITALS — DIASTOLIC BLOOD PRESSURE: 99 MMHG | SYSTOLIC BLOOD PRESSURE: 170 MMHG

## 2019-10-15 LAB
ANION GAP SERPL CALC-SCNC: 9 MMOL/L (ref 6–14)
BASOPHILS # BLD AUTO: 0 X10^3/UL (ref 0–0.2)
BASOPHILS NFR BLD: 0 % (ref 0–3)
BUN SERPL-MCNC: 16 MG/DL (ref 7–20)
CALCIUM SERPL-MCNC: 8.2 MG/DL (ref 8.5–10.1)
CHLORIDE SERPL-SCNC: 107 MMOL/L (ref 98–107)
CO2 SERPL-SCNC: 24 MMOL/L (ref 21–32)
CREAT SERPL-MCNC: 1 MG/DL (ref 0.6–1)
EOSINOPHIL NFR BLD: 0 X10^3/UL (ref 0–0.7)
EOSINOPHIL NFR BLD: 1 % (ref 0–3)
ERYTHROCYTE [DISTWIDTH] IN BLOOD BY AUTOMATED COUNT: 26.5 % (ref 11.5–14.5)
GFR SERPLBLD BASED ON 1.73 SQ M-ARVRAT: 63.9 ML/MIN
GLUCOSE SERPL-MCNC: 138 MG/DL (ref 70–99)
HCT VFR BLD CALC: 29 % (ref 36–47)
HGB BLD-MCNC: 9.2 G/DL (ref 12–15.5)
LYMPHOCYTES # BLD: 1 X10^3/UL (ref 1–4.8)
LYMPHOCYTES NFR BLD AUTO: 12 % (ref 24–48)
MCH RBC QN AUTO: 23 PG (ref 25–35)
MCHC RBC AUTO-ENTMCNC: 32 G/DL (ref 31–37)
MCV RBC AUTO: 73 FL (ref 79–100)
MONO #: 0.8 X10^3/UL (ref 0–1.1)
MONOCYTES NFR BLD: 10 % (ref 0–9)
NEUT #: 6.7 X10^3/UL (ref 1.8–7.7)
NEUTROPHILS NFR BLD AUTO: 78 % (ref 31–73)
PLATELET # BLD AUTO: 233 X10^3/UL (ref 140–400)
POTASSIUM SERPL-SCNC: 3.7 MMOL/L (ref 3.5–5.1)
RBC # BLD AUTO: 3.99 X10^6/UL (ref 3.5–5.4)
SODIUM SERPL-SCNC: 140 MMOL/L (ref 136–145)
WBC # BLD AUTO: 8.6 X10^3/UL (ref 4–11)

## 2019-10-15 RX ADMIN — AMOXICILLIN AND CLAVULANATE POTASSIUM SCH TAB: 500; 125 TABLET, FILM COATED ORAL at 09:21

## 2019-10-15 RX ADMIN — SIMVASTATIN SCH MG: 40 TABLET, FILM COATED ORAL at 22:00

## 2019-10-15 RX ADMIN — Medication SCH CAP: at 09:22

## 2019-10-15 RX ADMIN — Medication SCH CAP: at 22:00

## 2019-10-15 RX ADMIN — ASPIRIN 81 MG SCH MG: 81 TABLET ORAL at 09:22

## 2019-10-15 RX ADMIN — VITAMIN D, TAB 1000IU (100/BT) SCH UNIT: 25 TAB at 16:28

## 2019-10-15 RX ADMIN — ATENOLOL SCH MG: 25 TABLET ORAL at 09:22

## 2019-10-15 RX ADMIN — AMOXICILLIN AND CLAVULANATE POTASSIUM SCH TAB: 500; 125 TABLET, FILM COATED ORAL at 22:00

## 2019-10-15 NOTE — PDOC3
Discharge Summary


Visit Information


Date of Admission:  Oct 12, 2019


Date of Discharge:  Oct 15, 2019


Admitting Diagnosis Comment:


Altered mental status 


Acute respiratory failure with hypoxia RESOLVED , SEC TO ? OVERDOSE OF KLONOPIN 

, HOWEVER UDS NEG FOR BENZODIAZEPINES  X 5 on last admit   D/W FAMILY IN ROOM


Intentional// accidental ?  drug overdose last admit 


TOXIC Encephalopathy


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


Mild to moderate chronic small vessel ischemic changes and atrophy.on CT HEAD


DIABETES


HYPERTENSION HX


HYPOKALEMIA , resolved


Severe protein-caloric malnutrition


GERD


MICROCYTIC ANEMIA


Final Diagnosis


Problems


Medical Problems:


(1) Altered mental status


Status: Acute  











Brief Hospital Course


Allergies





                                    Allergies








Coded Allergies Type Severity Reaction Last Updated Verified


 


  No Known Drug Allergies    10/29/16 No








Vital Signs





Vital Signs








  Date Time  Temp Pulse Resp B/P (MAP) Pulse Ox O2 Delivery O2 Flow Rate FiO2


 


10/15/19 11:00 99.4 91 18 111/66 (81) 96 Room Air  





 99.4       








Lab Results





Laboratory Tests








Test


 10/13/19


16:50 10/13/19


20:32 10/14/19


06:30 10/14/19


07:45


 


Glucose (Fingerstick)


 164 mg/dL


(70-99) 81 mg/dL


(70-99) 


 96 mg/dL


(70-99)


 


White Blood Count


 


 


 7.1 x10^3/uL


(4.0-11.0) 





 


Red Blood Count


 


 


 3.98 x10^6/uL


(3.50-5.40) 





 


Hemoglobin


 


 


 9.2 g/dL


(12.0-15.5) 





 


Hematocrit


 


 


 29.0 %


(36.0-47.0) 





 


Mean Corpuscular Volume   73 fL ()  


 


Mean Corpuscular Hemoglobin   23 pg (25-35)  


 


Mean Corpuscular Hemoglobin


Concent 


 


 32 g/dL


(31-37) 





 


Red Cell Distribution Width


 


 


 25.9 %


(11.5-14.5) 





 


Platelet Count


 


 


 261 x10^3/uL


(140-400) 





 


Neutrophils (%) (Auto)   81 % (31-73)  


 


Lymphocytes (%) (Auto)   13 % (24-48)  


 


Monocytes (%) (Auto)   6 % (0-9)  


 


Eosinophils (%) (Auto)   1 % (0-3)  


 


Basophils (%) (Auto)   0 % (0-3)  


 


Neutrophils # (Auto)


 


 


 5.8 x10^3/uL


(1.8-7.7) 





 


Lymphocytes # (Auto)


 


 


 0.9 x10^3/uL


(1.0-4.8) 





 


Monocytes # (Auto)


 


 


 0.4 x10^3/uL


(0.0-1.1) 





 


Eosinophils # (Auto)


 


 


 0.0 x10^3/uL


(0.0-0.7) 





 


Basophils # (Auto)


 


 


 0.0 x10^3/uL


(0.0-0.2) 





 


Sodium Level


 


 


 144 mmol/L


(136-145) 





 


Potassium Level


 


 


 3.8 mmol/L


(3.5-5.1) 





 


Chloride Level


 


 


 112 mmol/L


() 





 


Carbon Dioxide Level


 


 


 22 mmol/L


(21-32) 





 


Anion Gap   10 (6-14)  


 


Blood Urea Nitrogen


 


 


 16 mg/dL


(7-20) 





 


Creatinine


 


 


 1.0 mg/dL


(0.6-1.0) 





 


Estimated GFR


(Cockcroft-Gault) 


 


 63.9 


 





 


Glucose Level


 


 


 92 mg/dL


(70-99) 





 


Calcium Level


 


 


 8.2 mg/dL


(8.5-10.1) 





 


Test


 10/14/19


12:04 10/14/19


17:17 10/14/19


20:52 10/15/19


07:25


 


Glucose (Fingerstick)


 116 mg/dL


(70-99) 136 mg/dL


(70-99) 101 mg/dL


(70-99) 92 mg/dL


(70-99)


 


Test


 10/15/19


10:30 10/15/19


11:04 


 





 


White Blood Count


 8.6 x10^3/uL


(4.0-11.0) 


 


 





 


Red Blood Count


 3.99 x10^6/uL


(3.50-5.40) 


 


 





 


Hemoglobin


 9.2 g/dL


(12.0-15.5) 


 


 





 


Hematocrit


 29.0 %


(36.0-47.0) 


 


 





 


Mean Corpuscular Volume 73 fL ()    


 


Mean Corpuscular Hemoglobin 23 pg (25-35)    


 


Mean Corpuscular Hemoglobin


Concent 32 g/dL


(31-37) 


 


 





 


Red Cell Distribution Width


 26.5 %


(11.5-14.5) 


 


 





 


Platelet Count


 233 x10^3/uL


(140-400) 


 


 





 


Neutrophils (%) (Auto) 78 % (31-73)    


 


Lymphocytes (%) (Auto) 12 % (24-48)    


 


Monocytes (%) (Auto) 10 % (0-9)    


 


Eosinophils (%) (Auto) 1 % (0-3)    


 


Basophils (%) (Auto) 0 % (0-3)    


 


Neutrophils # (Auto)


 6.7 x10^3/uL


(1.8-7.7) 


 


 





 


Lymphocytes # (Auto)


 1.0 x10^3/uL


(1.0-4.8) 


 


 





 


Monocytes # (Auto)


 0.8 x10^3/uL


(0.0-1.1) 


 


 





 


Eosinophils # (Auto)


 0.0 x10^3/uL


(0.0-0.7) 


 


 





 


Basophils # (Auto)


 0.0 x10^3/uL


(0.0-0.2) 


 


 





 


Sodium Level


 140 mmol/L


(136-145) 


 


 





 


Potassium Level


 3.7 mmol/L


(3.5-5.1) 


 


 





 


Chloride Level


 107 mmol/L


() 


 


 





 


Carbon Dioxide Level


 24 mmol/L


(21-32) 


 


 





 


Anion Gap 9 (6-14)    


 


Blood Urea Nitrogen


 16 mg/dL


(7-20) 


 


 





 


Creatinine


 1.0 mg/dL


(0.6-1.0) 


 


 





 


Estimated GFR


(Cockcroft-Gault) 63.9 


 


 


 





 


Glucose Level


 138 mg/dL


(70-99) 


 


 





 


Calcium Level


 8.2 mg/dL


(8.5-10.1) 


 


 





 


Glucose (Fingerstick)


 


 146 mg/dL


(70-99) 


 











Laboratory Tests








Test


 10/14/19


17:17 10/14/19


20:52 10/15/19


07:25 10/15/19


10:30


 


Glucose (Fingerstick)


 136 mg/dL


(70-99) 101 mg/dL


(70-99) 92 mg/dL


(70-99) 





 


White Blood Count


 


 


 


 8.6 x10^3/uL


(4.0-11.0)


 


Red Blood Count


 


 


 


 3.99 x10^6/uL


(3.50-5.40)


 


Hemoglobin


 


 


 


 9.2 g/dL


(12.0-15.5)


 


Hematocrit


 


 


 


 29.0 %


(36.0-47.0)


 


Mean Corpuscular Volume    73 fL () 


 


Mean Corpuscular Hemoglobin    23 pg (25-35) 


 


Mean Corpuscular Hemoglobin


Concent 


 


 


 32 g/dL


(31-37)


 


Red Cell Distribution Width


 


 


 


 26.5 %


(11.5-14.5)


 


Platelet Count


 


 


 


 233 x10^3/uL


(140-400)


 


Neutrophils (%) (Auto)    78 % (31-73) 


 


Lymphocytes (%) (Auto)    12 % (24-48) 


 


Monocytes (%) (Auto)    10 % (0-9) 


 


Eosinophils (%) (Auto)    1 % (0-3) 


 


Basophils (%) (Auto)    0 % (0-3) 


 


Neutrophils # (Auto)


 


 


 


 6.7 x10^3/uL


(1.8-7.7)


 


Lymphocytes # (Auto)


 


 


 


 1.0 x10^3/uL


(1.0-4.8)


 


Monocytes # (Auto)


 


 


 


 0.8 x10^3/uL


(0.0-1.1)


 


Eosinophils # (Auto)


 


 


 


 0.0 x10^3/uL


(0.0-0.7)


 


Basophils # (Auto)


 


 


 


 0.0 x10^3/uL


(0.0-0.2)


 


Sodium Level


 


 


 


 140 mmol/L


(136-145)


 


Potassium Level


 


 


 


 3.7 mmol/L


(3.5-5.1)


 


Chloride Level


 


 


 


 107 mmol/L


()


 


Carbon Dioxide Level


 


 


 


 24 mmol/L


(21-32)


 


Anion Gap    9 (6-14) 


 


Blood Urea Nitrogen


 


 


 


 16 mg/dL


(7-20)


 


Creatinine


 


 


 


 1.0 mg/dL


(0.6-1.0)


 


Estimated GFR


(Cockcroft-Gault) 


 


 


 63.9 





 


Glucose Level


 


 


 


 138 mg/dL


(70-99)


 


Calcium Level


 


 


 


 8.2 mg/dL


(8.5-10.1)


 


Test


 10/15/19


11:04 


 


 





 


Glucose (Fingerstick)


 146 mg/dL


(70-99) 


 


 











Brief Hospital Course


Ms. Deal  is a 84 old  she came in from [facility], confused, colleague thought 

could be from klonopin, she has that hx - We have stopped klonopin,SHe is now at

baseline stayed 3-4 days with us, BAck to [facility] today - ON PO augmentin as RXd by 

ID.


SHe is a partial code





Discharge Information


Condition at Discharge:  Improved, Stable


Disposition/Orders:  Other (snu)


Scheduled


Amoxicillin/Potassium Clav (Amox Tr-K Clv 500-125 Mg Tab) 1 Each Tablet, 0.5 TAB

PO BID for infection , #14


   Prescribed by: TORIE MAXWELL on 10/15/19 1225


Aspirin (Aspirin) 81 Mg Tab.chew, 1 TAB PO DAILY for blood thinner, #30 Ref 3 

(Reported)


   Entered as Reported by: ZAN CRUZ on 10/29/16 1918


   Last Taken: Unknown Dose on Unknown Date & Time     Last Action: Continued on

10/13/19 1050 by KATELYNN ABDULLAHI MD


Atenolol (Atenolol) 25 Mg Tablet, 1 TAB PO DAILY for htn, #30 Ref 5 (Reported)


   Entered as Reported by: ZAN CRUZ on 10/29/16 1918


   Last Taken: Unknown Dose on Unknown Date & Time     Last Action: Continued on

10/13/19 1050 by KATELYNN ABDULLAHI MD


Cholecalciferol (Vitamin D3) (Vitamin D3) 1,000 Unit Tablet, 1 TAB PO 

DAILYBFRSUP for n/a, #30 Ref 5 (Reported)


   Entered as Reported by: ALMA TITUS on 1/20/19 1219


   Last Taken: Unknown Dose on Unknown Date & Time     Last Action: Continued on

10/13/19 1050 by KATELYNN ABDULLAHI MD


Diclofenac Sodium (Voltaren) 100 Gm Gel..gram., 1 GM TP PRN QID for knee pain, 

#100 Ref 2 (Reported)


   Entered as Reported by: ALMA TITUS on 1/20/19 1218


   Last Taken: Unknown Dose on Unknown Date & Time     Last Action: Continued on

10/13/19 1050 by KATELYNN ABDULLAHI MD


Levetiracetam (Keppra) 500 Mg Tablet, 500 MG PO BID for seizure prevention, #60 

Ref 2


   Prescribed by: AUNG GRIMES on 1/24/19 1023


   Last Taken: Unknown Dose on Unknown Date & Time     Last Action: Continued on

10/13/19 1050 by KATELYNN ABDULLAHI MD


Simvastatin (Simvastatin) 40 Mg Tablet, 1 TAB PO QHS for elevated cholesterol, 

#30 Ref 5 (Reported)


   Entered as Reported by: ZAN CRUZ on 10/29/16 1918


   Last Taken: Unknown Dose on Unknown Date & Time     Last Action: Continued on

10/13/19 1050 by KATELYNN ABDULLAHI MD





Scheduled PRN


Meclizine Hcl (Meclizine Hcl) 25 Mg Tablet, 1 TAB PO PRN TID PRN for DIZZINESS, 

#30


   Prescribed by: KINGS ROSAS on 10/29/16 2034


   Last Taken: Unknown Dose on Unknown Date & Time     Last Action: HELD on 

10/13/19 1050 by MD HANS FARR CHERRIE Y MD           Oct 15, 2019 12:31

## 2019-10-15 NOTE — PDOC2
PALLIATIVE CARE


Palliative Care Note


Palliative Care


Consult requested by Dr. Dao to address goals of care/readmission for same. 

too sleepy to eat. 


Patient opens eyes to verbal stimuli. 


Spoke with Мария.  Adamant about wanting to know why she is "sleepy" before 

making any decisions. 


Code Status;  Partial This was clarified in the ER.  Мария wants to leave the 

same. 





Plan family meeting when more information available at family request.  


Will arrange meeting with Мария tomorrow.











GIOVANNA MIRANDA                  Oct 15, 2019 16:48

## 2019-10-15 NOTE — PDOC
Infectious Disease Note


Subjective


Subjective


Pt known to us, returned with change in MS, likely meds, now back to her 

baseline





Pt is awake, nodes , says she is ok, no pain, no sob





ROS


ROS


no n/v/d/hypoxia





Vital Sign


Vital Signs





Vital Signs








  Date Time  Temp Pulse Resp B/P (MAP) Pulse Ox O2 Delivery O2 Flow Rate FiO2


 


10/15/19 09:22  100  121/50    


 


10/15/19 08:00      Room Air  


 


10/15/19 07:00 98.8  18  96   





 98.8       











Physical Exam


PHYSICAL EXAM


GENERAL: Sitting in the bed alert, smiling 


HEENT:  Pupils equally round, reactive.  


NECK:  Supple.


LUNGS: Clear.


HEART:  S1, S2.  Murmur present.


ABDOMEN:  Soft, no guarding.  Bowel sounds present.


GENITOURINARY:  Indwelling Evans in place.


EXTREMITIES:  No gross edema or cyanosis.  Left wrist tenderness improved


SKIN:  Warm to touch.


PIV ok





Labs


Lab





Laboratory Tests








Test


 10/14/19


12:04 10/14/19


17:17 10/14/19


20:52 10/15/19


07:25


 


Glucose (Fingerstick)


 116 mg/dL


(70-99) 136 mg/dL


(70-99) 101 mg/dL


(70-99) 92 mg/dL


(70-99)








Micro





Microbiology


10/13/19 Blood Culture - Preliminary, Resulted


           NO GROWTH AFTER 2 DAYS


10/12/19 Urine Culture - Preliminary, Resulted


           


10/12/19 Urine Culture Result 1 (EDGAR) - Preliminary, Resulted





Objective


Assessment


Fever - ? possible aspiration


Acute encephalopathy , improved, likely sec to meds


Dementia 


Anemia 


Recent UTI





Plan


Plan of Care


cont augmentin


supportive care


soon d/c back to [facility]


need to have palliative care /hospice











CATRINA GALLAGHER MD               Oct 15, 2019 10:57

## 2019-10-15 NOTE — SNU/HH DC
DISCHARGE ORDERS


DISCHARGE INFORMATION:


DISCHARGE DATE:  Oct 15, 2019


FINAL DIAGNOSIS


Problems


Medical Problems:


(1) Altered mental status


Status: Acute  








CONDITION ON DISCHARGE:  Stable





CODE STATUS:


Code Status:  Full





SKILLED NURSING:


SNF STAY <30 DAYS:  Yes





HOSPICE:


HOSPICE:  No


HOSPICE EVAL & TREAT:  No





LTAC:


ADMIT TO LTAC:  No





POST DISCHARGE ORDERS:


ACTIVITY ORDERS:  Activity as tolerated


WEIGHT BEARING STATUS:  As tolerated


DIET AFTER DISCHARGE:  Cardiac





CHECKS AFTER DISCHARGE:


CHECKS AFTER DISCHARGE:  Check blood press - daily, Check blood sugar, ac/hs, 

Check your Temp as needed





FOLLOW-UP:


PHYSICIAN FOLLOW-UP:  avoid benzos if u can


LAB ORDERS FOR FOLLOW-UP:  BMP weekly





TREATMENT/EQUIPMENT ORDERS:


ADAPTIVE EQUIPMENT NEEDED:  None


Physical Therapy For:  Evalulation/Treatment


Occupational Therapy For:  Evaluation/Treatment


Speech Language Pathology For:  Evaluation/Treatment





DISCHARGE MEDICATIONS:


Home Meds


Active Scripts


Amoxicillin/Potassium Clav (AMOX TR-K -125 MG TAB) 1 Each Tablet, 0.5 TAB

PO BID for infection , #14 TAB


   Prov:TORIE MAXWELL MD         10/15/19


Levetiracetam (KEPPRA) 500 Mg Tablet, 500 MG PO BID for seizure prevention, #60 

TAB 2 Refills


   Prov:AUNG GRIMES MD         1/24/19


Meclizine Hcl (MECLIZINE HCL) 25 Mg Tablet, 1 TAB PO PRN TID PRN for DIZZINESS, 

#30 TAB


   Prov:KINGS ROSAS MD         10/29/16


Reported Medications


Cholecalciferol (Vitamin D3) (VITAMIN D3) 1,000 Unit Tablet, 1 TAB PO 

DAILYBFRSUP for n/a, #30 TAB 5 Refills


   1/20/19


Diclofenac Sodium (VOLTAREN) 100 Gm Gel..gram., 1 GM TP PRN QID for knee pain, 

#100 GM 2 Refills


   1/20/19


Aspirin (ASPIRIN) 81 Mg Tab.chew, 1 TAB PO DAILY for blood thinner, #30 TAB 3 

Refills


   10/29/16


Simvastatin (SIMVASTATIN) 40 Mg Tablet, 1 TAB PO QHS for elevated cholesterol, 

#30 TAB 5 Refills


   10/29/16


Atenolol (ATENOLOL) 25 Mg Tablet, 1 TAB PO DAILY for htn, #30 TAB 5 Refills


   10/29/16











TORIE MAXWELL MD           Oct 15, 2019 12:27

## 2019-10-15 NOTE — PDOC
Provider Note


Provider Note


cancel dc - patient still too sleepy to eat and she is not on any culprit meds


Shifted to PO augmentin already by ID


INvolved palliatve re goals of care


Pt partial code (drugs only)


Resident of [facility]


MAy t.o tele


dc tele











TORIE MAXWELL MD           Oct 15, 2019 12:38

## 2019-10-15 NOTE — NUR
ALICIA following pt for dc planning. Chart reviewed. Pt is known to [facility] from previous 
admission. Pt is a SNU resident at [facility]. ALICIA left a voice mail to pt's daughter, Ann Marie, 
phone: [phone]. Will continue to follow. 

-------------------------------------------------------------------------------

Addendum: 10/15/19 at 1606 by ORION VARGAS

-------------------------------------------------------------------------------

ALICIA confirmed plans with daughter, Ann Marie. Clinicals were faxed to [facility] earlier, Awaiting to 
hear back. Possible dc back to [facility] tomorrow pending auth.

## 2019-10-16 VITALS — DIASTOLIC BLOOD PRESSURE: 58 MMHG | SYSTOLIC BLOOD PRESSURE: 166 MMHG

## 2019-10-16 VITALS — SYSTOLIC BLOOD PRESSURE: 132 MMHG | DIASTOLIC BLOOD PRESSURE: 69 MMHG

## 2019-10-16 VITALS — SYSTOLIC BLOOD PRESSURE: 136 MMHG | DIASTOLIC BLOOD PRESSURE: 78 MMHG

## 2019-10-16 VITALS — SYSTOLIC BLOOD PRESSURE: 119 MMHG | DIASTOLIC BLOOD PRESSURE: 69 MMHG

## 2019-10-16 VITALS — DIASTOLIC BLOOD PRESSURE: 63 MMHG | SYSTOLIC BLOOD PRESSURE: 114 MMHG

## 2019-10-16 VITALS — DIASTOLIC BLOOD PRESSURE: 78 MMHG | SYSTOLIC BLOOD PRESSURE: 131 MMHG

## 2019-10-16 RX ADMIN — VITAMIN D, TAB 1000IU (100/BT) SCH UNIT: 25 TAB at 17:46

## 2019-10-16 RX ADMIN — Medication SCH CAP: at 21:44

## 2019-10-16 RX ADMIN — SIMVASTATIN SCH MG: 40 TABLET, FILM COATED ORAL at 21:44

## 2019-10-16 RX ADMIN — ATENOLOL SCH MG: 25 TABLET ORAL at 09:45

## 2019-10-16 RX ADMIN — CEFEPIME SCH GM: 1 INJECTION, POWDER, FOR SOLUTION INTRAMUSCULAR; INTRAVENOUS at 10:57

## 2019-10-16 RX ADMIN — ASPIRIN 81 MG SCH MG: 81 TABLET ORAL at 09:45

## 2019-10-16 RX ADMIN — Medication SCH CAP: at 09:45

## 2019-10-16 RX ADMIN — CEFEPIME SCH GM: 1 INJECTION, POWDER, FOR SOLUTION INTRAMUSCULAR; INTRAVENOUS at 17:50

## 2019-10-16 RX ADMIN — CEFEPIME SCH GM: 1 INJECTION, POWDER, FOR SOLUTION INTRAMUSCULAR; INTRAVENOUS at 21:44

## 2019-10-16 NOTE — PDOC2
PALLIATIVE CARE


Palliative Care Note


Palliative Care


Patient more alert today. 


Per Sussy RN patient ate applesauce, eggs and drank juice this morning for 

breakfast. 


Patient does remain confused;  "I have to go to work today.'  I'm resting now"





Attempted to reach Мария [phone].  Message to return call. 





Code Status;  Partial.











GIOVANNA MIRANDA                  Oct 16, 2019 12:03

## 2019-10-16 NOTE — NUR
SW following pt. [facility] had submitted auth request yesterday. Insurance auth for SNU pending. 
Will continue to follow.

## 2019-10-16 NOTE — PDOC
PROGRESS NOTES


Chief Complaint


Chief Complaint


A/P:


Acute encephalopathy


Acute respiratory failure with hypoxia


Intentional// accidental ?  drug overdose last admit 


TOXIC Encephalopathy


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


Mild to moderate chronic small vessel ischemic changes and atrophy.on CT HEAD


DIABETES


HYPERTENSION HX


HYPOKALEMIA , resolved


Severe protein-caloric malnutrition


GERD


MICROCYTIC ANEMIA


Fever, possible aspiration on last stay, resolved


UTI - e. coli intermediate to augmentin - change antibiotics


Moderate left and minimal right hip osteoarthritis.


Degenerative change at the lower lumbar levels weakness, max assist on transfers





plan


neurochecks q 4 hrs, may be at baseline


HOLD SEDATING MED


hold meclizine, may be too sedating


PT/OT/ST


Cefuroxime on d/c





27 MIN PT EXAM, CHART REVIEW, > 50% OF TIME SPENT WITH EXAM, CHART REVIEW, pt 

care coordination





History of Present Illness


History of Present Illness


Ms Deal is an 85yo F w/ PMHx Dementia, diabetes, seizures, myocardial 

infarction, coronary artery disease, hyperlipidemia, hypertension, and arthritis

who returns to the hospital from SNF after worsening confusion. Found with e. 

coli UTI.





10/15 had fever of 101. E. coli found intermediate to augmentin. Changed to 

cefepime per ID. CT abdomen ordered. Patient has no complaints. Is pleasantly 

confused. Discussed with family bedside. Will need to monitor for 24 hours 

without fever prior to d/c





Vitals


Vitals





Vital Signs








  Date Time  Temp Pulse Resp B/P (MAP) Pulse Ox O2 Delivery O2 Flow Rate FiO2


 


10/16/19 07:00 97.6 91 17 166/58 (94) 95 Room Air  





 97.6       











Physical Exam


Physical Exam


GENERAL: Sitting in the bed alert, smiling 


HEENT:  Pupils equally round, reactive.  


NECK:  Supple.


LUNGS: Clear.


HEART:  S1, S2.  Murmur present.


ABDOMEN:  Soft, no guarding.  Bowel sounds present.


GENITOURINARY:  Indwelling Evans in place.


EXTREMITIES:  No gross edema or cyanosis.  Left wrist tenderness improved


SKIN:  Warm to touch.


PIV ok


General:  Alert, Cooperative, No acute distress


Lungs:  Clear


Abdomen:  Normal bowel sounds, Soft


Extremities:  No cyanosis


Skin:  No breakdown





Labs


LABS





Laboratory Tests








Test


 10/15/19


10:30 10/15/19


11:04 10/15/19


15:20 10/15/19


21:35


 


White Blood Count


 8.6 x10^3/uL


(4.0-11.0) 


 


 





 


Red Blood Count


 3.99 x10^6/uL


(3.50-5.40) 


 


 





 


Hemoglobin


 9.2 g/dL


(12.0-15.5) 


 


 





 


Hematocrit


 29.0 %


(36.0-47.0) 


 


 





 


Mean Corpuscular Volume 73 fL ()    


 


Mean Corpuscular Hemoglobin 23 pg (25-35)    


 


Mean Corpuscular Hemoglobin


Concent 32 g/dL


(31-37) 


 


 





 


Red Cell Distribution Width


 26.5 %


(11.5-14.5) 


 


 





 


Platelet Count


 233 x10^3/uL


(140-400) 


 


 





 


Neutrophils (%) (Auto) 78 % (31-73)    


 


Lymphocytes (%) (Auto) 12 % (24-48)    


 


Monocytes (%) (Auto) 10 % (0-9)    


 


Eosinophils (%) (Auto) 1 % (0-3)    


 


Basophils (%) (Auto) 0 % (0-3)    


 


Neutrophils # (Auto)


 6.7 x10^3/uL


(1.8-7.7) 


 


 





 


Lymphocytes # (Auto)


 1.0 x10^3/uL


(1.0-4.8) 


 


 





 


Monocytes # (Auto)


 0.8 x10^3/uL


(0.0-1.1) 


 


 





 


Eosinophils # (Auto)


 0.0 x10^3/uL


(0.0-0.7) 


 


 





 


Basophils # (Auto)


 0.0 x10^3/uL


(0.0-0.2) 


 


 





 


Erythrocyte Sedimentation Rate 28 (0-25)    


 


Sodium Level


 140 mmol/L


(136-145) 


 


 





 


Potassium Level


 3.7 mmol/L


(3.5-5.1) 


 


 





 


Chloride Level


 107 mmol/L


() 


 


 





 


Carbon Dioxide Level


 24 mmol/L


(21-32) 


 


 





 


Anion Gap 9 (6-14)    


 


Blood Urea Nitrogen


 16 mg/dL


(7-20) 


 


 





 


Creatinine


 1.0 mg/dL


(0.6-1.0) 


 


 





 


Estimated GFR


(Cockcroft-Gault) 63.9 


 


 


 





 


Glucose Level


 138 mg/dL


(70-99) 


 


 





 


Calcium Level


 8.2 mg/dL


(8.5-10.1) 


 


 





 


Glucose (Fingerstick)


 


 146 mg/dL


(70-99) 129 mg/dL


(70-99) 156 mg/dL


(70-99)


 


Test


 10/16/19


00:15 10/16/19


07:27 


 





 


Lactic Acid Level


 0.6 mmol/L


(0.4-2.0) 


 


 





 


Glucose (Fingerstick)


 


 94 mg/dL


(70-99) 


 














Assessment and Plan


Assessmemt and Plan


Problems


Medical Problems:


(1) Altered mental status


Status: Acute  











Comment


Review of Relevant


I have reviewed the following items marky (where applicable) has been applied.


Labs





Laboratory Tests








Test


 10/14/19


12:04 10/14/19


17:17 10/14/19


20:52 10/15/19


07:25


 


Glucose (Fingerstick)


 116 mg/dL


(70-99) 136 mg/dL


(70-99) 101 mg/dL


(70-99) 92 mg/dL


(70-99)


 


Test


 10/15/19


10:30 10/15/19


11:04 10/15/19


15:20 10/15/19


21:35


 


White Blood Count


 8.6 x10^3/uL


(4.0-11.0) 


 


 





 


Red Blood Count


 3.99 x10^6/uL


(3.50-5.40) 


 


 





 


Hemoglobin


 9.2 g/dL


(12.0-15.5) 


 


 





 


Hematocrit


 29.0 %


(36.0-47.0) 


 


 





 


Mean Corpuscular Volume 73 fL ()    


 


Mean Corpuscular Hemoglobin 23 pg (25-35)    


 


Mean Corpuscular Hemoglobin


Concent 32 g/dL


(31-37) 


 


 





 


Red Cell Distribution Width


 26.5 %


(11.5-14.5) 


 


 





 


Platelet Count


 233 x10^3/uL


(140-400) 


 


 





 


Neutrophils (%) (Auto) 78 % (31-73)    


 


Lymphocytes (%) (Auto) 12 % (24-48)    


 


Monocytes (%) (Auto) 10 % (0-9)    


 


Eosinophils (%) (Auto) 1 % (0-3)    


 


Basophils (%) (Auto) 0 % (0-3)    


 


Neutrophils # (Auto)


 6.7 x10^3/uL


(1.8-7.7) 


 


 





 


Lymphocytes # (Auto)


 1.0 x10^3/uL


(1.0-4.8) 


 


 





 


Monocytes # (Auto)


 0.8 x10^3/uL


(0.0-1.1) 


 


 





 


Eosinophils # (Auto)


 0.0 x10^3/uL


(0.0-0.7) 


 


 





 


Basophils # (Auto)


 0.0 x10^3/uL


(0.0-0.2) 


 


 





 


Erythrocyte Sedimentation Rate 28 (0-25)    


 


Sodium Level


 140 mmol/L


(136-145) 


 


 





 


Potassium Level


 3.7 mmol/L


(3.5-5.1) 


 


 





 


Chloride Level


 107 mmol/L


() 


 


 





 


Carbon Dioxide Level


 24 mmol/L


(21-32) 


 


 





 


Anion Gap 9 (6-14)    


 


Blood Urea Nitrogen


 16 mg/dL


(7-20) 


 


 





 


Creatinine


 1.0 mg/dL


(0.6-1.0) 


 


 





 


Estimated GFR


(Cockcroft-Gault) 63.9 


 


 


 





 


Glucose Level


 138 mg/dL


(70-99) 


 


 





 


Calcium Level


 8.2 mg/dL


(8.5-10.1) 


 


 





 


Glucose (Fingerstick)


 


 146 mg/dL


(70-99) 129 mg/dL


(70-99) 156 mg/dL


(70-99)


 


Test


 10/16/19


00:15 10/16/19


07:27 


 





 


Lactic Acid Level


 0.6 mmol/L


(0.4-2.0) 


 


 





 


Glucose (Fingerstick)


 


 94 mg/dL


(70-99) 


 











Laboratory Tests








Test


 10/15/19


10:30 10/15/19


11:04 10/15/19


15:20 10/15/19


21:35


 


White Blood Count


 8.6 x10^3/uL


(4.0-11.0) 


 


 





 


Red Blood Count


 3.99 x10^6/uL


(3.50-5.40) 


 


 





 


Hemoglobin


 9.2 g/dL


(12.0-15.5) 


 


 





 


Hematocrit


 29.0 %


(36.0-47.0) 


 


 





 


Mean Corpuscular Volume 73 fL ()    


 


Mean Corpuscular Hemoglobin 23 pg (25-35)    


 


Mean Corpuscular Hemoglobin


Concent 32 g/dL


(31-37) 


 


 





 


Red Cell Distribution Width


 26.5 %


(11.5-14.5) 


 


 





 


Platelet Count


 233 x10^3/uL


(140-400) 


 


 





 


Neutrophils (%) (Auto) 78 % (31-73)    


 


Lymphocytes (%) (Auto) 12 % (24-48)    


 


Monocytes (%) (Auto) 10 % (0-9)    


 


Eosinophils (%) (Auto) 1 % (0-3)    


 


Basophils (%) (Auto) 0 % (0-3)    


 


Neutrophils # (Auto)


 6.7 x10^3/uL


(1.8-7.7) 


 


 





 


Lymphocytes # (Auto)


 1.0 x10^3/uL


(1.0-4.8) 


 


 





 


Monocytes # (Auto)


 0.8 x10^3/uL


(0.0-1.1) 


 


 





 


Eosinophils # (Auto)


 0.0 x10^3/uL


(0.0-0.7) 


 


 





 


Basophils # (Auto)


 0.0 x10^3/uL


(0.0-0.2) 


 


 





 


Erythrocyte Sedimentation Rate 28 (0-25)    


 


Sodium Level


 140 mmol/L


(136-145) 


 


 





 


Potassium Level


 3.7 mmol/L


(3.5-5.1) 


 


 





 


Chloride Level


 107 mmol/L


() 


 


 





 


Carbon Dioxide Level


 24 mmol/L


(21-32) 


 


 





 


Anion Gap 9 (6-14)    


 


Blood Urea Nitrogen


 16 mg/dL


(7-20) 


 


 





 


Creatinine


 1.0 mg/dL


(0.6-1.0) 


 


 





 


Estimated GFR


(Cockcroft-Gault) 63.9 


 


 


 





 


Glucose Level


 138 mg/dL


(70-99) 


 


 





 


Calcium Level


 8.2 mg/dL


(8.5-10.1) 


 


 





 


Glucose (Fingerstick)


 


 146 mg/dL


(70-99) 129 mg/dL


(70-99) 156 mg/dL


(70-99)


 


Test


 10/16/19


00:15 10/16/19


07:27 


 





 


Lactic Acid Level


 0.6 mmol/L


(0.4-2.0) 


 


 





 


Glucose (Fingerstick)


 


 94 mg/dL


(70-99) 


 











Microbiology


10/13/19 Blood Culture - Preliminary, Resulted


           NO GROWTH AFTER 3 DAYS


10/12/19 Urine Culture - Final, Complete


           


10/12/19 Urine Culture Result 1 (EGDAR) - Final, Complete


           


10/12/19 Antimicrobic Susceptibility - Final, Complete


Medications





Current Medications


Ondansetron HCl (Zofran) 4 mg 1X  ONCE IV  Last administered on 10/12/19at 

19:17;  Start 10/12/19 at 19:00;  Stop 10/12/19 at 19:01;  Status DC


Ondansetron HCl (Zofran) 4 mg PRN Q8HRS  PRN IV NAUSEA/VOMITING;  Start 10/12/19

at 19:45;  Stop 10/13/19 at 19:44;  Status DC


Sodium Chloride 1,000 ml @  75 mls/hr [id number] IV  Last administered on 

10/13/19at 15:33;  Start 10/12/19 at 19:35;  Stop 10/13/19 at 19:34;  Status DC


Acetaminophen (Tylenol) 650 mg PRN Q4HRS  PRN PO FEVER Last administered on 

10/13/19at 12:09;  Start 10/12/19 at 19:45;  Stop 10/13/19 at 19:44;  Status DC


Aspirin (Children'S Aspirin) 81 mg DAILY PO  Last administered on 10/15/19at 

09:22;  Start 10/13/19 at 12:00


Atenolol (Tenormin) 25 mg DAILY PO  Last administered on 10/15/19at 09:22;  

Start 10/13/19 at 12:00


Vitamin D (Vitamin D3) 1,000 unit DAILYBFRSUP PO  Last administered on 

10/15/19at 16:28;  Start 10/13/19 at 17:00


Diclofenac Sodium (Voltaren) 1 danyell PRN QID  PRN TP PAIN;  Start 10/13/19 at 

11:00


Levetiracetam (Keppra) 500 mg BID PO  Last administered on 10/15/19at 22:00;  

Start 10/13/19 at 12:00


Simvastatin (Zocor) 40 mg QHS PO  Last administered on 10/15/19at 22:00;  Start 

10/13/19 at 21:00


Amoxicillin/ Clavulanate Potassium (Augmentin 500/ 125mg) 0.5 tab BID PO  Last 

administered on 10/15/19at 22:00;  Start 10/13/19 at 12:00


Lactobacillus Rhamnosus (Culturelle) 1 cap BID PO  Last administered on 

10/15/19at 22:00;  Start 10/13/19 at 21:00


Acetaminophen (Tylenol) 500 mg PRN Q6HRS  PRN PO MILD PAIN / TEMP;  Start 

10/13/19 at 20:15


Ondansetron HCl (Zofran) 4 mg PRN Q6HRS  PRN IVP NAUSEA/VOMITING Last 

administered on 10/14/19at 15:24;  Start 10/13/19 at 20:15


Labetalol HCl (Normodyne Iv Push) 10 mg PRN Q2HR  PRN IVP HYPERTENSION;  Start 

10/13/19 at 20:15





Active Scripts


Active


Amox Tr-K Clv 500-125 Mg Tab (Amoxicillin/Potassium Clav) 1 Each Tablet 0.5 Tab 

PO BID


Keppra (Levetiracetam) 500 Mg Tablet 500 Mg PO BID


Meclizine Hcl 25 Mg Tablet 1 Tab PO PRN TID PRN


Reported


Vitamin D3 (Cholecalciferol (Vitamin D3)) 1,000 Unit Tablet 1 Tab PO DAILYBFRSUP


Voltaren (Diclofenac Sodium) 100 Gm Gel..gram. 1 Gm TP PRN QID


Aspirin 81 Mg Tab.chew 1 Tab PO DAILY


Simvastatin 40 Mg Tablet 1 Tab PO QHS


Atenolol 25 Mg Tablet 1 Tab PO DAILY


Vitals/I & O





Vital Sign - Last 24 Hours








 10/15/19 10/15/19 10/15/19 10/15/19





 09:22 11:00 15:00 19:00


 


Temp  99.4 100.4 100.7





  99.4 100.4 100.7


 


Pulse 100 91 101 113


 


Resp  18 18 18


 


B/P (MAP) 121/50 111/66 (81) 117/57 (77) 170/99 (122)


 


Pulse Ox  96 92 90


 


O2 Delivery  Room Air Room Air Room Air


 


    





    





 10/15/19 10/15/19 10/16/19 10/16/19





 19:45 23:00 03:00 07:00


 


Temp  101.0 98.2 97.6





  101.0 98.2 97.6


 


Pulse  106 93 91


 


Resp  18 18 17


 


B/P (MAP)  145/79 (101) 132/69 (90) 166/58 (94)


 


Pulse Ox  95 92 95


 


O2 Delivery Room Air Room Air Room Air Room Air














Intake and Output   


 


 10/15/19 10/15/19 10/16/19





 14:59 22:59 06:59


 


Intake Total   50 ml


 


Balance   50 ml

















BENJAMÍN ADAM MD        Oct 16, 2019 09:01

## 2019-10-16 NOTE — RAD
Examination: CT ABDOMEN PELVIS WO CONTRAST

 

History: Fever

 

Comparison/Correlation: None

 

Findings: Axial images of the abdomen and pelvis were obtained without 

contrast. Sagittal and coronal reformatted images were provided. Coronary 

arterial calcifications are partially seen. Small right pleural effusion 

is present. Adjacent atelectasis is present. Minimal left costophrenic 

sulcus atelectasis is present.

 

Small hiatal hernia is present. Liver is unremarkable. Calcified 

granulomas involving the spleen. Pancreas is normal. Gallbladder fossa is 

unremarkable. Right renal cyst is present. Mild bilateral renal atrophy is

present. Left kidney is unremarkable.

 

No extraluminal gas. No ascites or pelvic free fluid. No loculated 

collections. No bowel obstruction. Moderate amount of distal colon is 

present. Diverticulosis is present without findings of acute inflammation.

No extraluminal gas. Significant calcific involvement of the abdominal 

aorta and iliac arteries is noted. Urinary bladder is unremarkable. Uterus

is atrophic or absent. Multilevel degenerative disc space narrowing of the

lumbar spine is moderate. Left hip joint space narrowing is present with 

subchondral sclerosis.

 

 

Impression:

Bibasilar atelectasis. Small pleural effusions greater on the right.

 

Hiatal hernia.

 

Diverticulosis. No focal inflammatory findings.

 

 

PQRS Compliance Statement:

 

One or more of the following individualized dose reduction techniques were

utilized for this examination:  

1. Automated exposure control  

2. Adjustment of the mA and/or kV according to patient size  

3. Use of iterative reconstruction technique

 

Electronically signed by: North Padilla MD (10/16/2019 12:23 PM) [facility]

## 2019-10-17 VITALS — DIASTOLIC BLOOD PRESSURE: 54 MMHG | SYSTOLIC BLOOD PRESSURE: 113 MMHG

## 2019-10-17 VITALS — DIASTOLIC BLOOD PRESSURE: 74 MMHG | SYSTOLIC BLOOD PRESSURE: 145 MMHG

## 2019-10-17 VITALS — DIASTOLIC BLOOD PRESSURE: 82 MMHG | SYSTOLIC BLOOD PRESSURE: 118 MMHG

## 2019-10-17 VITALS — DIASTOLIC BLOOD PRESSURE: 69 MMHG | SYSTOLIC BLOOD PRESSURE: 131 MMHG

## 2019-10-17 VITALS — SYSTOLIC BLOOD PRESSURE: 141 MMHG | DIASTOLIC BLOOD PRESSURE: 66 MMHG

## 2019-10-17 VITALS — SYSTOLIC BLOOD PRESSURE: 133 MMHG | DIASTOLIC BLOOD PRESSURE: 67 MMHG

## 2019-10-17 RX ADMIN — Medication SCH CAP: at 09:10

## 2019-10-17 RX ADMIN — SIMVASTATIN SCH MG: 40 TABLET, FILM COATED ORAL at 20:57

## 2019-10-17 RX ADMIN — CEFEPIME SCH GM: 1 INJECTION, POWDER, FOR SOLUTION INTRAMUSCULAR; INTRAVENOUS at 15:37

## 2019-10-17 RX ADMIN — Medication SCH CAP: at 20:57

## 2019-10-17 RX ADMIN — VITAMIN D, TAB 1000IU (100/BT) SCH UNIT: 25 TAB at 09:10

## 2019-10-17 RX ADMIN — ASPIRIN 81 MG SCH MG: 81 TABLET ORAL at 09:10

## 2019-10-17 RX ADMIN — CEFEPIME SCH GM: 1 INJECTION, POWDER, FOR SOLUTION INTRAMUSCULAR; INTRAVENOUS at 06:31

## 2019-10-17 RX ADMIN — ACETAMINOPHEN PRN MG: 500 TABLET ORAL at 20:57

## 2019-10-17 RX ADMIN — ATENOLOL SCH MG: 25 TABLET ORAL at 09:10

## 2019-10-17 RX ADMIN — CEFEPIME SCH GM: 1 INJECTION, POWDER, FOR SOLUTION INTRAMUSCULAR; INTRAVENOUS at 22:26

## 2019-10-17 NOTE — PDOC
Infectious Disease Note


Subjective


Subjective


pt is awake, says feeling good





ROS


ROS


no n/v/d/


no fever





Vital Sign


Vital Signs





Vital Signs








  Date Time  Temp Pulse Resp B/P (MAP) Pulse Ox O2 Delivery O2 Flow Rate FiO2


 


10/17/19 09:10  86  133/67    


 


10/17/19 08:00      Room Air  


 


10/17/19 07:00 98.0  16  96   





 98.0       











Physical Exam


PHYSICAL EXAM


GENERAL: Sitting in the bed alert, smiling 


HEENT:  Pupils equally round, reactive.  


NECK:  Supple.


LUNGS: Clear.


HEART:  S1, S2.  Murmur present.


ABDOMEN:  Soft, no guarding.  Bowel sounds present.


GENITOURINARY:  Indwelling Evans in place.


EXTREMITIES:  No gross edema or cyanosis.  Left wrist tenderness improved


SKIN:  Warm to touch.


PIV ok





Labs


Lab





Laboratory Tests








Test


 10/16/19


11:27 10/16/19


16:07 10/16/19


21:21 10/17/19


07:56


 


Glucose (Fingerstick)


 168 mg/dL


(70-99) 92 mg/dL


(70-99) 96 mg/dL


(70-99) 74 mg/dL


(70-99)








Micro





Microbiology


10/13/19 Blood Culture - Preliminary, Resulted


           NO GROWTH AFTER 2 DAYS


10/12/19 Urine Culture - Preliminary, Resulted


           


10/12/19 Urine Culture Result 1 (EDGAR) - Preliminary, Resulted





Objective


Assessment


Fever - ? possible aspiration


Acute encephalopathy , improved, likely sec to meds


Dementia 


Anemia 


Recent UTI





Plan


Plan of Care


cefepime


soon change to po for d/c


d/w caregiver


d/w CATRINA MCDANIEL MD               Oct 17, 2019 10:47

## 2019-10-17 NOTE — PDOC
PROGRESS NOTES


Chief Complaint


Chief Complaint


A/P:


Acute encephalopathy


Acute respiratory failure with hypoxia


Intentional// accidental ?  drug overdose last admit 


TOXIC Encephalopathy


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


Mild to moderate chronic small vessel ischemic changes and atrophy.on CT HEAD


DIABETES


HYPERTENSION HX


HYPOKALEMIA , resolved


Severe protein-caloric malnutrition


GERD


MICROCYTIC ANEMIA


Fever, possible aspiration on last stay, resolved


UTI - e. coli intermediate to augmentin - change antibiotics


Moderate left and minimal right hip osteoarthritis.


Degenerative change at the lower lumbar levels weakness, max assist on transfers





plan


neurochecks q 4 hrs, may be at baseline


HOLD SEDATING MED


hold meclizine, may be too sedating


PT/OT/ST


Cefuroxime on d/c





27 MIN PT EXAM, CHART REVIEW, > 50% OF TIME SPENT WITH EXAM, CHART REVIEW, pt 

care coordination





History of Present Illness


History of Present Illness


Ms Deal is an 83yo F w/ PMHx Dementia, diabetes, seizures, myocardial 

infarction, coronary artery disease, hyperlipidemia, hypertension, and arthritis

who returns to the hospital from SNF after worsening confusion. Found with e. 

coli UTI.


10/15 had fever of 101. E. coli found intermediate to augmentin. Changed to 

cefepime per ID. CT abdomen ordered. Patient has no complaints. Is pleasantly 

confused. Discussed with family bedside. Will need to monitor for 24 hours 

without fever prior to d/c





Has temp 100F, nothing greater. She states she is feeling well. was transitioned

to cefepime. D/w family bedside could possibly go today if her temperature is 

lower.





Vitals


Vitals





Vital Signs








  Date Time  Temp Pulse Resp B/P (MAP) Pulse Ox O2 Delivery O2 Flow Rate FiO2


 


10/17/19 07:00 98.0 86 16 133/67 (89) 96 Room Air  





 98.0       











Physical Exam


Physical Exam


GENERAL: Sitting in the bed alert, smiling 


HEENT:  Pupils equally round, reactive.  


NECK:  Supple.


LUNGS: Clear.


HEART:  S1, S2.  Murmur present.


ABDOMEN:  Soft, no guarding.  Bowel sounds present.


GENITOURINARY:  Indwelling Evans in place.


EXTREMITIES:  No gross edema or cyanosis.  Left wrist tenderness improved


SKIN:  Warm to touch.


PIV ok


General:  Alert, Cooperative, No acute distress


Lungs:  Clear


Abdomen:  Normal bowel sounds, Soft


Extremities:  No cyanosis


Skin:  No breakdown





Labs


LABS





Laboratory Tests








Test


 10/16/19


11:27 10/16/19


16:07 10/16/19


21:21 10/17/19


07:56


 


Glucose (Fingerstick)


 168 mg/dL


(70-99) 92 mg/dL


(70-99) 96 mg/dL


(70-99) 74 mg/dL


(70-99)











Assessment and Plan


Assessmemt and Plan


Problems


Medical Problems:


(1) Altered mental status


Status: Acute  











Comment


Review of Relevant


I have reviewed the following items marky (where applicable) has been applied.


Labs





Laboratory Tests








Test


 10/15/19


10:30 10/15/19


11:04 10/15/19


15:20 10/15/19


21:35


 


White Blood Count


 8.6 x10^3/uL


(4.0-11.0) 


 


 





 


Red Blood Count


 3.99 x10^6/uL


(3.50-5.40) 


 


 





 


Hemoglobin


 9.2 g/dL


(12.0-15.5) 


 


 





 


Hematocrit


 29.0 %


(36.0-47.0) 


 


 





 


Mean Corpuscular Volume 73 fL ()    


 


Mean Corpuscular Hemoglobin 23 pg (25-35)    


 


Mean Corpuscular Hemoglobin


Concent 32 g/dL


(31-37) 


 


 





 


Red Cell Distribution Width


 26.5 %


(11.5-14.5) 


 


 





 


Platelet Count


 233 x10^3/uL


(140-400) 


 


 





 


Neutrophils (%) (Auto) 78 % (31-73)    


 


Lymphocytes (%) (Auto) 12 % (24-48)    


 


Monocytes (%) (Auto) 10 % (0-9)    


 


Eosinophils (%) (Auto) 1 % (0-3)    


 


Basophils (%) (Auto) 0 % (0-3)    


 


Neutrophils # (Auto)


 6.7 x10^3/uL


(1.8-7.7) 


 


 





 


Lymphocytes # (Auto)


 1.0 x10^3/uL


(1.0-4.8) 


 


 





 


Monocytes # (Auto)


 0.8 x10^3/uL


(0.0-1.1) 


 


 





 


Eosinophils # (Auto)


 0.0 x10^3/uL


(0.0-0.7) 


 


 





 


Basophils # (Auto)


 0.0 x10^3/uL


(0.0-0.2) 


 


 





 


Erythrocyte Sedimentation Rate 28 (0-25)    


 


Sodium Level


 140 mmol/L


(136-145) 


 


 





 


Potassium Level


 3.7 mmol/L


(3.5-5.1) 


 


 





 


Chloride Level


 107 mmol/L


() 


 


 





 


Carbon Dioxide Level


 24 mmol/L


(21-32) 


 


 





 


Anion Gap 9 (6-14)    


 


Blood Urea Nitrogen


 16 mg/dL


(7-20) 


 


 





 


Creatinine


 1.0 mg/dL


(0.6-1.0) 


 


 





 


Estimated GFR


(Cockcroft-Gault) 63.9 


 


 


 





 


Glucose Level


 138 mg/dL


(70-99) 


 


 





 


Calcium Level


 8.2 mg/dL


(8.5-10.1) 


 


 





 


Glucose (Fingerstick)


 


 146 mg/dL


(70-99) 129 mg/dL


(70-99) 156 mg/dL


(70-99)


 


Test


 10/16/19


00:15 10/16/19


07:27 10/16/19


11:27 10/16/19


16:07


 


Lactic Acid Level


 0.6 mmol/L


(0.4-2.0) 


 


 





 


Glucose (Fingerstick)


 


 94 mg/dL


(70-99) 168 mg/dL


(70-99) 92 mg/dL


(70-99)


 


Test


 10/16/19


21:21 10/17/19


07:56 


 





 


Glucose (Fingerstick)


 96 mg/dL


(70-99) 74 mg/dL


(70-99) 


 











Laboratory Tests








Test


 10/16/19


11:27 10/16/19


16:07 10/16/19


21:21 10/17/19


07:56


 


Glucose (Fingerstick)


 168 mg/dL


(70-99) 92 mg/dL


(70-99) 96 mg/dL


(70-99) 74 mg/dL


(70-99)








Microbiology


10/13/19 Blood Culture - Preliminary, Resulted


           NO GROWTH AFTER 4 DAYS


10/12/19 Urine Culture - Final, Complete


           


10/12/19 Urine Culture Result 1 (EDGAR) - Final, Complete


           


10/12/19 Antimicrobic Susceptibility - Final, Complete


Medications





Current Medications


Ondansetron HCl (Zofran) 4 mg 1X  ONCE IV  Last administered on 10/12/19at 

19:17;  Start 10/12/19 at 19:00;  Stop 10/12/19 at 19:01;  Status DC


Ondansetron HCl (Zofran) 4 mg PRN Q8HRS  PRN IV NAUSEA/VOMITING;  Start 10/12/19

at 19:45;  Stop 10/13/19 at 19:44;  Status DC


Sodium Chloride 1,000 ml @  75 mls/hr [id number] IV  Last administered on 

10/13/19at 15:33;  Start 10/12/19 at 19:35;  Stop 10/13/19 at 19:34;  Status DC


Acetaminophen (Tylenol) 650 mg PRN Q4HRS  PRN PO FEVER Last administered on 

10/13/19at 12:09;  Start 10/12/19 at 19:45;  Stop 10/13/19 at 19:44;  Status DC


Aspirin (Children'S Aspirin) 81 mg DAILY PO  Last administered on 10/16/19at 

09:45;  Start 10/13/19 at 12:00


Atenolol (Tenormin) 25 mg DAILY PO  Last administered on 10/16/19at 09:45;  

Start 10/13/19 at 12:00


Vitamin D (Vitamin D3) 1,000 unit DAILYBFRSUP PO  Last administered on 

10/16/19at 17:46;  Start 10/13/19 at 17:00


Diclofenac Sodium (Voltaren) 1 danyell PRN QID  PRN TP PAIN;  Start 10/13/19 at 

11:00


Levetiracetam (Keppra) 500 mg BID PO  Last administered on 10/16/19at 21:44;  

Start 10/13/19 at 12:00


Simvastatin (Zocor) 40 mg QHS PO  Last administered on 10/16/19at 21:44;  Start 

10/13/19 at 21:00


Amoxicillin/ Clavulanate Potassium (Augmentin 500/ 125mg) 0.5 tab BID PO  Last 

administered on 10/15/19at 22:00;  Start 10/13/19 at 12:00;  Stop 10/16/19 at 

09:24;  Status DC


Lactobacillus Rhamnosus (Culturelle) 1 cap BID PO  Last administered on 

10/16/19at 21:44;  Start 10/13/19 at 21:00


Acetaminophen (Tylenol) 500 mg PRN Q6HRS  PRN PO MILD PAIN / TEMP;  Start 1

0/13/19 at 20:15


Ondansetron HCl (Zofran) 4 mg PRN Q6HRS  PRN IVP NAUSEA/VOMITING Last 

administered on 10/14/19at 15:24;  Start 10/13/19 at 20:15


Labetalol HCl (Normodyne Iv Push) 10 mg PRN Q2HR  PRN IVP HYPERTENSION;  Start 

10/13/19 at 20:15


Cefepime HCl (Maxipime) 1 gm Q8HRS IVP  Last administered on 10/17/19at 06:31;  

Start 10/16/19 at 10:00





Active Scripts


Active


Cefuroxime (Cefuroxime Axetil) 500 Mg Tablet 1 Tab PO BID 7 Days


Keppra (Levetiracetam) 500 Mg Tablet 500 Mg PO BID


Meclizine Hcl 25 Mg Tablet 1 Tab PO PRN TID PRN


Reported


Vitamin D3 (Cholecalciferol (Vitamin D3)) 1,000 Unit Tablet 1 Tab PO DAILYBFRSUP


Voltaren (Diclofenac Sodium) 100 Gm Gel..gram. 1 Gm TP PRN QID


Aspirin 81 Mg Tab.chew 1 Tab PO DAILY


Simvastatin 40 Mg Tablet 1 Tab PO QHS


Atenolol 25 Mg Tablet 1 Tab PO DAILY


Vitals/I & O





Vital Sign - Last 24 Hours








 10/16/19 10/16/19 10/16/19 10/16/19





 09:45 11:00 15:00 19:00


 


Temp  99.0 98.0 99.0





  99.0 98.0 99.0


 


Pulse 91 93 90 86


 


Resp  17 17 17


 


B/P (MAP) 166/58 114/63 (80) 119/69 (86) 131/78 (95)


 


Pulse Ox  97 96 98


 


O2 Delivery  Room Air Room Air Room Air


 


    





    





 10/16/19 10/17/19 10/17/19 





 23:00 03:00 07:00 


 


Temp 98.9 100.0 98.0 





 98.9 100.0 98.0 


 


Pulse 87 92 86 


 


Resp 17 17 16 


 


B/P (MAP) 136/78 (97) 145/74 (97) 133/67 (89) 


 


Pulse Ox 97 95 96 


 


O2 Delivery Room Air Room Air Room Air 














Intake and Output   


 


 10/16/19 10/16/19 10/17/19





 15:00 23:00 07:00


 


Intake Total  0 ml 0 ml


 


Balance  0 ml 0 ml











Nutrition Consultation


Dietary Evaluation:


Recommendations by RD:  Increase Calorie Intake, Protein supplementation


Comments:  


ensure added bid


Expected Outcomes/Goals:  


to meet > 75% est nutr needs





Malnutrition Findings:


Food and Nutrition Intake (Mod:  <75% est energy req 7days


Weight Status:  Appropriate











BENJAMÍN ADAM MD        Oct 17, 2019 08:29

## 2019-10-18 VITALS — DIASTOLIC BLOOD PRESSURE: 70 MMHG | SYSTOLIC BLOOD PRESSURE: 131 MMHG

## 2019-10-18 VITALS — SYSTOLIC BLOOD PRESSURE: 141 MMHG | DIASTOLIC BLOOD PRESSURE: 61 MMHG

## 2019-10-18 VITALS — DIASTOLIC BLOOD PRESSURE: 67 MMHG | SYSTOLIC BLOOD PRESSURE: 130 MMHG

## 2019-10-18 VITALS — DIASTOLIC BLOOD PRESSURE: 65 MMHG | SYSTOLIC BLOOD PRESSURE: 128 MMHG

## 2019-10-18 VITALS — DIASTOLIC BLOOD PRESSURE: 61 MMHG | SYSTOLIC BLOOD PRESSURE: 121 MMHG

## 2019-10-18 VITALS — SYSTOLIC BLOOD PRESSURE: 118 MMHG | DIASTOLIC BLOOD PRESSURE: 70 MMHG

## 2019-10-18 LAB
ALBUMIN SERPL-MCNC: 2.2 G/DL (ref 3.4–5)
ALBUMIN/GLOB SERPL: 0.6 {RATIO} (ref 1–1.7)
ALP SERPL-CCNC: 61 U/L (ref 46–116)
ALT SERPL-CCNC: 18 U/L (ref 14–59)
ANION GAP SERPL CALC-SCNC: 9 MMOL/L (ref 6–14)
AST SERPL-CCNC: 29 U/L (ref 15–37)
BASOPHILS # BLD AUTO: 0 X10^3/UL (ref 0–0.2)
BASOPHILS NFR BLD: 0 % (ref 0–3)
BILIRUB SERPL-MCNC: 0.4 MG/DL (ref 0.2–1)
BUN SERPL-MCNC: 19 MG/DL (ref 7–20)
BUN/CREAT SERPL: 19 (ref 6–20)
CALCIUM SERPL-MCNC: 8.4 MG/DL (ref 8.5–10.1)
CHLORIDE SERPL-SCNC: 107 MMOL/L (ref 98–107)
CO2 SERPL-SCNC: 22 MMOL/L (ref 21–32)
CREAT SERPL-MCNC: 1 MG/DL (ref 0.6–1)
EOSINOPHIL NFR BLD: 0 X10^3/UL (ref 0–0.7)
EOSINOPHIL NFR BLD: 1 % (ref 0–3)
ERYTHROCYTE [DISTWIDTH] IN BLOOD BY AUTOMATED COUNT: 25.7 % (ref 11.5–14.5)
GFR SERPLBLD BASED ON 1.73 SQ M-ARVRAT: 63.9 ML/MIN
GLOBULIN SER-MCNC: 4 G/DL (ref 2.2–3.8)
GLUCOSE SERPL-MCNC: 156 MG/DL (ref 70–99)
HCT VFR BLD CALC: 27.5 % (ref 36–47)
HGB BLD-MCNC: 8.7 G/DL (ref 12–15.5)
INFLUENZA A PATIENT: NEGATIVE
INFLUENZA B PATIENT: NEGATIVE
LYMPHOCYTES # BLD: 0.6 X10^3/UL (ref 1–4.8)
LYMPHOCYTES NFR BLD AUTO: 8 % (ref 24–48)
MCH RBC QN AUTO: 23 PG (ref 25–35)
MCHC RBC AUTO-ENTMCNC: 32 G/DL (ref 31–37)
MCV RBC AUTO: 72 FL (ref 79–100)
MONO #: 0.7 X10^3/UL (ref 0–1.1)
MONOCYTES NFR BLD: 10 % (ref 0–9)
NEUT #: 6 X10^3/UL (ref 1.8–7.7)
NEUTROPHILS NFR BLD AUTO: 81 % (ref 31–73)
PLATELET # BLD AUTO: 223 X10^3/UL (ref 140–400)
POTASSIUM SERPL-SCNC: 3.5 MMOL/L (ref 3.5–5.1)
PROT SERPL-MCNC: 6.2 G/DL (ref 6.4–8.2)
RBC # BLD AUTO: 3.82 X10^6/UL (ref 3.5–5.4)
SODIUM SERPL-SCNC: 138 MMOL/L (ref 136–145)
WBC # BLD AUTO: 7.4 X10^3/UL (ref 4–11)

## 2019-10-18 RX ADMIN — Medication SCH CAP: at 20:36

## 2019-10-18 RX ADMIN — SIMVASTATIN SCH MG: 40 TABLET, FILM COATED ORAL at 20:36

## 2019-10-18 RX ADMIN — Medication SCH CAP: at 09:07

## 2019-10-18 RX ADMIN — ACETAMINOPHEN PRN MG: 500 TABLET ORAL at 22:23

## 2019-10-18 RX ADMIN — ACETAMINOPHEN PRN MG: 500 TABLET ORAL at 09:17

## 2019-10-18 RX ADMIN — ACETAMINOPHEN PRN MG: 500 TABLET ORAL at 15:40

## 2019-10-18 RX ADMIN — CEFEPIME SCH GM: 1 INJECTION, POWDER, FOR SOLUTION INTRAMUSCULAR; INTRAVENOUS at 05:59

## 2019-10-18 RX ADMIN — ASPIRIN 81 MG SCH MG: 81 TABLET ORAL at 09:07

## 2019-10-18 RX ADMIN — CEFEPIME SCH GM: 1 INJECTION, POWDER, FOR SOLUTION INTRAMUSCULAR; INTRAVENOUS at 22:23

## 2019-10-18 RX ADMIN — ATENOLOL SCH MG: 25 TABLET ORAL at 09:08

## 2019-10-18 RX ADMIN — VITAMIN D, TAB 1000IU (100/BT) SCH UNIT: 25 TAB at 15:40

## 2019-10-18 RX ADMIN — CEFEPIME SCH GM: 1 INJECTION, POWDER, FOR SOLUTION INTRAMUSCULAR; INTRAVENOUS at 14:04

## 2019-10-18 NOTE — PDOC
PROGRESS NOTES


Chief Complaint


Chief Complaint


A/P:


Acute encephalopathy


Acute respiratory failure with hypoxia


Intentional// accidental ?  drug overdose last admit 


TOXIC Encephalopathy


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


Mild to moderate chronic small vessel ischemic changes and atrophy.on CT HEAD


DIABETES


HYPERTENSION HX


HYPOKALEMIA , resolved


Severe protein-caloric malnutrition


GERD


MICROCYTIC ANEMIA


Fever, possible aspiration on last stay, resolved


UTI - e. coli intermediate to augmentin - change antibiotics


Moderate left and minimal right hip osteoarthritis.


Degenerative change at the lower lumbar levels weakness, max assist on transfers





plan


neurochecks q 4 hrs, may be at baseline


HOLD SEDATING MED


hold meclizine, may be too sedating


PT/OT/ST


Cefuroxime on d/c





27 MIN PT EXAM, CHART REVIEW, > 50% OF TIME SPENT WITH EXAM, CHART REVIEW, pt 

care coordination





History of Present Illness


History of Present Illness


Ms Deal is an 83yo F w/ PMHx Dementia, diabetes, seizures, myocardial 

infarction, coronary artery disease, hyperlipidemia, hypertension, and arthritis

who returns to the hospital from SNF after worsening confusion. Found with e. 

coli UTI.


10/15 had fever of 101. E. coli found intermediate to augmentin. Changed to 

cefepime per ID. CT abdomen ordered. Patient has no complaints. Is pleasantly 

confused. Discussed with family bedside. Will need to monitor for 24 hours 

without fever prior to d/c





Has temp 100F, nothing greater. She states she is feeling well. was transitioned

to cefepime. D/w family bedside could possibly go today if her temperature is 

lower. Flu swab today per ID. She is c/o dental pain in her right mandible. Some

cough after drinking liquids.





Plan:


SLP evaluation for swallow


CXR


Will call maxillo-facial surgeon to evaluation poor dentition





Vitals


Vitals





Vital Signs








  Date Time  Temp Pulse Resp B/P (MAP) Pulse Ox O2 Delivery O2 Flow Rate FiO2


 


10/18/19 07:35 99.4 87 20 128/65 (86) 94 Room Air  





 99.4       











Physical Exam


Physical Exam


GENERAL: Sitting in the bed alert, smiling 


HEENT:  Pupils equally round, reactive.  


NECK:  Supple.


LUNGS: Clear.


HEART:  S1, S2.  Murmur present.


ABDOMEN:  Soft, no guarding.  Bowel sounds present.


GENITOURINARY:  Indwelling Evans in place.


EXTREMITIES:  No gross edema or cyanosis.  Left wrist tenderness improved


SKIN:  Warm to touch.


PIV ok


General:  Alert, Cooperative, No acute distress


Lungs:  Clear


Abdomen:  Normal bowel sounds, Soft


Extremities:  No cyanosis


Skin:  No breakdown





Labs


LABS





Laboratory Tests








Test


 10/17/19


11:42 10/17/19


16:03 10/17/19


21:04 10/18/19


07:31


 


Glucose (Fingerstick)


 105 mg/dL


(70-99) 109 mg/dL


(70-99) 102 mg/dL


(70-99) 99 mg/dL


(70-99)











Assessment and Plan


Assessmemt and Plan


Problems


Medical Problems:


(1) Altered mental status


Status: Acute  











Comment


Review of Relevant


I have reviewed the following items marky (where applicable) has been applied.


Labs





Laboratory Tests








Test


 10/16/19


11:27 10/16/19


16:07 10/16/19


21:21 10/17/19


07:56


 


Glucose (Fingerstick)


 168 mg/dL


(70-99) 92 mg/dL


(70-99) 96 mg/dL


(70-99) 74 mg/dL


(70-99)


 


Test


 10/17/19


11:42 10/17/19


16:03 10/17/19


21:04 10/18/19


07:31


 


Glucose (Fingerstick)


 105 mg/dL


(70-99) 109 mg/dL


(70-99) 102 mg/dL


(70-99) 99 mg/dL


(70-99)








Laboratory Tests








Test


 10/17/19


11:42 10/17/19


16:03 10/17/19


21:04 10/18/19


07:31


 


Glucose (Fingerstick)


 105 mg/dL


(70-99) 109 mg/dL


(70-99) 102 mg/dL


(70-99) 99 mg/dL


(70-99)








Microbiology


10/16/19 Blood Culture - Preliminary, Resulted


           NO GROWTH AFTER 1 DAY


10/12/19 Urine Culture - Final, Complete


           


10/12/19 Urine Culture Result 1 (EDGAR) - Final, Complete


           


10/12/19 Antimicrobic Susceptibility - Final, Complete


Medications





Current Medications


Ondansetron HCl (Zofran) 4 mg 1X  ONCE IV  Last administered on 10/12/19at 

19:17;  Start 10/12/19 at 19:00;  Stop 10/12/19 at 19:01;  Status DC


Ondansetron HCl (Zofran) 4 mg PRN Q8HRS  PRN IV NAUSEA/VOMITING;  Start 10/12/19

at 19:45;  Stop 10/13/19 at 19:44;  Status DC


Sodium Chloride 1,000 ml @  75 mls/hr [id number] IV  Last administered on 

10/13/19at 15:33;  Start 10/12/19 at 19:35;  Stop 10/13/19 at 19:34;  Status DC


Acetaminophen (Tylenol) 650 mg PRN Q4HRS  PRN PO FEVER Last administered on 

10/13/19at 12:09;  Start 10/12/19 at 19:45;  Stop 10/13/19 at 19:44;  Status DC


Aspirin (Children'S Aspirin) 81 mg DAILY PO  Last administered on 10/17/19at 

09:10;  Start 10/13/19 at 12:00


Atenolol (Tenormin) 25 mg DAILY PO  Last administered on 10/17/19at 09:10;  

Start 10/13/19 at 12:00


Vitamin D (Vitamin D3) 1,000 unit DAILYBFRSUP PO  Last administered on 

10/17/19at 09:10;  Start 10/13/19 at 17:00


Diclofenac Sodium (Voltaren) 1 danyell PRN QID  PRN TP PAIN;  Start 10/13/19 at 

11:00


Levetiracetam (Keppra) 500 mg BID PO  Last administered on 10/17/19at 20:57;  

Start 10/13/19 at 12:00


Simvastatin (Zocor) 40 mg QHS PO  Last administered on 10/17/19at 20:57;  Start 

10/13/19 at 21:00


Amoxicillin/ Clavulanate Potassium (Augmentin 500/ 125mg) 0.5 tab BID PO  Last 

administered on 10/15/19at 22:00;  Start 10/13/19 at 12:00;  Stop 10/16/19 at 

09:24;  Status DC


Lactobacillus Rhamnosus (Culturelle) 1 cap BID PO  Last administered on 

10/17/19at 20:57;  Start 10/13/19 at 21:00


Acetaminophen (Tylenol) 500 mg PRN Q6HRS  PRN PO MILD PAIN / TEMP Last 

administered on 10/17/19at 20:57;  Start 10/13/19 at 20:15


Ondansetron HCl (Zofran) 4 mg PRN Q6HRS  PRN IVP NAUSEA/VOMITING Last 

administered on 10/14/19at 15:24;  Start 10/13/19 at 20:15


Labetalol HCl (Normodyne Iv Push) 10 mg PRN Q2HR  PRN IVP HYPERTENSION;  Start 

10/13/19 at 20:15


Cefepime HCl (Maxipime) 1 gm Q8HRS IVP  Last administered on 10/18/19at 05:59;  

Start 10/16/19 at 10:00





Active Scripts


Active


Cefuroxime (Cefuroxime Axetil) 500 Mg Tablet 1 Tab PO BID 7 Days


Keppra (Levetiracetam) 500 Mg Tablet 500 Mg PO BID


Meclizine Hcl 25 Mg Tablet 1 Tab PO PRN TID PRN


Reported


Vitamin D3 (Cholecalciferol (Vitamin D3)) 1,000 Unit Tablet 1 Tab PO DAILYBFRSUP


Voltaren (Diclofenac Sodium) 100 Gm Gel..gram. 1 Gm TP PRN QID


Aspirin 81 Mg Tab.chew 1 Tab PO DAILY


Simvastatin 40 Mg Tablet 1 Tab PO QHS


Atenolol 25 Mg Tablet 1 Tab PO DAILY


Vitals/I & O





Vital Sign - Last 24 Hours








 10/17/19 10/17/19 10/17/19 10/17/19





 09:10 11:00 15:00 19:00


 


Temp  100.1 100.3 100.2





  100.1 100.3 100.2


 


Pulse 86 82 82 88


 


Resp  16 14 18


 


B/P (MAP) 133/67 141/66 (91) 131/69 (89) 118/82 (94)


 


Pulse Ox  95 92 92


 


O2 Delivery  Room Air Room Air Room Air


 


    





    





 10/17/19 10/17/19 10/18/19 10/18/19





 20:00 23:09 03:00 07:35


 


Temp  100.1 98.9 99.4





  100.1 98.9 99.4


 


Pulse  90 87 87


 


Resp  18 18 20


 


B/P (MAP)  113/54 (73) 141/61 (87) 128/65 (86)


 


Pulse Ox  93 94 94


 


O2 Delivery Room Air Room Air Room Air Room Air














Intake and Output   


 


 10/17/19 10/17/19 10/18/19





 14:59 22:59 06:59


 


Intake Total  60 ml 0 ml


 


Balance  60 ml 0 ml











Nutrition Consultation


Dietary Evaluation:


Recommendations by RD:  Increase Calorie Intake, Protein supplementation


Comments:  


ensure added bid


Expected Outcomes/Goals:  


to meet > 75% est nutr needs





Malnutrition Findings:


Food and Nutrition Intake (Mod:  <75% est energy req 7days


Weight Status:  Appropriate











BENJAMÍN ADAM MD        Oct 18, 2019 08:23

## 2019-10-18 NOTE — NUR
Dr. Buck orderd consult, see orders.  Dr. Trevino not on call until Nov. 11.  He only sees 
patients when he is on call.  Dr. Buck notified.

## 2019-10-18 NOTE — RAD
CHEST AP ONLY

 

Clinical indications: Possible aspiration. History of hiatal hernia.

 

COMPARISON: October 12, 2019.

 

Findings: There is a new finding of bilateral interstitial pulmonary edema

or lung infiltrate. There is a new finding of right lung base 

consolidative infiltrate or alveolar pulmonary edema. There is a new 

finding of small bilateral pleural effusions. The heart size is prominent 

but stable. Hiatal hernia is seen. Mediastinum is unchanged.

 

Impression: Findings may reflect CHF. Consolidative infiltrate is seen 

within the right lung base however.

 

Electronically signed by: Garrison Reeder MD (10/18/2019 4:01 PM) 

[place] [id number]

## 2019-10-18 NOTE — PDOC
Infectious Disease Note


Subjective


Subjective


pt is awake, says not feeling well





ROS


ROS


no n/v/d/sob


cont fever





Vital Sign


Vital Signs





Vital Signs








  Date Time  Temp Pulse Resp B/P (MAP) Pulse Ox O2 Delivery O2 Flow Rate FiO2


 


10/18/19 09:08  87  128/65    


 


10/18/19 07:35 99.4  20  94 Room Air  





 99.4       











Physical Exam


PHYSICAL EXAM


GENERAL: Sitting in the bed alert, 


HEENT:  Pupils equally round, reactive.  


NECK:  Supple.


LUNGS: Clear.


HEART:  S1, S2.  Murmur present.


ABDOMEN:  Soft, no guarding.  Bowel sounds present.


GENITOURINARY:  Indwelling Evans in place.


EXTREMITIES:  No gross edema or cyanosis.  Left wrist tenderness improved


SKIN:  Warm to touch.


PIV ok





Labs


Lab





Laboratory Tests








Test


 10/17/19


11:42 10/17/19


16:03 10/17/19


21:04 10/18/19


07:31


 


Glucose (Fingerstick)


 105 mg/dL


(70-99) 109 mg/dL


(70-99) 102 mg/dL


(70-99) 99 mg/dL


(70-99)








Micro


BC neg


Urine








  URINE CULTURE  Final  


        Preliminary report


        Final report





  URINE CULTURE RES 1  Final  


        Escherichia coli





        Greater than 100,000 colony forming units per mL


        Performed at:   - [facility] [address], [place], [place]  [id number]


        : WARNER Bland MD, Phone:  [phone]


        Greater than 100,000 colony forming units per mL


        Cefazolin <=4 ug/mL


        Cefazolin with an EDGAR <=16 predicts susceptibility to the


        oral agents cefaclor, cefdinir, cefpodoxime, cefprozil,


        cefuroxime, cephalexin, and loracarbef when used for


        therapy of uncomplicated urinary tract infections due to E.


        coli, Klebsiella pneumoniae, and Proteus mirabilis.





  ANTIMICROBIAL SUSCEPTIBILITY  Final  


        Comment





              ** S = Susceptible; I = Intermediate; R = Resistant **


                           P = Positive; N = Negative


                    MICS are expressed in micrograms per mL


           Antibiotic                 RSLT#1    RSLT#2    RSLT#3    RSLT#4


        Amoxicillin/Clavulanic Acid    I =16


        Ampicillin                     R>=32


        Cefepime                       S<=0.12


        Ceftriaxone                    S<=0.25


        Cefuroxime                     S =4


        Ciprofloxacin                  S<=0.25


        Ertapenem                      S<=0.12


        Gentamicin                     S<=1


        Imipenem                       S<=0.25


        Levofloxacin                   S<=0.12


        Meropenem                      S<=0.25


        Nitrofurantoin                 S<=16


        Piperacillin/Tazobactam        S<=4














                               ** CONTINUED ON NEXT PAGE **





RUN DATE: 10/15/19                                                              

  PAGE 2   


RUN TIME: 1411


                          [facility]


                       [address] [place] [address]


                        Conner Rodriguez M.D., Medical Director





---------------------------------

-----------------------------------------------------------





SPEC: [id number]    PATIENT: MARY OBREGON                [id number]  

(Continued)


 

--------------------------------------------------------------------------------


------------








-----------

--------------------------------------------------------------------------------


-





  Procedure                         Result                                      

         


 

--------------------------------------------------------------------------------


------------





  ANTIMICROBIAL SUSCEPTIBILITY  Final   (continued)


        Tetracycline                   S<=1


        Tobramycin                     S<=1


        Trimethoprim/Sulfa             S<=20


        Performed at:  [facility] [place] [id number] [facility] [address], [place], [place]  [id number]


        : WARNER Bland MD, Phone:  [phone]





-------------------------------

-------------------------------------------------------------





Objective


Assessment


Fever - ? possible aspiration


Acute encephalopathy , improved, likely sec to meds


Dementia 


Anemia 


Recent UTI





Plan


Plan of Care


cefepime


soon change to po for d/c


d/w caregiver


d/w RN


check influenza











CATRINA GALLAGHER MD               Oct 18, 2019 09:20

## 2019-10-19 VITALS — DIASTOLIC BLOOD PRESSURE: 82 MMHG | SYSTOLIC BLOOD PRESSURE: 142 MMHG

## 2019-10-19 VITALS — SYSTOLIC BLOOD PRESSURE: 129 MMHG | DIASTOLIC BLOOD PRESSURE: 71 MMHG

## 2019-10-19 VITALS — SYSTOLIC BLOOD PRESSURE: 155 MMHG | DIASTOLIC BLOOD PRESSURE: 73 MMHG

## 2019-10-19 VITALS — DIASTOLIC BLOOD PRESSURE: 80 MMHG | SYSTOLIC BLOOD PRESSURE: 145 MMHG

## 2019-10-19 VITALS — SYSTOLIC BLOOD PRESSURE: 117 MMHG | DIASTOLIC BLOOD PRESSURE: 59 MMHG

## 2019-10-19 RX ADMIN — Medication SCH CAP: at 11:35

## 2019-10-19 RX ADMIN — IPRATROPIUM BROMIDE AND ALBUTEROL SULFATE SCH ML: .5; 3 SOLUTION RESPIRATORY (INHALATION) at 20:26

## 2019-10-19 RX ADMIN — IPRATROPIUM BROMIDE AND ALBUTEROL SULFATE SCH ML: .5; 3 SOLUTION RESPIRATORY (INHALATION) at 12:00

## 2019-10-19 RX ADMIN — VITAMIN D, TAB 1000IU (100/BT) SCH UNIT: 25 TAB at 17:00

## 2019-10-19 RX ADMIN — CEFEPIME SCH GM: 1 INJECTION, POWDER, FOR SOLUTION INTRAMUSCULAR; INTRAVENOUS at 05:34

## 2019-10-19 RX ADMIN — SIMVASTATIN SCH MG: 40 TABLET, FILM COATED ORAL at 21:47

## 2019-10-19 RX ADMIN — CEFEPIME SCH GM: 1 INJECTION, POWDER, FOR SOLUTION INTRAMUSCULAR; INTRAVENOUS at 13:57

## 2019-10-19 RX ADMIN — ACETAMINOPHEN PRN MG: 500 TABLET ORAL at 21:47

## 2019-10-19 RX ADMIN — IPRATROPIUM BROMIDE AND ALBUTEROL SULFATE SCH ML: .5; 3 SOLUTION RESPIRATORY (INHALATION) at 16:10

## 2019-10-19 RX ADMIN — CEFEPIME SCH GM: 1 INJECTION, POWDER, FOR SOLUTION INTRAMUSCULAR; INTRAVENOUS at 21:48

## 2019-10-19 RX ADMIN — ACETAMINOPHEN PRN MG: 500 TABLET ORAL at 11:35

## 2019-10-19 RX ADMIN — Medication SCH CAP: at 21:47

## 2019-10-19 RX ADMIN — ASPIRIN 81 MG SCH MG: 81 TABLET ORAL at 11:35

## 2019-10-19 RX ADMIN — ATENOLOL SCH MG: 25 TABLET ORAL at 11:36

## 2019-10-19 NOTE — PDOC
Provider Note


Provider Note


[id number]





abnl cxr


aspiration pneumonia/pneumonitis





? keep npo will discuss w primary


add BD


cont abx











DEJAH MAYBERRY MD               Oct 19, 2019 09:15

## 2019-10-19 NOTE — PDOC
Infectious Disease Note


Subjective:


Subjective


Pt sleepy but arousable


appears comfortable


d/w rn


has some dysphagia





ROS:


ROS


unable to obtain





Vital Signs:


Vital Signs





Vital Signs








  Date Time  Temp Pulse Resp B/P (MAP) Pulse Ox O2 Delivery O2 Flow Rate FiO2


 


10/19/19 07:00 100.2 88 14 129/71 (90) 100 Room Air  





 100.2       











Physical Exam:


PHYSICAL EXAM


GENERAL: lying in bed, comfortable, no family at bedside


HEENT:  Pupils equally round, reactive.  


NECK:  Supple.


LUNGS: Clear.


HEART:  S1, S2.  Murmur present.


ABDOMEN:  Soft, no guarding.  Bowel sounds present.


GENITOURINARY:  Indwelling Evans in place.


EXTREMITIES:  No gross edema or cyanosis.  Left wrist tenderness improved


SKIN:  Warm to touch.


CNS sleepy arousable but confused


PIV ok





Medications:


Inpatient Meds:





Current Medications








 Medications


  (Trade)  Dose


 Ordered  Sig/Td  Start Time


 Stop Time Status Last Admin


Dose Admin


 


 Acetaminophen


  (Tylenol)  500 mg  PRN Q6HRS  PRN  10/13/19 20:15


    10/18/19 22:23


500 MG


 


 Amoxicillin/


 Clavulanate


 Potassium


  (Augmentin 500/


 125mg)  0.5 tab  BID  10/13/19 12:00


 10/16/19 09:24 DC 10/15/19 22:00


0.5 TAB


 


 Aspirin


  (Children'S


 Aspirin)  81 mg  DAILY  10/13/19 12:00


    10/18/19 09:07


81 MG


 


 Atenolol


  (Tenormin)  25 mg  DAILY  10/13/19 12:00


    10/18/19 09:08


25 MG


 


 Cefepime HCl


  (Maxipime)  1 gm  Q8HRS  10/16/19 10:00


    10/19/19 05:34


1 GM


 


 Diclofenac Sodium


  (Voltaren)  1 danyell  PRN QID  PRN  10/13/19 11:00


     





 


 Labetalol HCl


  (Normodyne Iv


 Push)  10 mg  PRN Q2HR  PRN  10/13/19 20:15


     





 


 Lactobacillus


 Rhamnosus


  (Culturelle)  1 cap  BID  10/13/19 21:00


    10/18/19 20:36


1 CAP


 


 Levetiracetam


  (Keppra)  500 mg  BID  10/13/19 12:00


    10/18/19 20:36


500 MG


 


 Ondansetron HCl


  (Zofran)  4 mg  PRN Q6HRS  PRN  10/13/19 20:15


    10/14/19 15:24


4 MG


 


 Simvastatin


  (Zocor)  40 mg  QHS  10/13/19 21:00


    10/18/19 20:36


40 MG


 


 Sodium Chloride  1,000 ml @ 


 75 mls/hr  [id number]  10/12/19 19:35


 10/13/19 19:34 DC 10/13/19 15:33


75 MLS/HR


 


 Vitamin D


  (Vitamin D3)  1,000 unit  DAILYBFRSUP  10/13/19 17:00


    10/18/19 15:40


1,000 UNIT











Labs:


Lab





Laboratory Tests








Test


 10/18/19


10:00 10/18/19


11:38 10/18/19


16:35 10/18/19


20:30


 


Influenza Type A Antigen


 Negative


(NEGATIVE) 


 


 





 


Influenza Type B Antigen


 Negative


(NEGATIVE) 


 


 





 


Glucose (Fingerstick)


 


 126 mg/dL


(70-99) 118 mg/dL


(70-99) 97 mg/dL


(70-99)


 


Test


 10/19/19


07:30 


 


 





 


Glucose (Fingerstick)


 93 mg/dL


(70-99) 


 


 














Objective:


Assessment:


Fever - ? possible aspiration


flu screen neg


Acute encephalopathy , improved, likely sec to meds


Dementia 


Anemia 


E coli UTI





Plan:


Plan of Care


cefepime


maintain aspiration precautions


cont supportive care


d/w AARON Land MD           Oct 19, 2019 09:17

## 2019-10-19 NOTE — CONS
DATE OF CONSULTATION:  10/19/2019



I was asked to see this 84-year-old lady for abnormal chest x-ray.



HISTORY OF PRESENT ILLNESS:  The patient has dementia, is only moaning, does not

answer any of my questions.  I did talk to the nurse and she states that is her

usual mental status.  She has dysphagia and on dysphagia 1 diet.



She had a temperature of 100.2 this morning and last night.



She is seen by ID and is on cefepime for aspiration.



PAST MEDICAL HISTORY:  Dementia, diabetes mellitus, CVA, hypertension.



PAST SURGICAL HISTORY:  Status post hysterectomy and cardiac stents.



ALLERGIES:  No known drug allergies.



MEDICATIONS:  Currently, she is on cefepime, Zocor, labetalol p.r.n., vitamin D,

Keppra, atenolol, aspirin.



SOCIAL HISTORY:  Unable to obtain.  The patient does not answer any questions.



FAMILY HISTORY:  Unable to obtain.  The patient does not answer questions.



REVIEW OF SYSTEMS:  As mentioned as above.  I have discussed the patient with

RN, other systems otherwise negative.



PHYSICAL EXAMINATION:

GENERAL:  This is an elderly lady.

VITAL SIGNS:  Her O2 saturation is 100%, respiratory rate 14, heart rate 88,

blood pressure 129/71, temperature 100.2.

HEENT:  Normocephalic, atraumatic.  Pupils are equal, round, reactive to light. 

Nose is clear.

NECK:  There is no lymphadenopathy or thyromegaly.

CARDIOVASCULAR:  Regular rate and rhythm.  PMI is not displaced.

CHEST:  Inspection is normal.

LUNGS:  There are bibasilar crackles, dullness at the right base.

ABDOMEN:  Soft.  Bowel sounds are good.  There is no mass.

EXTREMITIES:  There is no edema.

LYMPHATICS:  There is no lymphadenopathy.

NEUROLOGIC:  She does not answer any questions.  She is moaning.

SKIN:  Chronic changes.



LABORATORY DATA:  I reviewed the following lab data:  Chest x-ray was done

yesterday, it did show right lower lobe infiltrate/atelectasis and increased

vascular marking.  A chest x-ray on 10/12/2019 did not show any infiltrates. 

WBC 7.4, hemoglobin 8.7, platelet 223.  Sodium 138, potassium 3.5, chloride 107,

CO2 of 22, glucose 156, BUN 19 and creatinine 1.



IMPRESSION:

1.  Abnormal chest x-ray.  I suspect it is secondary to aspiration.

2.  Fever secondary to aspiration pneumonia/pneumonitis.

3.  Aspiration pneumonia/pneumonitis.

4.  Dysphagia.

5.  History of cerebrovascular accident.

6.  Hypertension.

7.  Diabetes mellitus.

8.  Dementia.



PLAN AND RECOMMENDATIONS:

1.  Titrate FiO2 to keep O2 saturation 92%.

2.  Start bronchodilator.

3.  Continue antibiotic per ID.

4.  I suspect the patient has aspiration.  It is not safe to let her eat.  I

will discuss with primary doctor.  May consider to keep the patient n.p.o.  May

need a PEG placement.

5.  Elevate head of bed.

6.  Monitor respiratory status very closely.

7.  Code status is partial but will need to clear what that means.



I have discussed the findings and recommendations with RN.



Thank you very much for allowing me to participate in care of this very nice

lady.

 



______________________________

DEJAH MAYBERRY M.D.



DR:  ALEJANDRINA/prashant  JOB#:  [id number] / [id number]

DD:  10/19/2019 09:10  DT:  10/19/2019 09:41

SAMIRA

## 2019-10-19 NOTE — PDOC
PROGRESS NOTES


Chief Complaint


Chief Complaint


A/P:


Acute encephalopathy


Acute respiratory failure with hypoxia


Intentional// accidental ?  drug overdose last admit 


TOXIC Encephalopathy


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


Mild to moderate chronic small vessel ischemic changes and atrophy.on CT HEAD


DIABETES


HYPERTENSION HX


HYPOKALEMIA , resolved


Severe protein-caloric malnutrition


GERD


MICROCYTIC ANEMIA


Fever, possible aspiration on last stay, resolved


UTI - e. coli intermediate to augmentin - change antibiotics


Moderate left and minimal right hip osteoarthritis.


Degenerative change at the lower lumbar levels weakness, max assist on transfers





plan


neurochecks q 4 hrs, may be at baseline


HOLD SEDATING MED


hold meclizine, may be too sedating


PT/OT/ST


Cefuroxime on d/c





27 MIN PT EXAM, CHART REVIEW, > 50% OF TIME SPENT WITH EXAM, CHART REVIEW, pt 

care coordination





History of Present Illness


History of Present Illness


Ms Deal is an 83yo F w/ PMHx Dementia, diabetes, seizures, myocardial 

infarction, coronary artery disease, hyperlipidemia, hypertension, and arthritis

who returns to the hospital from SNF after worsening confusion. Found with e. 

coli UTI.


10/15 had fever of 101. E. coli found intermediate to augmentin. Changed to 

cefepime per ID. CT abdomen ordered. Patient has no complaints. Is pleasantly 

confused. Discussed with family bedside. Will need to monitor for 24 hours 

without fever prior to d/c


10/18: Has temp 100F, nothing greater. She states she is feeling well. was 

transitioned to cefepime. D/w family bedside could possibly go today if her 

temperature is lower. Flu swab negative today per ID. She is c/o dental pain in 

her right mandible. Some cough after drinking liquids, SLP evaluation recommends

thickened liquids. CXR reveals possible RLL infiltrate





She is more drowsy this morning. Nursing noted she is holding applesauce in her 

mouth, not initiating swallowing well. Pulm evaluation today suggests we should 

consider NPO for now. No family bedside.


Unable to obtain ROS due to confusion.





Plan:


SLP evaluation for swallow


CT chest to further evaluated RLL infiltrate


Will call maxillo-facial surgeon to evaluation poor dentition





Vitals


Vitals





Vital Signs








  Date Time  Temp Pulse Resp B/P (MAP) Pulse Ox O2 Delivery O2 Flow Rate FiO2


 


10/19/19 07:00 100.2 88 14 129/71 (90) 100 Room Air  





 100.2       











Physical Exam


Physical Exam


GENERAL: Sitting in the bed alert, 


HEENT:  Pupils equally round, reactive.  


NECK:  Supple.


LUNGS: Clear.


HEART:  S1, S2.  Murmur present.


ABDOMEN:  Soft, no guarding.  Bowel sounds present.


GENITOURINARY:  Indwelling Evans in place.


EXTREMITIES:  No gross edema or cyanosis.  Left wrist tenderness improved


SKIN:  Warm to touch.


PIV ok


General:  Alert, Cooperative, No acute distress


Lungs:  Clear


Abdomen:  Normal bowel sounds, Soft


Extremities:  No cyanosis


Skin:  No breakdown





Labs


LABS





Laboratory Tests








Test


 10/18/19


09:15 10/18/19


10:00 10/18/19


11:38 10/18/19


16:35


 


White Blood Count


 7.4 x10^3/uL


(4.0-11.0) 


 


 





 


Red Blood Count


 3.82 x10^6/uL


(3.50-5.40) 


 


 





 


Hemoglobin


 8.7 g/dL


(12.0-15.5) 


 


 





 


Hematocrit


 27.5 %


(36.0-47.0) 


 


 





 


Mean Corpuscular Volume 72 fL ()    


 


Mean Corpuscular Hemoglobin 23 pg (25-35)    


 


Mean Corpuscular Hemoglobin


Concent 32 g/dL


(31-37) 


 


 





 


Red Cell Distribution Width


 25.7 %


(11.5-14.5) 


 


 





 


Platelet Count


 223 x10^3/uL


(140-400) 


 


 





 


Neutrophils (%) (Auto) 81 % (31-73)    


 


Lymphocytes (%) (Auto) 8 % (24-48)    


 


Monocytes (%) (Auto) 10 % (0-9)    


 


Eosinophils (%) (Auto) 1 % (0-3)    


 


Basophils (%) (Auto) 0 % (0-3)    


 


Neutrophils # (Auto)


 6.0 x10^3/uL


(1.8-7.7) 


 


 





 


Lymphocytes # (Auto)


 0.6 x10^3/uL


(1.0-4.8) 


 


 





 


Monocytes # (Auto)


 0.7 x10^3/uL


(0.0-1.1) 


 


 





 


Eosinophils # (Auto)


 0.0 x10^3/uL


(0.0-0.7) 


 


 





 


Basophils # (Auto)


 0.0 x10^3/uL


(0.0-0.2) 


 


 





 


Sodium Level


 138 mmol/L


(136-145) 


 


 





 


Potassium Level


 3.5 mmol/L


(3.5-5.1) 


 


 





 


Chloride Level


 107 mmol/L


() 


 


 





 


Carbon Dioxide Level


 22 mmol/L


(21-32) 


 


 





 


Anion Gap 9 (6-14)    


 


Blood Urea Nitrogen


 19 mg/dL


(7-20) 


 


 





 


Creatinine


 1.0 mg/dL


(0.6-1.0) 


 


 





 


Estimated GFR


(Cockcroft-Gault) 63.9 


 


 


 





 


BUN/Creatinine Ratio 19 (6-20)    


 


Glucose Level


 156 mg/dL


(70-99) 


 


 





 


Calcium Level


 8.4 mg/dL


(8.5-10.1) 


 


 





 


Total Bilirubin


 0.4 mg/dL


(0.2-1.0) 


 


 





 


Aspartate Amino Transf


(AST/SGOT) 29 U/L (15-37) 


 


 


 





 


Alanine Aminotransferase


(ALT/SGPT) 18 U/L (14-59) 


 


 


 





 


Alkaline Phosphatase


 61 U/L


() 


 


 





 


Total Protein


 6.2 g/dL


(6.4-8.2) 


 


 





 


Albumin


 2.2 g/dL


(3.4-5.0) 


 


 





 


Albumin/Globulin Ratio 0.6 (1.0-1.7)    


 


Influenza Type A Antigen


 


 Negative


(NEGATIVE) 


 





 


Influenza Type B Antigen


 


 Negative


(NEGATIVE) 


 





 


Glucose (Fingerstick)


 


 


 126 mg/dL


(70-99) 118 mg/dL


(70-99)


 


Test


 10/18/19


20:30 10/19/19


07:30 


 





 


Glucose (Fingerstick)


 97 mg/dL


(70-99) 93 mg/dL


(70-99) 


 














Assessment and Plan


Assessmemt and Plan


Problems


Medical Problems:


(1) Altered mental status


Status: Acute  











Comment


Review of Relevant


I have reviewed the following items marky (where applicable) has been applied.


Labs





Laboratory Tests








Test


 10/17/19


11:42 10/17/19


16:03 10/17/19


21:04 10/18/19


07:31


 


Glucose (Fingerstick)


 105 mg/dL


(70-99) 109 mg/dL


(70-99) 102 mg/dL


(70-99) 99 mg/dL


(70-99)


 


Test


 10/18/19


09:15 10/18/19


10:00 10/18/19


11:38 10/18/19


16:35


 


White Blood Count


 7.4 x10^3/uL


(4.0-11.0) 


 


 





 


Red Blood Count


 3.82 x10^6/uL


(3.50-5.40) 


 


 





 


Hemoglobin


 8.7 g/dL


(12.0-15.5) 


 


 





 


Hematocrit


 27.5 %


(36.0-47.0) 


 


 





 


Mean Corpuscular Volume 72 fL ()    


 


Mean Corpuscular Hemoglobin 23 pg (25-35)    


 


Mean Corpuscular Hemoglobin


Concent 32 g/dL


(31-37) 


 


 





 


Red Cell Distribution Width


 25.7 %


(11.5-14.5) 


 


 





 


Platelet Count


 223 x10^3/uL


(140-400) 


 


 





 


Neutrophils (%) (Auto) 81 % (31-73)    


 


Lymphocytes (%) (Auto) 8 % (24-48)    


 


Monocytes (%) (Auto) 10 % (0-9)    


 


Eosinophils (%) (Auto) 1 % (0-3)    


 


Basophils (%) (Auto) 0 % (0-3)    


 


Neutrophils # (Auto)


 6.0 x10^3/uL


(1.8-7.7) 


 


 





 


Lymphocytes # (Auto)


 0.6 x10^3/uL


(1.0-4.8) 


 


 





 


Monocytes # (Auto)


 0.7 x10^3/uL


(0.0-1.1) 


 


 





 


Eosinophils # (Auto)


 0.0 x10^3/uL


(0.0-0.7) 


 


 





 


Basophils # (Auto)


 0.0 x10^3/uL


(0.0-0.2) 


 


 





 


Sodium Level


 138 mmol/L


(136-145) 


 


 





 


Potassium Level


 3.5 mmol/L


(3.5-5.1) 


 


 





 


Chloride Level


 107 mmol/L


() 


 


 





 


Carbon Dioxide Level


 22 mmol/L


(21-32) 


 


 





 


Anion Gap 9 (6-14)    


 


Blood Urea Nitrogen


 19 mg/dL


(7-20) 


 


 





 


Creatinine


 1.0 mg/dL


(0.6-1.0) 


 


 





 


Estimated GFR


(Cockcroft-Gault) 63.9 


 


 


 





 


BUN/Creatinine Ratio 19 (6-20)    


 


Glucose Level


 156 mg/dL


(70-99) 


 


 





 


Calcium Level


 8.4 mg/dL


(8.5-10.1) 


 


 





 


Total Bilirubin


 0.4 mg/dL


(0.2-1.0) 


 


 





 


Aspartate Amino Transf


(AST/SGOT) 29 U/L (15-37) 


 


 


 





 


Alanine Aminotransferase


(ALT/SGPT) 18 U/L (14-59) 


 


 


 





 


Alkaline Phosphatase


 61 U/L


() 


 


 





 


Total Protein


 6.2 g/dL


(6.4-8.2) 


 


 





 


Albumin


 2.2 g/dL


(3.4-5.0) 


 


 





 


Albumin/Globulin Ratio 0.6 (1.0-1.7)    


 


Influenza Type A Antigen


 


 Negative


(NEGATIVE) 


 





 


Influenza Type B Antigen


 


 Negative


(NEGATIVE) 


 





 


Glucose (Fingerstick)


 


 


 126 mg/dL


(70-99) 118 mg/dL


(70-99)


 


Test


 10/18/19


20:30 10/19/19


07:30 


 





 


Glucose (Fingerstick)


 97 mg/dL


(70-99) 93 mg/dL


(70-99) 


 











Laboratory Tests








Test


 10/18/19


09:15 10/18/19


10:00 10/18/19


11:38 10/18/19


16:35


 


White Blood Count


 7.4 x10^3/uL


(4.0-11.0) 


 


 





 


Red Blood Count


 3.82 x10^6/uL


(3.50-5.40) 


 


 





 


Hemoglobin


 8.7 g/dL


(12.0-15.5) 


 


 





 


Hematocrit


 27.5 %


(36.0-47.0) 


 


 





 


Mean Corpuscular Volume 72 fL ()    


 


Mean Corpuscular Hemoglobin 23 pg (25-35)    


 


Mean Corpuscular Hemoglobin


Concent 32 g/dL


(31-37) 


 


 





 


Red Cell Distribution Width


 25.7 %


(11.5-14.5) 


 


 





 


Platelet Count


 223 x10^3/uL


(140-400) 


 


 





 


Neutrophils (%) (Auto) 81 % (31-73)    


 


Lymphocytes (%) (Auto) 8 % (24-48)    


 


Monocytes (%) (Auto) 10 % (0-9)    


 


Eosinophils (%) (Auto) 1 % (0-3)    


 


Basophils (%) (Auto) 0 % (0-3)    


 


Neutrophils # (Auto)


 6.0 x10^3/uL


(1.8-7.7) 


 


 





 


Lymphocytes # (Auto)


 0.6 x10^3/uL


(1.0-4.8) 


 


 





 


Monocytes # (Auto)


 0.7 x10^3/uL


(0.0-1.1) 


 


 





 


Eosinophils # (Auto)


 0.0 x10^3/uL


(0.0-0.7) 


 


 





 


Basophils # (Auto)


 0.0 x10^3/uL


(0.0-0.2) 


 


 





 


Sodium Level


 138 mmol/L


(136-145) 


 


 





 


Potassium Level


 3.5 mmol/L


(3.5-5.1) 


 


 





 


Chloride Level


 107 mmol/L


() 


 


 





 


Carbon Dioxide Level


 22 mmol/L


(21-32) 


 


 





 


Anion Gap 9 (6-14)    


 


Blood Urea Nitrogen


 19 mg/dL


(7-20) 


 


 





 


Creatinine


 1.0 mg/dL


(0.6-1.0) 


 


 





 


Estimated GFR


(Cockcroft-Gault) 63.9 


 


 


 





 


BUN/Creatinine Ratio 19 (6-20)    


 


Glucose Level


 156 mg/dL


(70-99) 


 


 





 


Calcium Level


 8.4 mg/dL


(8.5-10.1) 


 


 





 


Total Bilirubin


 0.4 mg/dL


(0.2-1.0) 


 


 





 


Aspartate Amino Transf


(AST/SGOT) 29 U/L (15-37) 


 


 


 





 


Alanine Aminotransferase


(ALT/SGPT) 18 U/L (14-59) 


 


 


 





 


Alkaline Phosphatase


 61 U/L


() 


 


 





 


Total Protein


 6.2 g/dL


(6.4-8.2) 


 


 





 


Albumin


 2.2 g/dL


(3.4-5.0) 


 


 





 


Albumin/Globulin Ratio 0.6 (1.0-1.7)    


 


Influenza Type A Antigen


 


 Negative


(NEGATIVE) 


 





 


Influenza Type B Antigen


 


 Negative


(NEGATIVE) 


 





 


Glucose (Fingerstick)


 


 


 126 mg/dL


(70-99) 118 mg/dL


(70-99)


 


Test


 10/18/19


20:30 10/19/19


07:30 


 





 


Glucose (Fingerstick)


 97 mg/dL


(70-99) 93 mg/dL


(70-99) 


 











Microbiology


10/16/19 Blood Culture - Preliminary, Resulted


           NO GROWTH AFTER 2 DAYS


10/12/19 Urine Culture - Final, Complete


           


10/12/19 Urine Culture Result 1 (EDGAR) - Final, Complete


           


10/12/19 Antimicrobic Susceptibility - Final, Complete


Medications





Current Medications


Ondansetron HCl (Zofran) 4 mg 1X  ONCE IV  Last administered on 10/12/19at 

19:17;  Start 10/12/19 at 19:00;  Stop 10/12/19 at 19:01;  Status DC


Ondansetron HCl (Zofran) 4 mg PRN Q8HRS  PRN IV NAUSEA/VOMITING;  Start 10/12/19

at 19:45;  Stop 10/13/19 at 19:44;  Status DC


Sodium Chloride 1,000 ml @  75 mls/hr [id number] IV  Last administered on 

10/13/19at 15:33;  Start 10/12/19 at 19:35;  Stop 10/13/19 at 19:34;  Status DC


Acetaminophen (Tylenol) 650 mg PRN Q4HRS  PRN PO FEVER Last administered on 

10/13/19at 12:09;  Start 10/12/19 at 19:45;  Stop 10/13/19 at 19:44;  Status DC


Aspirin (Children'S Aspirin) 81 mg DAILY PO  Last administered on 10/18/19at 

09:07;  Start 10/13/19 at 12:00


Atenolol (Tenormin) 25 mg DAILY PO  Last administered on 10/18/19at 09:08;  

Start 10/13/19 at 12:00


Vitamin D (Vitamin D3) 1,000 unit DAILYBFRSUP PO  Last administered on 

10/18/19at 15:40;  Start 10/13/19 at 17:00


Diclofenac Sodium (Voltaren) 1 danyell PRN QID  PRN TP PAIN;  Start 10/13/19 at 

11:00


Levetiracetam (Keppra) 500 mg BID PO  Last administered on 10/18/19at 20:36;  

Start 10/13/19 at 12:00


Simvastatin (Zocor) 40 mg QHS PO  Last administered on 10/18/19at 20:36;  Start 

10/13/19 at 21:00


Amoxicillin/ Clavulanate Potassium (Augmentin 500/ 125mg) 0.5 tab BID PO  Last 

administered on 10/15/19at 22:00;  Start 10/13/19 at 12:00;  Stop 10/16/19 at 

09:24;  Status DC


Lactobacillus Rhamnosus (Culturelle) 1 cap BID PO  Last administered on 

10/18/19at 20:36;  Start 10/13/19 at 21:00


Acetaminophen (Tylenol) 500 mg PRN Q6HRS  PRN PO MILD PAIN / TEMP Last 

administered on 10/18/19at 22:23;  Start 10/13/19 at 20:15


Ondansetron HCl (Zofran) 4 mg PRN Q6HRS  PRN IVP NAUSEA/VOMITING Last 

administered on 10/14/19at 15:24;  Start 10/13/19 at 20:15


Labetalol HCl (Normodyne Iv Push) 10 mg PRN Q2HR  PRN IVP HYPERTENSION;  Start 

10/13/19 at 20:15


Cefepime HCl (Maxipime) 1 gm Q8HRS IVP  Last administered on 10/19/19at 05:34;  

Start 10/16/19 at 10:00





Active Scripts


Active


Cefuroxime (Cefuroxime Axetil) 500 Mg Tablet 1 Tab PO BID 7 Days


Keppra (Levetiracetam) 500 Mg Tablet 500 Mg PO BID


Meclizine Hcl 25 Mg Tablet 1 Tab PO PRN TID PRN


Reported


Vitamin D3 (Cholecalciferol (Vitamin D3)) 1,000 Unit Tablet 1 Tab PO DAILYBFRSUP


Voltaren (Diclofenac Sodium) 100 Gm Gel..gram. 1 Gm TP PRN QID


Aspirin 81 Mg Tab.chew 1 Tab PO DAILY


Simvastatin 40 Mg Tablet 1 Tab PO QHS


Atenolol 25 Mg Tablet 1 Tab PO DAILY


Vitals/I & O





Vital Sign - Last 24 Hours








 10/18/19 10/18/19 10/18/19 10/18/19





 09:08 11:24 15:52 19:00


 


Temp  100.9 98.6 98.6





  100.9 98.6 98.6


 


Pulse 87 84 78 81


 


Resp  20 24 17


 


B/P (MAP) 128/65 121/61 (81) 131/70 (90) 118/70 (86)


 


Pulse Ox  95 95 94


 


O2 Delivery  Room Air Room Air Room Air


 


    





    





 10/18/19 10/18/19 10/19/19 10/19/19





 20:00 23:00 03:00 07:00


 


Temp  100.3 99.0 100.2





  100.3 99.0 100.2


 


Pulse  85 65 88


 


Resp  17 18 14


 


B/P (MAP)  130/67 (88) 142/82 (102) 129/71 (90)


 


Pulse Ox  100 93 100


 


O2 Delivery Room Air Room Air Room Air Room Air














Intake and Output   


 


 10/18/19 10/18/19 10/19/19





 15:00 23:00 07:00


 


Intake Total 100 ml 70 ml 20 ml


 


Balance 100 ml 70 ml 20 ml











Nutrition Consultation


Dietary Evaluation:


Recommendations by RD:  Increase Calorie Intake, Protein supplementation


Comments:  


ensure added bid


Expected Outcomes/Goals:  


to meet > 75% est nutr needs





Malnutrition Findings:


Food and Nutrition Intake (Mod:  <75% est energy req 7days


Weight Status:  Appropriate











BENJAMÍN ADAM MD        Oct 19, 2019 08:34

## 2019-10-20 VITALS — DIASTOLIC BLOOD PRESSURE: 60 MMHG | SYSTOLIC BLOOD PRESSURE: 132 MMHG

## 2019-10-20 VITALS — SYSTOLIC BLOOD PRESSURE: 120 MMHG | DIASTOLIC BLOOD PRESSURE: 60 MMHG

## 2019-10-20 VITALS — DIASTOLIC BLOOD PRESSURE: 74 MMHG | SYSTOLIC BLOOD PRESSURE: 141 MMHG

## 2019-10-20 VITALS — DIASTOLIC BLOOD PRESSURE: 58 MMHG | SYSTOLIC BLOOD PRESSURE: 118 MMHG

## 2019-10-20 VITALS — DIASTOLIC BLOOD PRESSURE: 69 MMHG | SYSTOLIC BLOOD PRESSURE: 124 MMHG

## 2019-10-20 VITALS — DIASTOLIC BLOOD PRESSURE: 44 MMHG | SYSTOLIC BLOOD PRESSURE: 124 MMHG

## 2019-10-20 RX ADMIN — SIMVASTATIN SCH MG: 40 TABLET, FILM COATED ORAL at 20:33

## 2019-10-20 RX ADMIN — VITAMIN D, TAB 1000IU (100/BT) SCH UNIT: 25 TAB at 17:19

## 2019-10-20 RX ADMIN — IPRATROPIUM BROMIDE AND ALBUTEROL SULFATE SCH ML: .5; 3 SOLUTION RESPIRATORY (INHALATION) at 07:30

## 2019-10-20 RX ADMIN — CEFEPIME SCH GM: 1 INJECTION, POWDER, FOR SOLUTION INTRAMUSCULAR; INTRAVENOUS at 06:37

## 2019-10-20 RX ADMIN — Medication SCH CAP: at 20:33

## 2019-10-20 RX ADMIN — ATENOLOL SCH MG: 25 TABLET ORAL at 10:21

## 2019-10-20 RX ADMIN — ACETAMINOPHEN PRN MG: 500 TABLET ORAL at 10:23

## 2019-10-20 RX ADMIN — IPRATROPIUM BROMIDE AND ALBUTEROL SULFATE SCH ML: .5; 3 SOLUTION RESPIRATORY (INHALATION) at 11:21

## 2019-10-20 RX ADMIN — ASPIRIN 81 MG SCH MG: 81 TABLET ORAL at 10:21

## 2019-10-20 RX ADMIN — Medication SCH CAP: at 10:20

## 2019-10-20 RX ADMIN — IPRATROPIUM BROMIDE AND ALBUTEROL SULFATE SCH ML: .5; 3 SOLUTION RESPIRATORY (INHALATION) at 16:06

## 2019-10-20 RX ADMIN — CEFEPIME SCH GM: 1 INJECTION, POWDER, FOR SOLUTION INTRAMUSCULAR; INTRAVENOUS at 14:13

## 2019-10-20 RX ADMIN — IPRATROPIUM BROMIDE AND ALBUTEROL SULFATE SCH ML: .5; 3 SOLUTION RESPIRATORY (INHALATION) at 19:47

## 2019-10-20 RX ADMIN — CEFEPIME SCH GM: 1 INJECTION, POWDER, FOR SOLUTION INTRAMUSCULAR; INTRAVENOUS at 22:38

## 2019-10-20 NOTE — RAD
CT of the chest without contrast:

 

Clinical History: Right lower lobe infiltrate and pleural effusion.

 

Axial helical images of the chest were obtained without contrast.

 

FINDINGS:

 

There is a small right effusion with adjacent infiltrate. There is a large

diaphragmatic hernia containing stomach and some of the proximal small 

bowel. There is marked coronary artery calcifications. There is renal 

cysts better seen on recent CT of the abdomen.

There is no mediastinal or hilar lymphadenopathy.

 

 

Impression:

 

1. 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

 

2. Small right pleural effusion with adjacent infiltrates.

 

3. Significant coronary artery calcifications.

 

PQRS Compliance Statement:

 

One or more of the following individualized dose reduction techniques were

utilized for this examination:  

1. Automated exposure control  

2. Adjustment of the mA and/or kV according to patient size  

3. Use of iterative reconstruction technique

 

Electronically signed by: Bruce Kurtz III, MD (10/20/2019 8:36 AM) [facility]

## 2019-10-20 NOTE — PDOC
PULMONARY PROGRESS NOTES


Subjective


open eyes w verbal stimuli, appears comfortable


Vitals





Vital Signs








  Date Time  Temp Pulse Resp B/P (MAP) Pulse Ox O2 Delivery O2 Flow Rate FiO2


 


10/20/19 07:30     99 Room Air  


 


10/20/19 03:03 98.6 86 20 124/44 (70)    





 98.6       








General:  No acute distress


Lungs:  Crackles


Cardiovascular:  S1, S2


Abdomen:  Soft, Non-tender


Extremities:  No Edema


Skin:  Warm


Labs





Laboratory Tests








Test


 10/18/19


09:15 10/18/19


10:00 10/18/19


11:38 10/18/19


16:35


 


White Blood Count


 7.4 x10^3/uL


(4.0-11.0) 


 


 





 


Red Blood Count


 3.82 x10^6/uL


(3.50-5.40) 


 


 





 


Hemoglobin


 8.7 g/dL


(12.0-15.5) 


 


 





 


Hematocrit


 27.5 %


(36.0-47.0) 


 


 





 


Mean Corpuscular Volume 72 fL ()    


 


Mean Corpuscular Hemoglobin 23 pg (25-35)    


 


Mean Corpuscular Hemoglobin


Concent 32 g/dL


(31-37) 


 


 





 


Red Cell Distribution Width


 25.7 %


(11.5-14.5) 


 


 





 


Platelet Count


 223 x10^3/uL


(140-400) 


 


 





 


Neutrophils (%) (Auto) 81 % (31-73)    


 


Lymphocytes (%) (Auto) 8 % (24-48)    


 


Monocytes (%) (Auto) 10 % (0-9)    


 


Eosinophils (%) (Auto) 1 % (0-3)    


 


Basophils (%) (Auto) 0 % (0-3)    


 


Neutrophils # (Auto)


 6.0 x10^3/uL


(1.8-7.7) 


 


 





 


Lymphocytes # (Auto)


 0.6 x10^3/uL


(1.0-4.8) 


 


 





 


Monocytes # (Auto)


 0.7 x10^3/uL


(0.0-1.1) 


 


 





 


Eosinophils # (Auto)


 0.0 x10^3/uL


(0.0-0.7) 


 


 





 


Basophils # (Auto)


 0.0 x10^3/uL


(0.0-0.2) 


 


 





 


Sodium Level


 138 mmol/L


(136-145) 


 


 





 


Potassium Level


 3.5 mmol/L


(3.5-5.1) 


 


 





 


Chloride Level


 107 mmol/L


() 


 


 





 


Carbon Dioxide Level


 22 mmol/L


(21-32) 


 


 





 


Anion Gap 9 (6-14)    


 


Blood Urea Nitrogen


 19 mg/dL


(7-20) 


 


 





 


Creatinine


 1.0 mg/dL


(0.6-1.0) 


 


 





 


Estimated GFR


(Cockcroft-Gault) 63.9 


 


 


 





 


BUN/Creatinine Ratio 19 (6-20)    


 


Glucose Level


 156 mg/dL


(70-99) 


 


 





 


Calcium Level


 8.4 mg/dL


(8.5-10.1) 


 


 





 


Total Bilirubin


 0.4 mg/dL


(0.2-1.0) 


 


 





 


Aspartate Amino Transf


(AST/SGOT) 29 U/L (15-37) 


 


 


 





 


Alanine Aminotransferase


(ALT/SGPT) 18 U/L (14-59) 


 


 


 





 


Alkaline Phosphatase


 61 U/L


() 


 


 





 


Total Protein


 6.2 g/dL


(6.4-8.2) 


 


 





 


Albumin


 2.2 g/dL


(3.4-5.0) 


 


 





 


Albumin/Globulin Ratio 0.6 (1.0-1.7)    


 


Influenza Type A Antigen


 


 Negative


(NEGATIVE) 


 





 


Influenza Type B Antigen


 


 Negative


(NEGATIVE) 


 





 


Glucose (Fingerstick)


 


 


 126 mg/dL


(70-99) 118 mg/dL


(70-99)


 


Test


 10/18/19


20:30 10/19/19


07:30 10/19/19


11:40 10/19/19


16:46


 


Glucose (Fingerstick)


 97 mg/dL


(70-99) 93 mg/dL


(70-99) 107 mg/dL


(70-99) 111 mg/dL


(70-99)


 


Test


 10/19/19


20:40 10/20/19


07:30 


 





 


Glucose (Fingerstick)


 82 mg/dL


(70-99) 99 mg/dL


(70-99) 


 











Laboratory Tests








Test


 10/19/19


11:40 10/19/19


16:46 10/19/19


20:40 10/20/19


07:30


 


Glucose (Fingerstick)


 107 mg/dL


(70-99) 111 mg/dL


(70-99) 82 mg/dL


(70-99) 99 mg/dL


(70-99)








Medications





Active Scripts








 Medications  Dose


 Route/Sig


 Max Daily Dose Days Date Category


 


 Cefuroxime


  (Cefuroxime


 Axetil) 500 Mg


 Tablet  1 Tab


 PO BID


  7 10/16/19 Rx


 


 Keppra


  (Levetiracetam)


 500 Mg Tablet  500 Mg


 PO BID


   1/24/19 Rx


 


 Vitamin D3


  (Cholecalciferol


  (Vitamin D3))


 1,000 Unit Tablet  1 Tab


 PO DAILYBFRSUP


   1/20/19 Reported


 


 Voltaren


  (Diclofenac


 Sodium) 100 Gm


 Gel..gram.  1 Gm


 TP PRN QID


   1/20/19 Reported


 


 Meclizine Hcl 25


 Mg Tablet  1 Tab


 PO PRN TID PRN


   10/29/16 Rx


 


 Aspirin 81 Mg


 Tab.chew  1 Tab


 PO DAILY


   10/29/16 Reported


 


 Simvastatin 40 Mg


 Tablet  1 Tab


 PO QHS


   10/29/16 Reported


 


 Atenolol 25 Mg


 Tablet  1 Tab


 PO DAILY


   10/29/16 Reported











Impression


.


IMPRESSION:


1.  Abnormal chest x-ray.  I suspect it is secondary to aspiration.


2.  Fever secondary to aspiration pneumonia/pneumonitis.


3.  Aspiration pneumonia/pneumonitis.


4.  Dysphagia.


5.  History of cerebrovascular accident.


6.  Hypertension.


7.  Diabetes mellitus.


8.  Dementia.





Plan


.


PLAN AND RECOMMENDATIONS:


1.  Titrate FiO2 to keep O2 saturation 92%.


2.  bronchodilator.


3.  Continue antibiotic per ID.


4.  I suspect the patient has aspiration. speech recommended to feed her if she 

is fully awake. 


5.  Elevate head of bed.


6.  Monitor respiratory status very closely.


7.  Code status is chemical only


discussed w dr nettles. family doesnt not want peg, ? hospice, palliative care 

consult





discussed w DJEAH Scott MD               Oct 20, 2019 07:45

## 2019-10-20 NOTE — PDOC
PROGRESS NOTES


Chief Complaint


Chief Complaint


A/P:


Acute encephalopathy


Acute respiratory failure with hypoxia


Intentional// accidental ?  drug overdose last admit 


TOXIC Encephalopathy


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


Mild to moderate chronic small vessel ischemic changes and atrophy.on CT HEAD


DIABETES


HYPERTENSION HX


HYPOKALEMIA , resolved


Severe protein-caloric malnutrition


GERD


MICROCYTIC ANEMIA


Fever, possible aspiration on last stay, resolved


UTI - e. coli intermediate to augmentin - change antibiotics


Moderate left and minimal right hip osteoarthritis.


Degenerative change at the lower lumbar levels weakness, max assist on transfers





plan


neurochecks q 4 hrs, may be at baseline


HOLD SEDATING MED


hold meclizine, may be too sedating


PT/OT/ST


Cefuroxime on d/c





27 MIN PT EXAM, CHART REVIEW, > 50% OF TIME SPENT WITH EXAM, CHART REVIEW, pt 

care coordination





History of Present Illness


History of Present Illness


Ms Deal is an 83yo F w/ PMHx Dementia, diabetes, seizures, myocardial 

infarction, coronary artery disease, hyperlipidemia, hypertension, and arthritis

who returns to the hospital from SNF after worsening confusion. Found with e. 

coli UTI.


10/15 had fever of 101. E. coli found intermediate to augmentin. Changed to 

cefepime per ID. CT abdomen ordered. Patient has no complaints. Is pleasantly 

confused. Discussed with family bedside. Will need to monitor for 24 hours 

without fever prior to d/c


10/18: Has temp 100F, nothing greater. She states she is feeling well. was 

transitioned to cefepime. D/w family bedside could possibly go today if her 

temperature is lower. Flu swab negative today per ID. She is c/o dental pain in 

her right mandible. Some cough after drinking liquids, SLP evaluation recommends

thickened liquids. CXR reveals possible RLL infiltrate


10/19: She is more drowsy this morning. Nursing noted she is holding applesauce 

in her mouth, not initiating swallowing well. Pulm evaluation today suggests we 

should consider NPO for now. No family bedside. CT chest - There is a small 

right effusion with adjacent infiltrate. There is a large


diaphragmatic hernia containing stomach and some of the proximal small bowel. 

There is marked coronary artery calcifications. There is renal cysts better seen

on recent CT of the abdomen.There is no mediastinal or hilar lymphadenopathy.


 


She is less drowsy today. Was refusing food earlier. Family bedside.


Discussed with family today she will likely continue to decline and with 

dementia have good and bad days. They have asked to speak with hospice and a 

 today prior to discharge planning.


No fevers since yesterday.


Unable to obtain ROS due to confusion.





Plan:


SLP evaluation for swallow


No dentist on call until 11/11/2019


Appropriate care considering her advanced dementia would be to consider hospice 

d/c, they are open to this now





Vitals


Vitals





Vital Signs








  Date Time  Temp Pulse Resp B/P (MAP) Pulse Ox O2 Delivery O2 Flow Rate FiO2


 


10/20/19 07:30     99 Room Air  


 


10/20/19 07:00 97.4 84 20 131/58 (82)    





 97.4       











Physical Exam


Physical Exam


GENERAL: lying in bed, comfortable, no family at bedside


HEENT:  Pupils equally round, reactive.  


NECK:  Supple.


LUNGS: Clear.


HEART:  S1, S2.  Murmur present.


ABDOMEN:  Soft, no guarding.  Bowel sounds present.


GENITOURINARY:  Indwelling Evans in place.


EXTREMITIES:  No gross edema or cyanosis.  Left wrist tenderness improved


SKIN:  Warm to touch.


CNS sleepy arousable but confused


PIV ok


General:  Alert, Cooperative, No acute distress


Lungs:  Clear


Abdomen:  Normal bowel sounds, Soft


Extremities:  No cyanosis


Skin:  No breakdown





Labs


LABS





Laboratory Tests








Test


 10/19/19


11:40 10/19/19


16:46 10/19/19


20:40 10/20/19


07:30


 


Glucose (Fingerstick)


 107 mg/dL


(70-99) 111 mg/dL


(70-99) 82 mg/dL


(70-99) 99 mg/dL


(70-99)











Assessment and Plan


Assessmemt and Plan


Problems


Medical Problems:


(1) Altered mental status


Status: Acute  











Comment


Review of Relevant


I have reviewed the following items marky (where applicable) has been applied.


Labs





Laboratory Tests








Test


 10/18/19


10:00 10/18/19


11:38 10/18/19


16:35 10/18/19


20:30


 


Influenza Type A Antigen


 Negative


(NEGATIVE) 


 


 





 


Influenza Type B Antigen


 Negative


(NEGATIVE) 


 


 





 


Glucose (Fingerstick)


 


 126 mg/dL


(70-99) 118 mg/dL


(70-99) 97 mg/dL


(70-99)


 


Test


 10/19/19


07:30 10/19/19


11:40 10/19/19


16:46 10/19/19


20:40


 


Glucose (Fingerstick)


 93 mg/dL


(70-99) 107 mg/dL


(70-99) 111 mg/dL


(70-99) 82 mg/dL


(70-99)


 


Test


 10/20/19


07:30 


 


 





 


Glucose (Fingerstick)


 99 mg/dL


(70-99) 


 


 











Laboratory Tests








Test


 10/19/19


11:40 10/19/19


16:46 10/19/19


20:40 10/20/19


07:30


 


Glucose (Fingerstick)


 107 mg/dL


(70-99) 111 mg/dL


(70-99) 82 mg/dL


(70-99) 99 mg/dL


(70-99)








Microbiology


10/16/19 Blood Culture - Preliminary, Resulted


           NO GROWTH AFTER 4 DAYS


10/12/19 Urine Culture - Final, Complete


           


10/12/19 Urine Culture Result 1 (EDGAR) - Final, Complete


           


10/12/19 Antimicrobic Susceptibility - Final, Complete


Medications





Current Medications


Ondansetron HCl (Zofran) 4 mg 1X  ONCE IV  Last administered on 10/12/19at 

19:17;  Start 10/12/19 at 19:00;  Stop 10/12/19 at 19:01;  Status DC


Ondansetron HCl (Zofran) 4 mg PRN Q8HRS  PRN IV NAUSEA/VOMITING;  Start 10/12/19

at 19:45;  Stop 10/13/19 at 19:44;  Status DC


Sodium Chloride 1,000 ml @  75 mls/hr [id number] IV  Last administered on 

10/13/19at 15:33;  Start 10/12/19 at 19:35;  Stop 10/13/19 at 19:34;  Status DC


Acetaminophen (Tylenol) 650 mg PRN Q4HRS  PRN PO FEVER Last administered on 

10/13/19at 12:09;  Start 10/12/19 at 19:45;  Stop 10/13/19 at 19:44;  Status DC


Aspirin (Children'S Aspirin) 81 mg DAILY PO  Last administered on 10/19/19at 

11:35;  Start 10/13/19 at 12:00


Atenolol (Tenormin) 25 mg DAILY PO  Last administered on 10/19/19at 11:36;  

Start 10/13/19 at 12:00


Vitamin D (Vitamin D3) 1,000 unit DAILYBFRSUP PO  Last administered on 

10/18/19at 15:40;  Start 10/13/19 at 17:00


Diclofenac Sodium (Voltaren) 1 danyell PRN QID  PRN TP PAIN;  Start 10/13/19 at 

11:00


Levetiracetam (Keppra) 500 mg BID PO  Last administered on 10/19/19at 21:47;  

Start 10/13/19 at 12:00


Simvastatin (Zocor) 40 mg QHS PO  Last administered on 10/19/19at 21:47;  Start 

10/13/19 at 21:00


Amoxicillin/ Clavulanate Potassium (Augmentin 500/ 125mg) 0.5 tab BID PO  Last 

administered on 10/15/19at 22:00;  Start 10/13/19 at 12:00;  Stop 10/16/19 at 

09:24;  Status DC


Lactobacillus Rhamnosus (Culturelle) 1 cap BID PO  Last administered on 

10/19/19at 21:47;  Start 10/13/19 at 21:00


Acetaminophen (Tylenol) 500 mg PRN Q6HRS  PRN PO MILD PAIN / TEMP Last 

administered on 10/19/19at 21:47;  Start 10/13/19 at 20:15


Ondansetron HCl (Zofran) 4 mg PRN Q6HRS  PRN IVP NAUSEA/VOMITING Last 

administered on 10/14/19at 15:24;  Start 10/13/19 at 20:15


Labetalol HCl (Normodyne Iv Push) 10 mg PRN Q2HR  PRN IVP HYPERTENSION;  Start 

10/13/19 at 20:15


Cefepime HCl (Maxipime) 1 gm Q8HRS IVP  Last administered on 10/20/19at 06:37;  

Start 10/16/19 at 10:00


Albuterol/ Ipratropium (Duoneb) 3 ml RTQID NEB  Last administered on 10/20/19at 

07:30;  Start 10/19/19 at 12:00





Active Scripts


Active


Cefuroxime (Cefuroxime Axetil) 500 Mg Tablet 1 Tab PO BID 7 Days


Keppra (Levetiracetam) 500 Mg Tablet 500 Mg PO BID


Meclizine Hcl 25 Mg Tablet 1 Tab PO PRN TID PRN


Reported


Vitamin D3 (Cholecalciferol (Vitamin D3)) 1,000 Unit Tablet 1 Tab PO DAILYBFRSUP


Voltaren (Diclofenac Sodium) 100 Gm Gel..gram. 1 Gm TP PRN QID


Aspirin 81 Mg Tab.chew 1 Tab PO DAILY


Simvastatin 40 Mg Tablet 1 Tab PO QHS


Atenolol 25 Mg Tablet 1 Tab PO DAILY


Vitals/I & O





Vital Sign - Last 24 Hours








 10/19/19 10/19/19 10/19/19 10/19/19





 11:00 11:36 13:43 15:00


 


Temp 100.3   99.5





 100.3   99.5


 


Pulse 85 85  90


 


Resp 16   16


 


B/P (MAP) 129/71 (90) 129/71  117/59 (78)


 


Pulse Ox 98  92 100


 


O2 Delivery Room Air   Room Air


 


    





    





 10/19/19 10/19/19 10/19/19 10/19/19





 16:11 19:00 20:00 20:29


 


Temp  99.3  





  99.3  


 


Pulse  99  


 


Resp  20  


 


B/P (MAP)  145/80 (101)  


 


Pulse Ox 98 98  99


 


O2 Delivery  Room Air Room Air 


 


    





    





 10/19/19 10/20/19 10/20/19 10/20/19





 23:00 03:03 07:00 07:30


 


Temp 99.6 98.6 97.4 





 99.6 98.6 97.4 


 


Pulse 105 86 84 


 


Resp 20 20 20 


 


B/P (MAP) 155/73 (100) 124/44 (70) 131/58 (82) 


 


Pulse Ox 95 95 95 99


 


O2 Delivery Room Air Room Air Room Air Room Air











Nutrition Consultation


Dietary Evaluation:


Recommendations by RD:  Increase Calorie Intake, Protein supplementation


Comments:  


ensure added bid


Expected Outcomes/Goals:  


to meet > 75% est nutr needs





Malnutrition Findings:


Food and Nutrition Intake (Mod:  <75% est energy req 7days


Weight Status:  Appropriate











BENJAMÍN ADAM MD        Oct 20, 2019 09:53

## 2019-10-20 NOTE — PDOC
Infectious Disease Note


Subjective:


Subjective


pt more alert this am


appears comfortable


d/w rn


has some dysphagia





ROS:


ROS


Negative otherwise.





Vital Signs:


Vital Signs





Vital Signs








  Date Time  Temp Pulse Resp B/P (MAP) Pulse Ox O2 Delivery O2 Flow Rate FiO2


 


10/20/19 07:30     99 Room Air  


 


10/20/19 07:00 97.4 84 20 131/58 (82)    





 97.4       











Physical Exam:


PHYSICAL EXAM


GENERAL: lying in bed, comfortable, no family at bedside


HEENT:  Pupils equally round, reactive.  


NECK:  Supple.


LUNGS: Clear.


HEART:  S1, S2.  Murmur present.


ABDOMEN:  Soft, no guarding.  Bowel sounds present.


GENITOURINARY:  Indwelling Evans in place.


EXTREMITIES:  No gross edema or cyanosis.  Left wrist tenderness improved


SKIN:  Warm to touch.


CNS sleepy arousable but confused


PIV ok





Medications:


Inpatient Meds:





Current Medications








 Medications


  (Trade)  Dose


 Ordered  Sig/Td  Start Time


 Stop Time Status Last Admin


Dose Admin


 


 Acetaminophen


  (Tylenol)  500 mg  PRN Q6HRS  PRN  10/13/19 20:15


    10/19/19 21:47


500 MG


 


 Albuterol/


 Ipratropium


  (Duoneb)  3 ml  RTQID  10/19/19 12:00


    10/20/19 07:30


3 ML


 


 Amoxicillin/


 Clavulanate


 Potassium


  (Augmentin 500/


 125mg)  0.5 tab  BID  10/13/19 12:00


 10/16/19 09:24 DC 10/15/19 22:00


0.5 TAB


 


 Aspirin


  (Children'S


 Aspirin)  81 mg  DAILY  10/13/19 12:00


    10/19/19 11:35


81 MG


 


 Atenolol


  (Tenormin)  25 mg  DAILY  10/13/19 12:00


    10/19/19 11:36


25 MG


 


 Cefepime HCl


  (Maxipime)  1 gm  Q8HRS  10/16/19 10:00


    10/20/19 06:37


1 GM


 


 Diclofenac Sodium


  (Voltaren)  1 danyell  PRN QID  PRN  10/13/19 11:00


     





 


 Labetalol HCl


  (Normodyne Iv


 Push)  10 mg  PRN Q2HR  PRN  10/13/19 20:15


     





 


 Lactobacillus


 Rhamnosus


  (Culturelle)  1 cap  BID  10/13/19 21:00


    10/19/19 21:47


1 CAP


 


 Levetiracetam


  (Keppra)  500 mg  BID  10/13/19 12:00


    10/19/19 21:47


500 MG


 


 Ondansetron HCl


  (Zofran)  4 mg  PRN Q6HRS  PRN  10/13/19 20:15


    10/14/19 15:24


4 MG


 


 Simvastatin


  (Zocor)  40 mg  QHS  10/13/19 21:00


    10/19/19 21:47


40 MG


 


 Sodium Chloride  1,000 ml @ 


 75 mls/hr  [id number]  10/12/19 19:35


 10/13/19 19:34 DC 10/13/19 15:33


75 MLS/HR


 


 Vitamin D


  (Vitamin D3)  1,000 unit  DAILYBFRSUP  10/13/19 17:00


    10/18/19 15:40


1,000 UNIT











Labs:


Lab





Laboratory Tests








Test


 10/19/19


11:40 10/19/19


16:46 10/19/19


20:40 10/20/19


07:30


 


Glucose (Fingerstick)


 107 mg/dL


(70-99) 111 mg/dL


(70-99) 82 mg/dL


(70-99) 99 mg/dL


(70-99)











Objective:


Assessment:


Fever - ? possible aspiration


flu screen neg


Acute encephalopathy , improved, likely sec to meds


Dementia 


Anemia 


E coli UTI





Plan:


Plan of Care


DC cefepime


start augmentin


maintain aspiration precautions


cont supportive care


d/w AARON Land MD           Oct 20, 2019 09:41

## 2019-10-21 VITALS — DIASTOLIC BLOOD PRESSURE: 58 MMHG | SYSTOLIC BLOOD PRESSURE: 140 MMHG

## 2019-10-21 VITALS — SYSTOLIC BLOOD PRESSURE: 135 MMHG | DIASTOLIC BLOOD PRESSURE: 61 MMHG

## 2019-10-21 VITALS — SYSTOLIC BLOOD PRESSURE: 144 MMHG | DIASTOLIC BLOOD PRESSURE: 86 MMHG

## 2019-10-21 VITALS — DIASTOLIC BLOOD PRESSURE: 62 MMHG | SYSTOLIC BLOOD PRESSURE: 121 MMHG

## 2019-10-21 VITALS — DIASTOLIC BLOOD PRESSURE: 72 MMHG | SYSTOLIC BLOOD PRESSURE: 135 MMHG

## 2019-10-21 VITALS — SYSTOLIC BLOOD PRESSURE: 129 MMHG | DIASTOLIC BLOOD PRESSURE: 71 MMHG

## 2019-10-21 RX ADMIN — IPRATROPIUM BROMIDE AND ALBUTEROL SULFATE SCH ML: .5; 3 SOLUTION RESPIRATORY (INHALATION) at 15:31

## 2019-10-21 RX ADMIN — VITAMIN D, TAB 1000IU (100/BT) SCH UNIT: 25 TAB at 17:00

## 2019-10-21 RX ADMIN — IPRATROPIUM BROMIDE AND ALBUTEROL SULFATE SCH ML: .5; 3 SOLUTION RESPIRATORY (INHALATION) at 19:20

## 2019-10-21 RX ADMIN — CEFEPIME SCH GM: 1 INJECTION, POWDER, FOR SOLUTION INTRAMUSCULAR; INTRAVENOUS at 14:41

## 2019-10-21 RX ADMIN — ASPIRIN 81 MG SCH MG: 81 TABLET ORAL at 08:44

## 2019-10-21 RX ADMIN — IPRATROPIUM BROMIDE AND ALBUTEROL SULFATE SCH ML: .5; 3 SOLUTION RESPIRATORY (INHALATION) at 12:00

## 2019-10-21 RX ADMIN — ATENOLOL SCH MG: 25 TABLET ORAL at 08:44

## 2019-10-21 RX ADMIN — CEFEPIME SCH GM: 1 INJECTION, POWDER, FOR SOLUTION INTRAMUSCULAR; INTRAVENOUS at 05:47

## 2019-10-21 RX ADMIN — IPRATROPIUM BROMIDE AND ALBUTEROL SULFATE SCH ML: .5; 3 SOLUTION RESPIRATORY (INHALATION) at 07:27

## 2019-10-21 RX ADMIN — Medication SCH CAP: at 21:20

## 2019-10-21 RX ADMIN — Medication SCH CAP: at 08:44

## 2019-10-21 RX ADMIN — CEFEPIME SCH GM: 1 INJECTION, POWDER, FOR SOLUTION INTRAMUSCULAR; INTRAVENOUS at 21:25

## 2019-10-21 RX ADMIN — SIMVASTATIN SCH MG: 40 TABLET, FILM COATED ORAL at 21:20

## 2019-10-21 NOTE — NUR
ALICIA following pt. ALICIA spoke with pt's daughters Мария and Ann Marie about hospice. ALICIA explained 
hospice services and pt's daughter wants pt to go to SNF first and then possibly go home 
with hospice. ALICIA verified family does not want hospice at this time. 

Orders to SNF cancelled as pt will need more day of IV abx. Discussed with RN and Physician. 
Updated [facility] at [facility].

## 2019-10-21 NOTE — PDOC
PULMONARY PROGRESS NOTES


Subjective


PT AWAKE AND ALERT


FOLLOW SOME COMMANDS


Vitals





Vital Signs








  Date Time  Temp Pulse Resp B/P (MAP) Pulse Ox O2 Delivery O2 Flow Rate FiO2


 


10/21/19 08:44  83  129/71    


 


10/21/19 07:28     98 Room Air  


 


10/21/19 07:00 97.7  16     





 97.7       








General:  No acute distress


Lungs:  Crackles


Cardiovascular:  S1, S2


Abdomen:  Soft, Non-tender


Extremities:  No Edema


Skin:  Warm


Labs





Laboratory Tests








Test


 10/19/19


11:40 10/19/19


16:46 10/19/19


20:40 10/20/19


07:30


 


Glucose (Fingerstick)


 107 mg/dL


(70-99) 111 mg/dL


(70-99) 82 mg/dL


(70-99) 99 mg/dL


(70-99)


 


Test


 10/20/19


11:52 10/20/19


16:56 10/20/19


20:31 10/21/19


07:42


 


Glucose (Fingerstick)


 127 mg/dL


(70-99) 105 mg/dL


(70-99) 143 mg/dL


(70-99) 103 mg/dL


(70-99)








Laboratory Tests








Test


 10/20/19


11:52 10/20/19


16:56 10/20/19


20:31 10/21/19


07:42


 


Glucose (Fingerstick)


 127 mg/dL


(70-99) 105 mg/dL


(70-99) 143 mg/dL


(70-99) 103 mg/dL


(70-99)








Medications





Active Scripts








 Medications  Dose


 Route/Sig


 Max Daily Dose Days Date Category


 


 Cefuroxime


  (Cefuroxime


 Axetil) 500 Mg


 Tablet  1 Tab


 PO BID


  7 10/16/19 Rx


 


 Keppra


  (Levetiracetam)


 500 Mg Tablet  500 Mg


 PO BID


   1/24/19 Rx


 


 Vitamin D3


  (Cholecalciferol


  (Vitamin D3))


 1,000 Unit Tablet  1 Tab


 PO DAILYBFRSUP


   1/20/19 Reported


 


 Voltaren


  (Diclofenac


 Sodium) 100 Gm


 Gel..gram.  1 Gm


 TP PRN QID


   1/20/19 Reported


 


 Meclizine Hcl 25


 Mg Tablet  1 Tab


 PO PRN TID PRN


   10/29/16 Rx


 


 Aspirin 81 Mg


 Tab.chew  1 Tab


 PO DAILY


   10/29/16 Reported


 


 Simvastatin 40 Mg


 Tablet  1 Tab


 PO QHS


   10/29/16 Reported


 


 Atenolol 25 Mg


 Tablet  1 Tab


 PO DAILY


   10/29/16 Reported











Impression


.


IMPRESSION:


1.  Abnormal chest x-ray.  I suspect it is secondary to aspiration.


2.  Fever secondary to aspiration pneumonia/pneumonitis.


3.  Aspiration pneumonia/pneumonitis.


4.  Dysphagia.


5.  History of cerebrovascular accident.


6.  Hypertension.


7.  Diabetes mellitus.


8.  Dementia.





Plan


.


D/C IN AM OK BY ME


CLINICALLY BETTER


FOLLOW SPEECH INPUT











NUHA HINES MD              Oct 21, 2019 09:17

## 2019-10-21 NOTE — NUR
Wound Care

Wound care consult for L buttock wound. Pt has pink area of scar tissue on left buttock, no 
open areas noted. Pt left in chair for dinner with heels floated. No other wounds noted on 
full skin inspection. WC will sign off at this time, please reconsult if new wounds develop.

## 2019-10-21 NOTE — PDOC3
Discharge Summary


Visit Information


Date of Admission:  Oct 12, 2019


Date of Discharge:  Oct 21, 2019


Admitting Diagnosis Comment:


Altered mental status 


Acute respiratory failure with hypoxia RESOLVED , SEC TO ? OVERDOSE OF KLONOPIN 

, HOWEVER UDS NEG FOR BENZODIAZEPINES  X 5 on last admit   D/W FAMILY IN ROOM


Intentional// accidental ?  drug overdose last admit 


TOXIC Encephalopathy


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


Mild to moderate chronic small vessel ischemic changes and atrophy.on CT HEAD


DIABETES


HYPERTENSION HX


HYPOKALEMIA , resolved


Severe protein-caloric malnutrition


GERD


MICROCYTIC ANEMI


Final Diagnosis


Problems


Medical Problems:


(1) Altered mental status


Status: Acute  











Brief Hospital Course


Allergies





                                    Allergies








Coded Allergies Type Severity Reaction Last Updated Verified


 


  No Known Drug Allergies    10/29/16 No








Vital Signs





Vital Signs








  Date Time  Temp Pulse Resp B/P (MAP) Pulse Ox O2 Delivery O2 Flow Rate FiO2


 


10/21/19 08:44  83  129/71    


 


10/21/19 07:28     98 Room Air  


 


10/21/19 07:00 97.7  16     





 97.7       








Lab Results





Laboratory Tests








Test


 10/19/19


11:40 10/19/19


16:46 10/19/19


20:40 10/20/19


07:30


 


Glucose (Fingerstick)


 107 mg/dL


(70-99) 111 mg/dL


(70-99) 82 mg/dL


(70-99) 99 mg/dL


(70-99)


 


Test


 10/20/19


11:52 10/20/19


16:56 10/20/19


20:31 10/21/19


07:42


 


Glucose (Fingerstick)


 127 mg/dL


(70-99) 105 mg/dL


(70-99) 143 mg/dL


(70-99) 103 mg/dL


(70-99)








Laboratory Tests








Test


 10/20/19


11:52 10/20/19


16:56 10/20/19


20:31 10/21/19


07:42


 


Glucose (Fingerstick)


 127 mg/dL


(70-99) 105 mg/dL


(70-99) 143 mg/dL


(70-99) 103 mg/dL


(70-99)








Brief Hospital Course


Ms. Deal  is a 84 old [sex] who presented with [ ]


Ms. Deal  is a 84 old  she came in from [facility], confused, colleague thought 

could be from klonopin, she has that hx - We have stopped klonopin,SHe is now at

baseline stayed 3-4 days with us, BAck to [facility] today - ON PO augmentin as RXd by 

ID.


SHe is a partial code


FAmily interested in hospice and SNU will arrange for them


Dw  and PAlliative team





Discharge Information


Scheduled


Amoxicillin/Potassium Clav (Augmentin 500-125 Tablet) 1 Each Tablet, 1 TAB PO 

BID for e coli uti for 7 Days, #14 Ref 0


   Prescribed by: TORIE MAXWELL on 10/21/19 0833


Aspirin (Aspirin) 81 Mg Tab.chew, 1 TAB PO DAILY for blood thinner, #30 Ref 3 

(Reported)


   Entered as Reported by: ZAN CRUZ on 10/29/16 1918


   Last Taken: Unknown Dose on Unknown Date & Time     Last Action: Continued on

10/13/19 1050 by KATELYNN ABDULLAHI MD


Atenolol (Atenolol) 25 Mg Tablet, 1 TAB PO DAILY for htn, #30 Ref 5 (Reported)


   Entered as Reported by: ZAN CRUZ on 10/29/16 1918


   Last Taken: Unknown Dose on Unknown Date & Time     Last Action: Continued on

10/13/19 1050 by KATELYNN ABDULLAHI MD


Cholecalciferol (Vitamin D3) (Vitamin D3) 1,000 Unit Tablet, 1 TAB PO 

DAILYBFRSUP for n/a, #30 Ref 5 (Reported)


   Entered as Reported by: ALMA TITUS on 1/20/19 1219


   Last Taken: Unknown Dose on Unknown Date & Time     Last Action: Continued on

10/13/19 1050 by KATELYNN ABDULLAHI MD


Diclofenac Sodium (Voltaren) 100 Gm Gel..gram., 1 GM TP PRN QID for knee pain, 

#100 Ref 2 (Reported)


   Entered as Reported by: ALMA TITUS on 1/20/19 1218


   Last Taken: Unknown Dose on Unknown Date & Time     Last Action: Continued on

10/13/19 1050 by KATELYNN ABDULLAHI MD


Levetiracetam (Keppra) 500 Mg Tablet, 500 MG PO BID for seizure prevention, #60 

Ref 2


   Prescribed by: AUNG GRIMES on 1/24/19 1023


   Last Taken: Unknown Dose on Unknown Date & Time     Last Action: Continued on

10/13/19 1050 by KATELYNN ABDULLAHI MD


Simvastatin (Simvastatin) 40 Mg Tablet, 1 TAB PO QHS for elevated cholesterol, 

#30 Ref 5 (Reported)


   Entered as Reported by: ZAN CRUZ on 10/29/16 1918


   Last Taken: Unknown Dose on Unknown Date & Time     Last Action: Continued on

10/13/19 1050 by KATELYNN ABDULLAHI MD





Scheduled PRN


Meclizine Hcl (Meclizine Hcl) 25 Mg Tablet, 1 TAB PO PRN TID PRN for DIZZINESS, 

#30


   Prescribed by: KINGS ROSAS on 10/29/16 2034


   Last Taken: Unknown Dose on Unknown Date & Time     Last Action: HELD on 

10/13/19 1050 by MD HANS FARR CHERRIE Y MD           Oct 21, 2019 11:17

## 2019-10-21 NOTE — PDOC
Infectious Disease Note


Subjective:


Subjective


pt more alert this am


appears comfortable


d/w rn





ROS:


ROS


Negative otherwise.





Vital Signs:


Vital Signs





Vital Signs








  Date Time  Temp Pulse Resp B/P (MAP) Pulse Ox O2 Delivery O2 Flow Rate FiO2


 


10/21/19 08:44  83  129/71    


 


10/21/19 07:28     98 Room Air  


 


10/21/19 07:00 97.7  16     





 97.7       











Physical Exam:


PHYSICAL EXAM


GENERAL: lying in bed, comfortable, no family at bedside


HEENT:  Pupils equally round, reactive.  


NECK:  Supple.


LUNGS: Clear.


HEART:  S1, S2.  Murmur present.


ABDOMEN:  Soft, no guarding.  Bowel sounds present.


GENITOURINARY:  Indwelling Evans in place.


EXTREMITIES:  No gross edema or cyanosis.  Left wrist tenderness improved


SKIN:  Warm to touch.


CNS sleepy arousable but confused


PIV ok





Medications:


Inpatient Meds:





Current Medications








 Medications


  (Trade)  Dose


 Ordered  Sig/Td  Start Time


 Stop Time Status Last Admin


Dose Admin


 


 Acetaminophen


  (Tylenol)  500 mg  PRN Q6HRS  PRN  10/13/19 20:15


    10/20/19 10:23


500 MG


 


 Albuterol/


 Ipratropium


  (Duoneb)  3 ml  RTQID  10/19/19 12:00


    10/21/19 07:27


3 ML


 


 Amoxicillin/


 Clavulanate


 Potassium


  (Augmentin 500/


 125mg)  0.5 tab  BID  10/13/19 12:00


 10/16/19 09:24 DC 10/15/19 22:00


0.5 TAB


 


 Amoxicillin/


 Clavulanate


 Potassium


  (Augmentin 875/


 125mg)  1 tab  BID  10/20/19 11:30


 10/20/19 10:55 DC  





 


 Aspirin


  (Children'S


 Aspirin)  81 mg  DAILY  10/13/19 12:00


    10/21/19 08:44


81 MG


 


 Atenolol


  (Tenormin)  25 mg  DAILY  10/13/19 12:00


    10/21/19 08:44


25 MG


 


 Cefepime HCl


  (Maxipime)  1 gm  Q8HRS  10/20/19 14:00


    10/21/19 05:47


1 GM


 


 Diclofenac Sodium


  (Voltaren)  1 danyell  PRN QID  PRN  10/13/19 11:00


     





 


 Labetalol HCl


  (Normodyne Iv


 Push)  10 mg  PRN Q2HR  PRN  10/13/19 20:15


     





 


 Lactobacillus


 Rhamnosus


  (Culturelle)  1 cap  BID  10/13/19 21:00


    10/21/19 08:44


1 CAP


 


 Levetiracetam


  (Keppra)  500 mg  BID  10/13/19 12:00


    10/21/19 08:44


500 MG


 


 Ondansetron HCl


  (Zofran)  4 mg  PRN Q6HRS  PRN  10/13/19 20:15


    10/14/19 15:24


4 MG


 


 Simvastatin


  (Zocor)  40 mg  QHS  10/13/19 21:00


    10/20/19 20:33


40 MG


 


 Sodium Chloride  1,000 ml @ 


 75 mls/hr  [id number]  10/12/19 19:35


 10/13/19 19:34 DC 10/13/19 15:33


75 MLS/HR


 


 Vitamin D


  (Vitamin D3)  1,000 unit  [id number]  10/13/19 17:00


    10/20/19 17:19


1,000 UNIT











Labs:


Lab





Laboratory Tests








Test


 10/20/19


11:52 10/20/19


16:56 10/20/19


20:31 10/21/19


07:42


 


Glucose (Fingerstick)


 127 mg/dL


(70-99) 105 mg/dL


(70-99) 143 mg/dL


(70-99) 103 mg/dL


(70-99)











Objective:


Assessment:


Fever - ? possible aspiration resolved


flu screen neg


Acute encephalopathy , improved, likely sec to meds


Dementia 


Anemia 


E coli UTI





Plan:


Plan of Care


cont cefepime ,last dose tomorrow


maintain aspiration precautions


cont supportive care


d/w rn


d/w caregiver at bedside











AARON GALLAGHER MD           Oct 21, 2019 10:13

## 2019-10-22 VITALS — DIASTOLIC BLOOD PRESSURE: 88 MMHG | SYSTOLIC BLOOD PRESSURE: 127 MMHG

## 2019-10-22 VITALS — DIASTOLIC BLOOD PRESSURE: 66 MMHG | SYSTOLIC BLOOD PRESSURE: 145 MMHG

## 2019-10-22 VITALS — DIASTOLIC BLOOD PRESSURE: 65 MMHG | SYSTOLIC BLOOD PRESSURE: 121 MMHG

## 2019-10-22 VITALS — DIASTOLIC BLOOD PRESSURE: 54 MMHG | SYSTOLIC BLOOD PRESSURE: 132 MMHG

## 2019-10-22 RX ADMIN — IPRATROPIUM BROMIDE AND ALBUTEROL SULFATE SCH ML: .5; 3 SOLUTION RESPIRATORY (INHALATION) at 15:15

## 2019-10-22 RX ADMIN — IPRATROPIUM BROMIDE AND ALBUTEROL SULFATE SCH ML: .5; 3 SOLUTION RESPIRATORY (INHALATION) at 10:56

## 2019-10-22 RX ADMIN — CEFEPIME SCH GM: 1 INJECTION, POWDER, FOR SOLUTION INTRAMUSCULAR; INTRAVENOUS at 14:44

## 2019-10-22 RX ADMIN — CEFEPIME SCH GM: 1 INJECTION, POWDER, FOR SOLUTION INTRAMUSCULAR; INTRAVENOUS at 06:04

## 2019-10-22 RX ADMIN — IPRATROPIUM BROMIDE AND ALBUTEROL SULFATE SCH ML: .5; 3 SOLUTION RESPIRATORY (INHALATION) at 07:08

## 2019-10-22 RX ADMIN — ASPIRIN 81 MG SCH MG: 81 TABLET ORAL at 10:43

## 2019-10-22 RX ADMIN — ATENOLOL SCH MG: 25 TABLET ORAL at 10:43

## 2019-10-22 RX ADMIN — Medication SCH CAP: at 10:43

## 2019-10-22 NOTE — PDOC
Infectious Disease Note


Subjective:


Subjective


Pt more alert this am


appears comfortable


ready for dc  today


d/w rn





ROS:


ROS


Negative otherwise.





Vital Signs:


Vital Signs





Vital Signs








  Date Time  Temp Pulse Resp B/P (MAP) Pulse Ox O2 Delivery O2 Flow Rate FiO2


 


10/22/19 07:08     98 Room Air  


 


10/22/19 07:00 98.1 84 20 127/88 (101)    





 98.1       











Physical Exam:


PHYSICAL EXAM


GENERAL: lying in bed, comfortable, no family at bedside


HEENT:  Pupils equally round, reactive.  


NECK:  Supple.


LUNGS: Clear.


HEART:  S1, S2.  Murmur present.


ABDOMEN:  Soft, no guarding.  Bowel sounds present.


GENITOURINARY:  Indwelling Evans in place.


EXTREMITIES:  No gross edema or cyanosis.  Left wrist tenderness improved


SKIN:  Warm to touch.


CNS sleepy arousable but confused


PIV ok





Medications:


Inpatient Meds:





Current Medications








 Medications


  (Trade)  Dose


 Ordered  Sig/Td  Start Time


 Stop Time Status Last Admin


Dose Admin


 


 Acetaminophen


  (Tylenol)  500 mg  PRN Q6HRS  PRN  10/13/19 20:15


    10/20/19 10:23


500 MG


 


 Albuterol/


 Ipratropium


  (Duoneb)  3 ml  RTQID  10/19/19 12:00


    10/22/19 07:08


3 ML


 


 Amoxicillin/


 Clavulanate


 Potassium


  (Augmentin 500/


 125mg)  0.5 tab  BID  10/13/19 12:00


 10/16/19 09:24 DC 10/15/19 22:00


0.5 TAB


 


 Amoxicillin/


 Clavulanate


 Potassium


  (Augmentin 875/


 125mg)  1 tab  BID  10/20/19 11:30


 10/20/19 10:55 DC  





 


 Aspirin


  ([facility]


 Aspirin)  81 mg  DAILY  10/13/19 12:00


    10/21/19 08:44


81 MG


 


 Atenolol


  (Tenormin)  25 mg  DAILY  10/13/19 12:00


    10/21/19 08:44


25 MG


 


 Cefepime HCl


  (Maxipime)  1 gm  Q8HRS  10/20/19 14:00


    10/22/19 06:04


1 GM


 


 Diclofenac Sodium


  (Voltaren)  1 danyell  PRN QID  PRN  10/13/19 11:00


     





 


 Labetalol HCl


  (Normodyne Iv


 Push)  10 mg  PRN Q2HR  PRN  10/13/19 20:15


     





 


 Lactobacillus


 Rhamnosus


  (Culturelle)  1 cap  BID  10/13/19 21:00


    10/21/19 21:20


1 CAP


 


 Levetiracetam


  (Keppra)  500 mg  BID  10/13/19 12:00


    10/21/19 21:20


500 MG


 


 Ondansetron HCl


  (Zofran)  4 mg  PRN Q6HRS  PRN  10/13/19 20:15


    10/14/19 15:24


4 MG


 


 Simvastatin


  (Zocor)  40 mg  QHS  10/13/19 21:00


    10/21/19 21:20


40 MG


 


 Sodium Chloride  1,000 ml @ 


 75 mls/hr  [id number]  10/12/19 19:35


 10/13/19 19:34 DC 10/13/19 15:33


75 MLS/HR


 


 Vitamin D


  (Vitamin D3)  1,000 unit  DAILYBFRSUP  10/13/19 17:00


    10/20/19 17:19


1,000 UNIT











Labs:


Lab





Laboratory Tests








Test


 10/21/19


11:40 10/21/19


16:46 10/21/19


20:55 10/22/19


07:25


 


Glucose (Fingerstick)


 113 mg/dL


(70-99) 96 mg/dL


(70-99) 89 mg/dL


(70-99) 149 mg/dL


(70-99)











Objective:


Assessment:


Fever - ? possible aspiration resolved


flu screen neg


Acute encephalopathy , improved, likely sec to meds


Dementia 


Anemia 


E coli UTI





Plan:


Plan of Care


ok to dc  on omnicef 


maintain aspiration precautions


cont supportive care


d/w rnd


d/w AARON Florez MD           Oct 22, 2019 08:25

## 2019-10-22 NOTE — PDOC
PULMONARY PROGRESS NOTES


Subjective


PT AWAKE AND ALERT


FOLLOW SOME COMMANDS


Vitals





Vital Signs








  Date Time  Temp Pulse Resp B/P (MAP) Pulse Ox O2 Delivery O2 Flow Rate FiO2


 


10/22/19 07:08     98 Room Air  


 


10/22/19 07:00 98.1 84 20 127/88 (101)    





 98.1       








General:  No acute distress


Lungs:  Crackles


Cardiovascular:  S1, S2


Abdomen:  Soft, Non-tender


Extremities:  No Edema


Skin:  Warm


Labs





Laboratory Tests








Test


 10/20/19


11:52 10/20/19


16:56 10/20/19


20:31 10/21/19


07:42


 


Glucose (Fingerstick)


 127 mg/dL


(70-99) 105 mg/dL


(70-99) 143 mg/dL


(70-99) 103 mg/dL


(70-99)


 


Test


 10/21/19


11:40 10/21/19


16:46 10/21/19


20:55 10/22/19


07:25


 


Glucose (Fingerstick)


 113 mg/dL


(70-99) 96 mg/dL


(70-99) 89 mg/dL


(70-99) 149 mg/dL


(70-99)








Laboratory Tests








Test


 10/21/19


11:40 10/21/19


16:46 10/21/19


20:55 10/22/19


07:25


 


Glucose (Fingerstick)


 113 mg/dL


(70-99) 96 mg/dL


(70-99) 89 mg/dL


(70-99) 149 mg/dL


(70-99)








Medications





Active Scripts








 Medications  Dose


 Route/Sig


 Max Daily Dose Days Date Category


 


 Cefuroxime


  (Cefuroxime


 Axetil) 500 Mg


 Tablet  1 Tab


 PO BID


  7 10/16/19 Rx


 


 Keppra


  (Levetiracetam)


 500 Mg Tablet  500 Mg


 PO BID


   1/24/19 Rx


 


 Vitamin D3


  (Cholecalciferol


  (Vitamin D3))


 1,000 Unit Tablet  1 Tab


 PO DAILYBFRSUP


   1/20/19 Reported


 


 Voltaren


  (Diclofenac


 Sodium) 100 Gm


 Gel..gram.  1 Gm


 TP PRN QID


   1/20/19 Reported


 


 Meclizine Hcl 25


 Mg Tablet  1 Tab


 PO PRN TID PRN


   10/29/16 Rx


 


 Aspirin 81 Mg


 Tab.chew  1 Tab


 PO DAILY


   10/29/16 Reported


 


 Simvastatin 40 Mg


 Tablet  1 Tab


 PO QHS


   10/29/16 Reported


 


 Atenolol 25 Mg


 Tablet  1 Tab


 PO DAILY


   10/29/16 Reported











Impression


.


IMPRESSION:


1.  Abnormal chest x-ray.  I suspect it is secondary to aspiration.


2.  Fever secondary to aspiration pneumonia/pneumonitis.


3.  Aspiration pneumonia/pneumonitis.


4.  Dysphagia.


5.  History of cerebrovascular accident.


6.  Hypertension.


7.  Diabetes mellitus.


8.  Dementia.





Plan


.


D/C OK BY ME


CLINICALLY BETTER


D/W TEAM











NUHA HINES MD              Oct 22, 2019 09:36

## 2019-10-22 NOTE — SNU/HH DC
DISCHARGE ORDERS


DISCHARGE INFORMATION:


DISCHARGE DATE:  Oct 22, 2019


FINAL DIAGNOSIS


Problems


Medical Problems:


(1) Altered mental status


Status: Acute  








CONDITION ON DISCHARGE:  Stable





CODE STATUS:


Code Status:  Other (Partial - meds only code)





SKILLED NURSING:


SNF STAY <30 DAYS:  Yes





HOSPICE:


HOSPICE:  Yes


HOSPICE EVAL & TREAT:  Yes





LTAC:


ADMIT TO LTAC:  No





POST DISCHARGE ORDERS:


ACTIVITY ORDERS:  Activity as tolerated


WEIGHT BEARING STATUS:  As tolerated


DIET AFTER DISCHARGE:  pureed diet with thin liq





CHECKS AFTER DISCHARGE:


CHECKS AFTER DISCHARGE:  Check blood press - daily, Check blood sugar, ac/hs, 

Check your Temp as needed





FOLLOW-UP:


PHYSICIAN FOLLOW-UP:  avoid benzos if u can


LAB ORDERS FOR FOLLOW-UP:  BMP weekly





TREATMENT/EQUIPMENT ORDERS:


ADAPTIVE EQUIPMENT NEEDED:  None, Front wheeled walker


Physical Therapy For:  Evalulation/Treatment


Occupational Therapy For:  Evaluation/Treatment


Speech Language Pathology For:  Evaluation/Treatment





DISCHARGE MEDICATIONS:


Home Meds


Active Scripts


Levetiracetam (KEPPRA) 500 Mg Tablet, 500 MG PO BID for seizure prevention, #60 

TAB 2 Refills


   Prov:AUNG GRIMES MD         1/24/19


Meclizine Hcl (MECLIZINE HCL) 25 Mg Tablet, 1 TAB PO PRN TID PRN for DIZZINESS, 

#30 TAB


   Prov:KINGS ROSAS MD         10/29/16


Reported Medications


Cholecalciferol (Vitamin D3) (VITAMIN D3) 1,000 Unit Tablet, 1 TAB PO 

DAILYBFRSUP for n/a, #30 TAB 5 Refills


   1/20/19


Diclofenac Sodium (VOLTAREN) 100 Gm Gel..gram., 1 GM TP PRN QID for knee pain, 

#100 GM 2 Refills


   1/20/19


Aspirin (ASPIRIN) 81 Mg Tab.chew, 1 TAB PO DAILY for blood thinner, #30 TAB 3 

Refills


   10/29/16


Simvastatin (SIMVASTATIN) 40 Mg Tablet, 1 TAB PO QHS for elevated cholesterol, 

#30 TAB 5 Refills


   10/29/16


Atenolol (ATENOLOL) 25 Mg Tablet, 1 TAB PO DAILY for htn, #30 TAB 5 Refills


   10/29/16











TORIE MAXWELL MD           Oct 22, 2019 10:38

## 2019-10-22 NOTE — PDOC
Provider Note


Provider Note


Pt did not dc yesterday as ID requested 1 more day IV abx


Now cleared by ID as dw DR Soriano





HCR today, PArtial code (drugs only)


Family interested in hospice that they will pursue in SNU





Pt seen and examined, RN as my witness











TORIE MAXWELL MD           Oct 22, 2019 12:31

## 2019-10-22 NOTE — NUR
SW following pt. Orders faxed to [facility] and pt will transport via facility arranged w/c [facility] 
between [id number]. Pt's daughter,  Ann Marie, phone: [phone] notified and agreeable.

## 2019-10-26 ENCOUNTER — HOSPITAL ENCOUNTER (EMERGENCY)
Dept: HOSPITAL 61 - ER | Age: 84
Discharge: HOME | End: 2019-10-26
Payer: MEDICARE

## 2019-10-26 VITALS
DIASTOLIC BLOOD PRESSURE: 79 MMHG | SYSTOLIC BLOOD PRESSURE: 170 MMHG | SYSTOLIC BLOOD PRESSURE: 170 MMHG | SYSTOLIC BLOOD PRESSURE: 170 MMHG | DIASTOLIC BLOOD PRESSURE: 79 MMHG | SYSTOLIC BLOOD PRESSURE: 170 MMHG | DIASTOLIC BLOOD PRESSURE: 79 MMHG | DIASTOLIC BLOOD PRESSURE: 79 MMHG

## 2019-10-26 VITALS — BODY MASS INDEX: 27.11 KG/M2 | WEIGHT: 147.31 LBS | HEIGHT: 62 IN

## 2019-10-26 DIAGNOSIS — Z90.710: ICD-10-CM

## 2019-10-26 DIAGNOSIS — F03.90: ICD-10-CM

## 2019-10-26 DIAGNOSIS — E11.9: ICD-10-CM

## 2019-10-26 DIAGNOSIS — Z95.5: ICD-10-CM

## 2019-10-26 DIAGNOSIS — N39.0: ICD-10-CM

## 2019-10-26 DIAGNOSIS — Z86.73: ICD-10-CM

## 2019-10-26 DIAGNOSIS — R41.82: Primary | ICD-10-CM

## 2019-10-26 DIAGNOSIS — G40.909: ICD-10-CM

## 2019-10-26 LAB
ALBUMIN SERPL-MCNC: 2.7 G/DL (ref 3.4–5)
ALBUMIN/GLOB SERPL: 0.5 {RATIO} (ref 1–1.7)
ALP SERPL-CCNC: 71 U/L (ref 46–116)
ALT SERPL-CCNC: 22 U/L (ref 14–59)
ANION GAP SERPL CALC-SCNC: 13 MMOL/L (ref 6–14)
ANISOCYTOSIS BLD QL SMEAR: (no result)
APTT PPP: YELLOW S
AST SERPL-CCNC: 28 U/L (ref 15–37)
BACTERIA #/AREA URNS HPF: (no result) /HPF
BASOPHILS # BLD AUTO: 0 X10^3/UL (ref 0–0.2)
BASOPHILS NFR BLD: 0 % (ref 0–3)
BILIRUB SERPL-MCNC: 0.4 MG/DL (ref 0.2–1)
BILIRUB UR QL STRIP: (no result)
BIZZARE CELLS: (no result)
BUN SERPL-MCNC: 18 MG/DL (ref 7–20)
BUN/CREAT SERPL: 18 (ref 6–20)
CALCIUM SERPL-MCNC: 9.4 MG/DL (ref 8.5–10.1)
CHLORIDE SERPL-SCNC: 108 MMOL/L (ref 98–107)
CO2 SERPL-SCNC: 24 MMOL/L (ref 21–32)
CREAT SERPL-MCNC: 1 MG/DL (ref 0.6–1)
EOSINOPHIL NFR BLD: 0 % (ref 0–3)
EOSINOPHIL NFR BLD: 0 X10^3/UL (ref 0–0.7)
ERYTHROCYTE [DISTWIDTH] IN BLOOD BY AUTOMATED COUNT: 25.5 % (ref 11.5–14.5)
FIBRINOGEN PPP-MCNC: (no result) MG/DL
GFR SERPLBLD BASED ON 1.73 SQ M-ARVRAT: 63.9 ML/MIN
GLOBULIN SER-MCNC: 5 G/DL (ref 2.2–3.8)
GLUCOSE SERPL-MCNC: 158 MG/DL (ref 70–99)
HCT VFR BLD CALC: 33.5 % (ref 36–47)
HGB BLD-MCNC: 10.4 G/DL (ref 12–15.5)
HYALINE CASTS #/AREA URNS LPF: (no result) /HPF
HYPOCHROMIA BLD QL SMEAR: (no result)
LYMPHOCYTES # BLD: 0.6 X10^3/UL (ref 1–4.8)
LYMPHOCYTES NFR BLD AUTO: 7 % (ref 24–48)
MCH RBC QN AUTO: 23 PG (ref 25–35)
MCHC RBC AUTO-ENTMCNC: 31 G/DL (ref 31–37)
MCV RBC AUTO: 74 FL (ref 79–100)
MICROCYTES BLD QL SMEAR: SLIGHT
MONO #: 0.7 X10^3/UL (ref 0–1.1)
MONOCYTES NFR BLD: 8 % (ref 0–9)
NEUT #: 7.3 X10^3/UL (ref 1.8–7.7)
NEUTROPHILS NFR BLD AUTO: 84 % (ref 31–73)
NITRITE UR QL STRIP: NEGATIVE
OVALOCYTES BLD QL SMEAR: (no result)
PH UR STRIP: 5.5 [PH]
PLATELET # BLD AUTO: 255 X10^3/UL (ref 140–400)
PLATELET # BLD EST: ADEQUATE 10*3/UL
POIKILOCYTOSIS BLD QL SMEAR: (no result)
POLYCHROMASIA BLD QL SMEAR: SLIGHT
POTASSIUM SERPL-SCNC: 4.4 MMOL/L (ref 3.5–5.1)
PROT SERPL-MCNC: 7.7 G/DL (ref 6.4–8.2)
PROT UR STRIP-MCNC: 30 MG/DL
RBC # BLD AUTO: 4.54 X10^6/UL (ref 3.5–5.4)
RBC #/AREA URNS HPF: 0 /HPF (ref 0–2)
SCHISTOCYTES BLD QL SMEAR: (no result)
SODIUM SERPL-SCNC: 145 MMOL/L (ref 136–145)
UROBILINOGEN UR-MCNC: 1 MG/DL
WBC # BLD AUTO: 8.6 X10^3/UL (ref 4–11)
WBC #/AREA URNS HPF: (no result) /HPF (ref 0–4)
YEAST #/AREA URNS HPF: PRESENT /HPF

## 2019-10-26 PROCEDURE — 87086 URINE CULTURE/COLONY COUNT: CPT

## 2019-10-26 PROCEDURE — 81001 URINALYSIS AUTO W/SCOPE: CPT

## 2019-10-26 PROCEDURE — 85025 COMPLETE CBC W/AUTO DIFF WBC: CPT

## 2019-10-26 PROCEDURE — 96361 HYDRATE IV INFUSION ADD-ON: CPT

## 2019-10-26 PROCEDURE — 71045 X-RAY EXAM CHEST 1 VIEW: CPT

## 2019-10-26 PROCEDURE — 99285 EMERGENCY DEPT VISIT HI MDM: CPT

## 2019-10-26 PROCEDURE — 80053 COMPREHEN METABOLIC PANEL: CPT

## 2019-10-26 PROCEDURE — 93005 ELECTROCARDIOGRAM TRACING: CPT

## 2019-10-26 PROCEDURE — 96374 THER/PROPH/DIAG INJ IV PUSH: CPT

## 2019-10-26 PROCEDURE — 83605 ASSAY OF LACTIC ACID: CPT

## 2019-10-26 PROCEDURE — 70450 CT HEAD/BRAIN W/O DYE: CPT

## 2019-10-26 PROCEDURE — 36415 COLL VENOUS BLD VENIPUNCTURE: CPT

## 2019-10-26 NOTE — RAD
EXAM: CHEST ONE VIEW.

 

HISTORY: Altered mental status.

 

COMPARISON: 10/18/2019.

 

FINDINGS: A frontal view of the chest is obtained.

 

Small bilateral pleural effusions appear mildly decreased. Infiltrates in 

the right base are slightly improved. There is no pneumothorax. The heart 

is not clearly enlarged given this projection. There is a large hiatal 

hernia. There are atherosclerotic calcifications of the aorta. 

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

 

IMPRESSION: 

1. Small pleural effusions and a right basilar infiltrate have improved 

but not completely resolved.

2. Large hiatal hernia.

 

Electronically signed by: TIFFANY Chaparro MD (10/26/2019 1:14 PM) 

[facility]

## 2019-10-26 NOTE — RAD
EXAM: Head CT without contrast.

 

HISTORY: Altered mental status.

 

TECHNIQUE: Computed tomographic images of the head were obtained without 

contrast. 

*One or more of the following individualized dose reduction techniques 

were utilized for this examination:  

1. Automated exposure control.  

2. Adjustment of the mA and/or kV according to patient size.  

3. Use of iterative reconstruction technique.

 

COMPARISON: 10/12/2019.

 

FINDINGS: The exam is limited due to motion. There is no convincing acute 

or subacute hemorrhage. There is cerebral atrophy. 

 

There are extensive scattered areas of hypodensity throughout the cerebral

white matter, likely due to chronic small vessel disease. There is 

superimposed hypodensity likely due to chronic infarction within the 

posterior left frontal lobe.

 

There is evidence of lens surgery. There is hyperostosis frontalis 

interna. No suspicious osseous lesion is seen. The mastoid air cells are 

clear. There is benign ossification along the anterior right middle 

cranial fossa, not typical in appearance for an ossified meningioma.

 

IMPRESSION:

1. No acute intracranial finding, with evaluation limited due to motion. 

Note is made that MRI is more sensitive for acute infarction.

2. Extensive areas of hypodensity throughout the cerebral white matter, 

likely due to chronic small vessel disease.

3. Suspected chronic infarct within the posterior left frontal lobe.

4. Cerebral atrophy.

 

Electronically signed by: Tennille Martino MD (10/26/2019 2:56 PM) [facility]

## 2019-10-26 NOTE — PHYS DOC
Past Medical History


Past Medical History:  Dementia, Diabetes-Type II


Additional Past Medical Histor:  ENCEPHALOPATHY, HERNIA


Past Surgical History:  Hysterectomy


Additional Past Surgical Histo:  cardiac stent x 3


Alcohol Use:  None


Drug Use:  None





Adult General


Chief Complaint


Chief Complaint:  ALTERED MENTAL STATUS





HPI


HPI





84-year-old female with underlying history of seizure disorder, history of CVA 

with right-sided residual, diabetes, hypertension presents to the emergency 

department with altered mental status. Patient was visiting with her son, son 

witnessed seizure-type activity, decreased level consciousness with altered 

mental status likely postictal afterwards. EMS was called and patient was 

brought to the ER for further evaluation. No further information available. Full

reviews of systems unavailable given patient's underlying dementia at baseline 

as well as her current altered state.





Review of Systems


Review of Systems


ROS unable to be performed given patient's baseline dementia





All other systems were reviewed and found to be within normal limits, except as 

documented in this note.





Current Medications


Current Medications





Current Medications








 Medications


  (Trade)  Dose


 Ordered  Sig/Td  Start Time


 Stop Time Status Last Admin


Dose Admin


 


 Ceftriaxone Sodium


  (Rocephin)  1 gm  1X  ONCE  10/26/19 15:30


 10/26/19 15:31 DC  





 


 Sodium Chloride  1,000 ml @ 


 1,000 mls/hr  1X  ONCE  10/26/19 15:30


 10/26/19 16:29  10/26/19 15:19


1,000 MLS/HR











Allergies


Allergies





Allergies








Coded Allergies Type Severity Reaction Last Updated Verified


 


  No Known Drug Allergies    10/29/16 No











Physical Exam


Physical Exam





Constitutional: Well developed, well nourished, no acute distress, non-toxic 

appearance. []


HENT: Normocephalic, atraumatic, bilateral external ears normal, oropharynx 

moist, no oral exudates, nose normal. []


Eyes: PERRLA, EOMI, conjunctiva normal, no discharge. [] 


Neck: Normal range of motion, no tenderness, supple, no stridor. [] 


Cardiovascular:Heart rate regular rhythm, no murmur []


Lungs & Thorax:  Bilateral breath sounds clear to auscultation []


Abdomen: Bowel sounds normal, soft, no tenderness, no masses, no pulsatile 

masses. [] 


Skin: Warm, dry, no erythema, no rash. [] 


Back: No tenderness, no CVA tenderness. [] 


Extremities: No tenderness, no cyanosis, no clubbing, ROM intact, no edema. [] 


Neurologic: Alert and oriented X 3, normal motor function, normal sensory 

function, no focal deficits noted. []


Psychologic: Affect normal, judgement normal, mood normal. []





Current Patient Data


Vital Signs





                                   Vital Signs








  Date Time  Temp Pulse Resp B/P (MAP) Pulse Ox O2 Delivery O2 Flow Rate FiO2


 


10/26/19 12:40 98.4 86 28 162/96 (118) 99 Room Air  





 98.4       








Lab Values





                                Laboratory Tests








Test


 10/26/19


14:07 10/26/19


14:55


 


White Blood Count


 8.6 x10^3/uL


(4.0-11.0) 





 


Red Blood Count


 4.54 x10^6/uL


(3.50-5.40) 





 


Hemoglobin


 10.4 g/dL


(12.0-15.5)  L 





 


Hematocrit


 33.5 %


(36.0-47.0)  L 





 


Mean Corpuscular Volume


 74 fL ()


L 





 


Mean Corpuscular Hemoglobin


 23 pg (25-35)


L 





 


Mean Corpuscular Hemoglobin


Concent 31 g/dL


(31-37) 





 


Red Cell Distribution Width


 25.5 %


(11.5-14.5)  H 





 


Platelet Count


 255 x10^3/uL


(140-400) 





 


Neutrophils (%) (Auto) 84 % (31-73)  H 


 


Lymphocytes (%) (Auto) 7 % (24-48)  L 


 


Monocytes (%) (Auto) 8 % (0-9)   


 


Eosinophils (%) (Auto) 0 % (0-3)   


 


Basophils (%) (Auto) 0 % (0-3)   


 


Neutrophils # (Auto)


 7.3 x10^3/uL


(1.8-7.7) 





 


Lymphocytes # (Auto)


 0.6 x10^3/uL


(1.0-4.8)  L 





 


Monocytes # (Auto)


 0.7 x10^3/uL


(0.0-1.1) 





 


Eosinophils # (Auto)


 0.0 x10^3/uL


(0.0-0.7) 





 


Basophils # (Auto)


 0.0 x10^3/uL


(0.0-0.2) 





 


Platelet Estimate Pending   


 


Sodium Level


 145 mmol/L


(136-145) 





 


Potassium Level


 4.4 mmol/L


(3.5-5.1) 





 


Chloride Level


 108 mmol/L


()  H 





 


Carbon Dioxide Level


 24 mmol/L


(21-32) 





 


Anion Gap 13 (6-14)   


 


Blood Urea Nitrogen


 18 mg/dL


(7-20) 





 


Creatinine


 1.0 mg/dL


(0.6-1.0) 





 


Estimated GFR


(Cockcroft-Gault) 63.9  


 





 


BUN/Creatinine Ratio 18 (6-20)   


 


Glucose Level


 158 mg/dL


(70-99)  H 





 


Lactic Acid Level


 2.2 mmol/L


(0.4-2.0)  H 





 


Calcium Level


 9.4 mg/dL


(8.5-10.1) 





 


Total Bilirubin


 0.4 mg/dL


(0.2-1.0) 





 


Aspartate Amino Transferase


(AST) 28 U/L (15-37)


 





 


Alanine Aminotransferase (ALT)


 22 U/L (14-59)


 





 


Alkaline Phosphatase


 71 U/L


() 





 


Total Protein


 7.7 g/dL


(6.4-8.2) 





 


Albumin


 2.7 g/dL


(3.4-5.0)  L 





 


Albumin/Globulin Ratio


 0.5 (1.0-1.7)


L 





 


Urine Collection Type  U cath  


 


Urine Color  Yellow  


 


Urine Clarity  Turbid  


 


Urine pH  5.5  


 


Urine Specific Gravity  1.025  


 


Urine Protein


 


 30 mg/dL


(NEG-TRACE)


 


Urine Glucose (UA)


 


 Negative mg/dL


(NEG)


 


Urine Ketones (Stick)


 


 Negative mg/dL


(NEG)


 


Urine Blood


 


 Moderate (NEG)





 


Urine Nitrite


 


 Negative (NEG)





 


Urine Bilirubin  Small (NEG)  


 


Urine Urobilinogen Dipstick


 


 1.0 mg/dL (0.2


mg/dL)


 


Urine Leukocyte Esterase  Large (NEG)  


 


Urine RBC  0 /HPF (0-2)  


 


Urine WBC


 


 Tntc /HPF


(0-4)


 


Urine Bacteria


 


 Few /HPF


(0-FEW)


 


Urine Hyaline Casts  Moderate /HPF  


 


Urine Mucus  Slight /LPF  


 


Urine Yeast  Present /HPF  





                                Laboratory Tests


10/26/19 14:07








                                Laboratory Tests


10/26/19 14:07











EKG


EKG


EKG reviewed, heart rate 87, left axis deviation, no evidence of acute ST 

elevation MI appreciated[]


Interpretation Time:


Interpretation time 1247





Radiology/Procedures


Radiology/Procedures


[]





Course & Med Decision Making


Course & Med Decision Making


Pertinent Labs and Imaging studies reviewed. (See chart for details)





[]84-year-old female with underlying history of seizure disorder, history of CVA

 with right-sided residual, diabetes, hypertension presents to the emergency 

department with altered mental status. Patient was visiting with her son, son 

witnessed seizure-type activity, decreased level consciousness with altered ment

al status likely postictal afterwards. EMS was called and patient was brought to

 the ER for further evaluation. No further information available. Full reviews 

of systems unavailable given patient's underlying dementia at baseline as well 

as her current altered state.





Labs and Imaging reviewed


CT head negative 


Labs reveal lactic acidosis of 2.2, known UTI - no SEPSIS/SIRS criteria 

appreciated


IVF 1 liter


Rocephin1 gm IV x 1 


Discussed with family dc plans back to facility


Patient appears to be back at baseline according to family





Dragon Disclaimer


Dragon Disclaimer


This electronic medical record was generated, in whole or in part, using a voice

 recognition dictation system.





Departure


Departure


Impression:  


   Primary Impression:  


   Altered mental status


   Additional Impression:  


   Urinary tract infection


Disposition:  03 TRANSFER SNF


Condition:  STABLE


Referrals:  


UNKNOWN PCP NAME (PCP)


Patient Instructions:  Urinary Tract Infection, Child





Additional Instructions:  


Recommend follow up with PCP 3 - 5 days


Return to the ER with worsening symptoms, intractable pain, fever, altered 

mental status


Tylenol/Motrin as needed for pain


Take current abx as prescribed from nursing home physician





Problem Qualifiers








   Primary Impression:  


   Altered mental status


   Altered mental status type:  unspecified  Qualified Codes:  R41.82 - Altered 

   mental status, unspecified


   Additional Impression:  


   Urinary tract infection


   Urinary tract infection type:  site unspecified  Hematuria presence:  without

    hematuria  Qualified Codes:  N39.0 - Urinary tract infection, site not 

   specified








HAYDEN GARCIA MD              Oct 26, 2019 12:54

## 2019-10-27 NOTE — EKG
[facility]

              [address] [place] [address]

Test Date:    2019-10-26               Test Time:    12:45:03

Pat Name:     MARY OBREGON            Department:   

Patient ID:   [id number]           Room:          

Gender:       F                        Technician:   

:          [id number]               Requested By: HAYDEN GARCIA

Order Number: [id number]           Reading MD:     

                                 Measurements

Intervals                              Axis          

Rate:         86                       P:            

MT:                                    QRS:          -2

QRSD:         96                       T:            16

QT:           310                                    

QTc:          373                                    

                           Interpretive Statements

ATRIAL FLUTTER

LEFTWARD AXIS

T ABNORMALITY IN ANTEROSEPTAL LEADS

ABNORMAL ECG

No previous ECG available for comparison

## 2019-11-29 ENCOUNTER — HOSPITAL ENCOUNTER (OUTPATIENT)
Dept: HOSPITAL 61 - SPEC | Age: 84
Discharge: HOME | End: 2019-11-29
Attending: INTERNAL MEDICINE
Payer: MEDICARE

## 2019-11-29 DIAGNOSIS — N39.0: Primary | ICD-10-CM

## 2019-11-29 LAB
APTT PPP: YELLOW S
BACTERIA #/AREA URNS HPF: 0 /HPF
BILIRUB UR QL STRIP: NEGATIVE
FIBRINOGEN PPP-MCNC: CLEAR MG/DL
NITRITE UR QL STRIP: NEGATIVE
PH UR STRIP: 5 [PH]
PROT UR STRIP-MCNC: NEGATIVE MG/DL
RBC #/AREA URNS HPF: 0 /HPF (ref 0–2)
SQUAMOUS #/AREA URNS LPF: (no result) /LPF
UROBILINOGEN UR-MCNC: 0.2 MG/DL
WBC #/AREA URNS HPF: >40 /HPF (ref 0–4)
YEAST #/AREA URNS HPF: PRESENT /HPF

## 2019-11-29 PROCEDURE — 87086 URINE CULTURE/COLONY COUNT: CPT

## 2019-11-29 PROCEDURE — 81001 URINALYSIS AUTO W/SCOPE: CPT

## 2020-01-08 ENCOUNTER — HOSPITAL ENCOUNTER (OUTPATIENT)
Dept: HOSPITAL 61 - SPEC | Age: 85
Discharge: HOME | End: 2020-01-08
Attending: INTERNAL MEDICINE
Payer: MEDICARE

## 2020-01-08 DIAGNOSIS — N39.0: Primary | ICD-10-CM

## 2020-01-08 LAB
ALBUMIN SERPL-MCNC: 2.9 G/DL (ref 3.4–5)
ALBUMIN/GLOB SERPL: 1 {RATIO} (ref 1–1.7)
ALP SERPL-CCNC: 65 U/L (ref 46–116)
ALT SERPL-CCNC: 8 U/L (ref 14–59)
ANION GAP SERPL CALC-SCNC: 9 MMOL/L (ref 6–14)
AST SERPL-CCNC: 14 U/L (ref 15–37)
BILIRUB SERPL-MCNC: 0.2 MG/DL (ref 0.2–1)
BUN SERPL-MCNC: 18 MG/DL (ref 7–20)
BUN/CREAT SERPL: 20 (ref 6–20)
CALCIUM SERPL-MCNC: 8.8 MG/DL (ref 8.5–10.1)
CHLORIDE SERPL-SCNC: 107 MMOL/L (ref 98–107)
CO2 SERPL-SCNC: 27 MMOL/L (ref 21–32)
CREAT SERPL-MCNC: 0.9 MG/DL (ref 0.6–1)
GFR SERPLBLD BASED ON 1.73 SQ M-ARVRAT: 72.2 ML/MIN
GLOBULIN SER-MCNC: 2.8 G/DL (ref 2.2–3.8)
GLUCOSE SERPL-MCNC: 124 MG/DL (ref 70–99)
POTASSIUM SERPL-SCNC: 4 MMOL/L (ref 3.5–5.1)
PROT SERPL-MCNC: 5.7 G/DL (ref 6.4–8.2)
SODIUM SERPL-SCNC: 143 MMOL/L (ref 136–145)

## 2020-01-08 PROCEDURE — 36415 COLL VENOUS BLD VENIPUNCTURE: CPT

## 2020-01-08 PROCEDURE — 80053 COMPREHEN METABOLIC PANEL: CPT

## 2020-03-06 ENCOUNTER — HOSPITAL ENCOUNTER (OUTPATIENT)
Dept: HOSPITAL 61 - SPEC | Age: 85
Discharge: HOME | End: 2020-03-06
Payer: MEDICARE

## 2020-03-06 DIAGNOSIS — N39.0: Primary | ICD-10-CM

## 2020-03-06 LAB
APTT PPP: YELLOW S
BACTERIA #/AREA URNS HPF: (no result) /HPF
BILIRUB UR QL STRIP: NEGATIVE
FIBRINOGEN PPP-MCNC: CLEAR MG/DL
NITRITE UR QL STRIP: NEGATIVE
PH UR STRIP: 5 [PH]
PROT UR STRIP-MCNC: NEGATIVE MG/DL
RBC #/AREA URNS HPF: 0 /HPF (ref 0–2)
SQUAMOUS #/AREA URNS LPF: (no result) /LPF
UROBILINOGEN UR-MCNC: 0.2 MG/DL
WBC #/AREA URNS HPF: (no result) /HPF (ref 0–4)

## 2020-03-06 PROCEDURE — 81001 URINALYSIS AUTO W/SCOPE: CPT

## 2020-03-06 PROCEDURE — 87086 URINE CULTURE/COLONY COUNT: CPT

## 2020-03-06 PROCEDURE — 87186 SC STD MICRODIL/AGAR DIL: CPT

## 2022-02-17 NOTE — SNU/[facility] DC
2/17/2022    Assessment:       Diagnosis Orders   1. Uncontrolled type 2 diabetes mellitus with hyperglycemia (HCC)  POCT Glucose         PLAN:     Orders Placed This Encounter   Procedures    Basic Metabolic Panel     Standing Status:   Future     Standing Expiration Date:   2/17/2023    Hemoglobin A1C     Standing Status:   Future     Standing Expiration Date:   2/17/2023    Microalbumin / Creatinine Urine Ratio     Standing Status:   Future     Standing Expiration Date:   2/17/2023    POCT Glucose     Adjust insulin dose  Continue current dose of Metformin  A1c goal of seven or lower  Orders Placed This Encounter   Medications    insulin 70-30 (NOVOLIN 70/30) (70-30) 100 UNIT per ML injection vial     Sig: INJECT 70 UNITS AM 55 UNITS LUNCH AND 60 UNITS DINNER     Dispense:  50 mL     Refill:  3         Orders Placed This Encounter   Procedures    POCT Glucose       Subjective:     Chief Complaint   Patient presents with    Diabetes     Vitals:    02/17/22 1005   BP: 111/71   Site: Right Upper Arm   Position: Sitting   Cuff Size: Medium Adult   Pulse: 93   Weight: 159 lb (72.1 kg)   Height: 4' 11\" (1.499 m)     Wt Readings from Last 3 Encounters:   02/17/22 159 lb (72.1 kg)   10/19/21 158 lb (71.7 kg)   08/19/21 161 lb (73 kg)     BP Readings from Last 3 Encounters:   02/17/22 111/71   10/19/21 112/73   08/19/21 133/83     Follow-up on type 2 diabetes patient on Novolin 70/30 70 units in the morning 45 at lunch and 70 units at dinnertime higher blood sugars after lunch  meals A1c is 8.2 denies any hypoglycemia labs were done recently reviewed triglycerides have been higher at 400+    Diabetes  She presents for her follow-up diabetic visit. She has type 2 diabetes mellitus. Her disease course has been stable. There are no hypoglycemic complications. Risk factors for coronary artery disease include obesity. Current diabetic treatment includes insulin injections.  She is currently taking insulin pre-breakfast, DISCHARGE ORDERS


DISCHARGE INFORMATION:


DISCHARGE DATE:  Oct 16, 2019


FINAL DIAGNOSIS


Problems


Medical Problems:


(1) Altered mental status


Status: Acute  








CONDITION ON DISCHARGE:  Stable





CODE STATUS:


Code Status:  Other (Partial - meds only code)





SKILLED NURSING:


SNF STAY <30 DAYS:  Yes





POST DISCHARGE ORDERS:


ACTIVITY ORDERS:  Activity as tolerated


WEIGHT BEARING STATUS:  As tolerated


DIET AFTER DISCHARGE:  Cardiac





CHECKS AFTER DISCHARGE:


CHECKS AFTER DISCHARGE:  Check blood press - daily, Check blood sugar, ac/hs, 

Check your Temp as needed





FOLLOW-UP:


PHYSICIAN FOLLOW-UP:  avoid benzos if u can


LAB ORDERS FOR FOLLOW-UP:  BMP weekly





TREATMENT/EQUIPMENT ORDERS:


ADAPTIVE EQUIPMENT NEEDED:  None


Physical Therapy For:  Evalulation/Treatment


Occupational Therapy For:  Evaluation/Treatment


Speech Language Pathology For:  Evaluation/Treatment





DISCHARGE MEDICATIONS:


Home Meds


Active Scripts


Cefuroxime Axetil (CEFUROXIME) 500 Mg Tablet, 1 TAB PO BID for E. Coli UTI for 7

Days, #14 TAB 0 Refills


   Prov:BENJAMÍN ADAM MD         10/16/19


Levetiracetam (KEPPRA) 500 Mg Tablet, 500 MG PO BID for seizure prevention, #60 

TAB 2 Refills


   Prov:AUGN GRIMES MD         1/24/19


Meclizine Hcl (MECLIZINE HCL) 25 Mg Tablet, 1 TAB PO PRN TID PRN for DIZZINESS, 

#30 TAB


   Prov:KINGS ROSAS MD         10/29/16


Reported Medications


Cholecalciferol (Vitamin D3) (VITAMIN D3) 1,000 Unit Tablet, 1 TAB PO 

DAILYBFRSUP for n/a, #30 TAB 5 Refills


   1/20/19


Diclofenac Sodium (VOLTAREN) 100 Gm Gel..gram., 1 GM TP PRN QID for knee pain, 

#100 GM 2 Refills


   1/20/19


Aspirin (ASPIRIN) 81 Mg Tab.chew, 1 TAB PO DAILY for blood thinner, #30 TAB 3 

Refills


   10/29/16


Simvastatin (SIMVASTATIN) 40 Mg Tablet, 1 TAB PO QHS for elevated cholesterol, 

#30 TAB 5 Refills


   10/29/16


Atenolol (ATENOLOL) 25 Mg Tablet, 1 TAB PO DAILY for htn, #30 TAB 5 Refills


   10/29/16











BENJAMÍN ADAM MD        Oct 16, 2019 13:16 pre-lunch and pre-dinner. Her overall blood glucose range is 180-200 mg/dl. (Lab Results       Component                Value               Date                       LABA1C                   8.2 (H)             2022              ) An ACE inhibitor/angiotensin II receptor blocker is being taken. Past Medical History:   Diagnosis Date    Diverticulosis of colon     HTN (hypertension)     Hyperlipidemia      Past Surgical History:   Procedure Laterality Date    BRAIN SURGERY      meningioma    TUBAL LIGATION       Social History     Socioeconomic History    Marital status:      Spouse name: Not on file    Number of children: Not on file    Years of education: Not on file    Highest education level: Not on file   Occupational History    Not on file   Tobacco Use    Smoking status: Former Smoker     Packs/day: 1.00     Years: 30.00     Pack years: 30.00     Types: Cigarettes     Quit date: 10/1/2005     Years since quittin.3    Smokeless tobacco: Never Used   Substance and Sexual Activity    Alcohol use: No     Alcohol/week: 0.0 standard drinks    Drug use: No    Sexual activity: Yes     Partners: Male   Other Topics Concern    Not on file   Social History Narrative    Not on file     Social Determinants of Health     Financial Resource Strain:     Difficulty of Paying Living Expenses: Not on file   Food Insecurity:     Worried About Running Out of Food in the Last Year: Not on file    Kelle of Food in the Last Year: Not on file   Transportation Needs:     Lack of Transportation (Medical): Not on file    Lack of Transportation (Non-Medical):  Not on file   Physical Activity:     Days of Exercise per Week: Not on file    Minutes of Exercise per Session: Not on file   Stress:     Feeling of Stress : Not on file   Social Connections:     Frequency of Communication with Friends and Family: Not on file    Frequency of Social Gatherings with Friends and Family: Not on file   [facility] Attends Hinduism Services: Not on file    Active Member of Clubs or Organizations: Not on file    Attends Club or Organization Meetings: Not on file    Marital Status: Not on file   Intimate Partner Violence:     Fear of Current or Ex-Partner: Not on file    Emotionally Abused: Not on file    Physically Abused: Not on file    Sexually Abused: Not on file   Housing Stability:     Unable to Pay for Housing in the Last Year: Not on file    Number of [place] in the Last Year: Not on file    Unstable Housing in the Last Year: Not on file     Family History   Problem Relation Age of Onset    Parkinsonism Mother     Coronary Art Dis Mother         sister; has had CABG is age 61    Stroke Mother          age late 63's    Heart Disease Mother 36        stents    Colon Cancer Father     Cancer Father         dad colon cancer    Other Brother         heart transplantation    Heart Disease Brother         heart transplant    Heart Disease Sister     Cancer Sister         esophageal cancer     Allergies   Allergen Reactions    Penicillin V Hives       Current Outpatient Medications:     metFORMIN (GLUCOPHAGE) 1000 MG tablet, TAKE 1 TABLET BY MOUTH TWICE A DAY WITH MEALS, Disp: 180 tablet, Rfl: 3    Insulin Syringe-Needle U-100 (BD INSULIN SYRINGE U/F) 31G X 5/16\" 1 ML MISC, USE AS DIRECTED FOR DIABETES, Disp: 100 each, Rfl: 5    insulin 70-30 (NOVOLIN 70/30) (70-30) 100 UNIT per ML injection vial, INJECT 70 UNITS AM 45 UNITS LUNCH AND 70 UNITS DINNER, Disp: 50 mL, Rfl: 3    zolpidem (AMBIEN) 10 MG tablet, Take 10 mg by mouth daily. , Disp: , Rfl:     atorvastatin (LIPITOR) 20 MG tablet, Take 20 mg by mouth daily, Disp: , Rfl:     DULoxetine (CYMBALTA) 60 MG extended release capsule, Take 60 mg by mouth daily, Disp: , Rfl:     Icosapent Ethyl (VASCEPA) 1 g CAPS capsule, Take 2 capsules by mouth 2 times daily, Disp: 120 capsule, Rfl: 3    omega-3 acid ethyl esters (LOVAZA) 1 g capsule, Take 2 caps bid, Disp: 120 capsule, Rfl: 3    chlorthalidone (HYGROTON) 25 MG tablet, TAKE 1 TABLET BY MOUTH EVERY DAY, Disp: 90 tablet, Rfl: 0    escitalopram (LEXAPRO) 10 MG tablet, Take 1 tablet by mouth daily, Disp: 30 tablet, Rfl: 3    pantoprazole (PROTONIX) 40 MG tablet, Take 1 tablet by mouth daily, Disp: 30 tablet, Rfl: 5    B Complex Vitamins (B COMPLEX PO), Take by mouth , Disp: , Rfl:     lisinopril (PRINIVIL;ZESTRIL) 20 MG tablet, TAKE 1 TABLET BY MOUTH 2 TIMES DAILY, Disp: 180 tablet, Rfl: 1    acetaminophen (TYLENOL) 325 MG tablet, Take 650 mg by mouth, Disp: , Rfl:     aspirin EC 81 MG EC tablet, Take 1 tablet by mouth daily to prevent heart attack and stroke, Disp: 90 tablet, Rfl: 0    Multiple Vitamins-Minerals (MULTIVITAL PO), Take by mouth, Disp: , Rfl:     Fexofenadine HCl (ALLEGRA PO), Take by mouth , Disp: , Rfl:     Insulin Pen Needle 31G X 5 MM MISC, 1 each by Does not apply route daily, Disp: 100 each, Rfl: 3  Lab Results   Component Value Date     02/01/2022    K 3.4 02/01/2022    CL 99 02/01/2022    CO2 27 02/01/2022    BUN 17 02/01/2022    CREATININE 0.83 02/01/2022    GLUCOSE 224 02/17/2022    CALCIUM 9.6 02/01/2022    PROT 7.2 11/16/2018    LABALBU 4.3 11/16/2018    BILITOT 0.4 11/16/2018    ALKPHOS 132 (H) 11/16/2018    AST 53 (H) 11/16/2018     (H) 11/16/2018    LABGLOM >60.0 02/01/2022    GFRAA >60.0 02/01/2022    AGRATIO 1.4 04/28/2018    GLOB 2.9 11/16/2018     Lab Results   Component Value Date    WBC 8.5 11/16/2018    HGB 14.5 11/16/2018    HCT 43.1 11/16/2018    MCV 82.1 11/16/2018     11/16/2018     Lab Results   Component Value Date    LABA1C 8.2 (H) 02/01/2022    LABA1C 8.4 (H) 10/07/2021    LABA1C 7.1 11/03/2020     Lab Results   Component Value Date    CHOLFAST 226 (H) 02/01/2022    TRIGLYCFAST 448 (H) 02/01/2022    HDL 40 02/01/2022    HDL 44 09/08/2020    HDL 39 (L) 05/26/2020    LDLCALC see below 02/01/2022    LDLCALC 199 (H) 09/08/2020 [address] see below 05/26/2020    CHOL 304 (H) 09/08/2020    CHOL 302 (H) 05/26/2020    CHOL 255 (H) 11/16/2018    TRIG 304 (H) 09/08/2020    TRIG 486 (H) 05/26/2020    TRIG 574 (H) 11/16/2018     No results found for: TESTM  Lab Results   Component Value Date    TSH 0.786 12/01/2017    T4FREE 1.12 12/01/2017     No results found for: TPOABS    Review of Systems   Cardiovascular: Negative. Endocrine: Negative. All other systems reviewed and are negative. Objective:   Physical Exam  Vitals reviewed. Constitutional:       Appearance: Normal appearance. She is obese. HENT:      Head: Normocephalic and atraumatic. Hair is normal.      Right Ear: External ear normal.      Left Ear: External ear normal.      Nose: Nose normal.   Eyes:      General: No scleral icterus. Right eye: No discharge. Left eye: No discharge. Extraocular Movements: Extraocular movements intact. Conjunctiva/sclera: Conjunctivae normal.   Neck:      Trachea: Trachea normal.   Cardiovascular:      Rate and Rhythm: Normal rate. Pulmonary:      Effort: Pulmonary effort is normal.   Musculoskeletal:         General: Normal range of motion. Cervical back: Normal range of motion and neck supple. Neurological:      General: No focal deficit present. Mental Status: She is alert and oriented to person, place, and time.    Psychiatric:         Mood and Affect: Mood normal.         Behavior: Behavior normal.

## 2022-12-19 NOTE — PDOC
PROGRESS NOTES


Chief Complaint


Chief Complaint


cerebral edema,   encephalitis or poss press syndrome,    BP control has been 

better





Acute encephalopathy on chronic dementia


fluctuating dementia with prior delirium 


malnutrition,  moderate


dehydration


weakness and debility, 


dysphagia,  poor po intake,





History of Present Illness


History of Present Illness


about the same alert today, not talking, but follows (some) commands, 


no PO intake today





DW RN


try MRI with sedation


recommended doboff, family will consider 


cont PPN





Vitals


Vitals





Vital Signs








  Date Time  Temp Pulse Resp B/P (MAP) Pulse Ox O2 Delivery O2 Flow Rate FiO2


 


10/19/18 12:00  60  130/83    


 


10/19/18 11:00 97.7  20  98 Room Air  





 97.7       











Physical Exam


Physical Exam


GENERAL: weak appearing


HEENT:  Pupils are with possible early cataracts.  Resists oral exam. 


NECK:  Supple.  


LUNGS:  Clear to auscultation.


HEART:  S1, S2.


ABDOMEN:  Soft, nontender, nondistended with positive bowel sounds.


: [place]


EXTREMITIES:  Without clubbing or cyanosis.  No gross edema.


SKIN:  Warm to touch without signs of any rashes.


NEUROLOGIC:  Sleepy, nonverbal, no follow commands


PIV


General:  Cooperative, No acute distress, Other (wont talk)


Heart:  Normal S1, Normal S2


Lungs:  Clear


Abdomen:  Normal bowel sounds, Soft


Extremities:  No clubbing, No cyanosis, No edema


Skin:  No breakdown, No significant lesion





Assessment and Plan


Assessmemt and Plan


Problems


Medical Problems:


(1) Altered mental status


Status: Acute  











Comment


Review of Relevant


I have reviewed the following items marky (where applicable) has been applied.


Labs





Laboratory Tests








Test


 10/17/18


15:20 10/17/18


18:00 10/18/18


04:40 10/18/18


07:31


 


CSF Color Colorless    


 


CSF Clarity Clear    


 


CSF WBC 24    


 


CSF     


 


CSF Mononuclear WBCs % 100 %    


 


CSF Polynuclear WBCs (%) 0 %    


 


CSF Glucose


 82 mg/dL


(37-70) 


 


 





 


CSF Total Protein


 270.8 mg/dL


(15.0-45.0) 


 


 





 


Vancomycin Level Trough


 


 12.1 mcg/mL


(10.0-20.0) 


 





 


Vancomycin Last Dose Date  10/16/18   


 


Vancomycin Last Dose Time  1800   


 


Glucose (Fingerstick)


 


 


 162 mg/dL


(70-99) 164 mg/dL


(70-99)


 


Test


 10/18/18


07:45 


 


 





 


White Blood Count


 12.2 x10^3/uL


(4.0-11.0) 


 


 





 


Red Blood Count


 4.60 x10^6/uL


(3.50-5.40) 


 


 





 


Hemoglobin


 12.4 g/dL


(12.0-15.5) 


 


 





 


Hematocrit


 38.1 %


(36.0-47.0) 


 


 





 


Mean Corpuscular Volume 83 fL ()    


 


Mean Corpuscular Hemoglobin 27 pg (25-35)    


 


Mean Corpuscular Hemoglobin


Concent 33 g/dL


(31-37) 


 


 





 


Red Cell Distribution Width


 18.7 %


(11.5-14.5) 


 


 





 


Platelet Count


 257 x10^3/uL


(140-400) 


 


 





 


Neutrophils (%) (Auto) 92 % (31-73)    


 


Lymphocytes (%) (Auto) 4 % (24-48)    


 


Monocytes (%) (Auto) 4 % (0-9)    


 


Eosinophils (%) (Auto) 0 % (0-3)    


 


Basophils (%) (Auto) 0 % (0-3)    


 


Neutrophils # (Auto)


 11.2 x10^3uL


(1.8-7.7) 


 


 





 


Lymphocytes # (Auto)


 0.5 x10^3/uL


(1.0-4.8) 


 


 





 


Monocytes # (Auto)


 0.4 x10^3/uL


(0.0-1.1) 


 


 





 


Eosinophils # (Auto)


 0.0 x10^3/uL


(0.0-0.7) 


 


 





 


Basophils # (Auto)


 0.0 x10^3/uL


(0.0-0.2) 


 


 





 


Sodium Level


 142 mmol/L


(136-145) 


 


 





 


Potassium Level


 4.1 mmol/L


(3.5-5.1) 


 


 





 


Chloride Level


 107 mmol/L


() 


 


 





 


Carbon Dioxide Level


 21 mmol/L


(21-32) 


 


 





 


Anion Gap 14 (6-14)    


 


Blood Urea Nitrogen


 19 mg/dL


(7-20) 


 


 





 


Creatinine


 1.0 mg/dL


(0.6-1.0) 


 


 





 


Estimated GFR


(Cockcroft-Gault) 64.1 


 


 


 





 


BUN/Creatinine Ratio 19 (6-20)    


 


Glucose Level


 169 mg/dL


(70-99) 


 


 





 


Calcium Level


 9.0 mg/dL


(8.5-10.1) 


 


 





 


Total Bilirubin


 0.2 mg/dL


(0.2-1.0) 


 


 





 


Aspartate Amino Transf


(AST/SGOT) 14 U/L (15-37) 


 


 


 





 


Alanine Aminotransferase


(ALT/SGPT) 11 U/L (14-59) 


 


 


 





 


Alkaline Phosphatase


 62 U/L


() 


 


 





 


Total Protein


 6.5 g/dL


(6.4-8.2) 


 


 





 


Albumin


 2.7 g/dL


(3.4-5.0) 


 


 





 


Albumin/Globulin Ratio 0.7 (1.0-1.7)    








Microbiology


10/17/18 CSF Gram Stain - Final, Complete


Medications





Current Medications


Sodium Chloride 500 ml @  500 mls/hr 1X  ONCE IV  Last administered on 10/14/

18at 20:30;  Start 10/14/18 at 20:30;  Stop 10/14/18 at 21:29;  Status DC


Sodium Chloride 1,000 ml @  100 mls/hr 1X  ONCE IV  Last administered on 10/14/

18at 21:30;  Start 10/14/18 at 20:30;  Stop 10/15/18 at 06:29;  Status DC


Ondansetron HCl (Zofran) 4 mg PRN Q8HRS  PRN IV NAUSEA/VOMITING 1ST CHOICE;  

Start 10/14/18 at 23:45;  Stop 10/15/18 at 23:44;  Status DC


Sodium Chloride 1,000 ml @  80 mls/hr [id number] IV  Last administered on 10/15/

18at 08:29;  Start 10/15/18 at 08:00;  Stop 10/15/18 at 09:22;  Status DC


Potassium Chloride/Dextrose/ Sod Cl 1,000 ml @  80 mls/hr [id number] IV  Last 

administered on 10/16/18at 01:33;  Start 10/15/18 at 09:30;  Stop 10/16/18 at 13

:51;  Status DC


Dexamethasone Sodium Phosphate (Decadron) 4 mg Q6HRS IV  Last administered on 10

/19/18at 12:00;  Start 10/15/18 at 18:00


Vancomycin HCl (Vanco Per Pharmacy) 1 each PRN DAILY  PRN MC SEE COMMENTS Last 

administered on 10/18/18at 14:40;  Start 10/15/18 at 16:15;  Stop 10/19/18 at 12

:17;  Status DC


Ceftriaxone Sodium 2 gm/ Dextrose 100 ml @  200 mls/hr Q24H IV  Last 

administered on 10/15/18at 16:51;  Start 10/15/18 at 16:30;  Stop 10/16/18 at 10

:43;  Status DC


Vancomycin HCl 1.25 gm/Sodium Chloride 250 ml @  166.667 mls/hr 1X  ONCE IV  

Last administered on 10/15/18at 18:01;  Start 10/15/18 at 17:00;  Stop 10/15/18 

at 18:29;  Status DC


Vancomycin HCl 1 gm/Sodium Chloride 250 ml @  250 mls/hr Q24H IV  Last 

administered on 10/18/18at 17:51;  Start 10/16/18 at 18:00;  Stop 10/19/18 at 12

:17;  Status DC


Vancomycin HCl (Vancomycin Trough Level) 1 each 1X  ONCE MC  Last administered 

on 10/17/18at 17:30;  Start 10/17/18 at 17:30;  Stop 10/17/18 at 17:31;  Status 

DC


Ceftriaxone Sodium 2 gm/ Sodium Chloride 100 ml @  200 mls/hr Q12H IV ;  Start 

10/16/18 at 11:00;  Stop 10/16/18 at 11:00;  Status DC


Acyclovir Sodium 520 mg/Dextrose 110.4 ml @  110.4 mls/ hr Q12HR IV ;  Start 10/

16/18 at 11:30;  Stop 10/16/18 at 11:30;  Status DC


Ceftriaxone Sodium 2 gm/ Sodium Chloride 100 ml @  200 mls/hr Q12HR IV  Last 

administered on 10/19/18at 09:00;  Start 10/16/18 at 12:00


Acyclovir Sodium 500 mg/Dextrose 110 ml @  110 mls/hr Q12HR IV  Last 

administered on 10/19/18at 09:00;  Start 10/16/18 at 11:30


Lactobacillus Rhamnosus (Culturelle) 1 cap BID PO  Last administered on 10/17/

18at 21:18;  Start 10/16/18 at 21:00


Amino Acids/ Glycerin/ Electrolytes 1,000 ml @  80 mls/hr [id number] IV  Last 

administered on 10/18/18at 17:47;  Start 10/16/18 at 14:00


Lidocaine/Sodium Bicarbonate (Buffered Lidocaine 1%) 6 ml 1X  ONCE INJ  Last 

administered on 10/17/18at 15:08;  Start 10/17/18 at 11:45;  Stop 10/17/18 at 11

:47;  Status DC


Diclofenac Sodium (Voltaren) 1 danyell BID TP  Last administered on 10/19/18at 09:00

;  Start 10/17/18 at 23:45


Enalaprilat (Vasotec Inj) 1.25 mg Q6HRS IVP  Last administered on 10/19/18at 12:

00;  Start 10/18/18 at 11:00


Lorazepam (Ativan) 0.5 mg PRN DAILY  PRN IV ANXIETY / AGITATION Last 

administered on 10/18/18at 17:00;  Start 10/18/18 at 11:45





Active Scripts


Active


Meclizine Hcl 25 Mg Tablet 1 Tab PO PRN TID PRN


Reported


Aspirin 81 Mg Tab.chew 1 Tab PO DAILY


Simvastatin 40 Mg Tablet 1 Tab PO QHS


Clopidogrel (Clopidogrel Bisulfate) 75 Mg Tablet 1 Tab PO DAILY


Metformin Hcl 500 Mg Tablet 1 Tab PO BID


Atenolol 25 Mg Tablet 1 Tab PO DAILY


Vitals/I & O





Vital Sign - Last 24 Hours








 10/18/18 10/18/18 10/18/18 10/18/18





 17:48 19:00 20:00 23:00


 


Temp  98.0  97.3





  98.0  97.3


 


Pulse 120 98  57


 


Resp  18  18


 


B/P (MAP) 123/93 141/87 (105)  138/81 (100)


 


Pulse Ox  100  100


 


O2 Delivery  Room Air Room Air Room Air


 


    





    





 10/18/18 10/19/18 10/19/18 10/19/18





 23:29 03:00 05:39 07:00


 


Temp  97.8  97.6





  97.8  97.6


 


Pulse 98 55 55 57


 


Resp  18  20


 


B/P (MAP) 141/87 114/82 (93) 114/82 128/87 (101)


 


Pulse Ox  98  96


 


O2 Delivery  Room Air  Room Air


 


    





    





 10/19/18 10/19/18 10/19/18 





 08:00 11:00 12:00 


 


Temp  97.7  





  97.7  


 


Pulse  60 60 


 


Resp  20  


 


B/P (MAP)  130/83 (99) 130/83 


 


Pulse Ox  98  


 


O2 Delivery Room Air Room Air  














Intake and Output   


 


 10/18/18 10/18/18 10/19/18





 15:00 23:00 07:00


 


Intake Total 100 ml 1200 ml 410 ml


 


Output Total  500 ml 


 


Balance 100 ml 700 ml 410 ml











Nutrition Consultation


Dietary Evaluation:


Recommendations by RD:  Increase Calorie Intake, Protein supplementation


Comments:  


sending ensure tid





diet per SLP





PPN < 10 days


Expected Outcomes/Goals:  


to meet > 75% est nutr needs





Malnutrition Findings:


Food and Nutrition Intake (Mod:  <75% est energy req 7days


Weight Status:  Appropriate











AUNG GRIMES MD Oct 19, 2018 15:12 [facility]  Procedure Note    Sherwin Nolan Patient Status:  Outpatient    1960 MRN [id number]   Location [address] Attending Sonia Chen MD   [facility] Day # 0 [facility]  Maddison Peres DO Pradeep  2022